# Patient Record
Sex: FEMALE | Race: BLACK OR AFRICAN AMERICAN | NOT HISPANIC OR LATINO | Employment: FULL TIME | URBAN - METROPOLITAN AREA
[De-identification: names, ages, dates, MRNs, and addresses within clinical notes are randomized per-mention and may not be internally consistent; named-entity substitution may affect disease eponyms.]

---

## 2017-04-05 ENCOUNTER — HOSPITAL ENCOUNTER (EMERGENCY)
Facility: HOSPITAL | Age: 32
Discharge: HOME | End: 2017-04-05
Attending: EMERGENCY MEDICINE | Admitting: EMERGENCY MEDICINE

## 2017-04-05 ENCOUNTER — APPOINTMENT (OUTPATIENT)
Dept: GENERAL RADIOLOGY | Facility: HOSPITAL | Age: 32
End: 2017-04-05

## 2017-04-05 VITALS
WEIGHT: 293 LBS | SYSTOLIC BLOOD PRESSURE: 165 MMHG | BODY MASS INDEX: 43.4 KG/M2 | RESPIRATION RATE: 16 BRPM | HEIGHT: 69 IN | HEART RATE: 85 BPM | OXYGEN SATURATION: 95 % | DIASTOLIC BLOOD PRESSURE: 91 MMHG | TEMPERATURE: 96.9 F

## 2017-04-05 DIAGNOSIS — F41.1 ANXIETY REACTION: Primary | ICD-10-CM

## 2017-04-05 DIAGNOSIS — I10 ESSENTIAL HYPERTENSION: ICD-10-CM

## 2017-04-05 LAB
ANION GAP SERPL CALCULATED.3IONS-SCNC: 13 MMOL/L
BASOPHILS # BLD AUTO: 0.01 10*3/MM3 (ref 0–0.2)
BASOPHILS NFR BLD AUTO: 0.1 % (ref 0–1.5)
BUN BLD-MCNC: 9 MG/DL (ref 6–20)
BUN/CREAT SERPL: 10.2 (ref 7–25)
CALCIUM SPEC-SCNC: 9.5 MG/DL (ref 8.6–10.5)
CHLORIDE SERPL-SCNC: 98 MMOL/L (ref 98–107)
CO2 SERPL-SCNC: 26 MMOL/L (ref 22–29)
CREAT BLD-MCNC: 0.88 MG/DL (ref 0.57–1)
DEPRECATED RDW RBC AUTO: 48.6 FL (ref 37–54)
EOSINOPHIL # BLD AUTO: 0.12 10*3/MM3 (ref 0–0.7)
EOSINOPHIL NFR BLD AUTO: 1 % (ref 0.3–6.2)
ERYTHROCYTE [DISTWIDTH] IN BLOOD BY AUTOMATED COUNT: 15.8 % (ref 11.7–13)
GFR SERPL CREATININE-BSD FRML MDRD: 92 ML/MIN/1.73
GLUCOSE BLD-MCNC: 240 MG/DL (ref 65–99)
HCT VFR BLD AUTO: 39.8 % (ref 35.6–45.5)
HGB BLD-MCNC: 12 G/DL (ref 11.9–15.5)
HOLD SPECIMEN: NORMAL
HOLD SPECIMEN: NORMAL
IMM GRANULOCYTES # BLD: 0.02 10*3/MM3 (ref 0–0.03)
IMM GRANULOCYTES NFR BLD: 0.2 % (ref 0–0.5)
LYMPHOCYTES # BLD AUTO: 2.35 10*3/MM3 (ref 0.9–4.8)
LYMPHOCYTES NFR BLD AUTO: 19.7 % (ref 19.6–45.3)
MCH RBC QN AUTO: 25.4 PG (ref 26.9–32)
MCHC RBC AUTO-ENTMCNC: 30.2 G/DL (ref 32.4–36.3)
MCV RBC AUTO: 84.1 FL (ref 80.5–98.2)
MONOCYTES # BLD AUTO: 0.63 10*3/MM3 (ref 0.2–1.2)
MONOCYTES NFR BLD AUTO: 5.3 % (ref 5–12)
NEUTROPHILS # BLD AUTO: 8.79 10*3/MM3 (ref 1.9–8.1)
NEUTROPHILS NFR BLD AUTO: 73.7 % (ref 42.7–76)
PLATELET # BLD AUTO: 328 10*3/MM3 (ref 140–500)
PMV BLD AUTO: 10.5 FL (ref 6–12)
POTASSIUM BLD-SCNC: 4.1 MMOL/L (ref 3.5–5.2)
RBC # BLD AUTO: 4.73 10*6/MM3 (ref 3.9–5.2)
SODIUM BLD-SCNC: 137 MMOL/L (ref 136–145)
WBC NRBC COR # BLD: 11.92 10*3/MM3 (ref 4.5–10.7)
WHOLE BLOOD HOLD SPECIMEN: NORMAL
WHOLE BLOOD HOLD SPECIMEN: NORMAL

## 2017-04-05 PROCEDURE — 99283 EMERGENCY DEPT VISIT LOW MDM: CPT

## 2017-04-05 PROCEDURE — 25010000002 ONDANSETRON PER 1 MG: Performed by: EMERGENCY MEDICINE

## 2017-04-05 PROCEDURE — 80048 BASIC METABOLIC PNL TOTAL CA: CPT | Performed by: EMERGENCY MEDICINE

## 2017-04-05 PROCEDURE — 71020 HC CHEST PA AND LATERAL: CPT

## 2017-04-05 PROCEDURE — 85025 COMPLETE CBC W/AUTO DIFF WBC: CPT | Performed by: EMERGENCY MEDICINE

## 2017-04-05 PROCEDURE — 96374 THER/PROPH/DIAG INJ IV PUSH: CPT

## 2017-04-05 RX ORDER — ONDANSETRON 2 MG/ML
4 INJECTION INTRAMUSCULAR; INTRAVENOUS ONCE
Status: COMPLETED | OUTPATIENT
Start: 2017-04-05 | End: 2017-04-05

## 2017-04-05 RX ORDER — SODIUM CHLORIDE 0.9 % (FLUSH) 0.9 %
10 SYRINGE (ML) INJECTION AS NEEDED
Status: DISCONTINUED | OUTPATIENT
Start: 2017-04-05 | End: 2017-04-05 | Stop reason: HOSPADM

## 2017-04-05 RX ORDER — ONDANSETRON HYDROCHLORIDE 8 MG/1
8 TABLET, FILM COATED ORAL EVERY 8 HOURS PRN
Qty: 10 TABLET | Refills: 0 | Status: SHIPPED | OUTPATIENT
Start: 2017-04-05 | End: 2019-04-22 | Stop reason: ALTCHOICE

## 2017-04-05 RX ADMIN — ONDANSETRON 4 MG: 2 INJECTION INTRAMUSCULAR; INTRAVENOUS at 15:16

## 2017-04-05 NOTE — ED PROVIDER NOTES
" EMERGENCY DEPARTMENT ENCOUNTER    CHIEF COMPLAINT  Chief Complaint: Dizziness, vomiting  History given by: Pt  History limited by: N/A  Room Number: 09/09  PMD: BOB Luis      HPI:  Pt is a 31 y.o. female who presents complaining of dizziness and vomiting x3 onset 10:30 AM today while at work. Pt also c/o facial tingling, nausea, tremors, moderate headache, and occasionally productive cough w/ brown/green sputum for 2 months for which she has been seen by her PCP. She also states \"it felt like I was stuck\" and that she has experienced similar, but less severe, episodes in the last due to panic attacks. Pt denies recent fever, chest pain, SOA, abd pain, diarrhea, black/bloody stool, and recent weight changes. Her LMP was the 11th of March. Pt smokes less than 1/2 pack per day and occasionally drinks alcohol, but none recently. Pt reports THC use several days ago, but denies other illicit drug use. She is on two blood pressure medications, one of which she started yesterday. Pt was also started on Bactrim and Cymbalta yesterday. Pt reports ill contacts at work recently.    Duration: 3 hours  Onset: Gradual  Timing: Constant  Location: Neurological  Radiation: None  Quality: Dizziness  Intensity/Severity: Moderate  Progression: Unchanged  Associated Symptoms: Vomiting, nausea, facial tingling, tremors, moderate headache, and occasionally productive cough for 2 months  Aggravating Factors: Nothing  Alleviating Factors: Nothing  Previous Episodes: None specified  Treatment before arrival: None specified    PAST MEDICAL HISTORY  Active Ambulatory Problems     Diagnosis Date Noted   • No Active Ambulatory Problems     Resolved Ambulatory Problems     Diagnosis Date Noted   • No Resolved Ambulatory Problems     Past Medical History:   Diagnosis Date   • Diabetes mellitus    • Hypertension        PAST SURGICAL HISTORY  History reviewed. No pertinent surgical history.    FAMILY HISTORY  History reviewed. No " pertinent family history.    SOCIAL HISTORY  Social History     Social History   • Marital status:      Spouse name: N/A   • Number of children: N/A   • Years of education: N/A     Occupational History   • Not on file.     Social History Main Topics   • Smoking status: Current Some Day Smoker   • Smokeless tobacco: Not on file   • Alcohol use Yes   • Drug use: No   • Sexual activity: Not on file     Other Topics Concern   • Not on file     Social History Narrative   • No narrative on file       ALLERGIES  Latex    REVIEW OF SYSTEMS  Review of Systems   Constitutional: Negative.  Negative for fever and unexpected weight change.   Respiratory: Positive for cough (occasionally productive for 2 months). Negative for shortness of breath.    Cardiovascular: Negative.  Negative for chest pain.   Gastrointestinal: Positive for nausea and vomiting. Negative for abdominal pain, blood in stool and diarrhea.   Genitourinary: Negative.  Negative for dysuria.   Neurological: Positive for dizziness, tremors, numbness (facial) and headaches (moderate).   Psychiatric/Behavioral: The patient is nervous/anxious.    All other systems reviewed and are negative.      PHYSICAL EXAM  ED Triage Vitals   Temp Heart Rate Resp BP SpO2   04/05/17 1302 04/05/17 1302 04/05/17 1302 04/05/17 1302 04/05/17 1302   99 °F (37.2 °C) 92 20 166/99 100 %      Temp src Heart Rate Source Patient Position BP Location FiO2 (%)   -- -- -- -- --              Physical Exam   Constitutional: She is oriented to person, place, and time and well-developed, well-nourished, and in no distress. No distress.   HENT:   Head: Normocephalic and atraumatic.   Eyes: EOM are normal. Pupils are equal, round, and reactive to light.   Neck: Normal range of motion. Neck supple.   Cardiovascular: Normal rate, regular rhythm and normal heart sounds.    No murmur heard.  Rate 87   Pulmonary/Chest: Effort normal. No respiratory distress. She has decreased breath sounds  (bilaterally).   Sats 98% on room air. Respirations unlabored   Abdominal: Soft. There is no tenderness. There is no rebound and no guarding.   Musculoskeletal: Normal range of motion. She exhibits no edema.   Neurological: She is alert and oriented to person, place, and time. She has normal sensation and normal strength.   Skin: Skin is warm and dry. No rash noted.   Psychiatric: Mood and affect normal.   Nursing note and vitals reviewed.      LAB RESULTS  Lab Results (last 24 hours)     Procedure Component Value Units Date/Time    CBC & Differential [[id number]] Collected:  04/05/17 1323    Specimen:  Blood Updated:  04/05/17 1407    Narrative:       The following orders were created for panel order CBC & Differential.  Procedure                               Abnormality         Status                     ---------                               -----------         ------                     CBC Auto Differential[[id number]]         Abnormal            Final result                 Please view results for these tests on the individual orders.    Basic Metabolic Panel [[id number]]  (Abnormal) Collected:  04/05/17 1323    Specimen:  Blood Updated:  04/05/17 1419     Glucose 240 (H) mg/dL      BUN 9 mg/dL      Creatinine 0.88 mg/dL      Sodium 137 mmol/L      Potassium 4.1 mmol/L      Chloride 98 mmol/L      CO2 26.0 mmol/L      Calcium 9.5 mg/dL      eGFR  African Amer 92 mL/min/1.73      BUN/Creatinine Ratio 10.2     Anion Gap 13.0 mmol/L     Narrative:       GFR Normal >60  Chronic Kidney Disease <60  Kidney Failure <15    CBC Auto Differential [[id number]]  (Abnormal) Collected:  04/05/17 1323    Specimen:  Blood Updated:  04/05/17 1407     WBC 11.92 (H) 10*3/mm3      RBC 4.73 10*6/mm3      Hemoglobin 12.0 g/dL      Hematocrit 39.8 %      MCV 84.1 fL      MCH 25.4 (L) pg      MCHC 30.2 (L) g/dL      RDW 15.8 (H) %      RDW-SD 48.6 fl      MPV 10.5 fL      Platelets 328 10*3/mm3      Neutrophil % 73.7 %      Lymphocyte %  "19.7 %      Monocyte % 5.3 %      Eosinophil % 1.0 %      Basophil % 0.1 %      Immature Grans % 0.2 %      Neutrophils, Absolute 8.79 (H) 10*3/mm3      Lymphocytes, Absolute 2.35 10*3/mm3      Monocytes, Absolute 0.63 10*3/mm3      Eosinophils, Absolute 0.12 10*3/mm3      Basophils, Absolute 0.01 10*3/mm3      Immature Grans, Absolute 0.02 10*3/mm3           I ordered the above labs and reviewed the results    RADIOLOGY  XR Chest 2 View   Final Result      CXR:  Cardiomegaly.  NAD.    I ordered the above noted radiological studies. Interpreted by radiologist. Discussed with radiologist. Reviewed by me in PACS.       PROCEDURES  Procedures      PROGRESS AND CONSULTS  ED Course   Comment By Time   1:45 PM  32 yo with morbid obesity and HTN presents with possible \"panic attack\" at work around 10am.  Felt \"weird\" and dizzy.  Nausea and shaking all over.  Pt started yesterday on new BP med and Cymbalta by PCP.  Exam is unremarkable and neuro exam is normal. Tadeo Jack MD 04/05 1346   1:30 PM:  Vitals: BP: 166/99 HR: 92 Temp: 99 °F (37.2 °C) O2 sat: 100%  D/w pt plan for labs and CXR for further evaluation. Pt understands and agrees with the plan, all questions answered.    2:51 PM:  Vitals: BP: 162/97 HR: 90 Temp: 99 °F (37.2 °C) O2 sat: 96%  Rechecked pt. Pt is resting comfortably. Discussed results of labs, which were unremarkable, and that the pt should be taken for CXR shortly. Advised the pt to monitor her blood pressure over the next week and to f/u with her PCP for further care. Pt understands and agrees with the plan, all questions answered.      MEDICAL DECISION MAKING  Results were reviewed/discussed with the patient and they were also made aware of online access. Pt also made aware that some labs, such as cultures, will not be resulted during ER visit and follow up with PMD is necessary.     MDM  Number of Diagnoses or Management Options     Amount and/or Complexity of Data Reviewed  Clinical lab tests: " reviewed and ordered (Glucose 240, WBC 11.92)  Tests in the radiology section of CPT®: reviewed and ordered  Discussion of test results with the performing providers: yes  Independent visualization of images, tracings, or specimens: yes    Patient Progress  Patient progress: stable         DIAGNOSIS  Final diagnoses:   Anxiety reaction   Essential hypertension       DISPOSITION  DISCHARGE    Patient discharged in stable condition.    Reviewed implications of results, diagnosis, meds, responsibility to follow up, warning signs and symptoms of possible worsening, potential complications and reasons to return to ER, including any new or worsening symptoms.    Patient/Family voiced understanding of above instructions.    Discussed plan for discharge, as there is no emergent indication for admission.  Pt/family is agreeable and understands need for follow up and repeat testing.  Pt is aware that discharge does not mean that nothing is wrong but it indicates no emergency is present that requires admission and they must continue care with follow-up as given below or physician of their choice.     FOLLOW-UP  Aditi Daniels, APRN  [address] [facility] [address]  [place] [id number]  [phone]    In 3 days  If Not Better         Medication List      New Prescriptions          ondansetron 8 MG tablet   Commonly known as:  ZOFRAN   Take 1 tablet by mouth Every 8 (Eight) Hours As Needed for Nausea or   Vomiting.           Latest Documented Vital Signs:  As of 3:47 PM  BP- (!) 173/111 HR- 90 Temp- 99 °F (37.2 °C) O2 sat- 96%    --  Documentation assistance provided by galian Real for Dr. Jack.  Information recorded by the scribe was done at my direction and has been verified and validated by me.     Dipesh Real  04/05/17 [id number]       Tadeo Jack MD  04/05/17 1353

## 2017-04-05 NOTE — ED NOTES
Pt reports nausea, vomiting, weakness, malaise since 10am today.      Annalisa So RN  04/05/17 6334

## 2019-04-16 ENCOUNTER — LAB REQUISITION (OUTPATIENT)
Dept: LAB | Facility: HOSPITAL | Age: 34
End: 2019-04-16

## 2019-04-16 DIAGNOSIS — Z00.00 ROUTINE GENERAL MEDICAL EXAMINATION AT A HEALTH CARE FACILITY: ICD-10-CM

## 2019-04-16 LAB — HCG INTACT+B SERPL-ACNC: 938.4 MIU/ML

## 2019-04-16 PROCEDURE — 84702 CHORIONIC GONADOTROPIN TEST: CPT | Performed by: INTERNAL MEDICINE

## 2019-04-22 ENCOUNTER — APPOINTMENT (OUTPATIENT)
Dept: ULTRASOUND IMAGING | Facility: HOSPITAL | Age: 34
End: 2019-04-22

## 2019-04-22 ENCOUNTER — HOSPITAL ENCOUNTER (EMERGENCY)
Facility: HOSPITAL | Age: 34
Discharge: HOME | End: 2019-04-23
Attending: EMERGENCY MEDICINE | Admitting: EMERGENCY MEDICINE

## 2019-04-22 DIAGNOSIS — Z34.91 FIRST TRIMESTER PREGNANCY: ICD-10-CM

## 2019-04-22 DIAGNOSIS — I10 HYPERTENSION, UNSPECIFIED TYPE: Primary | ICD-10-CM

## 2019-04-22 LAB
BASOPHILS # BLD AUTO: 0.01 10*3/MM3 (ref 0–0.2)
BASOPHILS NFR BLD AUTO: 0.1 % (ref 0–1.5)
DEPRECATED RDW RBC AUTO: 49.4 FL (ref 37–54)
EOSINOPHIL # BLD AUTO: 0.14 10*3/MM3 (ref 0–0.4)
EOSINOPHIL NFR BLD AUTO: 1.2 % (ref 0.3–6.2)
ERYTHROCYTE [DISTWIDTH] IN BLOOD BY AUTOMATED COUNT: 16 % (ref 12.3–15.4)
HCT VFR BLD AUTO: 37.7 % (ref 34–46.6)
HGB BLD-MCNC: 11.3 G/DL (ref 12–15.9)
HOLD SPECIMEN: NORMAL
HOLD SPECIMEN: NORMAL
IMM GRANULOCYTES # BLD AUTO: 0.06 10*3/MM3 (ref 0–0.05)
IMM GRANULOCYTES NFR BLD AUTO: 0.5 % (ref 0–0.5)
LYMPHOCYTES # BLD AUTO: 4.07 10*3/MM3 (ref 0.7–3.1)
LYMPHOCYTES NFR BLD AUTO: 35.1 % (ref 19.6–45.3)
MCH RBC QN AUTO: 25.6 PG (ref 26.6–33)
MCHC RBC AUTO-ENTMCNC: 30 G/DL (ref 31.5–35.7)
MCV RBC AUTO: 85.3 FL (ref 79–97)
MONOCYTES # BLD AUTO: 0.62 10*3/MM3 (ref 0.1–0.9)
MONOCYTES NFR BLD AUTO: 5.4 % (ref 5–12)
NEUTROPHILS # BLD AUTO: 6.74 10*3/MM3 (ref 1.7–7)
NEUTROPHILS NFR BLD AUTO: 58.2 % (ref 42.7–76)
PLATELET # BLD AUTO: 386 10*3/MM3 (ref 140–450)
PMV BLD AUTO: 10.6 FL (ref 6–12)
RBC # BLD AUTO: 4.42 10*6/MM3 (ref 3.77–5.28)
WBC NRBC COR # BLD: 11.58 10*3/MM3 (ref 3.4–10.8)
WHOLE BLOOD HOLD SPECIMEN: NORMAL
WHOLE BLOOD HOLD SPECIMEN: NORMAL

## 2019-04-22 PROCEDURE — 84702 CHORIONIC GONADOTROPIN TEST: CPT | Performed by: EMERGENCY MEDICINE

## 2019-04-22 PROCEDURE — 85025 COMPLETE CBC W/AUTO DIFF WBC: CPT | Performed by: EMERGENCY MEDICINE

## 2019-04-22 PROCEDURE — 80053 COMPREHEN METABOLIC PANEL: CPT | Performed by: EMERGENCY MEDICINE

## 2019-04-22 PROCEDURE — 76817 TRANSVAGINAL US OBSTETRIC: CPT

## 2019-04-22 PROCEDURE — 99283 EMERGENCY DEPT VISIT LOW MDM: CPT

## 2019-04-22 RX ORDER — LABETALOL 100 MG/1
200 TABLET, FILM COATED ORAL 2 TIMES DAILY
COMMUNITY
End: 2022-02-19 | Stop reason: HOSPADM

## 2019-04-22 RX ORDER — HYDROCHLOROTHIAZIDE 25 MG/1
25 TABLET ORAL DAILY
COMMUNITY
End: 2019-05-02

## 2019-04-22 RX ORDER — SODIUM CHLORIDE 0.9 % (FLUSH) 0.9 %
10 SYRINGE (ML) INJECTION AS NEEDED
Status: DISCONTINUED | OUTPATIENT
Start: 2019-04-22 | End: 2019-04-23 | Stop reason: HOSPADM

## 2019-04-22 RX ORDER — GLIPIZIDE 5 MG/1
5 TABLET ORAL
COMMUNITY
End: 2019-05-02

## 2019-04-23 VITALS
OXYGEN SATURATION: 98 % | SYSTOLIC BLOOD PRESSURE: 164 MMHG | RESPIRATION RATE: 18 BRPM | HEART RATE: 90 BPM | DIASTOLIC BLOOD PRESSURE: 91 MMHG | BODY MASS INDEX: 43.4 KG/M2 | HEIGHT: 69 IN | TEMPERATURE: 99.5 F | WEIGHT: 293 LBS

## 2019-04-23 LAB
ALBUMIN SERPL-MCNC: 4.1 G/DL (ref 3.5–5.2)
ALBUMIN/GLOB SERPL: 1.1 G/DL
ALP SERPL-CCNC: 91 U/L (ref 39–117)
ALT SERPL W P-5'-P-CCNC: 18 U/L (ref 1–33)
ANION GAP SERPL CALCULATED.3IONS-SCNC: 16 MMOL/L
AST SERPL-CCNC: 17 U/L (ref 1–32)
BILIRUB SERPL-MCNC: 0.2 MG/DL (ref 0.2–1.2)
BILIRUB UR QL STRIP: NEGATIVE
BUN BLD-MCNC: 10 MG/DL (ref 6–20)
BUN/CREAT SERPL: 12.8 (ref 7–25)
CALCIUM SPEC-SCNC: 9.7 MG/DL (ref 8.6–10.5)
CHLORIDE SERPL-SCNC: 96 MMOL/L (ref 98–107)
CLARITY UR: CLEAR
CO2 SERPL-SCNC: 24 MMOL/L (ref 22–29)
COLOR UR: YELLOW
CREAT BLD-MCNC: 0.78 MG/DL (ref 0.57–1)
GFR SERPL CREATININE-BSD FRML MDRD: 104 ML/MIN/1.73
GLOBULIN UR ELPH-MCNC: 3.8 GM/DL
GLUCOSE BLD-MCNC: 256 MG/DL (ref 65–99)
GLUCOSE UR STRIP-MCNC: ABNORMAL MG/DL
HCG INTACT+B SERPL-ACNC: 6257 MIU/ML
HGB UR QL STRIP.AUTO: NEGATIVE
KETONES UR QL STRIP: ABNORMAL
LEUKOCYTE ESTERASE UR QL STRIP.AUTO: NEGATIVE
NITRITE UR QL STRIP: NEGATIVE
PH UR STRIP.AUTO: 6 [PH] (ref 5–8)
POTASSIUM BLD-SCNC: 3.9 MMOL/L (ref 3.5–5.2)
PROT SERPL-MCNC: 7.9 G/DL (ref 6–8.5)
PROT UR QL STRIP: NEGATIVE
SODIUM BLD-SCNC: 136 MMOL/L (ref 136–145)
SP GR UR STRIP: 1.03 (ref 1–1.03)
UROBILINOGEN UR QL STRIP: ABNORMAL

## 2019-04-23 PROCEDURE — 81003 URINALYSIS AUTO W/O SCOPE: CPT | Performed by: EMERGENCY MEDICINE

## 2019-04-23 PROCEDURE — 93005 ELECTROCARDIOGRAM TRACING: CPT | Performed by: EMERGENCY MEDICINE

## 2019-05-01 ENCOUNTER — INITIAL PRENATAL (OUTPATIENT)
Dept: OBSTETRICS AND GYNECOLOGY | Facility: CLINIC | Age: 34
End: 2019-05-01

## 2019-05-01 ENCOUNTER — LAB REQUISITION (OUTPATIENT)
Dept: LAB | Facility: HOSPITAL | Age: 34
End: 2019-05-01

## 2019-05-01 VITALS
HEIGHT: 67 IN | SYSTOLIC BLOOD PRESSURE: 144 MMHG | WEIGHT: 293 LBS | BODY MASS INDEX: 45.99 KG/M2 | DIASTOLIC BLOOD PRESSURE: 90 MMHG

## 2019-05-01 DIAGNOSIS — O16.9 HYPERTENSION AFFECTING PREGNANCY, ANTEPARTUM: ICD-10-CM

## 2019-05-01 DIAGNOSIS — O99.210 OBESITY AFFECTING PREGNANCY, ANTEPARTUM: ICD-10-CM

## 2019-05-01 DIAGNOSIS — O24.119 PRE-EXISTING TYPE 2 DIABETES MELLITUS DURING PREGNANCY, ANTEPARTUM: ICD-10-CM

## 2019-05-01 DIAGNOSIS — O34.219 PREVIOUS CESAREAN DELIVERY AFFECTING PREGNANCY: Primary | ICD-10-CM

## 2019-05-01 DIAGNOSIS — Z00.00 ROUTINE GENERAL MEDICAL EXAMINATION AT A HEALTH CARE FACILITY: ICD-10-CM

## 2019-05-01 LAB
EXTERNAL ABO GROUPING: (no result)
EXTERNAL ANTIBODY SCREEN: NORMAL
EXTERNAL HEMATOCRIT: 39 %
EXTERNAL HEMOGLOBIN: 12 G/DL
EXTERNAL HEPATITIS B SURFACE ANTIGEN: NEGATIVE
EXTERNAL PLATELET COUNT: 347 K/ΜL
EXTERNAL RH FACTOR: POSITIVE
EXTERNAL SYPHILIS RPR SCREEN: (no result)
HIV 1+2 AB+HIV1 P24 AG SERPL QL IA: NORMAL
RUBV IGG SERPL IA-ACNC: (no result)

## 2019-05-01 PROCEDURE — 36415 COLL VENOUS BLD VENIPUNCTURE: CPT | Performed by: OBSTETRICS & GYNECOLOGY

## 2019-05-01 PROCEDURE — 0501F PRENATAL FLOW SHEET: CPT | Performed by: OBSTETRICS & GYNECOLOGY

## 2019-05-02 ENCOUNTER — ROUTINE PRENATAL (OUTPATIENT)
Dept: OBSTETRICS AND GYNECOLOGY | Facility: CLINIC | Age: 34
End: 2019-05-02

## 2019-05-02 VITALS — WEIGHT: 293 LBS | BODY MASS INDEX: 67.82 KG/M2 | SYSTOLIC BLOOD PRESSURE: 132 MMHG | DIASTOLIC BLOOD PRESSURE: 76 MMHG

## 2019-05-02 DIAGNOSIS — O99.210 OBESITY AFFECTING PREGNANCY, ANTEPARTUM: ICD-10-CM

## 2019-05-02 DIAGNOSIS — O24.119 PRE-EXISTING TYPE 2 DIABETES MELLITUS DURING PREGNANCY, ANTEPARTUM: ICD-10-CM

## 2019-05-02 DIAGNOSIS — O16.9 HYPERTENSION AFFECTING PREGNANCY, ANTEPARTUM: ICD-10-CM

## 2019-05-02 DIAGNOSIS — O34.219 PREVIOUS CESAREAN DELIVERY AFFECTING PREGNANCY: Primary | ICD-10-CM

## 2019-05-02 RX ORDER — BLOOD-GLUCOSE METER
1 KIT MISCELLANEOUS AS NEEDED
Qty: 1 EACH | Refills: 1 | Status: SHIPPED | OUTPATIENT
Start: 2019-05-02 | End: 2020-05-01

## 2019-05-06 ENCOUNTER — RESULTS ENCOUNTER (OUTPATIENT)
Dept: OBSTETRICS AND GYNECOLOGY | Facility: CLINIC | Age: 34
End: 2019-05-06

## 2019-05-06 ENCOUNTER — TELEPHONE (OUTPATIENT)
Dept: OBSTETRICS AND GYNECOLOGY | Facility: CLINIC | Age: 34
End: 2019-05-06

## 2019-05-06 DIAGNOSIS — O99.210 OBESITY AFFECTING PREGNANCY, ANTEPARTUM: ICD-10-CM

## 2019-05-06 DIAGNOSIS — O34.219 PREVIOUS CESAREAN DELIVERY AFFECTING PREGNANCY: ICD-10-CM

## 2019-05-06 DIAGNOSIS — O24.119 PRE-EXISTING TYPE 2 DIABETES MELLITUS DURING PREGNANCY, ANTEPARTUM: ICD-10-CM

## 2019-05-06 DIAGNOSIS — O16.9 HYPERTENSION AFFECTING PREGNANCY, ANTEPARTUM: ICD-10-CM

## 2019-05-06 NOTE — TELEPHONE ENCOUNTER
Daniel from [facility] pharmacy -  They received a script last week for a  Freestyle monitoring kit - he is asking how many times she is testing?

## 2019-05-12 NOTE — PROGRESS NOTES
"Subjective   Edd Leung is a 33 y.o. female being seen today for her first obstetrical visit. She is at 8w3d gestation. Patient reports fatigue and pelvic cramping. Fetal movement: None to date.    Menstrual History:  OB History      Para Term  AB Living    2 1 1     1    SAB TAB Ectopic Molar Multiple Live Births              1         Menarche age: 13  Patient's last menstrual period was 2019 (exact date).       The following portions of the patient's history were reviewed and updated as appropriate: allergies, current medications, past family history, past medical history, past social history, past surgical history and problem list.    Review of Systems  A 14 point review of systems was negative except for: Constitutional: positive for fatigue  Genitourinary: positive for Pelvic cramping     Objective     /90   Ht 170.2 cm (67\")   Wt (!) 198 kg (436 lb)   LMP 2019 (Exact Date)   BMI 68.29 kg/m²   Uterine Size: Appropriate   Pelvic Exam:           Perineum: is normal                 Vulva: normal and Bartholin's, Urethra, Stonewood's normal              Vagina:  normal mucosa and normal discharge               Cervix: no cervical motion tenderness               Uterus: normal size, non-tender and normal shape and consistency              Adnexa: normal adnexa and no mass, fullness, tenderness      Bony Pelvis: gynecoid        Assessment     IUP at 8w3d  Pelvic cramping  Uncertain LMP      Plan     Problem list reviewed and updated.  Labs and ultrasound ordered.  AFP3 discussed: undecided.  Role of ultrasound in pregnancy discussed; fetal survey: ordered.  Amniocentesis discussed: not indicated.  Follow up in 1 weeks.  Time 20 minutes.       Guzman Peñaloza MD RAKESH    "

## 2019-05-19 NOTE — PROGRESS NOTES
Chief Complaint   Patient presents with   • Routine Prenatal Visit     No complaints       HPI: Edd is a  currently at 9w1d who today reports the following:  Nausea - No; Vaginal bleeding -  No; Heartburn - No.    ROS:  GI: Constipation - No; Diarrhea - No    Neuro: Headache - No; Visual change - No      EXAM:  Vitals: See prenatal flowsheet   Abdomen: See prenatal flowsheet   Urine glucose/protein: See prenatal flowsheet   Pelvic: See prenatal flowsheet     Prenatal Labs  Lab Results   Component Value Date    HGB 11.3 (L) 2019           MDM:  Impression: 1. Supervision of high risk pregnancy  2. DM - Pregestational  3. Previous C/S with route of delivery to be determined  4. Morbid obesity   Tests done today: 1. U/S for Dating   Topics discussed: 1. Continue with PNV's  2. Prenatal labs reviewed  3. Discussed plans for monitoring glucose levels.  Glucometer ordered.   Tests next visit: none   Next visit: See prenatal flowsheet

## 2019-05-20 ENCOUNTER — ROUTINE PRENATAL (OUTPATIENT)
Dept: OBSTETRICS AND GYNECOLOGY | Facility: CLINIC | Age: 34
End: 2019-05-20

## 2019-05-20 VITALS — WEIGHT: 293 LBS | DIASTOLIC BLOOD PRESSURE: 90 MMHG | BODY MASS INDEX: 68.48 KG/M2 | SYSTOLIC BLOOD PRESSURE: 130 MMHG

## 2019-05-20 DIAGNOSIS — O24.119 PRE-EXISTING TYPE 2 DIABETES MELLITUS DURING PREGNANCY, ANTEPARTUM: ICD-10-CM

## 2019-05-20 DIAGNOSIS — O34.219 PREVIOUS CESAREAN DELIVERY AFFECTING PREGNANCY: Primary | ICD-10-CM

## 2019-05-20 DIAGNOSIS — O16.9 HYPERTENSION AFFECTING PREGNANCY, ANTEPARTUM: ICD-10-CM

## 2019-05-20 DIAGNOSIS — O99.210 OBESITY AFFECTING PREGNANCY, ANTEPARTUM: ICD-10-CM

## 2019-05-20 LAB
AMPHET+METHAMPHET UR QL: NEGATIVE
AMPHETAMINES UR QL: NEGATIVE
BARBITURATES UR QL SCN: NEGATIVE
BENZODIAZ UR QL SCN: NEGATIVE
BUPRENORPHINE SERPL-MCNC: NEGATIVE NG/ML
CANNABINOIDS SERPL QL: POSITIVE
COCAINE UR QL: NEGATIVE
METHADONE UR QL SCN: NEGATIVE
OPIATES UR QL: NEGATIVE
OXYCODONE UR QL SCN: NEGATIVE
PCP UR QL SCN: NEGATIVE
PROPOXYPH UR QL: NEGATIVE
TRICYCLICS UR QL SCN: NEGATIVE

## 2019-05-20 PROCEDURE — 80306 DRUG TEST PRSMV INSTRMNT: CPT | Performed by: OBSTETRICS & GYNECOLOGY

## 2019-05-20 PROCEDURE — 0502F SUBSEQUENT PRENATAL CARE: CPT | Performed by: OBSTETRICS & GYNECOLOGY

## 2019-05-20 RX ORDER — DOCUSATE SODIUM 100 MG/1
100 CAPSULE, LIQUID FILLED ORAL 2 TIMES DAILY
Qty: 60 CAPSULE | Refills: 12 | Status: SHIPPED | OUTPATIENT
Start: 2019-05-20 | End: 2020-05-19

## 2020-09-02 ENCOUNTER — APPOINTMENT (OUTPATIENT)
Dept: GENERAL RADIOLOGY | Facility: HOSPITAL | Age: 35
End: 2020-09-02

## 2020-09-02 ENCOUNTER — HOSPITAL ENCOUNTER (EMERGENCY)
Facility: HOSPITAL | Age: 35
Discharge: HOME | End: 2020-09-03
Attending: EMERGENCY MEDICINE | Admitting: EMERGENCY MEDICINE

## 2020-09-02 VITALS
HEART RATE: 58 BPM | DIASTOLIC BLOOD PRESSURE: 97 MMHG | TEMPERATURE: 98.3 F | BODY MASS INDEX: 43.4 KG/M2 | RESPIRATION RATE: 16 BRPM | WEIGHT: 293 LBS | SYSTOLIC BLOOD PRESSURE: 125 MMHG | OXYGEN SATURATION: 98 % | HEIGHT: 69 IN

## 2020-09-02 DIAGNOSIS — F41.9 ANXIETY: ICD-10-CM

## 2020-09-02 DIAGNOSIS — R07.9 NONSPECIFIC CHEST PAIN: Primary | ICD-10-CM

## 2020-09-02 LAB
ALBUMIN SERPL-MCNC: 4.5 G/DL (ref 3.5–5.2)
ALBUMIN/GLOB SERPL: 1.2 G/DL
ALP SERPL-CCNC: 103 U/L (ref 39–117)
ALT SERPL W P-5'-P-CCNC: 21 U/L (ref 1–33)
ANION GAP SERPL CALCULATED.3IONS-SCNC: 11 MMOL/L (ref 5–15)
AST SERPL-CCNC: 23 U/L (ref 1–32)
B-HCG UR QL: NEGATIVE
BASOPHILS # BLD AUTO: 0.03 10*3/MM3 (ref 0–0.2)
BASOPHILS NFR BLD AUTO: 0.3 % (ref 0–1.5)
BILIRUB SERPL-MCNC: 0.3 MG/DL (ref 0–1.2)
BUN SERPL-MCNC: 10 MG/DL (ref 6–20)
BUN/CREAT SERPL: 11.9 (ref 7–25)
CALCIUM SPEC-SCNC: 9.6 MG/DL (ref 8.6–10.5)
CHLORIDE SERPL-SCNC: 98 MMOL/L (ref 98–107)
CO2 SERPL-SCNC: 28 MMOL/L (ref 22–29)
CREAT SERPL-MCNC: 0.84 MG/DL (ref 0.57–1)
DEPRECATED RDW RBC AUTO: 49.8 FL (ref 37–54)
EOSINOPHIL # BLD AUTO: 0.18 10*3/MM3 (ref 0–0.4)
EOSINOPHIL NFR BLD AUTO: 1.7 % (ref 0.3–6.2)
ERYTHROCYTE [DISTWIDTH] IN BLOOD BY AUTOMATED COUNT: 16.3 % (ref 12.3–15.4)
GFR SERPL CREATININE-BSD FRML MDRD: 95 ML/MIN/1.73
GLOBULIN UR ELPH-MCNC: 3.8 GM/DL
GLUCOSE SERPL-MCNC: 190 MG/DL (ref 65–99)
HCT VFR BLD AUTO: 43.6 % (ref 34–46.6)
HGB BLD-MCNC: 12.6 G/DL (ref 12–15.9)
HOLD SPECIMEN: NORMAL
HOLD SPECIMEN: NORMAL
IMM GRANULOCYTES # BLD AUTO: 0.02 10*3/MM3 (ref 0–0.05)
IMM GRANULOCYTES NFR BLD AUTO: 0.2 % (ref 0–0.5)
INTERNAL NEGATIVE CONTROL: NEGATIVE
INTERNAL POSITIVE CONTROL: POSITIVE
LIPASE SERPL-CCNC: 46 U/L (ref 13–60)
LYMPHOCYTES # BLD AUTO: 4.59 10*3/MM3 (ref 0.7–3.1)
LYMPHOCYTES NFR BLD AUTO: 42.2 % (ref 19.6–45.3)
Lab: NORMAL
MCH RBC QN AUTO: 24 PG (ref 26.6–33)
MCHC RBC AUTO-ENTMCNC: 28.9 G/DL (ref 31.5–35.7)
MCV RBC AUTO: 83 FL (ref 79–97)
MONOCYTES # BLD AUTO: 0.62 10*3/MM3 (ref 0.1–0.9)
MONOCYTES NFR BLD AUTO: 5.7 % (ref 5–12)
NEUTROPHILS NFR BLD AUTO: 49.9 % (ref 42.7–76)
NEUTROPHILS NFR BLD AUTO: 5.43 10*3/MM3 (ref 1.7–7)
NRBC BLD AUTO-RTO: 0 /100 WBC (ref 0–0.2)
NT-PROBNP SERPL-MCNC: 18.1 PG/ML (ref 0–450)
PLATELET # BLD AUTO: 385 10*3/MM3 (ref 140–450)
PMV BLD AUTO: 9.9 FL (ref 6–12)
POTASSIUM SERPL-SCNC: 3.9 MMOL/L (ref 3.5–5.2)
PROT SERPL-MCNC: 8.3 G/DL (ref 6–8.5)
RBC # BLD AUTO: 5.25 10*6/MM3 (ref 3.77–5.28)
SODIUM SERPL-SCNC: 137 MMOL/L (ref 136–145)
TROPONIN T SERPL-MCNC: <0.01 NG/ML (ref 0–0.03)
TROPONIN T SERPL-MCNC: <0.01 NG/ML (ref 0–0.03)
WBC # BLD AUTO: 10.87 10*3/MM3 (ref 3.4–10.8)
WHOLE BLOOD HOLD SPECIMEN: NORMAL
WHOLE BLOOD HOLD SPECIMEN: NORMAL

## 2020-09-02 PROCEDURE — 81025 URINE PREGNANCY TEST: CPT | Performed by: EMERGENCY MEDICINE

## 2020-09-02 PROCEDURE — 83880 ASSAY OF NATRIURETIC PEPTIDE: CPT | Performed by: EMERGENCY MEDICINE

## 2020-09-02 PROCEDURE — 93005 ELECTROCARDIOGRAM TRACING: CPT | Performed by: EMERGENCY MEDICINE

## 2020-09-02 PROCEDURE — 71045 X-RAY EXAM CHEST 1 VIEW: CPT

## 2020-09-02 PROCEDURE — 84484 ASSAY OF TROPONIN QUANT: CPT | Performed by: PHYSICIAN ASSISTANT

## 2020-09-02 PROCEDURE — 84484 ASSAY OF TROPONIN QUANT: CPT | Performed by: EMERGENCY MEDICINE

## 2020-09-02 PROCEDURE — 99284 EMERGENCY DEPT VISIT MOD MDM: CPT

## 2020-09-02 PROCEDURE — 80053 COMPREHEN METABOLIC PANEL: CPT | Performed by: EMERGENCY MEDICINE

## 2020-09-02 PROCEDURE — 83690 ASSAY OF LIPASE: CPT | Performed by: EMERGENCY MEDICINE

## 2020-09-02 PROCEDURE — 85025 COMPLETE CBC W/AUTO DIFF WBC: CPT | Performed by: EMERGENCY MEDICINE

## 2020-09-02 RX ORDER — GLIPIZIDE 10 MG/1
10 TABLET, FILM COATED, EXTENDED RELEASE ORAL DAILY
COMMUNITY
End: 2021-12-13 | Stop reason: HOSPADM

## 2020-09-02 RX ORDER — SODIUM CHLORIDE 0.9 % (FLUSH) 0.9 %
10 SYRINGE (ML) INJECTION AS NEEDED
Status: DISCONTINUED | OUTPATIENT
Start: 2020-09-02 | End: 2020-09-03 | Stop reason: HOSPADM

## 2020-09-02 RX ORDER — HYDROCHLOROTHIAZIDE 25 MG/1
25 TABLET ORAL DAILY
COMMUNITY
End: 2021-12-13 | Stop reason: HOSPADM

## 2020-09-02 RX ORDER — IBUPROFEN 800 MG/1
800 TABLET ORAL ONCE
Status: COMPLETED | OUTPATIENT
Start: 2020-09-02 | End: 2020-09-02

## 2020-09-02 RX ORDER — ASPIRIN 81 MG/1
324 TABLET, CHEWABLE ORAL ONCE
Status: DISCONTINUED | OUTPATIENT
Start: 2020-09-02 | End: 2020-09-03 | Stop reason: HOSPADM

## 2020-09-02 RX ADMIN — IBUPROFEN 800 MG: 800 TABLET, FILM COATED ORAL at 21:01

## 2020-09-02 NOTE — ED PROVIDER NOTES
"Subjective   Patient is a 35-year-old female who presents the emergency room today with complaints of a intermittent pressure in her right chest that she feels intermittently down into her right arm and fingers.  She describes the pressure as a \"not really pain\", which has been occurring intermittently for the last couple days but caused her to be more scared today when it woke her up this morning from her sleep.  Patient reports that her symptoms are intermittent in nature, come and go on their own and she cannot correlate them to anything specific.  She states that her pain was more frequent throughout the day today and she contacted a friend, took her regular medications of labetalol, hydrochlorothiazide and glipizide and called the ambulance as she lives by herself.  Patient states that she has been complaining for years about heart palpitations.  She says that she has worn a heart monitor for a month and nothing was captured.  Patient also shares that she had a stress test completed where her heart rate elevated quickly and it was stopped.  Patient shares that she knows that she needs to lose weight but reports she has been trying and exercising for approximately half hour several times a week.  She denies any additional associated symptoms at the time of interview and exam.          Review of Systems   Constitutional: Negative.    HENT: Negative.    Eyes: Negative.    Respiratory: Positive for shortness of breath.    Cardiovascular: Positive for chest pain and palpitations.   Gastrointestinal: Negative.    Genitourinary: Negative.    Musculoskeletal: Negative.    All other systems reviewed and are negative.      Past Medical History:   Diagnosis Date   • Anxiety    • Anxiety and depression    • Diabetes mellitus (CMS/[facility])    • Gonorrhea    • History of abnormal cervical Pap smear    • Hypertension        Allergies   Allergen Reactions   • Latex        Past Surgical History:   Procedure Laterality Date   • "  SECTION  2006   • DILATATION AND CURETTAGE         Family History   Problem Relation Age of Onset   • Hypertension Mother    • Hypertension Maternal Grandmother    • Diabetes Maternal Grandmother    • Diabetes Paternal Grandmother        Social History     Socioeconomic History   • Marital status:      Spouse name: Not on file   • Number of children: Not on file   • Years of education: Not on file   • Highest education level: Not on file   Tobacco Use   • Smoking status: Former Smoker     Last attempt to quit: 2019     Years since quittin.4   • Smokeless tobacco: Never Used   Substance and Sexual Activity   • Alcohol use: Yes     Comment: SOCIALLY    • Drug use: Yes     Types: Marijuana     Comment: SOCIALLY    • Sexual activity: Yes     Partners: Male     Birth control/protection: None           Objective   Physical Exam   Constitutional: She is oriented to person, place, and time. She appears well-developed and well-nourished. No distress.   HENT:   Head: Normocephalic and atraumatic.   Eyes: Conjunctivae and EOM are normal.   Neck: Normal range of motion. Neck supple.   Cardiovascular: Normal rate and regular rhythm.   Pulmonary/Chest: Effort normal and breath sounds normal. No respiratory distress.   Abdominal: She exhibits no distension.   Obese   Musculoskeletal: Normal range of motion.   Neurological: She is alert and oriented to person, place, and time.   Skin: Skin is warm and dry.   Psychiatric: She has a normal mood and affect. Her behavior is normal. Judgment and thought content normal.   Nursing note and vitals reviewed.      Procedures           ED Course  ED Course as of Sep 03 0433   Thu Sep 03, 2020   0431 Patient presents to ED with complaints of a tingling sensation in her right chest that worsened today.  No acute abnormalities appreciated on physical exam.  Sinus rhythm without acute ST elevations on serial EKGs.  Negative serial troponin.  Chest x-ray without acute  cardiopulmonary process.  Patient is afebrile, nontoxic appearing, vital signs stable and able to maintain O2 sats of 98% on room air. Patient will be discharged home with outpatient follow up to their primary care provider as recommended. Patient is agreeable to plan of care of outpatient follow up, provided clear return precautions and demonstrated understanding.    [JG]      ED Course User Index  [JG] Perry Alexandra, PA      Recent Results (from the past 24 hour(s))   Troponin    Collection Time: 09/02/20  7:27 PM   Result Value Ref Range    Troponin T <0.010 0.000 - 0.030 ng/mL   Comprehensive Metabolic Panel    Collection Time: 09/02/20  7:27 PM   Result Value Ref Range    Glucose 190 (H) 65 - 99 mg/dL    BUN 10 6 - 20 mg/dL    Creatinine 0.84 0.57 - 1.00 mg/dL    Sodium 137 136 - 145 mmol/L    Potassium 3.9 3.5 - 5.2 mmol/L    Chloride 98 98 - 107 mmol/L    CO2 28.0 22.0 - 29.0 mmol/L    Calcium 9.6 8.6 - 10.5 mg/dL    Total Protein 8.3 6.0 - 8.5 g/dL    Albumin 4.50 3.50 - 5.20 g/dL    ALT (SGPT) 21 1 - 33 U/L    AST (SGOT) 23 1 - 32 U/L    Alkaline Phosphatase 103 39 - 117 U/L    Total Bilirubin 0.3 0.0 - 1.2 mg/dL    eGFR  African Amer 95 >60 mL/min/1.73    Globulin 3.8 gm/dL    A/G Ratio 1.2 g/dL    BUN/Creatinine Ratio 11.9 7.0 - 25.0    Anion Gap 11.0 5.0 - 15.0 mmol/L   Lipase    Collection Time: 09/02/20  7:27 PM   Result Value Ref Range    Lipase 46 13 - 60 U/L   BNP    Collection Time: 09/02/20  7:27 PM   Result Value Ref Range    proBNP 18.1 0.0 - 450.0 pg/mL   Light Blue Top    Collection Time: 09/02/20  7:27 PM   Result Value Ref Range    Extra Tube hold for add-on    Green Top (Gel)    Collection Time: 09/02/20  7:27 PM   Result Value Ref Range    Extra Tube Hold for add-ons.    Lavender Top    Collection Time: 09/02/20  7:27 PM   Result Value Ref Range    Extra Tube hold for add-on    Gold Top - SST    Collection Time: 09/02/20  7:27 PM   Result Value Ref Range    Extra Tube Hold for add-ons.     CBC Auto Differential    Collection Time: 09/02/20  7:27 PM   Result Value Ref Range    WBC 10.87 (H) 3.40 - 10.80 10*3/mm3    RBC 5.25 3.77 - 5.28 10*6/mm3    Hemoglobin 12.6 12.0 - 15.9 g/dL    Hematocrit 43.6 34.0 - 46.6 %    MCV 83.0 79.0 - 97.0 fL    MCH 24.0 (L) 26.6 - 33.0 pg    MCHC 28.9 (L) 31.5 - 35.7 g/dL    RDW 16.3 (H) 12.3 - 15.4 %    RDW-SD 49.8 37.0 - 54.0 fl    MPV 9.9 6.0 - 12.0 fL    Platelets 385 140 - 450 10*3/mm3    Neutrophil % 49.9 42.7 - 76.0 %    Lymphocyte % 42.2 19.6 - 45.3 %    Monocyte % 5.7 5.0 - 12.0 %    Eosinophil % 1.7 0.3 - 6.2 %    Basophil % 0.3 0.0 - 1.5 %    Immature Grans % 0.2 0.0 - 0.5 %    Neutrophils, Absolute 5.43 1.70 - 7.00 10*3/mm3    Lymphocytes, Absolute 4.59 (H) 0.70 - 3.10 10*3/mm3    Monocytes, Absolute 0.62 0.10 - 0.90 10*3/mm3    Eosinophils, Absolute 0.18 0.00 - 0.40 10*3/mm3    Basophils, Absolute 0.03 0.00 - 0.20 10*3/mm3    Immature Grans, Absolute 0.02 0.00 - 0.05 10*3/mm3    nRBC 0.0 0.0 - 0.2 /100 WBC   POCT pregnancy, urine    Collection Time: 09/02/20  9:09 PM   Result Value Ref Range    HCG, Urine, QL Negative Negative    Lot Number [id number]     Internal Positive Control Positive     Internal Negative Control Negative    Troponin    Collection Time: 09/02/20  9:28 PM   Result Value Ref Range    Troponin T <0.010 0.000 - 0.030 ng/mL     Note: In addition to lab results from this visit, the labs listed above may include labs taken at another facility or during a different encounter within the last 24 hours. Please correlate lab times with ED admission and discharge times for further clarification of the services performed during this visit.    XR Chest 1 View   Final Result   No acute cardiopulmonary findings.      Signer Name: Delisa Levy MD    Signed: 9/2/2020 9:41 PM    Workstation Name: [id number]     [facility]        Vitals:    09/02/20 2001 09/02/20 2030 09/02/20 2038 09/02/20 2130   BP:  147/56  125/97   BP  Location:       Patient Position:       Pulse: 61  59 58   Resp:       Temp:       TempSrc:       SpO2: 97%  98%    Weight:       Height:         Medications   ibuprofen (ADVIL,MOTRIN) tablet 800 mg (800 mg Oral Given 9/2/20 2101)     ECG/EMG Results (last 24 hours)     Procedure Component Value Units Date/Time    ECG 12 Lead [[id number]] Collected:  09/02/20 1919     Updated:  09/02/20 1921    ECG 12 Lead [[id number]] Collected:  09/02/20 2133     Updated:  09/03/20 0002    Narrative:       Test Reason : chest pain  Blood Pressure : **/** mmHG  Vent. Rate : 058 BPM     Atrial Rate : 058 BPM     P-R Int : 152 ms          QRS Dur : 088 ms      QT Int : 454 ms       P-R-T Axes : 009 021 011 degrees     QTc Int : 445 ms    Sinus bradycardia  Nonspecific T wave abnormality  Abnormal ECG  When compared with ECG of 02-SEP-2020 19:19, (Unconfirmed)  No significant change was found  Confirmed by ARTHUR COOK MD ([id number]) on 9/3/2020 12:02:42 AM    Referred By:  AMISHA           Confirmed By:ARTHUR COOK MD        ECG 12 Lead   Final Result   Test Reason : chest pain   Blood Pressure : **/** mmHG   Vent. Rate : 058 BPM     Atrial Rate : 058 BPM      P-R Int : 152 ms          QRS Dur : 088 ms       QT Int : 454 ms       P-R-T Axes : 009 021 011 degrees      QTc Int : 445 ms      Sinus bradycardia   Nonspecific T wave abnormality   Abnormal ECG   When compared with ECG of 02-SEP-2020 19:19, (Unconfirmed)   No significant change was found   Confirmed by ARTHUR COOK MD ([id number]) on 9/3/2020 12:02:42 AM      Referred By:  AMISHA           Confirmed By:ARTHUR COOK MD      ECG 12 Lead                   DISCHARGE    Patient discharged in stable condition.    Reviewed implications of results, diagnosis, meds, responsibility to follow up, warning signs and symptoms of possible worsening, potential complications and reasons to return to ER.    Patient/Family voiced understanding of above instructions.    Discussed plan for discharge, as  there is no emergent indication for admission.  Pt/family is agreeable and understands need for follow up and possible repeat testing.  Pt/family is aware that discharge does not mean that nothing is wrong but that it indicates no emergency is currently present that requires admission and they must continue care with follow-up as given below or with a physician of their choice.     FOLLOW-UP  Fauzia Gerard MD  [address]  [address]  [place] [address]  [phone]    Schedule an appointment as soon as possible for a visit       [facility]  [address] [facility] [address] [phone]  Go to   If symptoms worsen         Medication List      No changes were made to your prescriptions during this visit.                                MDM  Number of Diagnoses or Management Options  Anxiety: new and requires workup  Nonspecific chest pain: new and requires workup     Amount and/or Complexity of Data Reviewed  Clinical lab tests: reviewed  Tests in the radiology section of CPT®: reviewed  Tests in the medicine section of CPT®: reviewed    Risk of Complications, Morbidity, and/or Mortality  Presenting problems: moderate  Diagnostic procedures: moderate  Management options: moderate    Patient Progress  Patient progress: improved      Final diagnoses:   Nonspecific chest pain   Anxiety            Perry Alexandra PA  09/03/20 0433

## 2020-09-03 NOTE — DISCHARGE INSTRUCTIONS
Symptomatic care is recommended.  Take all medications as prescribed and instructed.  Follow-up with your primary care physician or return to ED with worsening of symptoms.

## 2021-12-03 ENCOUNTER — PRE-ADMISSION TESTING (OUTPATIENT)
Dept: PREADMISSION TESTING | Facility: HOSPITAL | Age: 36
End: 2021-12-03

## 2021-12-03 VITALS — WEIGHT: 293 LBS | HEIGHT: 69 IN | BODY MASS INDEX: 43.4 KG/M2

## 2021-12-03 LAB
ABO GROUP BLD: NORMAL
BLD GP AB SCN SERPL QL: NEGATIVE
DEPRECATED RDW RBC AUTO: 48.9 FL (ref 37–54)
ERYTHROCYTE [DISTWIDTH] IN BLOOD BY AUTOMATED COUNT: 16.9 % (ref 12.3–15.4)
HCT VFR BLD AUTO: 34.1 % (ref 34–46.6)
HGB BLD-MCNC: 10.4 G/DL (ref 12–15.9)
MCH RBC QN AUTO: 24.5 PG (ref 26.6–33)
MCHC RBC AUTO-ENTMCNC: 30.5 G/DL (ref 31.5–35.7)
MCV RBC AUTO: 80.4 FL (ref 79–97)
PLATELET # BLD AUTO: 308 10*3/MM3 (ref 140–450)
PMV BLD AUTO: 10.7 FL (ref 6–12)
RBC # BLD AUTO: 4.24 10*6/MM3 (ref 3.77–5.28)
RH BLD: POSITIVE
SARS-COV-2 RNA PNL SPEC NAA+PROBE: NOT DETECTED
T&S EXPIRATION DATE: NORMAL
WBC NRBC COR # BLD: 8.35 10*3/MM3 (ref 3.4–10.8)

## 2021-12-03 PROCEDURE — 86901 BLOOD TYPING SEROLOGIC RH(D): CPT

## 2021-12-03 PROCEDURE — C9803 HOPD COVID-19 SPEC COLLECT: HCPCS

## 2021-12-03 PROCEDURE — 36415 COLL VENOUS BLD VENIPUNCTURE: CPT

## 2021-12-03 PROCEDURE — 86850 RBC ANTIBODY SCREEN: CPT

## 2021-12-03 PROCEDURE — 86900 BLOOD TYPING SEROLOGIC ABO: CPT

## 2021-12-03 PROCEDURE — U0004 COV-19 TEST NON-CDC HGH THRU: HCPCS

## 2021-12-03 PROCEDURE — 85027 COMPLETE CBC AUTOMATED: CPT

## 2021-12-03 RX ORDER — ASPIRIN 81 MG/1
81 TABLET, CHEWABLE ORAL DAILY
COMMUNITY
End: 2021-12-13 | Stop reason: HOSPADM

## 2021-12-03 RX ORDER — BUPROPION HYDROCHLORIDE 150 MG/1
150 TABLET, EXTENDED RELEASE ORAL DAILY
COMMUNITY
End: 2022-10-05 | Stop reason: ALTCHOICE

## 2021-12-03 RX ORDER — PRENATAL WITH FERROUS FUM AND FOLIC ACID 3080; 920; 120; 400; 22; 1.84; 3; 20; 10; 1; 12; 200; 27; 25; 2 [IU]/1; [IU]/1; MG/1; [IU]/1; MG/1; MG/1; MG/1; MG/1; MG/1; MG/1; UG/1; MG/1; MG/1; MG/1; MG/1
TABLET ORAL DAILY
Status: ON HOLD | COMMUNITY
End: 2022-02-18

## 2021-12-03 NOTE — PAT
Patient viewed general [facility] education video as instructed in their preoperative information received from their surgeon.  Patient stated the general [facility] education video was viewed in its entirety and survey completed.  Copies of [facility] general education handouts (Incentive Spirometry, Meds to Beds Program, Patient Belongings, Pre-op skin preparation instructions, Blood Glucose testing, Visitor policy, Surgery FAQ, Code H) distributed to patient if not printed. Education related to the PAT pass and skin preparation for surgery (if applicable) completed in [facility] as a reinforcement to PAT education video. Patient instructed to return PAT pass provided today as well as completed skin preparation sheet (if applicable) on the day of procedure.     Additionally if patient had not viewed video yet but intended to view it at home or in our waiting area, then referred them to the handout with QR code/link provided during PAT visit.  Instructed patient to complete survey after viewing the video in its entirety.  Encouraged patient/family to read [facility] general education handouts thoroughly and notify PAT staff with any questions or concerns. Patient verbalized understanding of all information and priority content.    Patient to apply Chlorhexadine wipes  to surgical area (as instructed) the night before procedure and the AM of procedure. Wipes provided.    Patient instructed to drink 20 ounces (or until full) of Gatorade and it needs to be completed 1 hour (for Main OR patients) or 2 hours (scheduled  section patients) before given arrival time for procedure (NO RED Gatorade)    Patient verbalized understanding.    Instructed patient to take two Tylenol extra strength (total of 1000 mg) the night before surgery.    It was noted during Pre Admission Testing that patient was wearing some form of fingernail polish (gel/regular) and/or acrylic/artificial nails.  Patient was told that polish and/or artificial nails must be removed  for surgery.  If a patient had recent manicure, and would rather not remove polish or artificial nails. Then the minimum requirement is that the polish/artificial nails must be removed from the middle finger on each hand.  Patient verbalized understanding.    If patient was having surgery on an upper extremity, then the patient was instructed that fingernail polish/artificial fingernails must be removed for surgery.  NO EXCEPTIONS.  Patient verbalized understanding.    If patient was having surgery on a lower extremity, then the patient was instructed that toenail polish on both extremities must be removed for surgery.  NO EXCEPTIONS. Patient verbalized understanding.    Covid test done in [facility].     Instructed pt to call OB to see if daily ASA needs to be stopped prior to surgery.     Blood bank bracelet applied to patient during Pre Admission Testing visit.  Patient instructed not to remove from arm until after procedure and they are discharged from the hospital.  Explained to patient that they may shower and get the bracelet wet, but not to immerse under water for longer periods (bathing, swimming, hand dishwashing, etc).  Patient verbalized understanding.

## 2021-12-06 ENCOUNTER — ANESTHESIA EVENT (OUTPATIENT)
Dept: LABOR AND DELIVERY | Facility: HOSPITAL | Age: 36
End: 2021-12-06

## 2021-12-06 ENCOUNTER — HOSPITAL ENCOUNTER (INPATIENT)
Facility: HOSPITAL | Age: 36
LOS: 7 days | Discharge: HOME | End: 2021-12-13
Attending: OBSTETRICS & GYNECOLOGY | Admitting: OBSTETRICS & GYNECOLOGY

## 2021-12-06 ENCOUNTER — ANESTHESIA (OUTPATIENT)
Dept: LABOR AND DELIVERY | Facility: HOSPITAL | Age: 36
End: 2021-12-06

## 2021-12-06 DIAGNOSIS — E66.01 CLASS 3 SEVERE OBESITY WITHOUT SERIOUS COMORBIDITY WITH BODY MASS INDEX (BMI) OF 60.0 TO 69.9 IN ADULT, UNSPECIFIED OBESITY TYPE (HCC): ICD-10-CM

## 2021-12-06 DIAGNOSIS — O24.113 TYPE 2 DIABETES MELLITUS AFFECTING PREGNANCY IN THIRD TRIMESTER, ANTEPARTUM: ICD-10-CM

## 2021-12-06 DIAGNOSIS — N71.0 ACUTE ENDOMETRITIS: ICD-10-CM

## 2021-12-06 LAB
AMPHET+METHAMPHET UR QL: NEGATIVE
AMPHETAMINES UR QL: NEGATIVE
ATMOSPHERIC PRESS: ABNORMAL MM[HG]
ATMOSPHERIC PRESS: ABNORMAL MM[HG]
BARBITURATES UR QL SCN: NEGATIVE
BASE EXCESS BLDCOA CALC-SCNC: -3.8 MMOL/L (ref 0–2)
BASE EXCESS BLDCOV CALC-SCNC: -1.1 MMOL/L (ref 0–2)
BDY SITE: ABNORMAL
BDY SITE: ABNORMAL
BENZODIAZ UR QL SCN: NEGATIVE
BODY TEMPERATURE: 37 C
BODY TEMPERATURE: 37 C
BUPRENORPHINE SERPL-MCNC: NEGATIVE NG/ML
CANNABINOIDS SERPL QL: NEGATIVE
CO2 BLDA-SCNC: 30.5 MMOL/L (ref 22–33)
CO2 BLDA-SCNC: 30.8 MMOL/L (ref 22–33)
COCAINE UR QL: NEGATIVE
COLLECT TME SMN: ABNORMAL
EPAP: 0
EPAP: 0
GLUCOSE BLDC GLUCOMTR-MCNC: 133 MG/DL (ref 70–130)
GLUCOSE BLDC GLUCOMTR-MCNC: 152 MG/DL (ref 70–130)
GLUCOSE BLDC GLUCOMTR-MCNC: 172 MG/DL (ref 70–130)
HCO3 BLDCOA-SCNC: 27.9 MMOL/L (ref 16.9–20.5)
HCO3 BLDCOV-SCNC: 28.7 MMOL/L (ref 18.6–21.4)
HGB BLDA-MCNC: 14.5 G/DL (ref 14–18)
HGB BLDA-MCNC: 15 G/DL (ref 14–18)
INHALED O2 CONCENTRATION: 21 %
INHALED O2 CONCENTRATION: 21 %
IPAP: 0
IPAP: 0
Lab: ABNORMAL
Lab: ABNORMAL
METHADONE UR QL SCN: NEGATIVE
MODALITY: ABNORMAL
MODALITY: ABNORMAL
NOTE: ABNORMAL
NOTE: ABNORMAL
NOTIFIED BY: ABNORMAL
NOTIFIED BY: ABNORMAL
NOTIFIED WHO: ABNORMAL
NOTIFIED WHO: ABNORMAL
OPIATES UR QL: NEGATIVE
OXYCODONE UR QL SCN: NEGATIVE
PAW @ PEAK INSP FLOW SETTING VENT: 0 CMH2O
PAW @ PEAK INSP FLOW SETTING VENT: 0 CMH2O
PCO2 BLDCOA: 84 MMHG (ref 43.3–54.9)
PCO2 BLDCOV: 70.6 MM HG (ref 28–40)
PCP UR QL SCN: NEGATIVE
PH BLDCOA: 7.13 PH UNITS (ref 7.22–7.3)
PH BLDCOV: 7.22 PH UNITS (ref 7.31–7.37)
PO2 BLDCOA: 13.4 MMHG (ref 11.5–43.3)
PO2 BLDCOV: 16.5 MM HG (ref 21–31)
PROPOXYPH UR QL: NEGATIVE
SAO2 % BLDCOA: 24.1 %
SAO2 % BLDCOA: ABNORMAL %
SAO2 % BLDCOV: 31 %
TOTAL RATE: 0 BREATHS/MINUTE
TOTAL RATE: 0 BREATHS/MINUTE
TRICYCLICS UR QL SCN: NEGATIVE

## 2021-12-06 PROCEDURE — 25010000002 CEFAZOLIN PER 500 MG: Performed by: OBSTETRICS & GYNECOLOGY

## 2021-12-06 PROCEDURE — 0 MORPHINE PER 10 MG: Performed by: ANESTHESIOLOGY

## 2021-12-06 PROCEDURE — 59514 CESAREAN DELIVERY ONLY: CPT | Performed by: OBSTETRICS & GYNECOLOGY

## 2021-12-06 PROCEDURE — C1889 IMPLANT/INSERT DEVICE, NOC: HCPCS | Performed by: OBSTETRICS & GYNECOLOGY

## 2021-12-06 PROCEDURE — S0260 H&P FOR SURGERY: HCPCS | Performed by: OBSTETRICS & GYNECOLOGY

## 2021-12-06 PROCEDURE — 25010000002 ONDANSETRON PER 1 MG: Performed by: STUDENT IN AN ORGANIZED HEALTH CARE EDUCATION/TRAINING PROGRAM

## 2021-12-06 PROCEDURE — 25010000002 METOCLOPRAMIDE PER 10 MG: Performed by: ANESTHESIOLOGY

## 2021-12-06 PROCEDURE — 25010000002 AZITHROMYCIN PER 500 MG: Performed by: STUDENT IN AN ORGANIZED HEALTH CARE EDUCATION/TRAINING PROGRAM

## 2021-12-06 PROCEDURE — 80306 DRUG TEST PRSMV INSTRMNT: CPT | Performed by: OBSTETRICS & GYNECOLOGY

## 2021-12-06 PROCEDURE — 82805 BLOOD GASES W/O2 SATURATION: CPT

## 2021-12-06 PROCEDURE — 25010000002 MORPHINE PER 10 MG: Performed by: NURSE ANESTHETIST, CERTIFIED REGISTERED

## 2021-12-06 PROCEDURE — 63710000001 INSULIN DETEMIR PER 5 UNITS: Performed by: STUDENT IN AN ORGANIZED HEALTH CARE EDUCATION/TRAINING PROGRAM

## 2021-12-06 PROCEDURE — 25010000002 KETOROLAC TROMETHAMINE PER 15 MG: Performed by: STUDENT IN AN ORGANIZED HEALTH CARE EDUCATION/TRAINING PROGRAM

## 2021-12-06 PROCEDURE — 25010000002 MIDAZOLAM PER 1 MG: Performed by: ANESTHESIOLOGY

## 2021-12-06 PROCEDURE — 25010000002 FENTANYL CITRATE (PF) 50 MCG/ML SOLUTION: Performed by: ANESTHESIOLOGY

## 2021-12-06 PROCEDURE — 82962 GLUCOSE BLOOD TEST: CPT

## 2021-12-06 PROCEDURE — 25010000002 ONDANSETRON PER 1 MG: Performed by: ANESTHESIOLOGY

## 2021-12-06 PROCEDURE — 88307 TISSUE EXAM BY PATHOLOGIST: CPT | Performed by: OBSTETRICS & GYNECOLOGY

## 2021-12-06 PROCEDURE — 63710000001 INSULIN LISPRO (HUMAN) PER 5 UNITS: Performed by: STUDENT IN AN ORGANIZED HEALTH CARE EDUCATION/TRAINING PROGRAM

## 2021-12-06 DEVICE — HEMOST ABS SURGICEL PWDR 3GM: Type: IMPLANTABLE DEVICE | Status: FUNCTIONAL

## 2021-12-06 RX ORDER — LANOLIN
CREAM (ML) TOPICAL
Status: DISCONTINUED | OUTPATIENT
Start: 2021-12-06 | End: 2021-12-13 | Stop reason: HOSPADM

## 2021-12-06 RX ORDER — SODIUM CHLORIDE 0.9 % (FLUSH) 0.9 %
10 SYRINGE (ML) INJECTION EVERY 12 HOURS SCHEDULED
Status: DISCONTINUED | OUTPATIENT
Start: 2021-12-06 | End: 2021-12-06

## 2021-12-06 RX ORDER — LABETALOL 200 MG/1
200 TABLET, FILM COATED ORAL 2 TIMES DAILY
Status: DISCONTINUED | OUTPATIENT
Start: 2021-12-06 | End: 2021-12-13 | Stop reason: HOSPADM

## 2021-12-06 RX ORDER — FAMOTIDINE 10 MG/ML
INJECTION, SOLUTION INTRAVENOUS AS NEEDED
Status: DISCONTINUED | OUTPATIENT
Start: 2021-12-06 | End: 2021-12-06 | Stop reason: SURG

## 2021-12-06 RX ORDER — MIDAZOLAM HYDROCHLORIDE 1 MG/ML
INJECTION INTRAMUSCULAR; INTRAVENOUS AS NEEDED
Status: DISCONTINUED | OUTPATIENT
Start: 2021-12-06 | End: 2021-12-06 | Stop reason: SURG

## 2021-12-06 RX ORDER — BUPROPION HYDROCHLORIDE 150 MG/1
150 TABLET, EXTENDED RELEASE ORAL DAILY
Status: DISCONTINUED | OUTPATIENT
Start: 2021-12-06 | End: 2021-12-13 | Stop reason: HOSPADM

## 2021-12-06 RX ORDER — BUPIVACAINE HYDROCHLORIDE 7.5 MG/ML
INJECTION, SOLUTION INTRASPINAL AS NEEDED
Status: DISCONTINUED | OUTPATIENT
Start: 2021-12-06 | End: 2021-12-06 | Stop reason: SURG

## 2021-12-06 RX ORDER — OXYCODONE HYDROCHLORIDE 5 MG/1
5 TABLET ORAL EVERY 4 HOURS PRN
Status: DISCONTINUED | OUTPATIENT
Start: 2021-12-06 | End: 2021-12-13 | Stop reason: HOSPADM

## 2021-12-06 RX ORDER — OXYTOCIN/0.9 % SODIUM CHLORIDE 30/500 ML
650 PLASTIC BAG, INJECTION (ML) INTRAVENOUS ONCE
Status: DISCONTINUED | OUTPATIENT
Start: 2021-12-06 | End: 2021-12-06

## 2021-12-06 RX ORDER — KETOROLAC TROMETHAMINE 30 MG/ML
30 INJECTION, SOLUTION INTRAMUSCULAR; INTRAVENOUS ONCE
Status: COMPLETED | OUTPATIENT
Start: 2021-12-06 | End: 2021-12-06

## 2021-12-06 RX ORDER — DOCUSATE SODIUM 100 MG/1
100 CAPSULE, LIQUID FILLED ORAL 2 TIMES DAILY PRN
Status: DISCONTINUED | OUTPATIENT
Start: 2021-12-06 | End: 2021-12-13 | Stop reason: HOSPADM

## 2021-12-06 RX ORDER — DIPHENHYDRAMINE HCL 25 MG
25 CAPSULE ORAL EVERY 4 HOURS PRN
Status: DISCONTINUED | OUTPATIENT
Start: 2021-12-06 | End: 2021-12-13 | Stop reason: HOSPADM

## 2021-12-06 RX ORDER — PROMETHAZINE HYDROCHLORIDE 12.5 MG/1
12.5 SUPPOSITORY RECTAL ONCE AS NEEDED
Status: DISCONTINUED | OUTPATIENT
Start: 2021-12-06 | End: 2021-12-13 | Stop reason: HOSPADM

## 2021-12-06 RX ORDER — MORPHINE SULFATE 1 MG/ML
INJECTION, SOLUTION EPIDURAL; INTRATHECAL; INTRAVENOUS AS NEEDED
Status: DISCONTINUED | OUTPATIENT
Start: 2021-12-06 | End: 2021-12-06 | Stop reason: SURG

## 2021-12-06 RX ORDER — IBUPROFEN 600 MG/1
600 TABLET ORAL EVERY 6 HOURS
Status: DISCONTINUED | OUTPATIENT
Start: 2021-12-07 | End: 2021-12-13 | Stop reason: HOSPADM

## 2021-12-06 RX ORDER — NIFEDIPINE 10 MG/1
30 CAPSULE ORAL 2 TIMES DAILY
Status: DISCONTINUED | OUTPATIENT
Start: 2021-12-06 | End: 2021-12-08

## 2021-12-06 RX ORDER — METHYLERGONOVINE MALEATE 0.2 MG/ML
200 INJECTION INTRAVENOUS ONCE AS NEEDED
Status: DISCONTINUED | OUTPATIENT
Start: 2021-12-06 | End: 2021-12-06

## 2021-12-06 RX ORDER — PRENATAL VIT/IRON FUM/FOLIC AC 27MG-0.8MG
1 TABLET ORAL DAILY
Status: DISCONTINUED | OUTPATIENT
Start: 2021-12-06 | End: 2021-12-06 | Stop reason: SDUPTHER

## 2021-12-06 RX ORDER — KETAMINE HYDROCHLORIDE 100 MG/ML
INJECTION INTRAMUSCULAR; INTRAVENOUS AS NEEDED
Status: DISCONTINUED | OUTPATIENT
Start: 2021-12-06 | End: 2021-12-06 | Stop reason: SURG

## 2021-12-06 RX ORDER — METOCLOPRAMIDE HYDROCHLORIDE 5 MG/ML
INJECTION INTRAMUSCULAR; INTRAVENOUS AS NEEDED
Status: DISCONTINUED | OUTPATIENT
Start: 2021-12-06 | End: 2021-12-06 | Stop reason: SURG

## 2021-12-06 RX ORDER — CEFAZOLIN SODIUM IN 0.9 % NACL 3 G/100 ML
3 INTRAVENOUS SOLUTION, PIGGYBACK (ML) INTRAVENOUS EVERY 8 HOURS
Status: COMPLETED | OUTPATIENT
Start: 2021-12-06 | End: 2021-12-07

## 2021-12-06 RX ORDER — KETOROLAC TROMETHAMINE 15 MG/ML
15 INJECTION, SOLUTION INTRAMUSCULAR; INTRAVENOUS EVERY 6 HOURS
Status: COMPLETED | OUTPATIENT
Start: 2021-12-06 | End: 2021-12-07

## 2021-12-06 RX ORDER — ONDANSETRON 2 MG/ML
INJECTION INTRAMUSCULAR; INTRAVENOUS AS NEEDED
Status: DISCONTINUED | OUTPATIENT
Start: 2021-12-06 | End: 2021-12-06 | Stop reason: SURG

## 2021-12-06 RX ORDER — EPHEDRINE SULFATE 50 MG/ML
INJECTION, SOLUTION INTRAVENOUS AS NEEDED
Status: DISCONTINUED | OUTPATIENT
Start: 2021-12-06 | End: 2021-12-06 | Stop reason: SURG

## 2021-12-06 RX ORDER — SODIUM CHLORIDE 0.9 % (FLUSH) 0.9 %
1-10 SYRINGE (ML) INJECTION AS NEEDED
Status: DISCONTINUED | OUTPATIENT
Start: 2021-12-06 | End: 2021-12-06

## 2021-12-06 RX ORDER — ONDANSETRON 2 MG/ML
4 INJECTION INTRAMUSCULAR; INTRAVENOUS EVERY 6 HOURS PRN
Status: DISCONTINUED | OUTPATIENT
Start: 2021-12-06 | End: 2021-12-06

## 2021-12-06 RX ORDER — MISOPROSTOL 200 UG/1
800 TABLET ORAL AS NEEDED
Status: DISCONTINUED | OUTPATIENT
Start: 2021-12-06 | End: 2021-12-06

## 2021-12-06 RX ORDER — PRENATAL VIT/IRON FUM/FOLIC AC 27MG-0.8MG
1 TABLET ORAL DAILY
Status: DISCONTINUED | OUTPATIENT
Start: 2021-12-06 | End: 2021-12-13 | Stop reason: HOSPADM

## 2021-12-06 RX ORDER — CEFAZOLIN SODIUM IN 0.9 % NACL 3 G/100 ML
3 INTRAVENOUS SOLUTION, PIGGYBACK (ML) INTRAVENOUS ONCE
Status: COMPLETED | OUTPATIENT
Start: 2021-12-06 | End: 2021-12-06

## 2021-12-06 RX ORDER — OXYCODONE HYDROCHLORIDE 5 MG/1
10 TABLET ORAL EVERY 4 HOURS PRN
Status: DISCONTINUED | OUTPATIENT
Start: 2021-12-06 | End: 2021-12-13 | Stop reason: HOSPADM

## 2021-12-06 RX ORDER — HYDROCORTISONE 25 MG/G
1 CREAM TOPICAL AS NEEDED
Status: DISCONTINUED | OUTPATIENT
Start: 2021-12-06 | End: 2021-12-13 | Stop reason: HOSPADM

## 2021-12-06 RX ORDER — LIDOCAINE HYDROCHLORIDE 10 MG/ML
5 INJECTION, SOLUTION EPIDURAL; INFILTRATION; INTRACAUDAL; PERINEURAL AS NEEDED
Status: DISCONTINUED | OUTPATIENT
Start: 2021-12-06 | End: 2021-12-06

## 2021-12-06 RX ORDER — TRISODIUM CITRATE DIHYDRATE AND CITRIC ACID MONOHYDRATE 500; 334 MG/5ML; MG/5ML
30 SOLUTION ORAL ONCE
Status: COMPLETED | OUTPATIENT
Start: 2021-12-06 | End: 2021-12-06

## 2021-12-06 RX ORDER — CARBOPROST TROMETHAMINE 250 UG/ML
250 INJECTION, SOLUTION INTRAMUSCULAR AS NEEDED
Status: DISCONTINUED | OUTPATIENT
Start: 2021-12-06 | End: 2021-12-06

## 2021-12-06 RX ORDER — ONDANSETRON 2 MG/ML
4 INJECTION INTRAMUSCULAR; INTRAVENOUS ONCE AS NEEDED
Status: ACTIVE | OUTPATIENT
Start: 2021-12-06 | End: 2021-12-07

## 2021-12-06 RX ORDER — MORPHINE SULFATE 0.5 MG/ML
INJECTION, SOLUTION EPIDURAL; INTRATHECAL; INTRAVENOUS AS NEEDED
Status: DISCONTINUED | OUTPATIENT
Start: 2021-12-06 | End: 2021-12-06 | Stop reason: SURG

## 2021-12-06 RX ORDER — OXYTOCIN-SODIUM CHLORIDE 0.9% IV SOLN 30 UNIT/500ML 30-0.9/5 UT/ML-%
SOLUTION INTRAVENOUS AS NEEDED
Status: DISCONTINUED | OUTPATIENT
Start: 2021-12-06 | End: 2021-12-06 | Stop reason: SURG

## 2021-12-06 RX ORDER — FENTANYL CITRATE 50 UG/ML
INJECTION, SOLUTION INTRAMUSCULAR; INTRAVENOUS AS NEEDED
Status: DISCONTINUED | OUTPATIENT
Start: 2021-12-06 | End: 2021-12-06 | Stop reason: SURG

## 2021-12-06 RX ORDER — ONDANSETRON 4 MG/1
4 TABLET, FILM COATED ORAL EVERY 6 HOURS PRN
Status: DISCONTINUED | OUTPATIENT
Start: 2021-12-06 | End: 2021-12-06

## 2021-12-06 RX ORDER — SIMETHICONE 80 MG
80 TABLET,CHEWABLE ORAL 4 TIMES DAILY PRN
Status: DISCONTINUED | OUTPATIENT
Start: 2021-12-06 | End: 2021-12-13 | Stop reason: HOSPADM

## 2021-12-06 RX ORDER — ACETAMINOPHEN 500 MG
1000 TABLET ORAL ONCE
Status: COMPLETED | OUTPATIENT
Start: 2021-12-06 | End: 2021-12-06

## 2021-12-06 RX ORDER — FAMOTIDINE 10 MG/ML
20 INJECTION, SOLUTION INTRAVENOUS ONCE AS NEEDED
Status: DISCONTINUED | OUTPATIENT
Start: 2021-12-06 | End: 2021-12-06

## 2021-12-06 RX ORDER — ACETAMINOPHEN 500 MG
1000 TABLET ORAL EVERY 6 HOURS
Status: COMPLETED | OUTPATIENT
Start: 2021-12-06 | End: 2021-12-07

## 2021-12-06 RX ORDER — DIPHENHYDRAMINE HYDROCHLORIDE 50 MG/ML
25 INJECTION INTRAMUSCULAR; INTRAVENOUS EVERY 4 HOURS PRN
Status: DISCONTINUED | OUTPATIENT
Start: 2021-12-06 | End: 2021-12-13 | Stop reason: HOSPADM

## 2021-12-06 RX ORDER — DIPHENHYDRAMINE HYDROCHLORIDE 50 MG/ML
25 INJECTION INTRAMUSCULAR; INTRAVENOUS ONCE AS NEEDED
Status: DISCONTINUED | OUTPATIENT
Start: 2021-12-06 | End: 2021-12-13 | Stop reason: HOSPADM

## 2021-12-06 RX ORDER — OXYTOCIN/0.9 % SODIUM CHLORIDE 30/500 ML
85 PLASTIC BAG, INJECTION (ML) INTRAVENOUS ONCE
Status: DISCONTINUED | OUTPATIENT
Start: 2021-12-06 | End: 2021-12-06

## 2021-12-06 RX ORDER — BUPIVACAINE HCL/0.9 % NACL/PF 0.125 %
PLASTIC BAG, INJECTION (ML) EPIDURAL AS NEEDED
Status: DISCONTINUED | OUTPATIENT
Start: 2021-12-06 | End: 2021-12-06 | Stop reason: SURG

## 2021-12-06 RX ORDER — HYDROCHLOROTHIAZIDE 25 MG/1
25 TABLET ORAL DAILY
Status: DISCONTINUED | OUTPATIENT
Start: 2021-12-06 | End: 2021-12-06

## 2021-12-06 RX ORDER — METOCLOPRAMIDE 10 MG/1
10 TABLET ORAL ONCE AS NEEDED
Status: DISCONTINUED | OUTPATIENT
Start: 2021-12-06 | End: 2021-12-13 | Stop reason: HOSPADM

## 2021-12-06 RX ORDER — ACETAMINOPHEN 325 MG/1
650 TABLET ORAL EVERY 6 HOURS
Status: DISCONTINUED | OUTPATIENT
Start: 2021-12-07 | End: 2021-12-13 | Stop reason: HOSPADM

## 2021-12-06 RX ORDER — PROMETHAZINE HYDROCHLORIDE 25 MG/1
25 TABLET ORAL ONCE AS NEEDED
Status: DISCONTINUED | OUTPATIENT
Start: 2021-12-06 | End: 2021-12-13 | Stop reason: HOSPADM

## 2021-12-06 RX ORDER — CALCIUM CARBONATE 200(500)MG
1 TABLET,CHEWABLE ORAL EVERY 4 HOURS PRN
Status: DISCONTINUED | OUTPATIENT
Start: 2021-12-06 | End: 2021-12-13 | Stop reason: HOSPADM

## 2021-12-06 RX ORDER — SODIUM CHLORIDE, SODIUM LACTATE, POTASSIUM CHLORIDE, CALCIUM CHLORIDE 600; 310; 30; 20 MG/100ML; MG/100ML; MG/100ML; MG/100ML
125 INJECTION, SOLUTION INTRAVENOUS CONTINUOUS
Status: DISCONTINUED | OUTPATIENT
Start: 2021-12-06 | End: 2021-12-06

## 2021-12-06 RX ORDER — NALOXONE HCL 0.4 MG/ML
0.4 VIAL (ML) INJECTION
Status: ACTIVE | OUTPATIENT
Start: 2021-12-06 | End: 2021-12-07

## 2021-12-06 RX ORDER — ALUMINA, MAGNESIA, AND SIMETHICONE 2400; 2400; 240 MG/30ML; MG/30ML; MG/30ML
15 SUSPENSION ORAL EVERY 4 HOURS PRN
Status: DISCONTINUED | OUTPATIENT
Start: 2021-12-06 | End: 2021-12-13 | Stop reason: HOSPADM

## 2021-12-06 RX ORDER — FAMOTIDINE 20 MG/1
20 TABLET, FILM COATED ORAL ONCE AS NEEDED
Status: DISCONTINUED | OUTPATIENT
Start: 2021-12-06 | End: 2021-12-06

## 2021-12-06 RX ADMIN — MIDAZOLAM 1 MG: 1 INJECTION INTRAMUSCULAR; INTRAVENOUS at 09:39

## 2021-12-06 RX ADMIN — MORPHINE SULFATE 2 MG: 1 INJECTION, SOLUTION EPIDURAL; INTRATHECAL; INTRAVENOUS at 09:30

## 2021-12-06 RX ADMIN — EPHEDRINE SULFATE 10 MG: 50 INJECTION, SOLUTION INTRAVENOUS at 08:29

## 2021-12-06 RX ADMIN — FENTANYL CITRATE 20 MCG: 50 INJECTION, SOLUTION INTRAMUSCULAR; INTRAVENOUS at 09:13

## 2021-12-06 RX ADMIN — MORPHINE SULFATE 2 MG: 1 INJECTION, SOLUTION EPIDURAL; INTRATHECAL; INTRAVENOUS at 09:39

## 2021-12-06 RX ADMIN — INSULIN DETEMIR 20 UNITS: 100 INJECTION, SOLUTION SUBCUTANEOUS at 20:59

## 2021-12-06 RX ADMIN — KETAMINE HYDROCHLORIDE 30 MG: 100 INJECTION INTRAMUSCULAR; INTRAVENOUS at 09:31

## 2021-12-06 RX ADMIN — Medication 100 MCG: at 08:43

## 2021-12-06 RX ADMIN — Medication 100 MCG: at 08:33

## 2021-12-06 RX ADMIN — INSULIN LISPRO 15 UNITS: 100 INJECTION, SOLUTION INTRAVENOUS; SUBCUTANEOUS at 17:30

## 2021-12-06 RX ADMIN — KETAMINE HYDROCHLORIDE 30 MG: 100 INJECTION INTRAMUSCULAR; INTRAVENOUS at 09:51

## 2021-12-06 RX ADMIN — METOCLOPRAMIDE 10 MG: 5 INJECTION, SOLUTION INTRAMUSCULAR; INTRAVENOUS at 07:46

## 2021-12-06 RX ADMIN — EPHEDRINE SULFATE 10 MG: 50 INJECTION, SOLUTION INTRAVENOUS at 08:22

## 2021-12-06 RX ADMIN — MORPHINE SULFATE 2 MG: 1 INJECTION, SOLUTION EPIDURAL; INTRATHECAL; INTRAVENOUS at 09:35

## 2021-12-06 RX ADMIN — Medication 100 MCG: at 08:47

## 2021-12-06 RX ADMIN — MORPHINE SULFATE 0.2 MCG: 0.5 INJECTION, SOLUTION EPIDURAL; INTRATHECAL; INTRAVENOUS at 07:55

## 2021-12-06 RX ADMIN — KETAMINE HYDROCHLORIDE 25 MG: 100 INJECTION INTRAMUSCULAR; INTRAVENOUS at 10:13

## 2021-12-06 RX ADMIN — KETOROLAC TROMETHAMINE 15 MG: 15 INJECTION, SOLUTION INTRAMUSCULAR; INTRAVENOUS at 16:03

## 2021-12-06 RX ADMIN — SIMETHICONE 80 MG: 80 TABLET, CHEWABLE ORAL at 16:06

## 2021-12-06 RX ADMIN — SODIUM CHLORIDE, POTASSIUM CHLORIDE, SODIUM LACTATE AND CALCIUM CHLORIDE 1000 ML: 600; 310; 30; 20 INJECTION, SOLUTION INTRAVENOUS at 07:29

## 2021-12-06 RX ADMIN — SIMETHICONE 80 MG: 80 TABLET, CHEWABLE ORAL at 22:08

## 2021-12-06 RX ADMIN — SODIUM CITRATE AND CITRIC ACID MONOHYDRATE 30 ML: 500; 334 SOLUTION ORAL at 07:29

## 2021-12-06 RX ADMIN — FENTANYL CITRATE 50 MCG: 50 INJECTION, SOLUTION INTRAMUSCULAR; INTRAVENOUS at 10:25

## 2021-12-06 RX ADMIN — ACETAMINOPHEN 1000 MG: 500 TABLET, FILM COATED ORAL at 07:29

## 2021-12-06 RX ADMIN — BUPROPION HYDROCHLORIDE 150 MG: 150 TABLET, EXTENDED RELEASE ORAL at 16:01

## 2021-12-06 RX ADMIN — SODIUM CHLORIDE, POTASSIUM CHLORIDE, SODIUM LACTATE AND CALCIUM CHLORIDE 1000 ML: 600; 310; 30; 20 INJECTION, SOLUTION INTRAVENOUS at 08:29

## 2021-12-06 RX ADMIN — MIDAZOLAM 1 MG: 1 INJECTION INTRAMUSCULAR; INTRAVENOUS at 09:45

## 2021-12-06 RX ADMIN — FENTANYL CITRATE 50 MCG: 50 INJECTION, SOLUTION INTRAMUSCULAR; INTRAVENOUS at 09:08

## 2021-12-06 RX ADMIN — KETAMINE HYDROCHLORIDE 25 MG: 100 INJECTION INTRAMUSCULAR; INTRAVENOUS at 10:00

## 2021-12-06 RX ADMIN — BUPIVACAINE HYDROCHLORIDE IN DEXTROSE 1.8 ML: 7.5 INJECTION, SOLUTION SUBARACHNOID at 07:55

## 2021-12-06 RX ADMIN — KETAMINE HYDROCHLORIDE 20 MG: 100 INJECTION INTRAMUSCULAR; INTRAVENOUS at 09:46

## 2021-12-06 RX ADMIN — MIDAZOLAM 1 MG: 1 INJECTION INTRAMUSCULAR; INTRAVENOUS at 09:10

## 2021-12-06 RX ADMIN — MIDAZOLAM 1 MG: 1 INJECTION INTRAMUSCULAR; INTRAVENOUS at 10:01

## 2021-12-06 RX ADMIN — MIDAZOLAM 1 MG: 1 INJECTION INTRAMUSCULAR; INTRAVENOUS at 10:00

## 2021-12-06 RX ADMIN — ONDANSETRON 4 MG: 2 INJECTION INTRAMUSCULAR; INTRAVENOUS at 17:27

## 2021-12-06 RX ADMIN — KETAMINE HYDROCHLORIDE 20 MG: 100 INJECTION INTRAMUSCULAR; INTRAVENOUS at 09:34

## 2021-12-06 RX ADMIN — EPHEDRINE SULFATE 10 MG: 50 INJECTION, SOLUTION INTRAVENOUS at 08:25

## 2021-12-06 RX ADMIN — KETOROLAC TROMETHAMINE 30 MG: 30 INJECTION, SOLUTION INTRAMUSCULAR at 11:11

## 2021-12-06 RX ADMIN — MIDAZOLAM 1 MG: 1 INJECTION INTRAMUSCULAR; INTRAVENOUS at 09:05

## 2021-12-06 RX ADMIN — EPHEDRINE SULFATE 15 MG: 50 INJECTION, SOLUTION INTRAVENOUS at 08:16

## 2021-12-06 RX ADMIN — FENTANYL CITRATE 20 MCG: 50 INJECTION, SOLUTION INTRAMUSCULAR; INTRAVENOUS at 07:55

## 2021-12-06 RX ADMIN — AZITHROMYCIN DIHYDRATE 500 MG: 500 INJECTION, POWDER, LYOPHILIZED, FOR SOLUTION INTRAVENOUS at 16:01

## 2021-12-06 RX ADMIN — SODIUM CHLORIDE, POTASSIUM CHLORIDE, SODIUM LACTATE AND CALCIUM CHLORIDE 125 ML/HR: 600; 310; 30; 20 INJECTION, SOLUTION INTRAVENOUS at 11:34

## 2021-12-06 RX ADMIN — OXYTOCIN-SODIUM CHLORIDE 0.9% IV SOLN 30 UNIT/500ML 500 ML: 30-0.9/5 SOLUTION at 08:38

## 2021-12-06 RX ADMIN — NIFEDIPINE 30 MG: 10 CAPSULE ORAL at 20:59

## 2021-12-06 RX ADMIN — Medication 3 G: at 23:26

## 2021-12-06 RX ADMIN — FENTANYL CITRATE 30 MCG: 50 INJECTION, SOLUTION INTRAMUSCULAR; INTRAVENOUS at 09:20

## 2021-12-06 RX ADMIN — Medication 3 G: at 07:30

## 2021-12-06 RX ADMIN — KETAMINE HYDROCHLORIDE 30 MG: 100 INJECTION INTRAMUSCULAR; INTRAVENOUS at 09:39

## 2021-12-06 RX ADMIN — MIDAZOLAM 2 MG: 1 INJECTION INTRAMUSCULAR; INTRAVENOUS at 07:46

## 2021-12-06 RX ADMIN — ACETAMINOPHEN 1000 MG: 500 TABLET, FILM COATED ORAL at 18:43

## 2021-12-06 RX ADMIN — MIDAZOLAM 1 MG: 1 INJECTION INTRAMUSCULAR; INTRAVENOUS at 09:17

## 2021-12-06 RX ADMIN — MORPHINE SULFATE 2 MG: 1 INJECTION, SOLUTION EPIDURAL; INTRATHECAL; INTRAVENOUS at 09:25

## 2021-12-06 RX ADMIN — ONDANSETRON 4 MG: 2 INJECTION INTRAMUSCULAR; INTRAVENOUS at 07:46

## 2021-12-06 RX ADMIN — Medication 3 G: at 15:16

## 2021-12-06 RX ADMIN — FAMOTIDINE 20 MG: 10 INJECTION, SOLUTION INTRAVENOUS at 07:46

## 2021-12-06 RX ADMIN — DOCUSATE SODIUM 100 MG: 100 CAPSULE, LIQUID FILLED ORAL at 22:08

## 2021-12-06 RX ADMIN — SODIUM CHLORIDE, POTASSIUM CHLORIDE, SODIUM LACTATE AND CALCIUM CHLORIDE 500 ML: 600; 310; 30; 20 INJECTION, SOLUTION INTRAVENOUS at 07:46

## 2021-12-06 RX ADMIN — FENTANYL CITRATE 50 MCG: 50 INJECTION, SOLUTION INTRAMUSCULAR; INTRAVENOUS at 10:27

## 2021-12-06 RX ADMIN — EPHEDRINE SULFATE 15 MG: 50 INJECTION, SOLUTION INTRAVENOUS at 08:12

## 2021-12-06 RX ADMIN — Medication 100 MCG: at 08:06

## 2021-12-06 RX ADMIN — MORPHINE SULFATE 1.8 MG: 1 INJECTION, SOLUTION EPIDURAL; INTRATHECAL; INTRAVENOUS at 09:46

## 2021-12-06 RX ADMIN — SODIUM CHLORIDE, POTASSIUM CHLORIDE, SODIUM LACTATE AND CALCIUM CHLORIDE: 600; 310; 30; 20 INJECTION, SOLUTION INTRAVENOUS at 08:30

## 2021-12-06 RX ADMIN — KETOROLAC TROMETHAMINE 15 MG: 15 INJECTION, SOLUTION INTRAMUSCULAR; INTRAVENOUS at 22:15

## 2021-12-06 RX ADMIN — LABETALOL HYDROCHLORIDE 200 MG: 200 TABLET, FILM COATED ORAL at 20:59

## 2021-12-06 NOTE — ANESTHESIA PROCEDURE NOTES
Spinal Block      Patient reassessed immediately prior to procedure    Patient location during procedure: OB  Indication:procedure for pain  Performed By  Anesthesiologist: Mylene Gray DO  Preanesthetic Checklist  Completed: patient identified, IV checked, risks and benefits discussed, surgical consent, monitors and equipment checked, pre-op evaluation and timeout performed  Spinal Block Prep:  Patient Position:sitting  Sterile Tech:cap, gloves, mask and sterile barriers  Prep:Chloraprep  Patient Monitoring:blood pressure monitoring and EKG  Spinal Block Procedure  Approach:midline  Guidance:palpation technique  Location:L3-L4  Needle Type:Lin  Needle Gauge:22 G  Placement of Spinal needle event:cerebrospinal fluid aspirated  Paresthesia: no  Fluid Appearance:clear     Post Assessment  Patient Tolerance:patient tolerated the procedure well with no apparent complications  Complications no

## 2021-12-06 NOTE — H&P
"[facility]  Edd Leung  : 1985  MRN: [id number]  CSN: [id number]    History and Physical    Subjective   Edd Leung is a 36 y.o. year old  with an Estimated Date of Delivery: None noted. scheduled for  delivery due to previous C/S - declines .  Her placental location is anterior.  She is not planning for sterilization at the time of the .    Prenatal care has been with Dr. Rita Busby.  It has been complicated by pre-gestational diabetes and chronic hypertension without superimposed pre-eclampsia.    OB History    Para Term  AB Living   2 1 1 0 0 1   SAB IAB Ectopic Molar Multiple Live Births   0 0 0 0 0 1      # Outcome Date GA Lbr Carter/2nd Weight Sex Delivery Anes PTL Lv   2 Current            1 Term 06   3090 g (6 lb 13 oz) M CS-LTranv   RICHI      Birth Comments: [place]      Name: [facility]     Past Medical History:   Diagnosis Date   • Anxiety    • Anxiety and depression    • Diabetes mellitus (HCC)    • Gonorrhea    • History of abnormal cervical Pap smear    • Hypertension      Past Surgical History:   Procedure Laterality Date   •  SECTION  2006   • DILATATION AND CURETTAGE       No current facility-administered medications for this encounter.    Allergies   Allergen Reactions   • Latex Rash     \"makes my skin raw\"        Social History    Tobacco Use      Smoking status: Former Smoker        Packs/day: 0.25        Years: 17.00        Pack years: 4.25        Types: Cigarettes        Quit date: 2019        Years since quittin.6      Smokeless tobacco: Never Used    Review of Systems   All other systems reviewed and are negative.        Objective   There were no vitals taken for this visit.  General: well developed; well nourished  no acute distress  obese   Heart: Not performed.   Lungs: breathing is unlabored   Abdomen: soft, non-tender; no masses     Prenatal Labs  Lab Results   Component " Value Date    HGB 10.4 (L) 2021    RUBELLAABIGG Immune 2019    HEPBSAG Negative 2019    ABSCRN Negative 2021    [id number] Non-Reactive 2019       Recent Labs  Lab Results   Component Value Date    HGB 10.4 (L) 2021    HCT 34.1 2021    WBC 8.35 2021     2021           Assessment   1. IUP with an Estimated Date of Delivery: None noted.  2. Planned  section for previous C/S - declines    3. Chronic Hypertension  4. T2DM  5. Anxiety     Plan   1. Repeat   2. ABx and DVT prophylaxis  3. CHTN: continue labetalol 200 mg BID and Nifedipine 30 mg BID  4. T2DM: continue Lantus 60u/60u, Humalog 56u/56u/56u  5. Anxiety: continue Wellbutrin 150 mg QD    Terra Green MD  2021

## 2021-12-06 NOTE — PAYOR COMM NOTE
"Yamel Leung (36 y.o. Female)     Delivery information:  YUSEF 21  GA 37 weeks and 4 days    C Section 21 @ 08:38  Male  Wt 3089 gms  Apgars 7/9    From: Amalia Laguerre  #[phone]  Fax#[phone]              Date of Birth Social Security Number Address Home Phone MRN    1985   [place] [address] [phone] [id number]    Taoism Marital Status             None        Admission Date Admission Type Admitting Provider Attending Provider Department, Room/Bed    21 Elective Rita Busby MD Playforth, Karen S, MD [facility] ANTEPARTUM, N324    Discharge Date Discharge Disposition Discharge Destination                         Attending Provider: Rita Busby MD    Allergies: Latex    Isolation: None   Infection: None   Code Status: CPR   Advance Care Planning Activity    Ht: 175.3 cm (69\")   Wt: 204 kg (450 lb)    Admission Cmt: None   Principal Problem: None                Active Insurance as of 2021     Primary Coverage     Payor Plan Insurance Group Employer/Plan Group    [facility] MEDICAID      Payor Plan Address Payor Plan Phone Number Payor Plan Fax Number Effective Dates    [address] [address] [phone]  2021 - None Entered    [facility] [id number]       Subscriber Name Subscriber Birth Date Member ID       YAMEL LEUNG 1985 [id number]                 Emergency Contacts      (Rel.) Home Phone Work Phone Mobile Phone    RAJAN ROWELL (Relative) -- -- [phone]    LeslieJoanne (Mother) -- -- [phone]            Insurance Information                [facility] MEDICAID Phone: [phone]    Subscriber: Yamel Leungoinette Subscriber#: [id number]    Group#: -- Precert#: --               History & Physical      Terra Green MD at 21 0719     Attestation signed by Rita Busby MD at 21 1128    I have " "reviewed this documentation and agree.                    [facility]  Edd Leung  : 1985  MRN: [id number]  CSN: [id number]    History and Physical    Subjective   Edd Leung is a 36 y.o. year old  with an Estimated Date of Delivery: None noted. scheduled for  delivery due to previous C/S - declines .  Her placental location is anterior.  She is not planning for sterilization at the time of the .    Prenatal care has been with Dr. Rita Busby.  It has been complicated by pre-gestational diabetes and chronic hypertension without superimposed pre-eclampsia.    OB History    Para Term  AB Living   2 1 1 0 0 1   SAB IAB Ectopic Molar Multiple Live Births   0 0 0 0 0 1      # Outcome Date GA Lbr Carter/2nd Weight Sex Delivery Anes PTL Lv   2 Current            1 Term 06   3090 g (6 lb 13 oz) M CS-LTranv   RICHI      Birth Comments: Arrest of descent      Name: [facility]     Past Medical History:   Diagnosis Date   • Anxiety    • Anxiety and depression    • Diabetes mellitus (HCC)    • Gonorrhea    • History of abnormal cervical Pap smear    • Hypertension      Past Surgical History:   Procedure Laterality Date   •  SECTION  2006   • DILATATION AND CURETTAGE       No current facility-administered medications for this encounter.    Allergies   Allergen Reactions   • Latex Rash     \"makes my skin raw\"        Social History    Tobacco Use      Smoking status: Former Smoker        Packs/day: 0.25        Years: 17.00        Pack years: 4.25        Types: Cigarettes        Quit date: 2019        Years since quittin.6      Smokeless tobacco: Never Used    Review of Systems   All other systems reviewed and are negative.        Objective   There were no vitals taken for this visit.  General: well developed; well nourished  no acute distress  obese   Heart: Not performed.   Lungs: breathing is unlabored   Abdomen: soft, " non-tender; no masses     Prenatal Labs  Lab Results   Component Value Date    HGB 10.4 (L) 2021    RUBELLAABIGG Immune 2019    HEPBSAG Negative 2019    ABSCRN Negative 2021    [id number] Non-Reactive 2019       Recent Labs  Lab Results   Component Value Date    HGB 10.4 (L) 2021    HCT 34.1 2021    WBC 8.35 2021     2021           Assessment   1. IUP with an Estimated Date of Delivery: None noted.  2. Planned  section for previous C/S - declines    3. Chronic Hypertension  4. T2DM  5. Anxiety     Plan   1. Repeat   2. ABx and DVT prophylaxis  3. CHTN: continue labetalol 200 mg BID and Nifedipine 30 mg BID  4. T2DM: continue Lantus 60u/60u, Humalog 56u/56u/56u  5. Anxiety: continue Wellbutrin 150 mg QD    Terra Green MD  2021          Electronically signed by Rita Busby MD at 21 1128         Facility-Administered Medications as of 2021   Medication Dose Route Frequency Provider Last Rate Last Admin   • [COMPLETED] acetaminophen (TYLENOL) tablet 1,000 mg  1,000 mg Oral Once Terra Green MD   1,000 mg at 21 0729   • carboprost (HEMABATE) injection 250 mcg  250 mcg Intramuscular PRN Terra Green MD       • [COMPLETED] ceFAZolin in Sodium Chloride (ANCEF) IVPB solution 3 g  3 g Intravenous Once Terra Green  mL/hr at 21 0730 3 g at 21 0730   • ceFAZolin in Sodium Chloride (ANCEF) IVPB solution 3 g  3 g Intravenous Q8H Terra Green MD       • famotidine (PEPCID) injection 20 mg  20 mg Intravenous Once PRN Terra Green MD        Or   • famotidine (PEPCID) tablet 20 mg  20 mg Oral Once PRN Terra Green MD       • [COMPLETED] ketorolac (TORADOL) injection 30 mg  30 mg Intravenous Once Terra Green MD   30 mg at 21 1111   • [COMPLETED] lactated ringers bolus 1,000 mL  1,000 mL Intravenous Once Terra Green MD 1,000 mL/hr at 21 0729 1,000 mL  at 12/06/21 0829   • lactated ringers infusion  125 mL/hr Intravenous Continuous Terra Green  mL/hr at 12/06/21 1134 125 mL/hr at 12/06/21 1134   • lidocaine PF 1% (XYLOCAINE) injection 5 mL  5 mL Intradermal PRN Terra Green MD       • methylergonovine (METHERGINE) injection 200 mcg  200 mcg Intramuscular Once PRN Terra Green MD       • miSOPROStol (CYTOTEC) tablet 800 mcg  800 mcg Rectal PRN Terra Green MD       • ondansetron (ZOFRAN) tablet 4 mg  4 mg Oral Q6H PRN Terra Green MD        Or   • ondansetron (ZOFRAN) injection 4 mg  4 mg Intravenous Q6H PRN Terra Green MD       • oxytocin (PITOCIN) 30 units in 0.9% sodium chloride 500 mL (premix)  650 mL/hr Intravenous Once Terra Green MD        Followed by   • oxytocin (PITOCIN) 30 units in 0.9% sodium chloride 500 mL (premix)  85 mL/hr Intravenous Once Terra Green MD       • [COMPLETED] Sod Citrate-Citric Acid (BICITRA) solution 30 mL  30 mL Oral Once Terra Green MD   30 mL at 12/06/21 0729   • sodium chloride 0.9 % flush 1-10 mL  1-10 mL Intravenous PRN Terra Green MD       • sodium chloride 0.9 % flush 10 mL  10 mL Intravenous Q12H Terra Green MD           Lab Results (last 24 hours)     Procedure Component Value Units Date/Time    POC Glucose Once [[id number]]  (Abnormal) Collected: 12/06/21 0935    Specimen: Blood Updated: 12/06/21 0937     Glucose 133 mg/dL      Comment: Meter: [id number] : [id number] Trey Lindquist       Blood Gas, Arterial, Cord [[id number]]  (Abnormal) Collected: 12/06/21 0851    Specimen: Cord Blood Arterial from Umbilical Cord Updated: 12/06/21 0852     Site Umbilical     pH, Cord Arterial 7.13 pH Units      Comment: 85 Value below critical limit        pCO2, Cord Arterial 84.0 mmHg      pO2, Cord Arterial 13.4 mmHg      HCO3, Cord Arterial 27.9 mmol/L      Base Exc, Cord Arterial -3.8 mmol/L      O2 Sat, Cord Arterial 24.1 %      Hemoglobin, Blood Gas 15.0 g/dL       CO2 Content 30.5 mmol/L      Temperature 37.0 C      Barometric Pressure for Blood Gas --     Comment: N/A        Modality Room Air     FIO2 21 %      Rate 0 Breaths/minute      PIP 0 cmH2O      Comment: Meter: [id number]     :  [id number]        IPAP 0     EPAP 0     Note --     Notified Corazon GUPTA MD     Notified By [id number]     Notified Time 12/06/2021 08:58    Blood Gas, Venous, Cord [[id number]]  (Abnormal) Collected: 12/06/21 0851    Specimen: Cord Blood Venous from Umbilical Cord Updated: 12/06/21 0852     Site Umbilical     pH, Cord Venous 7.217 pH Units      pCO2, Cord Venous 70.6 mm Hg      Comment: 86 Value above critical limit        pO2, Cord Venous 16.5 mm Hg      HCO3, Cord Venous 28.7 mmol/L      Base Excess, Cord Venous -1.1 mmol/L      O2 Sat, Cord Venous 31.0 %      Hemoglobin, Blood Gas 14.5 g/dL      CO2 Content 30.8 mmol/L      Temperature 37.0 C      Barometric Pressure for Blood Gas --     Comment: N/A        Modality Room Air     FIO2 21 %      Rate 0 Breaths/minute      PIP 0 cmH2O      Comment: Meter: [id number]     :  [id number]        IPAP 0     EPAP 0     O2 Saturation Calculated --     Comment: Calculated O2 saturation result not reported at this site.        Note --     Notified Corazon GUPTA MD     Notified By [id number]     Notified Time 12/06/2021 08:56     Collection Time --    Urine Drug Screen - Urine, Clean Catch [[id number]]  (Normal) Collected: 12/06/21 0748    Specimen: Urine, Clean Catch Updated: 12/06/21 0811     THC, Screen, Urine Negative     Phencyclidine (PCP), Urine Negative     Cocaine Screen, Urine Negative     Methamphetamine, Ur Negative     Opiate Screen Negative     Amphetamine Screen, Urine Negative     Benzodiazepine Screen, Urine Negative     Tricyclic Antidepressants Screen Negative     Methadone Screen, Urine Negative     Barbiturates Screen, Urine Negative     Oxycodone Screen, Urine Negative     Propoxyphene Screen Negative      "Buprenorphine, Screen, Urine Negative    Narrative:      Cutoff For Drugs Screened:    Amphetamines               500 ng/ml  Barbiturates               200 ng/ml  Benzodiazepines            150 ng/ml  Cocaine                    150 ng/ml  Methadone                  200 ng/ml  Opiates                    100 ng/ml  Phencyclidine               25 ng/ml  THC                            50 ng/ml  Methamphetamine            500 ng/ml  Tricyclic Antidepressants  300 ng/ml  Oxycodone                  100 ng/ml  Propoxyphene               300 ng/ml  Buprenorphine               10 ng/ml    The normal value for all drugs tested is negative. This report includes unconfirmed screening results, with the cutoff values listed, to be used for medical treatment purposes only.  Unconfirmed results must not be used for non-medical purposes such as employment or legal testing.  Clinical consideration should be applied to any drug of abuse test, particularly when unconfirmed results are used.          Orders (last 24 hrs)      Start     Ordered    12/06/21 1530  ceFAZolin in Sodium Chloride (ANCEF) IVPB solution 3 g  Every 8 Hours         12/06/21 1055    12/06/21 1344  Diet Regular  Diet Effective Now,   Status:  Canceled         12/06/21 1343    12/06/21 1344  Diet Regular  Diet Effective Now        Comments: Send chicken tenders and fries, sprite, dessert, honey mustard.    12/06/21 1344    12/06/21 1130  oxytocin (PITOCIN) 30 units in 0.9% sodium chloride 500 mL (premix)  Once        \"Followed by\" Linked Group Details    12/06/21 1040    12/06/21 1130  ketorolac (TORADOL) injection 30 mg  Once         12/06/21 1040    12/06/21 1100  Respirations  Every Hour      Comments: If respiratory rate is less than 10/min, notify the Anesthesiologist    12/06/21 1040    12/06/21 1041  Notify Physician (specified)  Until Discontinued         12/06/21 1040    12/06/21 1041  Vital Signs Per Hospital Policy  Per Hospital Policy         12/06/21 1040 " "   12/06/21 1041  Strict Bed Rest  Until Discontinued         12/06/21 1040    12/06/21 1041  Fundal & Lochia Check  Per Order Details        Comments: Every 15 Minutes x4, Then Every 30 Minutes x2, Then Every Shift    12/06/21 1040    12/06/21 1041  Fundal & Lochia Check  Every Shift       12/06/21 1040    12/06/21 1041  Diet Regular  Diet Effective Now,   Status:  Canceled         12/06/21 1040    12/06/21 1041  Advance Diet as Tolerated  Until Discontinued         12/06/21 1040    12/06/21 1041  Vital Signs  Per Hospital Policy         12/06/21 1040    12/06/21 1041  If Respiratory Rate is Less Than 8/Min, See Narcan Order. Notify Anesthesiologist STAT.  Continuous         12/06/21 1040    12/06/21 1041  Blood Pressure and Pulse Every 4 Hours  Continuous         12/06/21 1040    12/06/21 1041  Activity & Removal of Stratton Catheter, Per Obstetrician  Continuous         12/06/21 1040    12/06/21 1041  Notify Anesthesiologist for Any Questions / Problems  Continuous         12/06/21 1040    12/06/21 1041  Notify Anesthesia for Temp Over 101.4F  Continuous        Comments: May discharge from PACU with temperature at or above 95 degrees.    12/06/21 1040    12/06/21 1040  oxytocin (PITOCIN) 30 units in 0.9% sodium chloride 500 mL (premix)  Once        \"Followed by\" Linked Group Details    12/06/21 1040    12/06/21 1040  methylergonovine (METHERGINE) injection 200 mcg  Once As Needed         12/06/21 1040    12/06/21 1040  miSOPROStol (CYTOTEC) tablet 800 mcg  As Needed         12/06/21 1040    12/06/21 1040  ondansetron (ZOFRAN) tablet 4 mg  Every 6 Hours PRN        \"Or\" Linked Group Details    12/06/21 1040    12/06/21 1040  ondansetron (ZOFRAN) injection 4 mg  Every 6 Hours PRN        \"Or\" Linked Group Details    12/06/21 1040    12/06/21 1040  famotidine (PEPCID) injection 20 mg  Once As Needed        \"Or\" Linked Group Details    12/06/21 1040    12/06/21 1040  famotidine (PEPCID) tablet 20 mg  Once As Needed      " "  \"Or\" Linked Group Details    12/06/21 1040    12/06/21 1040  carboprost (HEMABATE) injection 250 mcg  As Needed         12/06/21 1040    12/06/21 0938  POC Glucose Once  PROCEDURE ONCE         12/06/21 0935    12/06/21 0901  Tissue Pathology Exam  RELEASE UPON ORDERING         12/06/21 0901    12/06/21 0900  sodium chloride 0.9 % flush 10 mL  Every 12 Hours Scheduled         12/06/21 0727    12/06/21 0853  Blood Gas, Arterial, Cord  PROCEDURE ONCE         12/06/21 0851    12/06/21 0852  Blood Gas, Venous, Cord  PROCEDURE ONCE         12/06/21 0851    12/06/21 0830  ceFAZolin in Sodium Chloride (ANCEF) IVPB solution 3 g  Once         12/06/21 0732    12/06/21 0815  lactated ringers bolus 1,000 mL  Once         12/06/21 0727    12/06/21 0815  lactated ringers infusion  Continuous         12/06/21 0727    12/06/21 0815  Sod Citrate-Citric Acid (BICITRA) solution 30 mL  Once         12/06/21 0727    12/06/21 0815  acetaminophen (TYLENOL) tablet 1,000 mg  Once         12/06/21 0727    12/06/21 0743  Urine Drug Screen - Urine, Clean Catch  STAT         12/06/21 0743    12/06/21 0723  Admit To Obstetrics Inpatient  Once         12/06/21 0727    12/06/21 0723  Code Status and Medical Interventions:  Continuous         12/06/21 0727    12/06/21 0723  Obtain informed consent  Once         12/06/21 0727    12/06/21 0723  Vital Signs Per Hospital Policy  Per Hospital Policy         12/06/21 0727 12/06/21 0723  Continuous Fetal Monitoring With NST on Admission and Prior to Initiation of Oxytocin.  Per Order Details        Comments: Continuous Fetal Monitoring With NST on Admission & Prior to Initiation of Oxytocin.    12/06/21 0727 12/06/21 0723  External Uterine Contraction Monitoring  Per Hospital Policy         12/06/21 0727 12/06/21 0723  Notify Physician (specified)  Until Discontinued         12/06/21 0727 12/06/21 0723  Notify physician for tachysystole (per hospital algorithm)  Until Discontinued         " "21  Notify physician if membranes ruptured, bleeding greater than 1 pad an hour, fetal heart tone abnormality, and severe pain  Until Discontinued         21  Initiate Group Beta Strep (GBS) Prophylaxis Protocol, If Criteria Met  Continuous        Comments: NO TREATMENT RECOMMENDED IF: 1)  Maternal GBS status known negative 2)  Scheduled  birth with intact membranes, not in labor.  3 ) Maternal GBS unknown, no risk factors.   TREAT WITH ANTIBIOTICS IF:  1)  Maternal GBS status is known postive.  2)  Maternal GBS status unknown with these risk factors:  a)  Previous infant affected by GBS infection.  b)  GBS urinary tract infection (UTI) or bacteruria during pregnancy  c)  Unexplained maternal fever in labor (greater than or equal to 100.4F or 38.0C)  d)  Prolonged rupture of the membranes greater than or equal to 18 hours.  e)  Gestational age less than 37 weeks.    21  Insert Indwelling Urinary Catheter  Once        \"And\" Linked Group Details    21  Assess Need for Indwelling Urinary Catheter - Follow Removal Protocol  Continuous        Comments: Indwelling Urinary Catheter Removal Criteria  Discontinue Indwelling Urinary Catheter Unless One of the Following is Present  Urinary Retention or Obstruction  Chronic Stratton Catheter Use  End of Life  Critical Illness with Strict I/O   Tract or Abdominal Surgery  Stage 3/4 Sacral / Perineal Wound  Required Activity Restriction: Trauma  Required Activity Restriction: Spine Surgery  If Patient is Being Followed by Urology Contact Them PRIOR to Removal  Do Not Remove Indwelling Urinary Catheter Order is Present with a CLINICAL REASON to Maintain the Catheter. Provider is Required to Include a Clinical Reason to Maintain a Urinary Catheter    Chronic Stratton Catheter Use (Present on Admission)  Assess for Continued Need & Document Medical Necessity  If " "Infection is Suspected, Contact the Provider       \"And\" Linked Group Details    12/06/21 0727 12/06/21 0723  Urinary Catheter Care  Every Shift      \"And\" Linked Group Details    12/06/21 0727 12/06/21 0723  Abdominal Prep with Clippers  Once         12/06/21 0727 12/06/21 0723  Chlorhexadine Skin Prep Unless Otherwise Indicated  Once         12/06/21 0727 12/06/21 0723  SCD (sequential compression devices)  Once         12/06/21 0727 12/06/21 0723  POC Glucose Once  Once         12/06/21 0727 12/06/21 0723  Document Gatorade Consumption Prior to Admission (Yes or No)  Once         12/06/21 0727 12/06/21 0723  NPO Diet  Diet Effective Now,   Status:  Canceled         12/06/21 0727 12/06/21 0723  NPO Diet NPO Except: Ice chips  Diet Effective Now,   Status:  Canceled         12/06/21 0727 12/06/21 0723  Inpatient Consult to Anesthesiology  Once        Specialty:  Anesthesiology  Provider:  (Not yet assigned)    12/06/21 0727 12/06/21 0723  Type & Screen  Once,   Status:  Canceled         12/06/21 0727 12/06/21 0723  CBC (No Diff)  STAT         12/06/21 0727 12/06/21 0723  Preeclampsia Panel  Once,   Status:  Canceled         12/06/21 0727 12/06/21 0723  Insert Peripheral IV  Once         12/06/21 0727 12/06/21 0723  Saline Lock & Maintain IV Access  Continuous         12/06/21 0727 12/06/21 0722  lidocaine PF 1% (XYLOCAINE) injection 5 mL  As Needed         12/06/21 0727 12/06/21 0722  sodium chloride 0.9 % flush 1-10 mL  As Needed         12/06/21 0727 12/06/21 0722  ABO/Rh Specimen Verification  Once         12/06/21 0721 12/06/21 0722  Follow Anesthesia Guidelines / Protocol  Once         12/06/21 0721 12/06/21 0722  Verify / Perform Chlorhexidine Skin Prep  Once        Comments: Chlorhexidine Skin Wipes and Instructions For All Patients Having A Procedure Requiring an Outward Incision if Not Allergic.  If Allergic, Give Antibacterial Skin Wipes and " "Instructions.  Do Not Use For Facial Cases or on Any Mucus Membranes.    12/06/21 0721 12/06/21 0722  Verify / Perform Chlorhexidine Skin Prep if Indicated (If Not Already Completed)  Once         12/06/21 0721    Unscheduled  IV Site Care  As Needed       12/06/21 1040    Unscheduled  Blood Gas, Arterial -With Co-Ox Panel: Yes  As Needed       12/06/21 1040    Signed and Held  ondansetron (ZOFRAN) injection 4 mg  Once As Needed         Signed and Held    Signed and Held  diphenhydrAMINE (BENADRYL) capsule 25 mg  Every 4 Hours PRN        \"Or\" Linked Group Details    Signed and Held    Signed and Held  diphenhydrAMINE (BENADRYL) injection 25 mg  Once As Needed        \"Or\" Linked Group Details    Signed and Held    Signed and Held  diphenhydrAMINE (BENADRYL) injection 25 mg  Every 4 Hours PRN        \"Or\" Linked Group Details    Signed and Held    Signed and Held  naloxone (NARCAN) injection 0.4 mg  Every 1 Hour PRN         Signed and Held    Signed and Held  buPROPion SR (WELLBUTRIN SR) 12 hr tablet 150 mg  Daily         Signed and Held    Signed and Held  hydroCHLOROthiazide (HYDRODIURIL) tablet 25 mg  Daily         Signed and Held    Signed and Held  labetalol (NORMODYNE) tablet 200 mg  2 Times Daily         Signed and Held    Signed and Held  NIFEdipine (PROCARDIA) capsule 30 mg  2 Times Daily         Signed and Held    Signed and Held  prenatal vitamin tablet 1 tablet  Daily         Signed and Held    Signed and Held  Code Status and Medical Interventions:  Continuous         Signed and Held    Signed and Held  Vital Signs Per Hospital Policy  Per Hospital Policy         Signed and Held    Signed and Held  Notify Physician  Until Discontinued         Signed and Held    Signed and Held  Up with Assistance  As Needed         Signed and Held    Signed and Held  Ambulate Patient  2 Times Daily      Comments: After anesthesia wears off.    Signed and Held    Signed and Held  Patient May Shower  Per Order Details     "    Comments: After Anesthesia Wears Off & With Assistance    Signed and Held    Signed and Held  Advance Diet as Tolerated  Until Discontinued         Signed and Held    Signed and Held  I/O  Every Shift       Signed and Held    Signed and Held  Fundal and Lochia Check  Per Order Details        Comments: Every 30 Minutes x2, Every 1 Hour x4, Every 4 Hours x24 Hours, Then Every Shift.    Signed and Held    Signed and Held  Weigh Patient  Once         Signed and Held    Signed and Held  Continue Indwelling Urinary Catheter Already in Place  Once         Signed and Held    Signed and Held  Discontinue Indwelling Urinary Catheter in AM  Once         Signed and Held    Signed and Held  Bladder Scan if Patient Unable to Void 4-6 Hours After Catheter Removal  As Needed         Signed and Held    Signed and Held  Straight Cath Every 4-6 Hours As Needed If Patient is Unable to Void After 4-6 Hours, Bladder Scan Volume is Greater Than 500mL & Patient Has Symptoms of Bladder Discomfort / Distention  As Needed         Signed and Held    Signed and Held  Notify Provider if Bladder Distention Continues  Until Discontinued         Signed and Held    Signed and Held  Consult Pharmacist For Review of Medications That May Cause Urinary Retention - RN To Place Order for Consult it Needed  As Needed         Signed and Held    Signed and Held  Schedule / Prompt Voiding For Patients With Urinary Incontinence  As Needed         Signed and Held    Signed and Held  Urinary Catheter Care  Every Shift       Signed and Held    Signed and Held  Abdominal Wound Care  Per Order Details        Comments: Postop Day 1. Remove Dressing & Leave Incision Open to Air.    Signed and Held    Signed and Held  Turn Cough Deep Breathe  Once         Signed and Held    Signed and Held  Incentive spirometry RT  Every Hour       Signed and Held    Signed and Held  Encourage early intake of PO fluids  Continuous         Signed and Held    Signed and Held   "Ambulate Patient 3-5 times per day (with or without Stratton)  Every Shift       Signed and Held    Signed and Held  Chewing Gum  As Needed         Signed and Held    Signed and Held  Apply Abdominal Binding Until Discontinued  Until Discontinued         Signed and Held    Signed and Held  \"If patient tolerates food and liquids after completion of second bag of Pitocin, saline lock IV and discontinue IV fluid infusions.  Once         Signed and Held    Signed and Held  Remove Abdominal Dressing  Once         Signed and Held    Signed and Held  Warm compress  As Needed         Signed and Held    Signed and Held  Breast pump to bed  Once         Signed and Held    Signed and Held  Apply ace wrap, tight bra, or binder  As Needed         Signed and Held    Signed and Held  Apply ice packs  As Needed         Signed and Held    Signed and Held  If indicated -- Please administer RH Immunoglobulin based on results of cord blood evaluation and fetal screen lab tests, pharmacy to dispense  Per Order Details        Comments: See Process Instructions For Reference Range Details.    Signed and Held    Signed and Held  CBC & Differential  Timed         Signed and Held    Signed and Held  docusate sodium (COLACE) capsule 100 mg  2 Times Daily PRN         Signed and Held    Signed and Held  simethicone (MYLICON) chewable tablet 80 mg  4 Times Daily PRN         Signed and Held    Signed and Held  promethazine (PHENERGAN) tablet 25 mg  Once As Needed        \"Or\" Linked Group Details    Signed and Held    Signed and Held  promethazine (PHENERGAN) suppository 12.5 mg  Once As Needed        \"Or\" Linked Group Details    Signed and Held    Signed and Held  metoclopramide (REGLAN) tablet 10 mg  Once As Needed         Signed and Held    Signed and Held  lanolin cream  Every 1 Hour PRN         Signed and Held    Signed and Held  Hydrocortisone (Perianal) (ANUSOL-HC) 2.5 % rectal cream 1 application  As Needed         Signed and Held    Signed " "and Held  prenatal vitamin tablet 1 tablet  Daily         Signed and Held    Signed and Held  aluminum-magnesium hydroxide-simethicone (MAALOX MAX) 400-400-40 MG/5ML suspension 15 mL  Every 4 Hours PRN        \"Or\" Linked Group Details    Signed and Held    Signed and Held  calcium carbonate (TUMS) chewable tablet 500 mg (200 mg elemental)  Every 4 Hours PRN        \"Or\" Linked Group Details    Signed and Held    Signed and Held  Place Sequential Compression Device  Once         Signed and Held    Signed and Held  Maintain Sequential Compression Device  Continuous         Signed and Held    Signed and Held  Diet Regular; Consistent Carbohydrate  Diet Effective Now         Signed and Held    Signed and Held  POC Glucose 4x Daily Fasting & PC  4 Times Daily Fasting & After Meals       Signed and Held    Signed and Held  acetaminophen (TYLENOL) tablet 1,000 mg  Every 6 Hours        \"Followed by\" Linked Group Details    Signed and Held    Signed and Held  acetaminophen (TYLENOL) tablet 650 mg  Every 6 Hours        \"Followed by\" Linked Group Details    Signed and Held    Signed and Held  ketorolac (TORADOL) injection 15 mg  Every 6 Hours        \"Followed by\" Linked Group Details    Signed and Held    Signed and Held  ibuprofen (ADVIL,MOTRIN) tablet 600 mg  Every 6 Hours        \"Followed by\" Linked Group Details    Signed and Held    Signed and Held  oxyCODONE (ROXICODONE) immediate release tablet 5 mg  Every 4 Hours PRN        \"Or\" Linked Group Details    Signed and Held    Signed and Held  oxyCODONE (ROXICODONE) immediate release tablet 10 mg  Every 4 Hours PRN        \"Or\" Linked Group Details    Signed and Held    Signed and Held  diphenhydrAMINE (BENADRYL) capsule 25 mg  Every 4 Hours PRN         Signed and Held    Signed and Held  insulin lispro (humaLOG) injection 15 Units  3 Times Daily With Meals         Signed and Held    Signed and Held  insulin detemir (LEVEMIR) injection 20 Units  Every 12 Hours Scheduled      "    Signed and Held    Signed and Held  AZITHROMYCIN 500 MG/250 ML 0.9% NS IVPB (vial-mate)  Once         Signed and Held                   Operative/Procedure Notes (last 24 hours)      Rita Busby MD at 21 0813           Rhea  Edd Dede Leung  : 1985  MRN: [id number]  CSN: [id number]     Section Operative Note    Pre-Operative Dx:   1. Intrauterine pregnancy at 37w4d weeks   2. Previous  section - declines       Postoperative dx:    1. Intrauterine pregnancy at 37w4d weeks 2.   Previous  section - declines         Procedure: vacuum delivery Repeat  (LTCS)  - 2 layer closure       Surgeon: MD Cesar Georges MD   Assistant:   Terra Green MD ( OB/GYN PGY1 resident)       Anesthesia: Spinal        EBL: 750 mls.   IV Fluids: 1500 mls.   UOP: 200 mls.     Antibiotics: cefazolin 3 gms and azithromycin 500mgs     Infant      Name:  Uriel      Gender: male  infant    Weight: 3089 g (6 lb 13 oz)     Apgars: 7  @ 1 minute / 9  @ 5 minutes     Procedure Details:   After the patient was adequately anesthetized, she was sterilely prepped and draped in the dorsal supine left lateral tilt position. Her pannus was taped up to allow for better visualization of the lower abdomen. A Pfannenstiel incision was created sharply with the knife. It was carried down to the fascia with the scalpel.  The fascia was cut transversely with the knife and extended in a curvilinear fashion with scissors. The fascia was freed from its midline insertion superiorly and inferiorly. Rectus muscles were  in the midline. The peritoneum was sharply entered and a bladder flap was sharply created. The lower uterine segment was scored transversely with the knife. Placenta was visualized at superior border of hysterotomy. Clear amniotic fluid was seen. The infant was in vertex presentation.  The head was delivered with use of Kiwi vacuum, no pop  off. The mouth and nose were bulb suctioned.  The cord was milked 4 times, clamped and the infant was handed to the delivery team which was in attendance. The placenta was spontaneously extracted. The uterus was exteriorized and wiped free of debris and clot. Given angle of uterus, decision was made to close hysterotomy intra-abdominally. The uterine incision was closed with 1-0 vicryl in a continuous running locking fashion. A second 1-0 vicryl was used to imbricate across the first. Multiple figure 8 stitches and box stitches were used to control bleeding from the uterine incision. The bladder flap was not reapproximated. Surgifoam was used x 2 across the incision. Hemostatis was confirmed visually.     The fascia was closed with 0 vicryl sewing from either corner, tying in the midline. The subcutaneous tissue was copiously irrigated. New's fascia was closed with 3-0 Vicryl in one layer, and a subsequent layer was run approximating subcutaneous tissue. and the skin was closed with 4-0 Vicryl subcuticularly. Skin glue was applied.  All counts were correct.         Complications:   None      Disposition:   Mother to Mother Baby/Postpartum  in stable condition currently.   Baby to NBN  in stable condition currently.       Terra Green MD  12/6/2021  10:51 EST    I was present and scrubbed for the entire procedure.   Rita Busby MD    Electronically signed by Rita Busby MD at 12/06/21 1140       Physician Progress Notes (last 24 hours)  Notes from 12/05/21 1413 through 12/06/21 1413   No notes of this type exist for this encounter.

## 2021-12-06 NOTE — ANESTHESIA PREPROCEDURE EVALUATION
Anesthesia Evaluation     Patient summary reviewed and Nursing notes reviewed   NPO Solid Status: > 8 hours  NPO Liquid Status: > 2 hours           Airway   Mallampati: I  TM distance: >3 FB  Neck ROM: full  Dental      Pulmonary - negative pulmonary ROS   Cardiovascular     (+) hypertension,       Neuro/Psych  (+) psychiatric history Anxiety and Depression,     GI/Hepatic/Renal/Endo    (+) morbid obesity (Super),  diabetes mellitus type 2,     Musculoskeletal (-) negative ROS    Abdominal    Substance History - negative use     OB/GYN negative ob/gyn ROS         Other - negative ROS                     Anesthesia Plan    ASA 3     spinal and ITN       Anesthetic plan, all risks, benefits, and alternatives have been provided, discussed and informed consent has been obtained with: patient.  Use of blood products discussed with patient .   Plan discussed with attending.

## 2021-12-06 NOTE — ANESTHESIA POSTPROCEDURE EVALUATION
Patient: Edd Aguayo Madhu    Procedure Summary     Date: 21 Room / Location: [facility] LABOR DELIVERY   [facility] LABOR DELIVERY    Anesthesia Start: 746 Anesthesia Stop:     Procedure:  SECTION REPEAT (N/A Abdomen) Diagnosis:     Surgeons: Rita Busby MD Provider: Mylene Gray DO    Anesthesia Type: spinal, ITN ASA Status: 3          Anesthesia Type: spinal, ITN    Vitals  Vitals Value Taken Time   /65 21 1036   Temp     Pulse 65 21 1038   Resp     SpO2 93 % 21 1038   Vitals shown include unvalidated device data.  T 97.4  RR 18      Post Anesthesia Care and Evaluation    Patient location during evaluation: bedside  Patient participation: complete - patient participated  Level of consciousness: awake and awake and alert  Pain score: 0  Pain management: satisfactory to patient  Airway patency: patent  Anesthetic complications: No anesthetic complications  PONV Status: none  Cardiovascular status: acceptable, hemodynamically stable and stable  Respiratory status: acceptable  Hydration status: stable  Post Neuraxial Block status: Motor and sensory function returned to baseline and No signs or symptoms of PDPH

## 2021-12-06 NOTE — OP NOTE
Rhea Leung  : 1985  MRN: [id number]  CSN: [id number]     Section Operative Note    Pre-Operative Dx:   1. Intrauterine pregnancy at 37w4d weeks   2. Previous  section - declines       Postoperative dx:    1. Intrauterine pregnancy at 37w4d weeks 2.   Previous  section - declines         Procedure: vacuum delivery Repeat  (LTCS)  - 2 layer closure       Surgeon: MD Cesar Georges MD   Assistant:   Terra Green MD ( OB/GYN PGY1 resident)       Anesthesia: Spinal        EBL: 750 mls.   IV Fluids: 1500 mls.   UOP: 200 mls.     Antibiotics: cefazolin 3 gms and azithromycin 500mgs     Infant      Name:  Uriel      Gender: male  infant    Weight: 3089 g (6 lb 13 oz)     Apgars: 7  @ 1 minute / 9  @ 5 minutes     Procedure Details:   After the patient was adequately anesthetized, she was sterilely prepped and draped in the dorsal supine left lateral tilt position. Her pannus was taped up to allow for better visualization of the lower abdomen. A Pfannenstiel incision was created sharply with the knife. It was carried down to the fascia with the scalpel.  The fascia was cut transversely with the knife and extended in a curvilinear fashion with scissors. The fascia was freed from its midline insertion superiorly and inferiorly. Rectus muscles were  in the midline. The peritoneum was sharply entered and a bladder flap was sharply created. The lower uterine segment was scored transversely with the knife. Placenta was visualized at superior border of hysterotomy. Clear amniotic fluid was seen. The infant was in vertex presentation.  The head was delivered with use of Kiwi vacuum, no pop off. The mouth and nose were bulb suctioned.  The cord was milked 4 times, clamped and the infant was handed to the delivery team which was in attendance. The placenta was spontaneously extracted. The uterus was exteriorized and wiped  free of debris and clot. Given angle of uterus, decision was made to close hysterotomy intra-abdominally. The uterine incision was closed with 1-0 vicryl in a continuous running locking fashion. A second 1-0 vicryl was used to imbricate across the first. Multiple figure 8 stitches and box stitches were used to control bleeding from the uterine incision. The bladder flap was not reapproximated. Surgifoam was used x 2 across the incision. Hemostatis was confirmed visually.     The fascia was closed with 0 vicryl sewing from either corner, tying in the midline. The subcutaneous tissue was copiously irrigated. New's fascia was closed with 3-0 Vicryl in one layer, and a subsequent layer was run approximating subcutaneous tissue. and the skin was closed with 4-0 Vicryl subcuticularly. Skin glue was applied.  All counts were correct.         Complications:   None      Disposition:   Mother to Mother Baby/Postpartum  in stable condition currently.   Baby to NBN  in stable condition currently.       Terra Green MD  12/6/2021  10:51 EST    I was present and scrubbed for the entire procedure.   Rita Busby MD

## 2021-12-07 LAB
ABO GROUP BLD: NORMAL
BASOPHILS # BLD AUTO: 0.02 10*3/MM3 (ref 0–0.2)
BASOPHILS NFR BLD AUTO: 0.2 % (ref 0–1.5)
DEPRECATED RDW RBC AUTO: 49 FL (ref 37–54)
DEPRECATED RDW RBC AUTO: 49.9 FL (ref 37–54)
EOSINOPHIL # BLD AUTO: 0.07 10*3/MM3 (ref 0–0.4)
EOSINOPHIL NFR BLD AUTO: 0.6 % (ref 0.3–6.2)
ERYTHROCYTE [DISTWIDTH] IN BLOOD BY AUTOMATED COUNT: 16.3 % (ref 12.3–15.4)
ERYTHROCYTE [DISTWIDTH] IN BLOOD BY AUTOMATED COUNT: 16.5 % (ref 12.3–15.4)
GLUCOSE BLDC GLUCOMTR-MCNC: 120 MG/DL (ref 70–130)
GLUCOSE BLDC GLUCOMTR-MCNC: 127 MG/DL (ref 70–130)
GLUCOSE BLDC GLUCOMTR-MCNC: 133 MG/DL (ref 70–130)
GLUCOSE BLDC GLUCOMTR-MCNC: 136 MG/DL (ref 70–130)
HCT VFR BLD AUTO: 25.8 % (ref 34–46.6)
HCT VFR BLD AUTO: 26.6 % (ref 34–46.6)
HGB BLD-MCNC: 7.8 G/DL (ref 12–15.9)
HGB BLD-MCNC: 7.9 G/DL (ref 12–15.9)
IMM GRANULOCYTES # BLD AUTO: 0.05 10*3/MM3 (ref 0–0.05)
IMM GRANULOCYTES NFR BLD AUTO: 0.5 % (ref 0–0.5)
LYMPHOCYTES # BLD AUTO: 2.11 10*3/MM3 (ref 0.7–3.1)
LYMPHOCYTES NFR BLD AUTO: 19.5 % (ref 19.6–45.3)
MCH RBC QN AUTO: 24.5 PG (ref 26.6–33)
MCH RBC QN AUTO: 24.7 PG (ref 26.6–33)
MCHC RBC AUTO-ENTMCNC: 29.7 G/DL (ref 31.5–35.7)
MCHC RBC AUTO-ENTMCNC: 30.2 G/DL (ref 31.5–35.7)
MCV RBC AUTO: 81.6 FL (ref 79–97)
MCV RBC AUTO: 82.4 FL (ref 79–97)
MONOCYTES # BLD AUTO: 0.73 10*3/MM3 (ref 0.1–0.9)
MONOCYTES NFR BLD AUTO: 6.8 % (ref 5–12)
NEUTROPHILS NFR BLD AUTO: 7.83 10*3/MM3 (ref 1.7–7)
NEUTROPHILS NFR BLD AUTO: 72.4 % (ref 42.7–76)
NRBC BLD AUTO-RTO: 0 /100 WBC (ref 0–0.2)
PLATELET # BLD AUTO: 257 10*3/MM3 (ref 140–450)
PLATELET # BLD AUTO: 272 10*3/MM3 (ref 140–450)
PMV BLD AUTO: 10.9 FL (ref 6–12)
PMV BLD AUTO: 11.1 FL (ref 6–12)
RBC # BLD AUTO: 3.16 10*6/MM3 (ref 3.77–5.28)
RBC # BLD AUTO: 3.23 10*6/MM3 (ref 3.77–5.28)
RH BLD: POSITIVE
WBC NRBC COR # BLD: 10.81 10*3/MM3 (ref 3.4–10.8)
WBC NRBC COR # BLD: 9.04 10*3/MM3 (ref 3.4–10.8)

## 2021-12-07 PROCEDURE — 25010000002 KETOROLAC TROMETHAMINE PER 15 MG: Performed by: STUDENT IN AN ORGANIZED HEALTH CARE EDUCATION/TRAINING PROGRAM

## 2021-12-07 PROCEDURE — 25010000002 CEFAZOLIN PER 500 MG: Performed by: OBSTETRICS & GYNECOLOGY

## 2021-12-07 PROCEDURE — 82962 GLUCOSE BLOOD TEST: CPT

## 2021-12-07 PROCEDURE — 63710000001 INSULIN LISPRO (HUMAN) PER 5 UNITS: Performed by: STUDENT IN AN ORGANIZED HEALTH CARE EDUCATION/TRAINING PROGRAM

## 2021-12-07 PROCEDURE — 85025 COMPLETE CBC W/AUTO DIFF WBC: CPT | Performed by: STUDENT IN AN ORGANIZED HEALTH CARE EDUCATION/TRAINING PROGRAM

## 2021-12-07 PROCEDURE — 99231 SBSQ HOSP IP/OBS SF/LOW 25: CPT | Performed by: OBSTETRICS & GYNECOLOGY

## 2021-12-07 PROCEDURE — 85027 COMPLETE CBC AUTOMATED: CPT | Performed by: OBSTETRICS & GYNECOLOGY

## 2021-12-07 PROCEDURE — 86900 BLOOD TYPING SEROLOGIC ABO: CPT

## 2021-12-07 PROCEDURE — 63710000001 INSULIN DETEMIR PER 5 UNITS: Performed by: STUDENT IN AN ORGANIZED HEALTH CARE EDUCATION/TRAINING PROGRAM

## 2021-12-07 PROCEDURE — 86901 BLOOD TYPING SEROLOGIC RH(D): CPT

## 2021-12-07 RX ORDER — BUTALBITAL, ACETAMINOPHEN AND CAFFEINE 50; 325; 40 MG/1; MG/1; MG/1
2 TABLET ORAL EVERY 4 HOURS PRN
Status: DISCONTINUED | OUTPATIENT
Start: 2021-12-07 | End: 2021-12-13 | Stop reason: HOSPADM

## 2021-12-07 RX ADMIN — OXYCODONE 5 MG: 5 TABLET ORAL at 16:03

## 2021-12-07 RX ADMIN — LABETALOL HYDROCHLORIDE 200 MG: 200 TABLET, FILM COATED ORAL at 09:22

## 2021-12-07 RX ADMIN — SIMETHICONE 80 MG: 80 TABLET, CHEWABLE ORAL at 16:03

## 2021-12-07 RX ADMIN — INSULIN DETEMIR 20 UNITS: 100 INJECTION, SOLUTION SUBCUTANEOUS at 09:26

## 2021-12-07 RX ADMIN — ACETAMINOPHEN 1000 MG: 500 TABLET, FILM COATED ORAL at 01:05

## 2021-12-07 RX ADMIN — PRENATAL VITAMINS-IRON FUMARATE 27 MG IRON-FOLIC ACID 0.8 MG TABLET 1 TABLET: at 09:21

## 2021-12-07 RX ADMIN — INSULIN LISPRO 15 UNITS: 100 INJECTION, SOLUTION INTRAVENOUS; SUBCUTANEOUS at 12:26

## 2021-12-07 RX ADMIN — INSULIN LISPRO 15 UNITS: 100 INJECTION, SOLUTION INTRAVENOUS; SUBCUTANEOUS at 07:40

## 2021-12-07 RX ADMIN — OXYCODONE 10 MG: 5 TABLET ORAL at 20:58

## 2021-12-07 RX ADMIN — ACETAMINOPHEN 1000 MG: 500 TABLET, FILM COATED ORAL at 12:25

## 2021-12-07 RX ADMIN — KETOROLAC TROMETHAMINE 15 MG: 15 INJECTION, SOLUTION INTRAMUSCULAR; INTRAVENOUS at 09:51

## 2021-12-07 RX ADMIN — DOCUSATE SODIUM 100 MG: 100 CAPSULE, LIQUID FILLED ORAL at 20:58

## 2021-12-07 RX ADMIN — ACETAMINOPHEN 650 MG: 325 TABLET, FILM COATED ORAL at 18:28

## 2021-12-07 RX ADMIN — Medication 3 G: at 08:12

## 2021-12-07 RX ADMIN — IBUPROFEN 600 MG: 600 TABLET ORAL at 22:24

## 2021-12-07 RX ADMIN — LABETALOL HYDROCHLORIDE 200 MG: 200 TABLET, FILM COATED ORAL at 20:59

## 2021-12-07 RX ADMIN — BUTALBITAL, ACETAMINOPHEN, AND CAFFEINE 2 TABLET: 50; 325; 40 TABLET ORAL at 22:46

## 2021-12-07 RX ADMIN — INSULIN LISPRO 15 UNITS: 100 INJECTION, SOLUTION INTRAVENOUS; SUBCUTANEOUS at 19:27

## 2021-12-07 RX ADMIN — OXYCODONE 10 MG: 5 TABLET ORAL at 23:35

## 2021-12-07 RX ADMIN — BUPROPION HYDROCHLORIDE 150 MG: 150 TABLET, EXTENDED RELEASE ORAL at 09:20

## 2021-12-07 RX ADMIN — IBUPROFEN 600 MG: 600 TABLET ORAL at 16:03

## 2021-12-07 RX ADMIN — KETOROLAC TROMETHAMINE 15 MG: 15 INJECTION, SOLUTION INTRAMUSCULAR; INTRAVENOUS at 04:01

## 2021-12-07 RX ADMIN — ACETAMINOPHEN 1000 MG: 500 TABLET, FILM COATED ORAL at 06:50

## 2021-12-07 RX ADMIN — INSULIN DETEMIR 20 UNITS: 100 INJECTION, SOLUTION SUBCUTANEOUS at 22:24

## 2021-12-07 RX ADMIN — NIFEDIPINE 30 MG: 10 CAPSULE ORAL at 09:19

## 2021-12-07 NOTE — LACTATION NOTE
12/06/21 1510   Maternal Information   Person Making Referral   (courtesy consult)   Maternal Reason for Referral   (attempted to breastfeed 1st baby x2 weeks)   Infant Reason for Referral   (reports baby  ok x1 and followed up with formula per moms request)   Maternal Assessment   Breast Size Issue none   Breast Shape Bilateral:; pendulous   Breast Density Bilateral:; soft   Nipples Bilateral:; short   Left Nipple Symptoms intact   Right Nipple Symptoms intact   Maternal Infant Feeding   Maternal Emotional State receptive; tense   Infant Positioning clutch/football   Signs of Milk Transfer   (not able to latch--used nipple shield and baby latched well)   Pain with Feeding no   Comfort Measures Before/During Feeding infant position adjusted; latch adjusted; maternal position adjusted  (small nipple shield)   Latch Assistance minimal assistance   Milk Expression/Equipment   Breast Pump Type double electric, personal  (has pump at home)   Helped mom with latch and position. Mom will continue to work on these things. Plans to give formula after breastfeedings, if baby seems hungry. Teaching done as documented under Education. To call lactation services, if there are questions or concerns or if mom wants help with another feeding.

## 2021-12-07 NOTE — PLAN OF CARE
Problem: Breastfeeding  Goal: Effective Breastfeeding  Outcome: Ongoing, Progressing  Intervention: Promote Effective Breastfeeding  Flowsheets (Taken 12/6/2021 1510)  Breastfeeding Assistance:   feeding cue recognition promoted   feeding on demand promoted   assisted with positioning   support offered  Parent/Child Attachment Promotion:   caring behavior modeled   cue recognition promoted   face-to-face positioning promoted   participation in care promoted   skin-to-skin contact encouraged   strengths emphasized   positive reinforcement provided  Intervention: Support Exclusive Breastfeeding Success  Flowsheets (Taken 12/6/2021 1510)  Supportive Measures: active listening utilized  Breastfeeding Support:   diary/feeding log utilized   encouragement provided   lactation counseling provided   Goal Outcome Evaluation:

## 2021-12-07 NOTE — LACTATION NOTE
"   12/07/21 1230   Maternal Information   Person Making Referral lactation consultant  (Follow-up visit)   Maternal Reason for Referral breastfeeding unsuccessful in past; other (see comments)  (Reports she has \"spinal headache\" at this time.)   Infant Reason for Referral other (see comments)  (Baby's grandmother is formula feeding baby at this time)   Milk Expression/Equipment   Breast Pump Type double electric, hospital grade  (Encouraged to use breast pump when she feels well enough.)   Breast Pump Flange Type hard   Breast Pump Flange Size 24 mm   Breast Pumping   Breast Pumping Interventions other (see comments)  (Enouraged to call out for LC with help with pumping)     "

## 2021-12-07 NOTE — ANESTHESIA POSTPROCEDURE EVALUATION
Patient: Edd Aguayo Madhu    Procedure Summary     Date: 21 Room / Location: [facility] LABOR DELIVERY   [facility] LABOR DELIVERY    Anesthesia Start: 746 Anesthesia Stop:     Procedure:  SECTION REPEAT (N/A Abdomen) Diagnosis:     Surgeons: Rita Busby MD Provider: Mylene Gray DO    Anesthesia Type: spinal, ITN ASA Status: 3          Anesthesia Type: spinal, ITN    Vitals  Vitals Value Taken Time   /58 21 1200   Temp 98.1 °F (36.7 °C) 21 1200   Pulse 80 21 1200   Resp 16 21 1200   SpO2 97 % 21 0650   Vitals shown include unvalidated device data.        Post Anesthesia Care and Evaluation    Patient location during evaluation: bedside  Patient participation: complete - patient participated  Level of consciousness: awake and alert  Pain management: adequate  Airway patency: patent  Anesthetic complications: No anesthetic complications    Cardiovascular status: acceptable  Respiratory status: acceptable  Hydration status: acceptable  Post Neuraxial Block status: Motor and sensory function returned to baseline and No signs or symptoms of PDPH

## 2021-12-07 NOTE — PROGRESS NOTES
"  Laborist Post Partum Note: Postpartum Day: #1     Patient Name:  Edd Leung  :  1985  MRN:  [id number]  Referring Physician: Bettina Schwartz     Chief complaint: Insulin-dependent diabetes in pregnancy    S. No complaints today. Resting comfortably.    O.     Vitals:    21 1600   BP: 133/62   Pulse: 85   Resp: 18   Temp: 98.7 °F (37.1 °C)   SpO2:        Examination              Chest: Clear to auscultation.  Normal air movement.              Heart: Regular rate and rhythm without murmurs, gallops or rubs.              Abdomen: Dressing dry and intact.   Extremities: No calf tenderness.    Blood sugar stable       A.   Doing well postop day 1 status post  section      P. Continue current care.    Cesar Sea \"Silvia\" LINDA Leung MD  17:55 EST  21  "

## 2021-12-08 LAB
ALP SERPL-CCNC: 101 U/L (ref 39–117)
ALT SERPL W P-5'-P-CCNC: 15 U/L (ref 1–33)
AST SERPL-CCNC: 33 U/L (ref 1–32)
BASOPHILS # BLD AUTO: 0.01 10*3/MM3 (ref 0–0.2)
BASOPHILS NFR BLD AUTO: 0.1 % (ref 0–1.5)
BILIRUB SERPL-MCNC: 0.2 MG/DL (ref 0–1.2)
CREAT SERPL-MCNC: 0.96 MG/DL (ref 0.57–1)
CYTO UR: NORMAL
DEPRECATED RDW RBC AUTO: 48 FL (ref 37–54)
EOSINOPHIL # BLD AUTO: 0.06 10*3/MM3 (ref 0–0.4)
EOSINOPHIL NFR BLD AUTO: 0.8 % (ref 0.3–6.2)
ERYTHROCYTE [DISTWIDTH] IN BLOOD BY AUTOMATED COUNT: 16.7 % (ref 12.3–15.4)
GLUCOSE BLDC GLUCOMTR-MCNC: 126 MG/DL (ref 70–130)
GLUCOSE BLDC GLUCOMTR-MCNC: 149 MG/DL (ref 70–130)
GLUCOSE BLDC GLUCOMTR-MCNC: 199 MG/DL (ref 70–130)
GLUCOSE BLDC GLUCOMTR-MCNC: 216 MG/DL (ref 70–130)
HCT VFR BLD AUTO: 26 % (ref 34–46.6)
HGB BLD-MCNC: 8.1 G/DL (ref 12–15.9)
IMM GRANULOCYTES # BLD AUTO: 0.06 10*3/MM3 (ref 0–0.05)
IMM GRANULOCYTES NFR BLD AUTO: 0.8 % (ref 0–0.5)
LAB AP CASE REPORT: NORMAL
LAB AP CLINICAL INFORMATION: NORMAL
LDH SERPL-CCNC: 323 U/L (ref 135–214)
LYMPHOCYTES # BLD AUTO: 1.16 10*3/MM3 (ref 0.7–3.1)
LYMPHOCYTES NFR BLD AUTO: 16 % (ref 19.6–45.3)
MCH RBC QN AUTO: 24.8 PG (ref 26.6–33)
MCHC RBC AUTO-ENTMCNC: 31.2 G/DL (ref 31.5–35.7)
MCV RBC AUTO: 79.5 FL (ref 79–97)
MONOCYTES # BLD AUTO: 0.48 10*3/MM3 (ref 0.1–0.9)
MONOCYTES NFR BLD AUTO: 6.6 % (ref 5–12)
NEUTROPHILS NFR BLD AUTO: 5.47 10*3/MM3 (ref 1.7–7)
NEUTROPHILS NFR BLD AUTO: 75.7 % (ref 42.7–76)
NRBC BLD AUTO-RTO: 0 /100 WBC (ref 0–0.2)
PATH REPORT.FINAL DX SPEC: NORMAL
PATH REPORT.GROSS SPEC: NORMAL
PLATELET # BLD AUTO: 268 10*3/MM3 (ref 140–450)
PMV BLD AUTO: 10.6 FL (ref 6–12)
RBC # BLD AUTO: 3.27 10*6/MM3 (ref 3.77–5.28)
URATE SERPL-MCNC: 5.6 MG/DL (ref 2.4–5.7)
WBC NRBC COR # BLD: 7.24 10*3/MM3 (ref 3.4–10.8)

## 2021-12-08 PROCEDURE — 63710000001 INSULIN DETEMIR PER 5 UNITS: Performed by: STUDENT IN AN ORGANIZED HEALTH CARE EDUCATION/TRAINING PROGRAM

## 2021-12-08 PROCEDURE — 99231 SBSQ HOSP IP/OBS SF/LOW 25: CPT | Performed by: OBSTETRICS & GYNECOLOGY

## 2021-12-08 PROCEDURE — 82962 GLUCOSE BLOOD TEST: CPT

## 2021-12-08 PROCEDURE — 84550 ASSAY OF BLOOD/URIC ACID: CPT | Performed by: OBSTETRICS & GYNECOLOGY

## 2021-12-08 PROCEDURE — 25010000002 HYDROMORPHONE 1 MG/ML SOLUTION: Performed by: OBSTETRICS & GYNECOLOGY

## 2021-12-08 PROCEDURE — 83615 LACTATE (LD) (LDH) ENZYME: CPT | Performed by: OBSTETRICS & GYNECOLOGY

## 2021-12-08 PROCEDURE — 85025 COMPLETE CBC W/AUTO DIFF WBC: CPT | Performed by: OBSTETRICS & GYNECOLOGY

## 2021-12-08 PROCEDURE — 84075 ASSAY ALKALINE PHOSPHATASE: CPT | Performed by: OBSTETRICS & GYNECOLOGY

## 2021-12-08 PROCEDURE — 82247 BILIRUBIN TOTAL: CPT | Performed by: OBSTETRICS & GYNECOLOGY

## 2021-12-08 PROCEDURE — 84460 ALANINE AMINO (ALT) (SGPT): CPT | Performed by: OBSTETRICS & GYNECOLOGY

## 2021-12-08 PROCEDURE — 82565 ASSAY OF CREATININE: CPT | Performed by: OBSTETRICS & GYNECOLOGY

## 2021-12-08 PROCEDURE — 63710000001 INSULIN LISPRO (HUMAN) PER 5 UNITS: Performed by: STUDENT IN AN ORGANIZED HEALTH CARE EDUCATION/TRAINING PROGRAM

## 2021-12-08 PROCEDURE — 84450 TRANSFERASE (AST) (SGOT): CPT | Performed by: OBSTETRICS & GYNECOLOGY

## 2021-12-08 RX ORDER — NIFEDIPINE 30 MG/1
30 TABLET, EXTENDED RELEASE ORAL EVERY 12 HOURS
Status: DISCONTINUED | OUTPATIENT
Start: 2021-12-08 | End: 2021-12-13 | Stop reason: HOSPADM

## 2021-12-08 RX ADMIN — INSULIN LISPRO 15 UNITS: 100 INJECTION, SOLUTION INTRAVENOUS; SUBCUTANEOUS at 12:39

## 2021-12-08 RX ADMIN — DOCUSATE SODIUM 100 MG: 100 CAPSULE, LIQUID FILLED ORAL at 20:07

## 2021-12-08 RX ADMIN — PRENATAL VITAMINS-IRON FUMARATE 27 MG IRON-FOLIC ACID 0.8 MG TABLET 1 TABLET: at 08:52

## 2021-12-08 RX ADMIN — INSULIN LISPRO 15 UNITS: 100 INJECTION, SOLUTION INTRAVENOUS; SUBCUTANEOUS at 17:25

## 2021-12-08 RX ADMIN — ACETAMINOPHEN 650 MG: 325 TABLET, FILM COATED ORAL at 05:46

## 2021-12-08 RX ADMIN — ACETAMINOPHEN 650 MG: 325 TABLET, FILM COATED ORAL at 12:39

## 2021-12-08 RX ADMIN — OXYCODONE 10 MG: 5 TABLET ORAL at 18:16

## 2021-12-08 RX ADMIN — INSULIN LISPRO 15 UNITS: 100 INJECTION, SOLUTION INTRAVENOUS; SUBCUTANEOUS at 09:06

## 2021-12-08 RX ADMIN — BUPROPION HYDROCHLORIDE 150 MG: 150 TABLET, EXTENDED RELEASE ORAL at 08:52

## 2021-12-08 RX ADMIN — HYDROMORPHONE HYDROCHLORIDE 1 MG: 1 INJECTION, SOLUTION INTRAMUSCULAR; INTRAVENOUS; SUBCUTANEOUS at 02:50

## 2021-12-08 RX ADMIN — IBUPROFEN 600 MG: 600 TABLET ORAL at 05:46

## 2021-12-08 RX ADMIN — IBUPROFEN 600 MG: 600 TABLET ORAL at 21:53

## 2021-12-08 RX ADMIN — LABETALOL HYDROCHLORIDE 200 MG: 200 TABLET, FILM COATED ORAL at 20:07

## 2021-12-08 RX ADMIN — HYDROMORPHONE HYDROCHLORIDE 1 MG: 1 INJECTION, SOLUTION INTRAMUSCULAR; INTRAVENOUS; SUBCUTANEOUS at 01:38

## 2021-12-08 RX ADMIN — OXYCODONE 10 MG: 5 TABLET ORAL at 07:41

## 2021-12-08 RX ADMIN — IBUPROFEN 600 MG: 600 TABLET ORAL at 16:11

## 2021-12-08 RX ADMIN — IBUPROFEN 600 MG: 600 TABLET ORAL at 11:35

## 2021-12-08 RX ADMIN — ACETAMINOPHEN 650 MG: 325 TABLET, FILM COATED ORAL at 18:16

## 2021-12-08 RX ADMIN — INSULIN DETEMIR 20 UNITS: 100 INJECTION, SOLUTION SUBCUTANEOUS at 09:07

## 2021-12-08 RX ADMIN — INSULIN DETEMIR 20 UNITS: 100 INJECTION, SOLUTION SUBCUTANEOUS at 21:53

## 2021-12-08 RX ADMIN — LABETALOL HYDROCHLORIDE 200 MG: 200 TABLET, FILM COATED ORAL at 08:53

## 2021-12-08 RX ADMIN — NIFEDIPINE 30 MG: 30 TABLET, FILM COATED, EXTENDED RELEASE ORAL at 11:35

## 2021-12-08 RX ADMIN — OXYCODONE 10 MG: 5 TABLET ORAL at 21:53

## 2021-12-08 RX ADMIN — OXYCODONE 10 MG: 5 TABLET ORAL at 12:45

## 2021-12-08 RX ADMIN — ACETAMINOPHEN 650 MG: 325 TABLET, FILM COATED ORAL at 01:39

## 2021-12-08 NOTE — PROGRESS NOTES
Rhea  Edd Leung  : 1985  MRN: [id number]  CSN: [id number]    Hospital Day: 3    Post-operative Day #2  Subjective     CC: Previous caesarean section     Yesterday and into last night Ms. Leung reported having an ongoing headache.  She says her lower abdomen is hurting more as well which does not allow her to sleep, and she has not slept much for the last several days, and the lack of sleep is causing her headache.   She tried Tylenol and Roxicodone with little to no relief.   She denies RUQ pian, and vision changes.  Denies chest pain and SOA.  She says it does not change with position of lying down or sitting up.  Given her risk for pre-eclampsia, labs ordered and an IV started.  She received 1 mg of Dilaudid while awaiting lab results.   Checking on her after a bit  - she said the headache had completely resolved and her abdominal pain had minimized to what she expected after a Caesarean section.   She was happy that she felt well and felt like she could sleep.      Objective     Min/max vitals past 24 hours:   Temp  Min: 98.1 °F (36.7 °C)  Max: 98.7 °F (37.1 °C)  BP  Min: 118/56  Max: 176/77  Pulse  Min: 80  Max: 92  Resp  Min: 16  Max: 18        General:    Neuro: well developed;   no acute distress  No obvious abnormalities - CN II - XII grossly intact.    Abdomen: soft, obese; some mild tenderness in the lower abdomen area bilaterally, but not an acute abdomen.   Pressure dressing intact.     Exam very limited by body habitus   Pelvic: Not performed   Ext: Calves NT - +2 edema     Results from last 7 days   Lab Units 21  0143 21  1612 21  1056   WBC 10*3/mm3 7.24 9.04 10.81*   HEMOGLOBIN g/dL 8.1* 7.9* 7.8*   HEMATOCRIT % 26.0* 26.6* 25.8*   PLATELETS 10*3/mm3 268 257 272     Lab Results   Component Value Date    RH Positive 2021    HEPBSAG Negative 2019     Lab Results   Component Value Date     2021    HGB 8.1 (L) 2021    HCT  26.0 (L) 2021    WBC 7.24 2021     Lab Results   Component Value Date    HGB 8.1 (L) 2021     2021    AST 33 (H) 2021    ALT 15 2021     (H) 2021    URICACID 5.6 2021    BUN 10 2020    CREATININE 0.96 2021    GLUCOSE 190 (H) 2020        Assessment /Plan  1. POD #2 S/P Repeat  (LTCS)  2. Headache resolved with treatment  3. PEP/CBC WNL  4. Will remove pressure bandage today.  5. Continue to watch BPs and repeat labs prn   6. Continue routine post operative care.   7. Discuss with MFM team adding anticoagulation while recovering due to high risk for coagulation          Héctor Hoffman MD  2021  02:54 EST

## 2021-12-08 NOTE — LACTATION NOTE
12/08/21 1200   Maternal Information   Date of Referral 12/08/21   Person Making Referral nurse; patient   Maternal Reason for Referral breastfeeding currently  (desires help with nursing)   Maternal Assessment   Breast Size Issue none   Breast Shape Bilateral:; round   Breast Density Bilateral:; soft   Nipples Bilateral:; short; everted   Left Nipple Symptoms intact   Right Nipple Symptoms intact   Maternal Infant Feeding   Maternal Emotional State receptive; relaxed   Infant Positioning clutch/football  (bilaterally)   Signs of Milk Transfer audible swallow; deep jaw excursions noted   Pain with Feeding no   Comfort Measures Before/During Feeding infant position adjusted; latch adjusted; maternal position adjusted  (using medium shield to nurse)   Nipple Shape After Feeding, Left Breast round; symmetrical; appropriately projected   Nipple Shape After Feeding, Right round; symmetrical; appropriately projected   Latch Assistance full assistance needed   Support Person Involvement verbally supports mother; actively supporting mother   Milk Expression/Equipment   Breast Pump Type double electric, hospital grade   Breast Pump Flange Type hard   Breast Pump Flange Size 24 mm   Breast Pumping   Breast Pumping Interventions   (pump when baby takes bottle and does not nurse well.)

## 2021-12-09 LAB
ALBUMIN SERPL-MCNC: 2.9 G/DL (ref 3.5–5.2)
ALBUMIN/GLOB SERPL: 0.9 G/DL
ALP SERPL-CCNC: 97 U/L (ref 39–117)
ALT SERPL W P-5'-P-CCNC: 37 U/L (ref 1–33)
ANION GAP SERPL CALCULATED.3IONS-SCNC: 10 MMOL/L (ref 5–15)
AST SERPL-CCNC: 66 U/L (ref 1–32)
BACTERIA UR QL AUTO: ABNORMAL /HPF
BASOPHILS # BLD MANUAL: 0 10*3/MM3 (ref 0–0.2)
BASOPHILS NFR BLD MANUAL: 0 % (ref 0–1.5)
BILIRUB SERPL-MCNC: 0.4 MG/DL (ref 0–1.2)
BILIRUB UR QL STRIP: NEGATIVE
BUN SERPL-MCNC: 7 MG/DL (ref 6–20)
BUN/CREAT SERPL: 9.7 (ref 7–25)
CALCIUM SPEC-SCNC: 8.5 MG/DL (ref 8.6–10.5)
CHLORIDE SERPL-SCNC: 104 MMOL/L (ref 98–107)
CLARITY UR: CLEAR
CO2 SERPL-SCNC: 23 MMOL/L (ref 22–29)
COLOR UR: YELLOW
CREAT SERPL-MCNC: 0.72 MG/DL (ref 0.57–1)
D-LACTATE SERPL-SCNC: 1.2 MMOL/L (ref 0.5–2)
D-LACTATE SERPL-SCNC: 2.1 MMOL/L (ref 0.5–2)
DEPRECATED RDW RBC AUTO: 50.4 FL (ref 37–54)
EOSINOPHIL # BLD MANUAL: 0 10*3/MM3 (ref 0–0.4)
EOSINOPHIL NFR BLD MANUAL: 0 % (ref 0.3–6.2)
ERYTHROCYTE [DISTWIDTH] IN BLOOD BY AUTOMATED COUNT: 17 % (ref 12.3–15.4)
GFR SERPL CREATININE-BSD FRML MDRD: 112 ML/MIN/1.73
GLOBULIN UR ELPH-MCNC: 3.2 GM/DL
GLUCOSE BLDC GLUCOMTR-MCNC: 153 MG/DL (ref 70–130)
GLUCOSE BLDC GLUCOMTR-MCNC: 160 MG/DL (ref 70–130)
GLUCOSE BLDC GLUCOMTR-MCNC: 246 MG/DL (ref 70–130)
GLUCOSE SERPL-MCNC: 129 MG/DL (ref 65–99)
GLUCOSE UR STRIP-MCNC: NEGATIVE MG/DL
HCT VFR BLD AUTO: 26.5 % (ref 34–46.6)
HGB BLD-MCNC: 8 G/DL (ref 12–15.9)
HGB UR QL STRIP.AUTO: ABNORMAL
HYALINE CASTS UR QL AUTO: ABNORMAL /LPF
HYPOCHROMIA BLD QL: ABNORMAL
KETONES UR QL STRIP: NEGATIVE
LEUKOCYTE ESTERASE UR QL STRIP.AUTO: NEGATIVE
LYMPHOCYTES # BLD MANUAL: 0.18 10*3/MM3 (ref 0.7–3.1)
LYMPHOCYTES NFR BLD MANUAL: 2 % (ref 5–12)
MCH RBC QN AUTO: 24.5 PG (ref 26.6–33)
MCHC RBC AUTO-ENTMCNC: 30.2 G/DL (ref 31.5–35.7)
MCV RBC AUTO: 81.3 FL (ref 79–97)
METAMYELOCYTES NFR BLD MANUAL: 4 % (ref 0–0)
MICROCYTES BLD QL: ABNORMAL
MONOCYTES # BLD: 0.09 10*3/MM3 (ref 0.1–0.9)
NEUTROPHILS # BLD AUTO: 4.12 10*3/MM3 (ref 1.7–7)
NEUTROPHILS NFR BLD MANUAL: 77 % (ref 42.7–76)
NEUTS BAND NFR BLD MANUAL: 13 % (ref 0–5)
NITRITE UR QL STRIP: NEGATIVE
NT-PROBNP SERPL-MCNC: 154.9 PG/ML (ref 0–450)
PH UR STRIP.AUTO: 6.5 [PH] (ref 5–8)
PLAT MORPH BLD: NORMAL
PLATELET # BLD AUTO: 266 10*3/MM3 (ref 140–450)
PMV BLD AUTO: 10.4 FL (ref 6–12)
POTASSIUM SERPL-SCNC: 3.9 MMOL/L (ref 3.5–5.2)
PROCALCITONIN SERPL-MCNC: 0.17 NG/ML (ref 0–0.25)
PROT SERPL-MCNC: 6.1 G/DL (ref 6–8.5)
PROT UR QL STRIP: NEGATIVE
RBC # BLD AUTO: 3.26 10*6/MM3 (ref 3.77–5.28)
RBC # UR STRIP: ABNORMAL /HPF
REF LAB TEST METHOD: ABNORMAL
SODIUM SERPL-SCNC: 137 MMOL/L (ref 136–145)
SP GR UR STRIP: 1.01 (ref 1–1.03)
SQUAMOUS #/AREA URNS HPF: ABNORMAL /HPF
UROBILINOGEN UR QL STRIP: ABNORMAL
VARIANT LYMPHS NFR BLD MANUAL: 4 % (ref 19.6–45.3)
WBC # UR STRIP: ABNORMAL /HPF
WBC MORPH BLD: NORMAL
WBC NRBC COR # BLD: 4.58 10*3/MM3 (ref 3.4–10.8)

## 2021-12-09 PROCEDURE — 84145 PROCALCITONIN (PCT): CPT | Performed by: OBSTETRICS & GYNECOLOGY

## 2021-12-09 PROCEDURE — 63710000001 INSULIN LISPRO (HUMAN) PER 5 UNITS: Performed by: STUDENT IN AN ORGANIZED HEALTH CARE EDUCATION/TRAINING PROGRAM

## 2021-12-09 PROCEDURE — 25010000002 AMPICILLIN PER 500 MG: Performed by: OBSTETRICS & GYNECOLOGY

## 2021-12-09 PROCEDURE — 99233 SBSQ HOSP IP/OBS HIGH 50: CPT | Performed by: OBSTETRICS & GYNECOLOGY

## 2021-12-09 PROCEDURE — C1751 CATH, INF, PER/CENT/MIDLINE: HCPCS

## 2021-12-09 PROCEDURE — 85007 BL SMEAR W/DIFF WBC COUNT: CPT | Performed by: OBSTETRICS & GYNECOLOGY

## 2021-12-09 PROCEDURE — 85025 COMPLETE CBC W/AUTO DIFF WBC: CPT | Performed by: OBSTETRICS & GYNECOLOGY

## 2021-12-09 PROCEDURE — 63710000001 INSULIN DETEMIR PER 5 UNITS: Performed by: STUDENT IN AN ORGANIZED HEALTH CARE EDUCATION/TRAINING PROGRAM

## 2021-12-09 PROCEDURE — 81001 URINALYSIS AUTO W/SCOPE: CPT | Performed by: OBSTETRICS & GYNECOLOGY

## 2021-12-09 PROCEDURE — 25010000002 GENTAMICIN PER 80 MG

## 2021-12-09 PROCEDURE — 05HY33Z INSERTION OF INFUSION DEVICE INTO UPPER VEIN, PERCUTANEOUS APPROACH: ICD-10-PCS | Performed by: OBSTETRICS & GYNECOLOGY

## 2021-12-09 PROCEDURE — 82962 GLUCOSE BLOOD TEST: CPT

## 2021-12-09 PROCEDURE — 83880 ASSAY OF NATRIURETIC PEPTIDE: CPT | Performed by: OBSTETRICS & GYNECOLOGY

## 2021-12-09 PROCEDURE — 83605 ASSAY OF LACTIC ACID: CPT | Performed by: OBSTETRICS & GYNECOLOGY

## 2021-12-09 PROCEDURE — 80053 COMPREHEN METABOLIC PANEL: CPT | Performed by: OBSTETRICS & GYNECOLOGY

## 2021-12-09 PROCEDURE — 87040 BLOOD CULTURE FOR BACTERIA: CPT | Performed by: OBSTETRICS & GYNECOLOGY

## 2021-12-09 PROCEDURE — 25010000002 ENOXAPARIN PER 10 MG: Performed by: OBSTETRICS & GYNECOLOGY

## 2021-12-09 PROCEDURE — C1894 INTRO/SHEATH, NON-LASER: HCPCS

## 2021-12-09 RX ORDER — CLINDAMYCIN PHOSPHATE 900 MG/50ML
900 INJECTION INTRAVENOUS EVERY 8 HOURS
Status: DISCONTINUED | OUTPATIENT
Start: 2021-12-09 | End: 2021-12-09

## 2021-12-09 RX ORDER — SODIUM CHLORIDE 9 MG/ML
9 INJECTION, SOLUTION INTRAVENOUS CONTINUOUS
Status: DISCONTINUED | OUTPATIENT
Start: 2021-12-09 | End: 2021-12-13 | Stop reason: HOSPADM

## 2021-12-09 RX ORDER — SODIUM CHLORIDE 9 MG/ML
75 INJECTION, SOLUTION INTRAVENOUS CONTINUOUS
Status: DISCONTINUED | OUTPATIENT
Start: 2021-12-09 | End: 2021-12-13 | Stop reason: HOSPADM

## 2021-12-09 RX ORDER — SODIUM CHLORIDE 0.9 % (FLUSH) 0.9 %
10 SYRINGE (ML) INJECTION AS NEEDED
Status: DISCONTINUED | OUTPATIENT
Start: 2021-12-09 | End: 2021-12-13 | Stop reason: HOSPADM

## 2021-12-09 RX ORDER — CLINDAMYCIN PHOSPHATE 900 MG/50ML
900 INJECTION INTRAVENOUS EVERY 8 HOURS
Status: COMPLETED | OUTPATIENT
Start: 2021-12-09 | End: 2021-12-10

## 2021-12-09 RX ORDER — SODIUM CHLORIDE 0.9 % (FLUSH) 0.9 %
10 SYRINGE (ML) INJECTION EVERY 12 HOURS SCHEDULED
Status: DISCONTINUED | OUTPATIENT
Start: 2021-12-09 | End: 2021-12-13 | Stop reason: HOSPADM

## 2021-12-09 RX ADMIN — NIFEDIPINE 30 MG: 30 TABLET, FILM COATED, EXTENDED RELEASE ORAL at 12:59

## 2021-12-09 RX ADMIN — GENTAMICIN SULFATE 500 MG: 40 INJECTION, SOLUTION INTRAMUSCULAR; INTRAVENOUS at 15:45

## 2021-12-09 RX ADMIN — LABETALOL HYDROCHLORIDE 200 MG: 200 TABLET, FILM COATED ORAL at 20:02

## 2021-12-09 RX ADMIN — INSULIN LISPRO 15 UNITS: 100 INJECTION, SOLUTION INTRAVENOUS; SUBCUTANEOUS at 08:30

## 2021-12-09 RX ADMIN — ACETAMINOPHEN 650 MG: 325 TABLET, FILM COATED ORAL at 06:23

## 2021-12-09 RX ADMIN — SODIUM CHLORIDE, PRESERVATIVE FREE 10 ML: 5 INJECTION INTRAVENOUS at 20:05

## 2021-12-09 RX ADMIN — INSULIN LISPRO 15 UNITS: 100 INJECTION, SOLUTION INTRAVENOUS; SUBCUTANEOUS at 20:02

## 2021-12-09 RX ADMIN — OXYCODONE 10 MG: 5 TABLET ORAL at 03:00

## 2021-12-09 RX ADMIN — IBUPROFEN 600 MG: 600 TABLET ORAL at 11:07

## 2021-12-09 RX ADMIN — ENOXAPARIN SODIUM 40 MG: 40 INJECTION SUBCUTANEOUS at 11:08

## 2021-12-09 RX ADMIN — NIFEDIPINE 30 MG: 30 TABLET, FILM COATED, EXTENDED RELEASE ORAL at 00:42

## 2021-12-09 RX ADMIN — AMPICILLIN SODIUM 2 G: 2 INJECTION, POWDER, FOR SOLUTION INTRAMUSCULAR; INTRAVENOUS at 20:03

## 2021-12-09 RX ADMIN — ACETAMINOPHEN 650 MG: 325 TABLET, FILM COATED ORAL at 18:27

## 2021-12-09 RX ADMIN — BUPROPION HYDROCHLORIDE 150 MG: 150 TABLET, EXTENDED RELEASE ORAL at 08:30

## 2021-12-09 RX ADMIN — INSULIN DETEMIR 20 UNITS: 100 INJECTION, SOLUTION SUBCUTANEOUS at 08:29

## 2021-12-09 RX ADMIN — DOCUSATE SODIUM 100 MG: 100 CAPSULE, LIQUID FILLED ORAL at 20:02

## 2021-12-09 RX ADMIN — INSULIN DETEMIR 20 UNITS: 100 INJECTION, SOLUTION SUBCUTANEOUS at 20:04

## 2021-12-09 RX ADMIN — OXYCODONE 10 MG: 5 TABLET ORAL at 20:02

## 2021-12-09 RX ADMIN — ACETAMINOPHEN 650 MG: 325 TABLET, FILM COATED ORAL at 12:46

## 2021-12-09 RX ADMIN — AMPICILLIN SODIUM 2 G: 2 INJECTION, POWDER, FOR SOLUTION INTRAMUSCULAR; INTRAVENOUS at 14:43

## 2021-12-09 RX ADMIN — PRENATAL VITAMINS-IRON FUMARATE 27 MG IRON-FOLIC ACID 0.8 MG TABLET 1 TABLET: at 08:30

## 2021-12-09 RX ADMIN — SODIUM CHLORIDE 1000 ML: 9 INJECTION, SOLUTION INTRAVENOUS at 12:18

## 2021-12-09 RX ADMIN — IBUPROFEN 600 MG: 600 TABLET ORAL at 04:36

## 2021-12-09 RX ADMIN — CLINDAMYCIN PHOSPHATE 900 MG: 900 INJECTION, SOLUTION INTRAVENOUS at 16:30

## 2021-12-09 RX ADMIN — IBUPROFEN 600 MG: 600 TABLET ORAL at 16:30

## 2021-12-09 RX ADMIN — IBUPROFEN 600 MG: 600 TABLET ORAL at 22:26

## 2021-12-09 RX ADMIN — INSULIN LISPRO 15 UNITS: 100 INJECTION, SOLUTION INTRAVENOUS; SUBCUTANEOUS at 12:46

## 2021-12-09 RX ADMIN — ACETAMINOPHEN 650 MG: 325 TABLET, FILM COATED ORAL at 00:42

## 2021-12-09 NOTE — PROGRESS NOTES
Pharmacokinetic Consult - Gentamicin Dosing  Edd Leung is a 36 y.o. female who has been consulted for gentamicin dosing for sepsis.    Relevant clinical data and objective history reviewed:  Creatinine   Date Value Ref Range Status   12/08/2021 0.96 0.57 - 1.00 mg/dL Final     Estimated Creatinine Clearance: 157.7 mL/min (by C-G formula based on SCr of 0.96 mg/dL).  No intake/output data recorded.  Patient weight: (!) 204 kg (450 lb)   Adjusted body weight: 121 kg    Asessment/Plan  Gentamicin 500 mg (5 mg/kg adjusted ideal body weight of 121 kg, max dose 500mg) IV q 24 hours x 2 doses    Pharmacy will order gentamicin level in 3 days if patient remains on gentamicin therapy after that time frame.    Evita Roldan, PharmD  12/9/2021  09:58 EST

## 2021-12-09 NOTE — LACTATION NOTE
Mom currently pumping and baby received a bottle for this feeding.  States nursing has been going well since yesterday with medium shield.  No needs at this time.

## 2021-12-09 NOTE — PROGRESS NOTES
12/9/2021  PPD/POD #3     Subjective   Marlissa reports feeling somewhat unwell. Generalized abdominal pain. Mild headache. No SOA, CP, cough, sore throat. Min VB. No incision concerns. Pumping well.   Baby doing well     Objective   Temp: Temp:  [98.8 °F (37.1 °C)-101.3 °F (38.5 °C)] 99.2 °F (37.3 °C) Temp src: Oral   BP: BP: (104-168)/(59-85) 104/59        Pulse: Heart Rate:  [] 114  RR: Resp:  [16-18] 18      Intake/Output last 24 hours:    No intake or output data in the 24 hours ending 12/09/21 0953    Intake/Output this shift:    No intake/output data recorded.    NAD. Stilling upright. Speaking in full sentences without difficulty   CTA-B. Normal pulmonary effort   RRR   Breasts without erythema   Abdomen: ++ fundal TTP. No rebound. Incision C/D/I   Ext: 2+ bilateral lower extremity edema, no pain                     Lab Results   Component Value Date    WBC 7.24 12/08/2021    HGB 8.1 (L) 12/08/2021    HCT 26.0 (L) 12/08/2021    MCV 79.5 12/08/2021     12/08/2021    HEPBSAG Negative 05/01/2019         Medications:  acetaminophen, 650 mg, Oral, Q6H  ampicillin, 2 g, Intravenous, Q6H  buPROPion SR, 150 mg, Oral, Daily  clindamycin, 900 mg, Intravenous, Q8H  enoxaparin, 40 mg, Subcutaneous, Q24H  gentamicin, 1 mg/kg (Ideal), Intravenous, Q8H   And  Pharmacy to Dose gentamicin (GARAMYCIN), 1 Mutually Defined, Does not apply, Q8H  ibuprofen, 600 mg, Oral, Q6H  insulin detemir, 20 Units, Subcutaneous, Q12H  insulin lispro, 15 Units, Subcutaneous, TID With Meals  labetalol, 200 mg, Oral, BID  NIFEdipine XL, 30 mg, Oral, Q12H  prenatal vitamin, 1 tablet, Oral, Daily       aluminum-magnesium hydroxide-simethicone **OR** calcium carbonate  •  butalbital-acetaminophen-caffeine  •  diphenhydrAMINE **OR** diphenhydrAMINE **OR** diphenhydrAMINE  •  diphenhydrAMINE  •  docusate sodium  •  Hydrocortisone (Perianal)  •  lanolin  •  metoclopramide  •  oxyCODONE **OR** oxyCODONE  •  promethazine **OR**  promethazine  •  simethicone    Labs:  Lab Results   Component Value Date    HGB 8.1 (L) 12/08/2021     Lab Results   Component Value Date    GLUCOSE 190 (H) 09/02/2020         Assessment  1. PPD/POD# 3 after RCS   2. Poorly controlled DM   3. CHTN   4. Post-op fever - suspect endometritis      Plan  1. Will send labs now: CBC with diff, CMP, lactate, BNP   2. Start broad spectrum Abx: Amp/Gent/Clinda   3. Will need to reestablish IV access   4. If continues to have fever, consider CT A/P   5. Start ppx Lovenox   6. Continue current insulin regimen   7. Last BP check slightly low. Hold BP meds this AM and reevaluate   8. Continue close monitoring     Bettina Schwartz MD  December 9, 2021 12/9/2021

## 2021-12-09 NOTE — CONSULTS
JED MARTIN PICC placed in R cephalic vein by RAFAL Lopez-BC  50cm total length, 3cm exposed.  Verified via 3cg.

## 2021-12-10 LAB
BASOPHILS # BLD MANUAL: 0 10*3/MM3 (ref 0–0.2)
BASOPHILS NFR BLD MANUAL: 0 % (ref 0–1.5)
D-LACTATE SERPL-SCNC: 1 MMOL/L (ref 0.5–2)
DEPRECATED RDW RBC AUTO: 50.1 FL (ref 37–54)
EOSINOPHIL # BLD MANUAL: 0 10*3/MM3 (ref 0–0.4)
EOSINOPHIL NFR BLD MANUAL: 0 % (ref 0.3–6.2)
ERYTHROCYTE [DISTWIDTH] IN BLOOD BY AUTOMATED COUNT: 16.8 % (ref 12.3–15.4)
GLUCOSE BLDC GLUCOMTR-MCNC: 117 MG/DL (ref 70–130)
GLUCOSE BLDC GLUCOMTR-MCNC: 142 MG/DL (ref 70–130)
GLUCOSE BLDC GLUCOMTR-MCNC: 150 MG/DL (ref 70–130)
GLUCOSE BLDC GLUCOMTR-MCNC: 98 MG/DL (ref 70–130)
HCT VFR BLD AUTO: 26.6 % (ref 34–46.6)
HGB BLD-MCNC: 8 G/DL (ref 12–15.9)
HYPOCHROMIA BLD QL: ABNORMAL
LYMPHOCYTES # BLD MANUAL: 0.85 10*3/MM3 (ref 0.7–3.1)
LYMPHOCYTES NFR BLD MANUAL: 3 % (ref 5–12)
MCH RBC QN AUTO: 24.6 PG (ref 26.6–33)
MCHC RBC AUTO-ENTMCNC: 30.1 G/DL (ref 31.5–35.7)
MCV RBC AUTO: 81.8 FL (ref 79–97)
METAMYELOCYTES NFR BLD MANUAL: 3 % (ref 0–0)
MONOCYTES # BLD: 0.09 10*3/MM3 (ref 0.1–0.9)
NEUTROPHILS # BLD AUTO: 2.11 10*3/MM3 (ref 1.7–7)
NEUTROPHILS NFR BLD MANUAL: 20 % (ref 42.7–76)
NEUTS BAND NFR BLD MANUAL: 47 % (ref 0–5)
NRBC SPEC MANUAL: 0 /100 WBC (ref 0–0.2)
PLAT MORPH BLD: NORMAL
PLATELET # BLD AUTO: 269 10*3/MM3 (ref 140–450)
PMV BLD AUTO: 10.3 FL (ref 6–12)
RBC # BLD AUTO: 3.25 10*6/MM3 (ref 3.77–5.28)
SMUDGE CELLS BLD QL SMEAR: ABNORMAL
VARIANT LYMPHS NFR BLD MANUAL: 2 % (ref 0–5)
VARIANT LYMPHS NFR BLD MANUAL: 25 % (ref 19.6–45.3)
WBC NRBC COR # BLD: 3.15 10*3/MM3 (ref 3.4–10.8)

## 2021-12-10 PROCEDURE — 63710000001 INSULIN DETEMIR PER 5 UNITS: Performed by: STUDENT IN AN ORGANIZED HEALTH CARE EDUCATION/TRAINING PROGRAM

## 2021-12-10 PROCEDURE — 63710000001 INSULIN LISPRO (HUMAN) PER 5 UNITS: Performed by: STUDENT IN AN ORGANIZED HEALTH CARE EDUCATION/TRAINING PROGRAM

## 2021-12-10 PROCEDURE — 83605 ASSAY OF LACTIC ACID: CPT | Performed by: OBSTETRICS & GYNECOLOGY

## 2021-12-10 PROCEDURE — 82962 GLUCOSE BLOOD TEST: CPT

## 2021-12-10 PROCEDURE — 25010000002 GENTAMICIN PER 80 MG

## 2021-12-10 PROCEDURE — 25010000002 FUROSEMIDE PER 20 MG: Performed by: OBSTETRICS & GYNECOLOGY

## 2021-12-10 PROCEDURE — 25010000002 AMPICILLIN PER 500 MG: Performed by: OBSTETRICS & GYNECOLOGY

## 2021-12-10 PROCEDURE — 85007 BL SMEAR W/DIFF WBC COUNT: CPT | Performed by: OBSTETRICS & GYNECOLOGY

## 2021-12-10 PROCEDURE — 25010000002 ENOXAPARIN PER 10 MG: Performed by: OBSTETRICS & GYNECOLOGY

## 2021-12-10 PROCEDURE — 99233 SBSQ HOSP IP/OBS HIGH 50: CPT | Performed by: OBSTETRICS & GYNECOLOGY

## 2021-12-10 PROCEDURE — 85025 COMPLETE CBC W/AUTO DIFF WBC: CPT | Performed by: OBSTETRICS & GYNECOLOGY

## 2021-12-10 RX ORDER — FUROSEMIDE 10 MG/ML
20 INJECTION INTRAMUSCULAR; INTRAVENOUS DAILY
Status: COMPLETED | OUTPATIENT
Start: 2021-12-10 | End: 2021-12-12

## 2021-12-10 RX ADMIN — DOCUSATE SODIUM 100 MG: 100 CAPSULE, LIQUID FILLED ORAL at 07:51

## 2021-12-10 RX ADMIN — ENOXAPARIN SODIUM 40 MG: 40 INJECTION SUBCUTANEOUS at 10:54

## 2021-12-10 RX ADMIN — SIMETHICONE 80 MG: 80 TABLET, CHEWABLE ORAL at 16:03

## 2021-12-10 RX ADMIN — ACETAMINOPHEN 650 MG: 325 TABLET, FILM COATED ORAL at 12:46

## 2021-12-10 RX ADMIN — IBUPROFEN 600 MG: 600 TABLET ORAL at 22:49

## 2021-12-10 RX ADMIN — ACETAMINOPHEN 650 MG: 325 TABLET, FILM COATED ORAL at 18:30

## 2021-12-10 RX ADMIN — ACETAMINOPHEN 650 MG: 325 TABLET, FILM COATED ORAL at 00:38

## 2021-12-10 RX ADMIN — NIFEDIPINE 30 MG: 30 TABLET, FILM COATED, EXTENDED RELEASE ORAL at 00:38

## 2021-12-10 RX ADMIN — AMPICILLIN SODIUM 2 G: 2 INJECTION, POWDER, FOR SOLUTION INTRAMUSCULAR; INTRAVENOUS at 07:51

## 2021-12-10 RX ADMIN — BUPROPION HYDROCHLORIDE 150 MG: 150 TABLET, EXTENDED RELEASE ORAL at 08:00

## 2021-12-10 RX ADMIN — INSULIN DETEMIR 20 UNITS: 100 INJECTION, SOLUTION SUBCUTANEOUS at 20:24

## 2021-12-10 RX ADMIN — CLINDAMYCIN PHOSPHATE 900 MG: 900 INJECTION, SOLUTION INTRAVENOUS at 08:41

## 2021-12-10 RX ADMIN — OXYCODONE 10 MG: 5 TABLET ORAL at 00:38

## 2021-12-10 RX ADMIN — OXYCODONE 10 MG: 5 TABLET ORAL at 10:54

## 2021-12-10 RX ADMIN — LABETALOL HYDROCHLORIDE 200 MG: 200 TABLET, FILM COATED ORAL at 08:41

## 2021-12-10 RX ADMIN — IBUPROFEN 600 MG: 600 TABLET ORAL at 02:57

## 2021-12-10 RX ADMIN — INSULIN LISPRO 15 UNITS: 100 INJECTION, SOLUTION INTRAVENOUS; SUBCUTANEOUS at 18:37

## 2021-12-10 RX ADMIN — PRENATAL VITAMINS-IRON FUMARATE 27 MG IRON-FOLIC ACID 0.8 MG TABLET 1 TABLET: at 08:00

## 2021-12-10 RX ADMIN — IBUPROFEN 600 MG: 600 TABLET ORAL at 16:04

## 2021-12-10 RX ADMIN — NIFEDIPINE 30 MG: 30 TABLET, FILM COATED, EXTENDED RELEASE ORAL at 11:57

## 2021-12-10 RX ADMIN — GENTAMICIN SULFATE 500 MG: 40 INJECTION, SOLUTION INTRAMUSCULAR; INTRAVENOUS at 16:04

## 2021-12-10 RX ADMIN — CLINDAMYCIN PHOSPHATE 900 MG: 900 INJECTION, SOLUTION INTRAVENOUS at 17:05

## 2021-12-10 RX ADMIN — IBUPROFEN 600 MG: 600 TABLET ORAL at 10:54

## 2021-12-10 RX ADMIN — ACETAMINOPHEN 650 MG: 325 TABLET, FILM COATED ORAL at 06:00

## 2021-12-10 RX ADMIN — SIMETHICONE 80 MG: 80 TABLET, CHEWABLE ORAL at 07:51

## 2021-12-10 RX ADMIN — FUROSEMIDE 20 MG: 10 INJECTION, SOLUTION INTRAVENOUS at 11:57

## 2021-12-10 RX ADMIN — AMPICILLIN SODIUM 2 G: 2 INJECTION, POWDER, FOR SOLUTION INTRAMUSCULAR; INTRAVENOUS at 02:57

## 2021-12-10 RX ADMIN — INSULIN LISPRO 15 UNITS: 100 INJECTION, SOLUTION INTRAVENOUS; SUBCUTANEOUS at 11:56

## 2021-12-10 RX ADMIN — INSULIN DETEMIR 20 UNITS: 100 INJECTION, SOLUTION SUBCUTANEOUS at 08:00

## 2021-12-10 RX ADMIN — SODIUM CHLORIDE 75 ML/HR: 9 INJECTION, SOLUTION INTRAVENOUS at 12:46

## 2021-12-10 RX ADMIN — INSULIN LISPRO 15 UNITS: 100 INJECTION, SOLUTION INTRAVENOUS; SUBCUTANEOUS at 08:41

## 2021-12-10 RX ADMIN — DOCUSATE SODIUM 100 MG: 100 CAPSULE, LIQUID FILLED ORAL at 20:26

## 2021-12-10 RX ADMIN — CLINDAMYCIN PHOSPHATE 900 MG: 900 INJECTION, SOLUTION INTRAVENOUS at 00:38

## 2021-12-10 RX ADMIN — AMPICILLIN SODIUM 2 G: 2 INJECTION, POWDER, FOR SOLUTION INTRAMUSCULAR; INTRAVENOUS at 20:27

## 2021-12-10 RX ADMIN — LABETALOL HYDROCHLORIDE 200 MG: 200 TABLET, FILM COATED ORAL at 20:26

## 2021-12-10 RX ADMIN — AMPICILLIN SODIUM 2 G: 2 INJECTION, POWDER, FOR SOLUTION INTRAMUSCULAR; INTRAVENOUS at 14:39

## 2021-12-10 NOTE — PROGRESS NOTES
Antepartum Progress Note    Patient name: Edd Leung  YOB: 1985   MRN: [id number]  Admission Date: 2021  Date of Service: 12/10/2021    Edd Leung is a 36 y.o.   s/p repeat  delivery at [id number] secondary to Type 2 DM/CHTN/obesity.     Hospital day 4      Subjective:      Edd reports increased swelling of lower extremities and feet with numbness of feet bilaterally.  Feels better than yesterday. Still with abdominal discomfort above the incision. No SOA/CP/palpitations. Breast feeding/pumping going very well.     Objective:     Vital signs:  Temp:  [98.1 °F (36.7 °C)-98.8 °F (37.1 °C)] 98.1 °F (36.7 °C)  Heart Rate:  [81-95] 81  Resp:  [16-18] 18  BP: (115-150)/(56-69) 126/65    Abdomen: soft, nontender, woody edema of panus  Uterus: mild TTP  Extremities: nontender; woody edema to bilateral lower shins, extreme edema to tops of feet     Medications:  acetaminophen, 650 mg, Oral, Q6H  ampicillin, 2 g, Intravenous, Q6H  buPROPion SR, 150 mg, Oral, Daily  clindamycin, 900 mg, Intravenous, Q8H  enoxaparin, 40 mg, Subcutaneous, Q24H  furosemide, 20 mg, Intravenous, Daily  gentamicin, 500 mg, Intravenous, Q24H  ibuprofen, 600 mg, Oral, Q6H  insulin detemir, 20 Units, Subcutaneous, Q12H  insulin lispro, 15 Units, Subcutaneous, TID With Meals  labetalol, 200 mg, Oral, BID  NIFEdipine XL, 30 mg, Oral, Q12H  prenatal vitamin, 1 tablet, Oral, Daily  sodium chloride, 10 mL, Intravenous, Q12H       aluminum-magnesium hydroxide-simethicone **OR** calcium carbonate  •  butalbital-acetaminophen-caffeine  •  diphenhydrAMINE **OR** diphenhydrAMINE **OR** diphenhydrAMINE  •  diphenhydrAMINE  •  docusate sodium  •  Hydrocortisone (Perianal)  •  lanolin  •  metoclopramide  •  oxyCODONE **OR** oxyCODONE  •  gentamicin **AND** Pharmacy to Dose gentamicin (GARAMYCIN)  •  promethazine **OR** promethazine  •  simethicone  •  sodium chloride    Labs:  Lab Results (last 24  hours)     Procedure Component Value Units Date/Time    Lactic Acid, Plasma [[id number]]  (Normal) Collected: 12/10/21 0806    Specimen: Blood Updated: 12/10/21 0924     Lactate 1.0 mmol/L      Comment: Falsely depressed results may occur on samples drawn from patients receiving N-Acetylcysteine (NAC) or Metamizole.       CBC Auto Differential [[id number]]  (Abnormal) Collected: 12/10/21 0806    Specimen: Blood Updated: 12/10/21 0907     WBC 3.15 10*3/mm3      RBC 3.25 10*6/mm3      Hemoglobin 8.0 g/dL      Hematocrit 26.6 %      MCV 81.8 fL      MCH 24.6 pg      MCHC 30.1 g/dL      RDW 16.8 %      RDW-SD 50.1 fl      MPV 10.3 fL      Platelets 269 10*3/mm3      nRBC 0.0 /100 WBC     CBC & Differential [[id number]]  (Abnormal) Collected: 12/10/21 0806    Specimen: Blood Updated: 12/10/21 0902    Narrative:      The following orders were created for panel order CBC & Differential.  Procedure                               Abnormality         Status                     ---------                               -----------         ------                     CBC Auto Differential[[id number]]        Abnormal            Preliminary result         Scan Slide[[id number]]                                       In process                   Please view results for these tests on the individual orders.    Scan Slide [[id number]] Collected: 12/10/21 0806    Specimen: Blood Updated: 12/10/21 0902    POC Glucose Once [[id number]]  (Abnormal) Collected: 12/10/21 0739    Specimen: Blood Updated: 12/10/21 0740     Glucose 150 mg/dL      Comment: Meter: [id number] : [id number] Aaron Walden       POC Glucose Once [[id number]]  (Abnormal) Collected: 12/09/21 2236    Specimen: Blood Updated: 12/09/21 2237     Glucose 246 mg/dL      Comment: Meter: [id number] : [id number] [employer]       STAT Lactic Acid, Reflex [[id number]]  (Normal) Collected: 12/09/21 1524    Specimen: Blood Updated: 12/09/21 1555     Lactate 1.2 mmol/L       Comment: Falsely depressed results may occur on samples drawn from patients receiving N-Acetylcysteine (NAC) or Metamizole.       Urinalysis, Microscopic Only - Urine, Clean Catch [[id number]]  (Abnormal) Collected: 12/09/21 1339    Specimen: Urine, Clean Catch Updated: 12/09/21 1404     RBC, UA 7-12 /HPF      WBC, UA 0-2 /HPF      Bacteria, UA None Seen /HPF      Squamous Epithelial Cells, UA 0-2 /HPF      Hyaline Casts, UA None Seen /LPF      Methodology Automated Microscopy    Urinalysis With Culture If Indicated - Urine, Clean Catch [[id number]]  (Abnormal) Collected: 12/09/21 1339    Specimen: Urine, Clean Catch Updated: 12/09/21 1404     Color, UA Yellow     Appearance, UA Clear     pH, UA 6.5     Specific Gravity, UA 1.009     Glucose, UA Negative     Ketones, UA Negative     Bilirubin, UA Negative     Blood, UA Small (1+)     Protein, UA Negative     Leuk Esterase, UA Negative     Nitrite, UA Negative     Urobilinogen, UA 0.2 E.U./dL    POC Glucose Once [[id number]]  (Abnormal) Collected: 12/09/21 1355    Specimen: Blood Updated: 12/09/21 1358     Glucose 160 mg/dL      Comment: Meter: [id number] : [id number] Kiersten López       Manual Differential [[id number]]  (Abnormal) Collected: 12/09/21 1035    Specimen: Blood Updated: 12/09/21 1249     Neutrophil % 77.0 %      Lymphocyte % 4.0 %      Monocyte % 2.0 %      Eosinophil % 0.0 %      Basophil % 0.0 %      Bands %  13.0 %      Metamyelocyte % 4.0 %      Neutrophils Absolute 4.12 10*3/mm3      Lymphocytes Absolute 0.18 10*3/mm3      Monocytes Absolute 0.09 10*3/mm3      Eosinophils Absolute 0.00 10*3/mm3      Basophils Absolute 0.00 10*3/mm3      Hypochromia Slight/1+     Microcytes Slight/1+     WBC Morphology Normal     Platelet Morphology Normal    CBC & Differential [[id number]]  (Abnormal) Collected: 12/09/21 1035    Specimen: Blood Updated: 12/09/21 1249    Narrative:      The following orders were created for panel order CBC &  Differential.  Procedure                               Abnormality         Status                     ---------                               -----------         ------                     CBC Auto Differential[[id number]]        Abnormal            Final result               Scan Slide[[id number]]                                                                    Please view results for these tests on the individual orders.    CBC Auto Differential [[id number]]  (Abnormal) Collected: 12/09/21 1035    Specimen: Blood Updated: 12/09/21 1249     WBC 4.58 10*3/mm3      RBC 3.26 10*6/mm3      Hemoglobin 8.0 g/dL      Hematocrit 26.5 %      MCV 81.3 fL      MCH 24.5 pg      MCHC 30.2 g/dL      RDW 17.0 %      RDW-SD 50.4 fl      MPV 10.4 fL      Platelets 266 10*3/mm3     Narrative:      The previously reported component NRBC is no longer being reported. Previous result was 0.0 /100 WBC (Reference Range: 0.0-0.2 /100 WBC) on 12/9/2021 at 1206 EST.    Lactic Acid, Plasma [[id number]]  (Abnormal) Collected: 12/09/21 1035    Specimen: Blood Updated: 12/09/21 1245     Lactate 2.1 mmol/L      Comment: Falsely depressed results may occur on samples drawn from patients receiving N-Acetylcysteine (NAC) or Metamizole.       Comprehensive Metabolic Panel [[id number]]  (Abnormal) Collected: 12/09/21 1035    Specimen: Blood Updated: 12/09/21 1241     Glucose 129 mg/dL      BUN 7 mg/dL      Creatinine 0.72 mg/dL      Sodium 137 mmol/L      Potassium 3.9 mmol/L      Chloride 104 mmol/L      CO2 23.0 mmol/L      Calcium 8.5 mg/dL      Total Protein 6.1 g/dL      Albumin 2.90 g/dL      ALT (SGPT) 37 U/L      AST (SGOT) 66 U/L      Alkaline Phosphatase 97 U/L      Total Bilirubin 0.4 mg/dL      eGFR  African Amer 112 mL/min/1.73      Globulin 3.2 gm/dL      Comment: Calculated Result        A/G Ratio 0.9 g/dL      BUN/Creatinine Ratio 9.7     Anion Gap 10.0 mmol/L     Narrative:      GFR Normal >60  Chronic Kidney Disease <60  Kidney Failure  "<15      Procalcitonin [[id number]]  (Normal) Collected: 12/09/21 1035    Specimen: Blood Updated: 12/09/21 1229     Procalcitonin 0.17 ng/mL     Narrative:      As a Marker for Sepsis (Non-Neonates):     1. <0.5 ng/mL represents a low risk of severe sepsis and/or septic shock.  2. >2 ng/mL represents a high risk of severe sepsis and/or septic shock.    As a Marker for Lower Respiratory Tract Infections that require antibiotic therapy:  PCT on Admission     Antibiotic Therapy             6-12 Hrs later  >0.5                          Strongly Recommended            >0.25 - <0.5             Recommended  0.1 - 0.25                  Discouraged                       Remeasure/reassess PCT  <0.1                         Strongly Discouraged         Remeasure/reassess PCT      As 28 day mortality risk marker: \"Change in Procalcitonin Result\" (>80% or <=80%) if Day 0 (or Day 1) and Day 4 values are available. Refer to http://www.[employer] [facility]-pct-calculator.com/    Change in PCT <=80 %   A decrease of PCT levels below or equal to 80% defines a positive change in PCT test result representing a higher risk for 28-day all-cause mortality of patients diagnosed with severe sepsis or septic shock.    Change in PCT >80 %   A decrease of PCT levels of more than 80% defines a negative change in PCT result representing a lower risk for 28-day all-cause mortality of patients diagnosed with severe sepsis or septic shock.                BNP [[id number]]  (Normal) Collected: 12/09/21 1035    Specimen: Blood Updated: 12/09/21 1220     proBNP 154.9 pg/mL     Narrative:      Among patients with dyspnea, NT-proBNP is highly sensitive for the detection of acute congestive heart failure. In addition NT-proBNP of <300 pg/ml effectively rules out acute congestive heart failure with 99% negative predictive value.    Results may be falsely decreased if patient taking Biotin.      Blood Culture - Blood, Arm, Right [[id number]] Collected: 12/09/21 1048    " Specimen: Blood from Arm, Right Updated: 21 1154    Blood Culture - Blood, Arm, Left [[id number]] Collected: 21 1035    Specimen: Blood from Arm, Left Updated: 21 1154    POC Glucose Once [[id number]]  (Abnormal) Collected: 21 103    Specimen: Blood Updated: 21 1034     Glucose 153 mg/dL      Comment: Meter: [id number] : [id number] Kiersten López           Lab Results   Component Value Date    HGB 8.0 (L) 12/10/2021       Assessment/Plan:      Edd is a 36 y.o.    POD#4 s/p repeat  at 37w4d with post-op course complicated by sepsis most likely secondary to endometritis, now improving with Amp/Gent/Clinda IV. Repeat lactic acid is normal. WBC's are still supressed. Vital signs are now normal. Clinically she appears well today without fever. Blood cultures are still pending.   1. Will continue antibiotics for 48hrs then d/c and observe x 24 hours   2. Likely discharge home on Monday  3. Continue supportive care, lovenox, antibiotics, abdominal binder  4. Will give Lasix IV for 3 days, encouraged to ambulate and to elevate feet when not ambulating     I spent 25 minutes with patient, in counseling and coordination of care.  All questions were answered to the best of my ablity.    Rita Busby MD  12/10/2021  09:49 EST

## 2021-12-10 NOTE — LACTATION NOTE
Mom said she is going to pump and supplement baby with formula until her milk volume picks up.  She was encouraged to pump every 3 hours.

## 2021-12-11 LAB
GLUCOSE BLDC GLUCOMTR-MCNC: 129 MG/DL (ref 70–130)
GLUCOSE BLDC GLUCOMTR-MCNC: 130 MG/DL (ref 70–130)
GLUCOSE BLDC GLUCOMTR-MCNC: 140 MG/DL (ref 70–130)
GLUCOSE BLDC GLUCOMTR-MCNC: 143 MG/DL (ref 70–130)

## 2021-12-11 PROCEDURE — 63710000001 INSULIN LISPRO (HUMAN) PER 5 UNITS: Performed by: STUDENT IN AN ORGANIZED HEALTH CARE EDUCATION/TRAINING PROGRAM

## 2021-12-11 PROCEDURE — 25010000002 FUROSEMIDE PER 20 MG: Performed by: OBSTETRICS & GYNECOLOGY

## 2021-12-11 PROCEDURE — 82962 GLUCOSE BLOOD TEST: CPT

## 2021-12-11 PROCEDURE — 99231 SBSQ HOSP IP/OBS SF/LOW 25: CPT | Performed by: OBSTETRICS & GYNECOLOGY

## 2021-12-11 PROCEDURE — 63710000001 INSULIN DETEMIR PER 5 UNITS: Performed by: STUDENT IN AN ORGANIZED HEALTH CARE EDUCATION/TRAINING PROGRAM

## 2021-12-11 PROCEDURE — 25010000002 ENOXAPARIN PER 10 MG: Performed by: OBSTETRICS & GYNECOLOGY

## 2021-12-11 PROCEDURE — 25010000002 AMPICILLIN PER 500 MG: Performed by: OBSTETRICS & GYNECOLOGY

## 2021-12-11 RX ADMIN — INSULIN LISPRO 15 UNITS: 100 INJECTION, SOLUTION INTRAVENOUS; SUBCUTANEOUS at 08:24

## 2021-12-11 RX ADMIN — FUROSEMIDE 20 MG: 10 INJECTION, SOLUTION INTRAVENOUS at 08:24

## 2021-12-11 RX ADMIN — ACETAMINOPHEN 650 MG: 325 TABLET, FILM COATED ORAL at 12:43

## 2021-12-11 RX ADMIN — LABETALOL HYDROCHLORIDE 200 MG: 200 TABLET, FILM COATED ORAL at 20:44

## 2021-12-11 RX ADMIN — SODIUM CHLORIDE, PRESERVATIVE FREE 10 ML: 5 INJECTION INTRAVENOUS at 08:24

## 2021-12-11 RX ADMIN — PRENATAL VITAMINS-IRON FUMARATE 27 MG IRON-FOLIC ACID 0.8 MG TABLET 1 TABLET: at 08:24

## 2021-12-11 RX ADMIN — INSULIN DETEMIR 20 UNITS: 100 INJECTION, SOLUTION SUBCUTANEOUS at 20:43

## 2021-12-11 RX ADMIN — AMPICILLIN SODIUM 2 G: 2 INJECTION, POWDER, FOR SOLUTION INTRAMUSCULAR; INTRAVENOUS at 02:45

## 2021-12-11 RX ADMIN — ACETAMINOPHEN 650 MG: 325 TABLET, FILM COATED ORAL at 00:21

## 2021-12-11 RX ADMIN — INSULIN LISPRO 15 UNITS: 100 INJECTION, SOLUTION INTRAVENOUS; SUBCUTANEOUS at 12:42

## 2021-12-11 RX ADMIN — AMPICILLIN SODIUM 2 G: 2 INJECTION, POWDER, FOR SOLUTION INTRAMUSCULAR; INTRAVENOUS at 08:23

## 2021-12-11 RX ADMIN — ACETAMINOPHEN 650 MG: 325 TABLET, FILM COATED ORAL at 18:59

## 2021-12-11 RX ADMIN — DOCUSATE SODIUM 100 MG: 100 CAPSULE, LIQUID FILLED ORAL at 08:24

## 2021-12-11 RX ADMIN — SIMETHICONE 80 MG: 80 TABLET, CHEWABLE ORAL at 08:24

## 2021-12-11 RX ADMIN — ENOXAPARIN SODIUM 40 MG: 40 INJECTION SUBCUTANEOUS at 10:28

## 2021-12-11 RX ADMIN — IBUPROFEN 600 MG: 600 TABLET ORAL at 16:51

## 2021-12-11 RX ADMIN — BUPROPION HYDROCHLORIDE 150 MG: 150 TABLET, EXTENDED RELEASE ORAL at 08:23

## 2021-12-11 RX ADMIN — IBUPROFEN 600 MG: 600 TABLET ORAL at 10:28

## 2021-12-11 RX ADMIN — NIFEDIPINE 30 MG: 30 TABLET, FILM COATED, EXTENDED RELEASE ORAL at 00:21

## 2021-12-11 RX ADMIN — OXYCODONE 5 MG: 5 TABLET ORAL at 16:51

## 2021-12-11 RX ADMIN — OXYCODONE 10 MG: 5 TABLET ORAL at 00:21

## 2021-12-11 RX ADMIN — IBUPROFEN 600 MG: 600 TABLET ORAL at 22:08

## 2021-12-11 RX ADMIN — IBUPROFEN 600 MG: 600 TABLET ORAL at 04:20

## 2021-12-11 RX ADMIN — INSULIN LISPRO 15 UNITS: 100 INJECTION, SOLUTION INTRAVENOUS; SUBCUTANEOUS at 17:28

## 2021-12-11 RX ADMIN — NIFEDIPINE 30 MG: 30 TABLET, FILM COATED, EXTENDED RELEASE ORAL at 12:42

## 2021-12-11 RX ADMIN — INSULIN DETEMIR 20 UNITS: 100 INJECTION, SOLUTION SUBCUTANEOUS at 08:23

## 2021-12-11 RX ADMIN — ACETAMINOPHEN 650 MG: 325 TABLET, FILM COATED ORAL at 06:38

## 2021-12-11 RX ADMIN — LABETALOL HYDROCHLORIDE 200 MG: 200 TABLET, FILM COATED ORAL at 08:24

## 2021-12-11 RX ADMIN — OXYCODONE 10 MG: 5 TABLET ORAL at 22:08

## 2021-12-11 NOTE — PROGRESS NOTES
"  Laborist Post Partum Note: Postpartum Day: #5     Patient Name:  Edd Leung  :  1985  MRN:  [id number]  Referring Physician: Bettina Schwartz     Chief complaint: No chief complaint on file.       S.  No complaints today.  Denies chest pain or shortness of breath.    O.     Vitals:    21 0700   BP: 135/69   Pulse: 85   Resp: 16   Temp: 98.3 °F (36.8 °C)   SpO2:        Examination              Chest: Clear to auscultation.  Normal air movement.              Heart: Regular rate and rhythm without murmurs, gallops or rubs.              Abdomen: Incision clean, dry, and intact with subcuticular suture in place   Extremities: No calf tenderness.  Blood sugar stable.       A.    Status post  section.  Finishing antibiotic course today for treatment of chorioamnionitis.  We will follow tomorrow off of antibiotics to make sure she remains afebrile.      P.  Plan discharge on Monday.    Cesar Hurtado \"Silvia\" LINDA Leung MD  12:06 EST  21  "

## 2021-12-12 LAB
ANION GAP SERPL CALCULATED.3IONS-SCNC: 6 MMOL/L (ref 5–15)
ANISOCYTOSIS BLD QL: ABNORMAL
BASOPHILS # BLD MANUAL: 0 10*3/MM3 (ref 0–0.2)
BASOPHILS NFR BLD MANUAL: 0 % (ref 0–1.5)
BUN SERPL-MCNC: 11 MG/DL (ref 6–20)
BUN/CREAT SERPL: 11.7 (ref 7–25)
CALCIUM SPEC-SCNC: 8.1 MG/DL (ref 8.6–10.5)
CHLORIDE SERPL-SCNC: 105 MMOL/L (ref 98–107)
CO2 SERPL-SCNC: 27 MMOL/L (ref 22–29)
CREAT SERPL-MCNC: 0.94 MG/DL (ref 0.57–1)
DEPRECATED RDW RBC AUTO: 51.3 FL (ref 37–54)
EOSINOPHIL # BLD MANUAL: 0 10*3/MM3 (ref 0–0.4)
EOSINOPHIL NFR BLD MANUAL: 0 % (ref 0.3–6.2)
ERYTHROCYTE [DISTWIDTH] IN BLOOD BY AUTOMATED COUNT: 17.1 % (ref 12.3–15.4)
GFR SERPL CREATININE-BSD FRML MDRD: 83 ML/MIN/1.73
GLUCOSE BLDC GLUCOMTR-MCNC: 105 MG/DL (ref 70–130)
GLUCOSE BLDC GLUCOMTR-MCNC: 120 MG/DL (ref 70–130)
GLUCOSE BLDC GLUCOMTR-MCNC: 139 MG/DL (ref 70–130)
GLUCOSE BLDC GLUCOMTR-MCNC: 149 MG/DL (ref 70–130)
GLUCOSE SERPL-MCNC: 146 MG/DL (ref 65–99)
HCT VFR BLD AUTO: 24.5 % (ref 34–46.6)
HGB BLD-MCNC: 7.2 G/DL (ref 12–15.9)
HYPOCHROMIA BLD QL: ABNORMAL
LYMPHOCYTES # BLD MANUAL: 2.71 10*3/MM3 (ref 0.7–3.1)
LYMPHOCYTES NFR BLD MANUAL: 8 % (ref 5–12)
MCH RBC QN AUTO: 24.2 PG (ref 26.6–33)
MCHC RBC AUTO-ENTMCNC: 29.4 G/DL (ref 31.5–35.7)
MCV RBC AUTO: 82.2 FL (ref 79–97)
METAMYELOCYTES NFR BLD MANUAL: 5 % (ref 0–0)
MONOCYTES # BLD: 0.49 10*3/MM3 (ref 0.1–0.9)
MYELOCYTES NFR BLD MANUAL: 5 % (ref 0–0)
NEUTROPHILS # BLD AUTO: 2.34 10*3/MM3 (ref 1.7–7)
NEUTROPHILS NFR BLD MANUAL: 27 % (ref 42.7–76)
NEUTS BAND NFR BLD MANUAL: 11 % (ref 0–5)
NRBC SPEC MANUAL: 0 /100 WBC (ref 0–0.2)
PLAT MORPH BLD: NORMAL
PLATELET # BLD AUTO: 333 10*3/MM3 (ref 140–450)
PMV BLD AUTO: 10 FL (ref 6–12)
POTASSIUM SERPL-SCNC: 3.7 MMOL/L (ref 3.5–5.2)
RBC # BLD AUTO: 2.98 10*6/MM3 (ref 3.77–5.28)
SMUDGE CELLS BLD QL SMEAR: ABNORMAL
SODIUM SERPL-SCNC: 138 MMOL/L (ref 136–145)
VARIANT LYMPHS NFR BLD MANUAL: 44 % (ref 19.6–45.3)
WBC NRBC COR # BLD: 6.15 10*3/MM3 (ref 3.4–10.8)

## 2021-12-12 PROCEDURE — 25010000002 ENOXAPARIN PER 10 MG: Performed by: OBSTETRICS & GYNECOLOGY

## 2021-12-12 PROCEDURE — 82962 GLUCOSE BLOOD TEST: CPT

## 2021-12-12 PROCEDURE — 63710000001 INSULIN LISPRO (HUMAN) PER 5 UNITS: Performed by: STUDENT IN AN ORGANIZED HEALTH CARE EDUCATION/TRAINING PROGRAM

## 2021-12-12 PROCEDURE — 99232 SBSQ HOSP IP/OBS MODERATE 35: CPT | Performed by: OBSTETRICS & GYNECOLOGY

## 2021-12-12 PROCEDURE — 25010000002 FUROSEMIDE PER 20 MG: Performed by: OBSTETRICS & GYNECOLOGY

## 2021-12-12 PROCEDURE — 85007 BL SMEAR W/DIFF WBC COUNT: CPT | Performed by: OBSTETRICS & GYNECOLOGY

## 2021-12-12 PROCEDURE — 63710000001 INSULIN DETEMIR PER 5 UNITS: Performed by: STUDENT IN AN ORGANIZED HEALTH CARE EDUCATION/TRAINING PROGRAM

## 2021-12-12 PROCEDURE — 80048 BASIC METABOLIC PNL TOTAL CA: CPT | Performed by: OBSTETRICS & GYNECOLOGY

## 2021-12-12 PROCEDURE — 85025 COMPLETE CBC W/AUTO DIFF WBC: CPT | Performed by: OBSTETRICS & GYNECOLOGY

## 2021-12-12 RX ADMIN — ACETAMINOPHEN 650 MG: 325 TABLET, FILM COATED ORAL at 00:55

## 2021-12-12 RX ADMIN — FUROSEMIDE 20 MG: 10 INJECTION, SOLUTION INTRAVENOUS at 08:19

## 2021-12-12 RX ADMIN — LABETALOL HYDROCHLORIDE 200 MG: 200 TABLET, FILM COATED ORAL at 21:26

## 2021-12-12 RX ADMIN — OXYCODONE 10 MG: 5 TABLET ORAL at 23:34

## 2021-12-12 RX ADMIN — NIFEDIPINE 30 MG: 30 TABLET, FILM COATED, EXTENDED RELEASE ORAL at 00:55

## 2021-12-12 RX ADMIN — PRENATAL VITAMINS-IRON FUMARATE 27 MG IRON-FOLIC ACID 0.8 MG TABLET 1 TABLET: at 08:19

## 2021-12-12 RX ADMIN — INSULIN DETEMIR 20 UNITS: 100 INJECTION, SOLUTION SUBCUTANEOUS at 08:16

## 2021-12-12 RX ADMIN — SODIUM CHLORIDE, PRESERVATIVE FREE 10 ML: 5 INJECTION INTRAVENOUS at 23:00

## 2021-12-12 RX ADMIN — IBUPROFEN 600 MG: 600 TABLET ORAL at 16:41

## 2021-12-12 RX ADMIN — IBUPROFEN 600 MG: 600 TABLET ORAL at 10:55

## 2021-12-12 RX ADMIN — OXYCODONE 10 MG: 5 TABLET ORAL at 04:17

## 2021-12-12 RX ADMIN — SODIUM CHLORIDE, PRESERVATIVE FREE 10 ML: 5 INJECTION INTRAVENOUS at 08:19

## 2021-12-12 RX ADMIN — IBUPROFEN 600 MG: 600 TABLET ORAL at 04:17

## 2021-12-12 RX ADMIN — LABETALOL HYDROCHLORIDE 200 MG: 200 TABLET, FILM COATED ORAL at 08:19

## 2021-12-12 RX ADMIN — BUPROPION HYDROCHLORIDE 150 MG: 150 TABLET, EXTENDED RELEASE ORAL at 08:19

## 2021-12-12 RX ADMIN — ENOXAPARIN SODIUM 40 MG: 40 INJECTION SUBCUTANEOUS at 10:55

## 2021-12-12 RX ADMIN — INSULIN LISPRO 15 UNITS: 100 INJECTION, SOLUTION INTRAVENOUS; SUBCUTANEOUS at 08:17

## 2021-12-12 RX ADMIN — INSULIN LISPRO 15 UNITS: 100 INJECTION, SOLUTION INTRAVENOUS; SUBCUTANEOUS at 17:15

## 2021-12-12 RX ADMIN — IBUPROFEN 600 MG: 600 TABLET ORAL at 23:00

## 2021-12-12 RX ADMIN — ACETAMINOPHEN 650 MG: 325 TABLET, FILM COATED ORAL at 18:57

## 2021-12-12 RX ADMIN — ACETAMINOPHEN 650 MG: 325 TABLET, FILM COATED ORAL at 06:42

## 2021-12-12 RX ADMIN — INSULIN LISPRO 15 UNITS: 100 INJECTION, SOLUTION INTRAVENOUS; SUBCUTANEOUS at 12:42

## 2021-12-12 RX ADMIN — NIFEDIPINE 30 MG: 30 TABLET, FILM COATED, EXTENDED RELEASE ORAL at 12:42

## 2021-12-12 RX ADMIN — INSULIN DETEMIR 20 UNITS: 100 INJECTION, SOLUTION SUBCUTANEOUS at 21:26

## 2021-12-12 RX ADMIN — ACETAMINOPHEN 650 MG: 325 TABLET, FILM COATED ORAL at 12:42

## 2021-12-12 NOTE — PROGRESS NOTES
"  Laborist Post Partum Note: Postpartum Day: #6     Patient Name:  Edd Leung  :  1985  MRN:  [id number]  Referring Physician: Bettina Schwartz  Patient voices some concern regarding her edema.  Continues to receive intermittent Lasix.  She denies headache, scotomata or epigastric pain.    O.     Vitals:    21 0400   BP: 119/56   Pulse: 83   Resp: 18   Temp: 97.9 °F (36.6 °C)   SpO2:      Good blood sugar control without evidence of hypoglycemia.    Examination              Chest: Clear to auscultation.  Normal air movement.              Heart: Regular rate and rhythm without murmurs, gallops or rubs.              Abdomen: Incision clean, dry, and intact with subcuticular suture in place.  Patient does have significant mid abdominal edema.  No evidence of cellulitis.   Extremities: No calf tenderness.       A.  1.  Postop day #6 status post  section.  24 hours off of antibiotic therapy.  Remains afebrile status post treatment for chorioamnionitis.        2.  Insulin-dependent diabetes mellitus.  Reasonable blood sugar control noted postop.      P.  1.  Repeat electrolytes and CBC.        2.  If remains afebrile today, most likely discharge tomorrow.    Cesar Hurtado \"Slivia\" LINDA Leung MD  09:48 EST  21  "

## 2021-12-13 VITALS
DIASTOLIC BLOOD PRESSURE: 72 MMHG | BODY MASS INDEX: 43.4 KG/M2 | RESPIRATION RATE: 20 BRPM | HEIGHT: 69 IN | SYSTOLIC BLOOD PRESSURE: 155 MMHG | WEIGHT: 293 LBS | TEMPERATURE: 98.6 F | HEART RATE: 83 BPM | OXYGEN SATURATION: 100 %

## 2021-12-13 LAB
GLUCOSE BLDC GLUCOMTR-MCNC: 119 MG/DL (ref 70–130)
GLUCOSE BLDC GLUCOMTR-MCNC: 349 MG/DL (ref 70–130)

## 2021-12-13 PROCEDURE — 82962 GLUCOSE BLOOD TEST: CPT

## 2021-12-13 PROCEDURE — 25010000002 ENOXAPARIN PER 10 MG: Performed by: OBSTETRICS & GYNECOLOGY

## 2021-12-13 PROCEDURE — 99238 HOSP IP/OBS DSCHRG MGMT 30/<: CPT | Performed by: OBSTETRICS & GYNECOLOGY

## 2021-12-13 PROCEDURE — 63710000001 INSULIN DETEMIR PER 5 UNITS: Performed by: STUDENT IN AN ORGANIZED HEALTH CARE EDUCATION/TRAINING PROGRAM

## 2021-12-13 PROCEDURE — 63710000001 INSULIN LISPRO (HUMAN) PER 5 UNITS: Performed by: STUDENT IN AN ORGANIZED HEALTH CARE EDUCATION/TRAINING PROGRAM

## 2021-12-13 RX ORDER — ACETAMINOPHEN 325 MG/1
650 TABLET ORAL EVERY 6 HOURS
Qty: 100 TABLET | Refills: 0 | Status: SHIPPED | OUTPATIENT
Start: 2021-12-13 | End: 2021-12-27

## 2021-12-13 RX ORDER — PSEUDOEPHEDRINE HCL 30 MG
100 TABLET ORAL 2 TIMES DAILY PRN
Qty: 120 CAPSULE | Refills: 0 | Status: SHIPPED | OUTPATIENT
Start: 2021-12-13 | End: 2022-02-11

## 2021-12-13 RX ORDER — INSULIN LISPRO 100 [IU]/ML
15 INJECTION, SOLUTION INTRAVENOUS; SUBCUTANEOUS
Qty: 13.5 ML | Refills: 11 | Status: SHIPPED | OUTPATIENT
Start: 2021-12-13 | End: 2021-12-20 | Stop reason: HOSPADM

## 2021-12-13 RX ORDER — FUROSEMIDE 40 MG/1
40 TABLET ORAL DAILY
Qty: 3 TABLET | Refills: 0 | Status: ON HOLD | OUTPATIENT
Start: 2021-12-13 | End: 2021-12-20 | Stop reason: SDUPTHER

## 2021-12-13 RX ORDER — SIMETHICONE 80 MG
80 TABLET,CHEWABLE ORAL 4 TIMES DAILY PRN
Qty: 30 TABLET | Refills: 0 | Status: ON HOLD | OUTPATIENT
Start: 2021-12-13 | End: 2022-02-18

## 2021-12-13 RX ORDER — LANOLIN
CREAM (ML) TOPICAL
Qty: 9 ML | Refills: 0 | Status: SHIPPED | OUTPATIENT
Start: 2021-12-13 | End: 2021-12-20 | Stop reason: HOSPADM

## 2021-12-13 RX ORDER — INSULIN GLARGINE 100 [IU]/ML
20 INJECTION, SOLUTION SUBCUTANEOUS 2 TIMES DAILY
Qty: 15 ML | Refills: 11 | Status: SHIPPED | OUTPATIENT
Start: 2021-12-13 | End: 2021-12-20 | Stop reason: HOSPADM

## 2021-12-13 RX ORDER — OXYCODONE HYDROCHLORIDE 5 MG/1
5 TABLET ORAL EVERY 4 HOURS PRN
Qty: 20 TABLET | Refills: 0 | Status: ON HOLD | OUTPATIENT
Start: 2021-12-13 | End: 2022-02-18

## 2021-12-13 RX ORDER — IBUPROFEN 600 MG/1
600 TABLET ORAL EVERY 6 HOURS
Qty: 80 TABLET | Refills: 0 | Status: SHIPPED | OUTPATIENT
Start: 2021-12-13 | End: 2022-01-02

## 2021-12-13 RX ADMIN — IBUPROFEN 600 MG: 600 TABLET ORAL at 11:01

## 2021-12-13 RX ADMIN — IBUPROFEN 600 MG: 600 TABLET ORAL at 04:07

## 2021-12-13 RX ADMIN — BUPROPION HYDROCHLORIDE 150 MG: 150 TABLET, EXTENDED RELEASE ORAL at 08:09

## 2021-12-13 RX ADMIN — PRENATAL VITAMINS-IRON FUMARATE 27 MG IRON-FOLIC ACID 0.8 MG TABLET 1 TABLET: at 08:10

## 2021-12-13 RX ADMIN — ENOXAPARIN SODIUM 40 MG: 40 INJECTION SUBCUTANEOUS at 11:01

## 2021-12-13 RX ADMIN — NIFEDIPINE 30 MG: 30 TABLET, FILM COATED, EXTENDED RELEASE ORAL at 13:04

## 2021-12-13 RX ADMIN — NIFEDIPINE 30 MG: 30 TABLET, FILM COATED, EXTENDED RELEASE ORAL at 00:38

## 2021-12-13 RX ADMIN — INSULIN DETEMIR 20 UNITS: 100 INJECTION, SOLUTION SUBCUTANEOUS at 08:09

## 2021-12-13 RX ADMIN — LABETALOL HYDROCHLORIDE 200 MG: 200 TABLET, FILM COATED ORAL at 08:10

## 2021-12-13 RX ADMIN — SODIUM CHLORIDE, PRESERVATIVE FREE 10 ML: 5 INJECTION INTRAVENOUS at 11:02

## 2021-12-13 RX ADMIN — ACETAMINOPHEN 650 MG: 325 TABLET, FILM COATED ORAL at 00:38

## 2021-12-13 RX ADMIN — ACETAMINOPHEN 650 MG: 325 TABLET, FILM COATED ORAL at 06:29

## 2021-12-13 RX ADMIN — INSULIN LISPRO 15 UNITS: 100 INJECTION, SOLUTION INTRAVENOUS; SUBCUTANEOUS at 08:10

## 2021-12-13 NOTE — PROGRESS NOTES
2021  PPD/POD#7     Chief Complaint: Ready to go home.     Subjective   Edd feels well today. Reports continued swelling of bilateral LE and abdominal skin.   Patient describes her lochia less than menses.  Pain is well controlled  Breastfeeding/Pumping going well  Contraception --> partner lives in [place]. Plan to discuss BC at next visit.      Objective   Temp: Temp:  [97.9 °F (36.6 °C)-98.4 °F (36.9 °C)] 98.1 °F (36.7 °C) Temp src: Oral   BP: BP: (137-142)/(65-99) 139/99        Pulse: Heart Rate:  [81-89] 89  RR: Resp:  [16-20] 20      General:  No acute distress   Abdomen: Fundus firm and beneath umbilicus   Extremities: Edema bilaterally, tense not compressible   Pelvis: deferred       Medications:  acetaminophen, 650 mg, Oral, Q6H  buPROPion SR, 150 mg, Oral, Daily  enoxaparin, 40 mg, Subcutaneous, Q24H  ibuprofen, 600 mg, Oral, Q6H  insulin detemir, 20 Units, Subcutaneous, Q12H  insulin lispro, 15 Units, Subcutaneous, TID With Meals  labetalol, 200 mg, Oral, BID  NIFEdipine XL, 30 mg, Oral, Q12H  prenatal vitamin, 1 tablet, Oral, Daily  sodium chloride, 10 mL, Intravenous, Q12H       aluminum-magnesium hydroxide-simethicone **OR** calcium carbonate  •  butalbital-acetaminophen-caffeine  •  diphenhydrAMINE **OR** diphenhydrAMINE **OR** diphenhydrAMINE  •  diphenhydrAMINE  •  docusate sodium  •  Hydrocortisone (Perianal)  •  lanolin  •  metoclopramide  •  oxyCODONE **OR** oxyCODONE  •  promethazine **OR** promethazine  •  simethicone  •  sodium chloride    Labs:  Lab Results   Component Value Date    HGB 7.2 (L) 2021     Lab Results   Component Value Date    GLUCOSE 146 (H) 2021     Assessment  1. PPD/POD# 7 after  (LTCS)    Plan  1. Discharge to home  2. Follow up with Dr. Busby  in 2 weeks  3. Prescriptions for Tylenol, Motrin and Percocet prescribed and advised on weaning. Adding oral lasix for significant edema.   4. Advised no tampons, intercourse, or tub baths for 6  weeks.     This note has been electronically signed.    Rita Busby MD  December 13, 2021

## 2021-12-13 NOTE — DISCHARGE SUMMARY
Discharge Summary    Patient Name: Edd Leung  : 1985  MRN: [id number]  Date of Service: 2021  Referring Provider: Bettina Schwartz  Discharge Provider: Rita Busby MD    Date of Admission: 2021    Date of Discharge:  2021         Admission Diagnosis:  delivery due to maternal disorder [O99.892]     Discharge Diagnosis: repeat  section, low vertical incision at 37w4d    Procedures:  , Low Transverse     2021    8:38 AM      Hospital Course:  Patient is a 36 y.o. female  status post repeat  section, low transverse incision without complication at 37w4d. Postpartum course was complicated by endometritis and SIRS/sepsis. She was placed on Amp/Gent/Clinda for 48hrs. She was then observed for an additional 24hrs and she remained afebrile, with vital signs stable. She was also given Lovenox post-partum and for 7 days after discharge. She was ready for discharge on postpartum day 7.     Infant:   male  fetus 3089 g (6 lb 13 oz)  with Apgar scores of 7 , 9  at five minutes.    Discharge Condition: Stable    Discharge to: Home    Discharge Medications:      Discharge Medications      New Medications      Instructions Start Date   acetaminophen 325 MG tablet  Commonly known as: TYLENOL   650 mg, Oral, Every 6 Hours      enoxaparin 40 MG/0.4ML solution syringe  Commonly known as: LOVENOX   40 mg, Subcutaneous, Every 24 Hours      furosemide 40 MG tablet  Commonly known as: Lasix   40 mg, Oral, Daily      ibuprofen 600 MG tablet  Commonly known as: ADVIL,MOTRIN   600 mg, Oral, Every 6 Hours      lanolin cream   Topical, Every 1 Hour PRN      Lantus SoloStar 100 UNIT/ML injection pen  Generic drug: Insulin Glargine  Replaces: LANTUS SC   20 Units, Subcutaneous, 2 Times Daily      oxyCODONE 5 MG immediate release tablet  Commonly known as: ROXICODONE   5 mg, Oral, Every 4 Hours PRN      simethicone 80 MG chewable tablet  Commonly known  as: MYLICON   80 mg, Oral, 4 Times Daily PRN      Stool Softener 100 MG capsule  Generic drug: docusate sodium   100 mg, Oral, 2 Times Daily PRN         Changes to Medications      Instructions Start Date   HumaLOG KwikPen 100 UNIT/ML solution pen-injector  Generic drug: Insulin Lispro (1 Unit Dial)  What changed:   · medication strength  · how much to take  · when to take this  · additional instructions   15 Units, Subcutaneous, 3 Times Daily With Meals         Continue These Medications      Instructions Start Date   buPROPion  MG 12 hr tablet  Commonly known as: WELLBUTRIN SR   150 mg, Oral, Daily      labetalol 100 MG tablet  Commonly known as: NORMODYNE   200 mg, Oral, 2 Times Daily      NIFEDIPINE PO   30 mg, Oral, 2 Times Daily      Prenatal 27-1 27-1 MG tablet tablet   Oral, Daily         Stop These Medications    aspirin 81 MG chewable tablet     glipizide 10 MG 24 hr tablet  Commonly known as: GLUCOTROL XL     hydroCHLOROthiazide 25 MG tablet  Commonly known as: HYDRODIURIL     LANTUS SC  Replaced by: Lantus SoloStar 100 UNIT/ML injection pen            Discharge Diet: Diabetic diet    Discharge Activity: No driving until no longer taking narcotics and Pelvic rest x 6 weeks (nothing in the vagina, no intercourse, tampons, douches)      Follow up appointments: To see Dr. Busby in 2 weeks    Contraception: Will follow up with her primary OB in 1-2 weeks to further discuss short and long term options.    Rita Busby MD  12/13/2021 17:16 EST

## 2021-12-14 LAB
BACTERIA SPEC AEROBE CULT: NORMAL
BACTERIA SPEC AEROBE CULT: NORMAL

## 2021-12-14 NOTE — PAYOR COMM NOTE
"[facility]  phone [phone]  fax [phone]    Yamel Leung (36 y.o. Female) [id number]  NOTIFICATION OF DISCHARGE               Date of Birth Social Security Number Address Home Phone MRN    1985  [address] [address] [phone] [id number]    [employer] Marital Status             None        Admission Date Admission Type Admitting Provider Attending Provider Department, Room/Bed    21 Elective Cesar Leung III, MD  [facility] MOTHER BABY 4A, N401/1    Discharge Date Discharge Disposition Discharge Destination          2021 Home or Self Care              Attending Provider: (none)   Allergies: Latex    Isolation: None   Infection: None   Code Status: Prior   Advance Care Planning Activity    Ht: 175.3 cm (69\")   Wt: 204 kg (450 lb)    Admission Cmt: None   Principal Problem: None                Active Insurance as of 2021     Primary Coverage     Payor Plan Insurance Group Employer/Plan Group    [employer] [place] [employer] [place]      Payor Plan Address Payor Plan Phone Number Payor Plan Fax Number Effective Dates    [address] [address] [phone]  2021 - None Entered    [facility] [id number]       Subscriber Name Subscriber Birth Date Member ID       YAMEL LEUNGOINETTE 1985 [id number]                 Emergency Contacts      (Rel.) Home Phone Work Phone Mobile Phone    LELIASTEFANIE DOWLINGRAJAN (Relative) -- -- [phone]    LeslieJoanne (Mother) -- -- [phone]            Insurance Information                [facility]/[employer] MEDICAID Phone: [phone]    Subscriber: Johnna Leungbetty Aguayo Subscriber#: [id number]    Group#: -- Precert#: --             Discharge Summary      Rita Busby MD at 21 1344          Discharge Summary    Patient Name: Yamel Leung  : 1985  MRN: [id number]  Date of Service: 2021  Referring Provider: " Bettina Schwartz  Discharge Provider: Rita Busby MD    Date of Admission: 2021    Date of Discharge:  2021         Admission Diagnosis:  delivery due to maternal disorder [O99.892]     Discharge Diagnosis: repeat  section, low vertical incision at 37w4d    Procedures:  , Low Transverse     2021    8:38 AM      Hospital Course:  Patient is a 36 y.o. female  status post repeat  section, low transverse incision without complication at 37w4d. Postpartum course was complicated by endometritis and SIRS/sepsis. She was placed on Amp/Gent/Clinda for 48hrs. She was then observed for an additional 24hrs and she remained afebrile, with vital signs stable. She was also given Lovenox post-partum and for 7 days after discharge. She was ready for discharge on postpartum day 7.     Infant:   male  fetus 3089 g (6 lb 13 oz)  with Apgar scores of 7 , 9  at five minutes.    Discharge Condition: Stable    Discharge to: Home    Discharge Medications:      Discharge Medications      New Medications      Instructions Start Date   acetaminophen 325 MG tablet  Commonly known as: TYLENOL   650 mg, Oral, Every 6 Hours      enoxaparin 40 MG/0.4ML solution syringe  Commonly known as: LOVENOX   40 mg, Subcutaneous, Every 24 Hours      furosemide 40 MG tablet  Commonly known as: Lasix   40 mg, Oral, Daily      ibuprofen 600 MG tablet  Commonly known as: ADVIL,MOTRIN   600 mg, Oral, Every 6 Hours      lanolin cream   Topical, Every 1 Hour PRN      Lantus SoloStar 100 UNIT/ML injection pen  Generic drug: Insulin Glargine  Replaces: LANTUS SC   20 Units, Subcutaneous, 2 Times Daily      oxyCODONE 5 MG immediate release tablet  Commonly known as: ROXICODONE   5 mg, Oral, Every 4 Hours PRN      simethicone 80 MG chewable tablet  Commonly known as: MYLICON   80 mg, Oral, 4 Times Daily PRN      Stool Softener 100 MG capsule  Generic drug: docusate sodium   100 mg, Oral, 2 Times Daily  PRN         Changes to Medications      Instructions Start Date   HumaLOG KwikPen 100 UNIT/ML solution pen-injector  Generic drug: Insulin Lispro (1 Unit Dial)  What changed:   · medication strength  · how much to take  · when to take this  · additional instructions   15 Units, Subcutaneous, 3 Times Daily With Meals         Continue These Medications      Instructions Start Date   buPROPion  MG 12 hr tablet  Commonly known as: WELLBUTRIN SR   150 mg, Oral, Daily      labetalol 100 MG tablet  Commonly known as: NORMODYNE   200 mg, Oral, 2 Times Daily      NIFEDIPINE PO   30 mg, Oral, 2 Times Daily      Prenatal 27-1 27-1 MG tablet tablet   Oral, Daily         Stop These Medications    aspirin 81 MG chewable tablet     glipizide 10 MG 24 hr tablet  Commonly known as: GLUCOTROL XL     hydroCHLOROthiazide 25 MG tablet  Commonly known as: HYDRODIURIL     LANTUS SC  Replaced by: Lantus SoloStar 100 UNIT/ML injection pen            Discharge Diet: Diabetic diet    Discharge Activity: No driving until no longer taking narcotics and Pelvic rest x 6 weeks (nothing in the vagina, no intercourse, tampons, douches)      Follow up appointments: To see Dr. Busby in 2 weeks    Contraception: Will follow up with her primary OB in 1-2 weeks to further discuss short and long term options.    Rita Busby MD  12/13/2021 17:16 EST    Electronically signed by Rita Busby MD at 12/13/21 4941

## 2021-12-15 ENCOUNTER — HOSPITAL ENCOUNTER (INPATIENT)
Facility: HOSPITAL | Age: 36
LOS: 5 days | Discharge: HOME | End: 2021-12-20
Attending: EMERGENCY MEDICINE | Admitting: OBSTETRICS & GYNECOLOGY

## 2021-12-15 ENCOUNTER — APPOINTMENT (OUTPATIENT)
Dept: GENERAL RADIOLOGY | Facility: HOSPITAL | Age: 36
End: 2021-12-15

## 2021-12-15 ENCOUNTER — APPOINTMENT (OUTPATIENT)
Dept: CT IMAGING | Facility: HOSPITAL | Age: 36
End: 2021-12-15

## 2021-12-15 DIAGNOSIS — J96.01 ACUTE RESPIRATORY FAILURE WITH HYPOXIA (HCC): Primary | ICD-10-CM

## 2021-12-15 DIAGNOSIS — D64.9 ANEMIA, UNSPECIFIED TYPE: ICD-10-CM

## 2021-12-15 DIAGNOSIS — E11.69 TYPE 2 DIABETES MELLITUS WITH OTHER SPECIFIED COMPLICATION, WITH LONG-TERM CURRENT USE OF INSULIN (HCC): ICD-10-CM

## 2021-12-15 DIAGNOSIS — Z98.891 STATUS POST CESAREAN SECTION: ICD-10-CM

## 2021-12-15 DIAGNOSIS — I51.7 CARDIOMEGALY: ICD-10-CM

## 2021-12-15 DIAGNOSIS — Z79.4 TYPE 2 DIABETES MELLITUS WITH OTHER SPECIFIED COMPLICATION, WITH LONG-TERM CURRENT USE OF INSULIN (HCC): ICD-10-CM

## 2021-12-15 DIAGNOSIS — I10 HYPERTENSION, UNSPECIFIED TYPE: ICD-10-CM

## 2021-12-15 DIAGNOSIS — R60.0 BILATERAL EDEMA OF LOWER EXTREMITY: ICD-10-CM

## 2021-12-15 DIAGNOSIS — J18.9 PNEUMONIA OF BOTH LOWER LOBES DUE TO INFECTIOUS ORGANISM: ICD-10-CM

## 2021-12-15 LAB
ALBUMIN SERPL-MCNC: 3.5 G/DL (ref 3.5–5.2)
ALBUMIN/GLOB SERPL: 1.1 G/DL
ALP SERPL-CCNC: 160 U/L (ref 39–117)
ALT SERPL W P-5'-P-CCNC: 34 U/L (ref 1–33)
ANION GAP SERPL CALCULATED.3IONS-SCNC: 12 MMOL/L (ref 5–15)
ANISOCYTOSIS BLD QL: ABNORMAL
AST SERPL-CCNC: 30 U/L (ref 1–32)
BASOPHILS # BLD MANUAL: 0 10*3/MM3 (ref 0–0.2)
BASOPHILS NFR BLD MANUAL: 0 % (ref 0–1.5)
BILIRUB SERPL-MCNC: 0.3 MG/DL (ref 0–1.2)
BILIRUB UR QL STRIP: NEGATIVE
BUN SERPL-MCNC: 7 MG/DL (ref 6–20)
BUN/CREAT SERPL: 9.6 (ref 7–25)
CALCIUM SPEC-SCNC: 8.8 MG/DL (ref 8.6–10.5)
CHLORIDE SERPL-SCNC: 103 MMOL/L (ref 98–107)
CLARITY UR: CLEAR
CO2 SERPL-SCNC: 24 MMOL/L (ref 22–29)
COLOR UR: YELLOW
CREAT SERPL-MCNC: 0.73 MG/DL (ref 0.57–1)
D-LACTATE SERPL-SCNC: 0.9 MMOL/L (ref 0.5–2)
DEPRECATED RDW RBC AUTO: 51 FL (ref 37–54)
EOSINOPHIL # BLD MANUAL: 0.14 10*3/MM3 (ref 0–0.4)
EOSINOPHIL NFR BLD MANUAL: 1 % (ref 0.3–6.2)
ERYTHROCYTE [DISTWIDTH] IN BLOOD BY AUTOMATED COUNT: 17.4 % (ref 12.3–15.4)
FLUAV SUBTYP SPEC NAA+PROBE: NOT DETECTED
FLUBV RNA ISLT QL NAA+PROBE: NOT DETECTED
GFR SERPL CREATININE-BSD FRML MDRD: 111 ML/MIN/1.73
GLOBULIN UR ELPH-MCNC: 3.3 GM/DL
GLUCOSE BLDC GLUCOMTR-MCNC: 59 MG/DL (ref 70–130)
GLUCOSE BLDC GLUCOMTR-MCNC: 87 MG/DL (ref 70–130)
GLUCOSE SERPL-MCNC: 153 MG/DL (ref 65–99)
GLUCOSE UR STRIP-MCNC: NEGATIVE MG/DL
HCT VFR BLD AUTO: 28.3 % (ref 34–46.6)
HGB BLD-MCNC: 8.4 G/DL (ref 12–15.9)
HGB UR QL STRIP.AUTO: NEGATIVE
HOLD SPECIMEN: NORMAL
HYPOCHROMIA BLD QL: ABNORMAL
KETONES UR QL STRIP: NEGATIVE
LARGE PLATELETS: ABNORMAL
LEUKOCYTE ESTERASE UR QL STRIP.AUTO: NEGATIVE
LYMPHOCYTES # BLD MANUAL: 2.68 10*3/MM3 (ref 0.7–3.1)
LYMPHOCYTES NFR BLD MANUAL: 5 % (ref 5–12)
MAGNESIUM SERPL-MCNC: 2 MG/DL (ref 1.6–2.6)
MCH RBC QN AUTO: 24.1 PG (ref 26.6–33)
MCHC RBC AUTO-ENTMCNC: 29.7 G/DL (ref 31.5–35.7)
MCV RBC AUTO: 81.3 FL (ref 79–97)
METAMYELOCYTES NFR BLD MANUAL: 3 % (ref 0–0)
MONOCYTES # BLD: 0.71 10*3/MM3 (ref 0.1–0.9)
MYELOCYTES NFR BLD MANUAL: 4 % (ref 0–0)
NEUTROPHILS # BLD AUTO: 9.61 10*3/MM3 (ref 1.7–7)
NEUTROPHILS NFR BLD MANUAL: 66 % (ref 42.7–76)
NEUTS BAND NFR BLD MANUAL: 2 % (ref 0–5)
NITRITE UR QL STRIP: NEGATIVE
NT-PROBNP SERPL-MCNC: 93.1 PG/ML (ref 0–450)
PH UR STRIP.AUTO: 8.5 [PH] (ref 5–8)
PLATELET # BLD AUTO: 478 10*3/MM3 (ref 140–450)
PMV BLD AUTO: 9.6 FL (ref 6–12)
POLYCHROMASIA BLD QL SMEAR: ABNORMAL
POTASSIUM SERPL-SCNC: 4 MMOL/L (ref 3.5–5.2)
PROCALCITONIN SERPL-MCNC: 0.04 NG/ML (ref 0–0.25)
PROT SERPL-MCNC: 6.8 G/DL (ref 6–8.5)
PROT UR QL STRIP: NEGATIVE
RBC # BLD AUTO: 3.48 10*6/MM3 (ref 3.77–5.28)
SARS-COV-2 RNA PNL SPEC NAA+PROBE: NOT DETECTED
SMALL PLATELETS BLD QL SMEAR: ABNORMAL
SODIUM SERPL-SCNC: 139 MMOL/L (ref 136–145)
SP GR UR STRIP: 1.01 (ref 1–1.03)
TROPONIN T SERPL-MCNC: <0.01 NG/ML (ref 0–0.03)
UROBILINOGEN UR QL STRIP: ABNORMAL
VARIANT LYMPHS NFR BLD MANUAL: 19 % (ref 19.6–45.3)
WBC MORPH BLD: NORMAL
WBC NRBC COR # BLD: 14.13 10*3/MM3 (ref 3.4–10.8)
WHOLE BLOOD HOLD SPECIMEN: NORMAL
WHOLE BLOOD HOLD SPECIMEN: NORMAL

## 2021-12-15 PROCEDURE — 87641 MR-STAPH DNA AMP PROBE: CPT

## 2021-12-15 PROCEDURE — 83605 ASSAY OF LACTIC ACID: CPT | Performed by: PHYSICIAN ASSISTANT

## 2021-12-15 PROCEDURE — 87147 CULTURE TYPE IMMUNOLOGIC: CPT | Performed by: PHYSICIAN ASSISTANT

## 2021-12-15 PROCEDURE — 84484 ASSAY OF TROPONIN QUANT: CPT | Performed by: EMERGENCY MEDICINE

## 2021-12-15 PROCEDURE — 93005 ELECTROCARDIOGRAM TRACING: CPT

## 2021-12-15 PROCEDURE — 87040 BLOOD CULTURE FOR BACTERIA: CPT | Performed by: PHYSICIAN ASSISTANT

## 2021-12-15 PROCEDURE — 87150 DNA/RNA AMPLIFIED PROBE: CPT | Performed by: PHYSICIAN ASSISTANT

## 2021-12-15 PROCEDURE — 85025 COMPLETE CBC W/AUTO DIFF WBC: CPT | Performed by: EMERGENCY MEDICINE

## 2021-12-15 PROCEDURE — 80053 COMPREHEN METABOLIC PANEL: CPT | Performed by: EMERGENCY MEDICINE

## 2021-12-15 PROCEDURE — 71045 X-RAY EXAM CHEST 1 VIEW: CPT

## 2021-12-15 PROCEDURE — 74177 CT ABD & PELVIS W/CONTRAST: CPT

## 2021-12-15 PROCEDURE — 82962 GLUCOSE BLOOD TEST: CPT

## 2021-12-15 PROCEDURE — 25010000002 ENOXAPARIN PER 10 MG: Performed by: OBSTETRICS & GYNECOLOGY

## 2021-12-15 PROCEDURE — 84145 PROCALCITONIN (PCT): CPT | Performed by: PHYSICIAN ASSISTANT

## 2021-12-15 PROCEDURE — 99222 1ST HOSP IP/OBS MODERATE 55: CPT | Performed by: OBSTETRICS & GYNECOLOGY

## 2021-12-15 PROCEDURE — 81003 URINALYSIS AUTO W/O SCOPE: CPT | Performed by: PHYSICIAN ASSISTANT

## 2021-12-15 PROCEDURE — 71275 CT ANGIOGRAPHY CHEST: CPT

## 2021-12-15 PROCEDURE — 25010000002 FUROSEMIDE PER 20 MG: Performed by: OBSTETRICS & GYNECOLOGY

## 2021-12-15 PROCEDURE — 25010000002 PIPERACILLIN SOD-TAZOBACTAM PER 1 G: Performed by: PHYSICIAN ASSISTANT

## 2021-12-15 PROCEDURE — 83880 ASSAY OF NATRIURETIC PEPTIDE: CPT | Performed by: EMERGENCY MEDICINE

## 2021-12-15 PROCEDURE — 87636 SARSCOV2 & INF A&B AMP PRB: CPT | Performed by: EMERGENCY MEDICINE

## 2021-12-15 PROCEDURE — 0 IOPAMIDOL PER 1 ML: Performed by: EMERGENCY MEDICINE

## 2021-12-15 PROCEDURE — 85007 BL SMEAR W/DIFF WBC COUNT: CPT | Performed by: EMERGENCY MEDICINE

## 2021-12-15 PROCEDURE — 87076 CULTURE ANAEROBE IDENT EACH: CPT | Performed by: PHYSICIAN ASSISTANT

## 2021-12-15 PROCEDURE — 99285 EMERGENCY DEPT VISIT HI MDM: CPT

## 2021-12-15 PROCEDURE — 83735 ASSAY OF MAGNESIUM: CPT | Performed by: PHYSICIAN ASSISTANT

## 2021-12-15 PROCEDURE — 25010000002 VANCOMYCIN 10 G RECONSTITUTED SOLUTION: Performed by: PHYSICIAN ASSISTANT

## 2021-12-15 PROCEDURE — 93005 ELECTROCARDIOGRAM TRACING: CPT | Performed by: EMERGENCY MEDICINE

## 2021-12-15 RX ORDER — DEXTROSE MONOHYDRATE 25 G/50ML
25 INJECTION, SOLUTION INTRAVENOUS
Status: DISCONTINUED | OUTPATIENT
Start: 2021-12-15 | End: 2021-12-20 | Stop reason: HOSPADM

## 2021-12-15 RX ORDER — LABETALOL 200 MG/1
200 TABLET, FILM COATED ORAL EVERY 8 HOURS SCHEDULED
Status: DISCONTINUED | OUTPATIENT
Start: 2021-12-15 | End: 2021-12-20 | Stop reason: HOSPADM

## 2021-12-15 RX ORDER — NICOTINE POLACRILEX 4 MG
15 LOZENGE BUCCAL
Status: DISCONTINUED | OUTPATIENT
Start: 2021-12-15 | End: 2021-12-20 | Stop reason: HOSPADM

## 2021-12-15 RX ORDER — MAGNESIUM SULFATE HEPTAHYDRATE 40 MG/ML
1 INJECTION, SOLUTION INTRAVENOUS CONTINUOUS
Status: DISCONTINUED | OUTPATIENT
Start: 2021-12-15 | End: 2021-12-17

## 2021-12-15 RX ORDER — SODIUM CHLORIDE 0.9 % (FLUSH) 0.9 %
10 SYRINGE (ML) INJECTION AS NEEDED
Status: DISCONTINUED | OUTPATIENT
Start: 2021-12-15 | End: 2021-12-20 | Stop reason: HOSPADM

## 2021-12-15 RX ORDER — ACETAMINOPHEN 325 MG/1
650 TABLET ORAL EVERY 4 HOURS PRN
Status: DISCONTINUED | OUTPATIENT
Start: 2021-12-15 | End: 2021-12-20 | Stop reason: HOSPADM

## 2021-12-15 RX ORDER — HYDROXYZINE HYDROCHLORIDE 25 MG/1
50 TABLET, FILM COATED ORAL NIGHTLY PRN
Status: DISCONTINUED | OUTPATIENT
Start: 2021-12-15 | End: 2021-12-20 | Stop reason: HOSPADM

## 2021-12-15 RX ORDER — LIDOCAINE HYDROCHLORIDE 10 MG/ML
5 INJECTION, SOLUTION EPIDURAL; INFILTRATION; INTRACAUDAL; PERINEURAL AS NEEDED
Status: DISCONTINUED | OUTPATIENT
Start: 2021-12-15 | End: 2021-12-17

## 2021-12-15 RX ORDER — BUPROPION HYDROCHLORIDE 150 MG/1
150 TABLET, EXTENDED RELEASE ORAL DAILY
Status: DISCONTINUED | OUTPATIENT
Start: 2021-12-15 | End: 2021-12-20 | Stop reason: HOSPADM

## 2021-12-15 RX ORDER — FUROSEMIDE 40 MG/1
40 TABLET ORAL
Status: DISCONTINUED | OUTPATIENT
Start: 2021-12-15 | End: 2021-12-20 | Stop reason: HOSPADM

## 2021-12-15 RX ORDER — LABETALOL HYDROCHLORIDE 5 MG/ML
20-80 INJECTION, SOLUTION INTRAVENOUS
Status: ACTIVE | OUTPATIENT
Start: 2021-12-15 | End: 2021-12-16

## 2021-12-15 RX ORDER — NITROGLYCERIN 20 MG/100ML
5-200 INJECTION INTRAVENOUS
Status: DISCONTINUED | OUTPATIENT
Start: 2021-12-15 | End: 2021-12-17

## 2021-12-15 RX ORDER — SODIUM CHLORIDE 450 MG/100ML
50 INJECTION, SOLUTION INTRAVENOUS CONTINUOUS
Status: DISCONTINUED | OUTPATIENT
Start: 2021-12-15 | End: 2021-12-20 | Stop reason: HOSPADM

## 2021-12-15 RX ORDER — IBUPROFEN 600 MG/1
600 TABLET ORAL EVERY 6 HOURS PRN
Status: DISCONTINUED | OUTPATIENT
Start: 2021-12-15 | End: 2021-12-17

## 2021-12-15 RX ORDER — SODIUM CHLORIDE 0.9 % (FLUSH) 0.9 %
10 SYRINGE (ML) INJECTION EVERY 12 HOURS SCHEDULED
Status: DISCONTINUED | OUTPATIENT
Start: 2021-12-15 | End: 2021-12-20 | Stop reason: HOSPADM

## 2021-12-15 RX ORDER — FUROSEMIDE 10 MG/ML
20 INJECTION INTRAMUSCULAR; INTRAVENOUS ONCE
Status: COMPLETED | OUTPATIENT
Start: 2021-12-15 | End: 2021-12-15

## 2021-12-15 RX ORDER — NIFEDIPINE 30 MG/1
30 TABLET, EXTENDED RELEASE ORAL EVERY 12 HOURS SCHEDULED
Status: DISCONTINUED | OUTPATIENT
Start: 2021-12-15 | End: 2021-12-20 | Stop reason: HOSPADM

## 2021-12-15 RX ORDER — SODIUM CHLORIDE 0.9 % (FLUSH) 0.9 %
1-10 SYRINGE (ML) INJECTION AS NEEDED
Status: DISCONTINUED | OUTPATIENT
Start: 2021-12-15 | End: 2021-12-17

## 2021-12-15 RX ADMIN — BUPROPION HYDROCHLORIDE 150 MG: 150 TABLET, EXTENDED RELEASE ORAL at 21:48

## 2021-12-15 RX ADMIN — VANCOMYCIN HYDROCHLORIDE 3000 MG: 10 INJECTION, POWDER, LYOPHILIZED, FOR SOLUTION INTRAVENOUS at 12:53

## 2021-12-15 RX ADMIN — TAZOBACTAM SODIUM AND PIPERACILLIN SODIUM 4.5 G: 500; 4 INJECTION, SOLUTION INTRAVENOUS at 11:49

## 2021-12-15 RX ADMIN — SODIUM CHLORIDE 50 ML/HR: 4.5 INJECTION, SOLUTION INTRAVENOUS at 20:01

## 2021-12-15 RX ADMIN — FUROSEMIDE 20 MG: 10 INJECTION, SOLUTION INTRAMUSCULAR; INTRAVENOUS at 20:20

## 2021-12-15 RX ADMIN — FUROSEMIDE 40 MG: 40 TABLET ORAL at 21:48

## 2021-12-15 RX ADMIN — ENOXAPARIN SODIUM 40 MG: 40 INJECTION SUBCUTANEOUS at 20:20

## 2021-12-15 RX ADMIN — NIFEDIPINE 30 MG: 30 TABLET, FILM COATED, EXTENDED RELEASE ORAL at 20:09

## 2021-12-15 RX ADMIN — IOPAMIDOL 90 ML: 755 INJECTION, SOLUTION INTRAVENOUS at 14:41

## 2021-12-15 RX ADMIN — LABETALOL HYDROCHLORIDE 200 MG: 200 TABLET, FILM COATED ORAL at 21:48

## 2021-12-15 RX ADMIN — NITROGLYCERIN 5 MCG/MIN: 20 INJECTION INTRAVENOUS at 11:36

## 2021-12-15 NOTE — CASE MANAGEMENT/SOCIAL WORK
Discharge Planning Assessment  [facility]     Patient Name: Edd Leung  MRN: [id number]  Today's Date: 12/15/2021    Admit Date: 12/15/2021     Discharge Needs Assessment     Row Name 12/15/21 7014       Living Environment    Lives With parent(s); child(bob), dependent    Current Living Arrangements home/apartment/condo    Primary Care Provided by self    Provides Primary Care For no one    Family Caregiver if Needed parent(s)    Quality of Family Relationships unable to assess    Able to Return to Prior Arrangements yes       Resource/Environmental Concerns    Resource/Environmental Concerns none    Transportation Concerns car, none       Transition Planning    Patient/Family Anticipates Transition to home    Patient/Family Anticipated Services at Transition     Transportation Anticipated family or friend will provide       Discharge Needs Assessment    Readmission Within the Last 30 Days no previous admission in last 30 days    Equipment Currently Used at Home none    Concerns to be Addressed denies needs/concerns at this time    Anticipated Changes Related to Illness none    Equipment Needed After Discharge none               Discharge Plan     Row Name 12/15/21 6752       Plan    Plan Initial    Plan Comments CM spoke with patient at bedside. Patient resides in [facility] with her father, son and infant. Patient is independent with ADL’s, denies any DME. Patient denies any current home health or outpatient services. Patient has medical insurance, prescription coverage and is able to afford/obtain medications without difficulty. Patient denies any discharge planning needs. Goal is home. CM will continue to follow.              Continued Care and Services - Admitted Since 12/15/2021    Coordination has not been started for this encounter.          Demographic Summary     Row Name 12/15/21 5727       General Information    Arrived From home    Referral Source emergency department     Reason for Consult discharge planning    Preferred Language English     Used During This Interaction no       Contact Information    Contact Information Comments RAJAN ROWELL Relative   [phone]               Functional Status     Row Name 12/15/21 1656       Functional Status    Usual Activity Tolerance moderate    Current Activity Tolerance moderate       Functional Status, IADL    Medications independent    Meal Preparation independent    Housekeeping independent    Laundry independent    Shopping independent       Mental Status    General Appearance WDL WDL       Mental Status Summary    Recent Changes in Mental Status/Cognitive Functioning no changes       Employment/    Employment Status unemployed               Psychosocial    No documentation.                Abuse/Neglect    No documentation.                Legal    No documentation.                Substance Abuse    No documentation.                Patient Forms    No documentation.                   Betty Covarrubias RN

## 2021-12-15 NOTE — ED PROVIDER NOTES
Subjective   Pt is a 37 yo female presenting to ED with complaints SOB and leg swelling. PMHx significant for DM (insulin), HTN, Anxiety, Depression and Obesity. Pt post partum from C section 12-6-21 with Dr. Rita Busby. She was 37w4 day at time of Csection. Pt had complicated course with possible Endometritis / SIRS / Sepsis and treated with Ampicillin / Gentamycin / Clindamycin for 48 hours per records. She was not discharged on antibiotics. She developed a dry cough with SOB yesterday. SOB worse laying flat and with exertion. She reports gradually worsening bilateral lower extremity edema and lower abdominal swelling over the last few days. On arrival to ED /100. Pt has been on Labetalol 200mg BID and Nifedipine 30mg BID since discharge as well as Lasix 40mg daily. She is also taking Lovenox injections. Pt denies headache, confusion, weakness, numbness, dizziness, syncope, fever, chest pain, N/V/D or urinary sx. She is still having some vaginal bleeding but improving. Pt does not wear O2 at home. She denies prior hx of CHF / PE. She denies tobacco, drug or ETOH use. She is breastfeeding.       History provided by:  Medical records and patient      Review of Systems   Constitutional: Negative for chills and fever.   HENT: Negative for congestion, sore throat and trouble swallowing.    Eyes: Negative for visual disturbance.   Respiratory: Positive for cough. Negative for shortness of breath.    Cardiovascular: Positive for leg swelling. Negative for chest pain.   Gastrointestinal: Positive for abdominal pain. Negative for diarrhea, nausea and vomiting.   Genitourinary: Positive for vaginal bleeding. Negative for difficulty urinating, dysuria, flank pain and hematuria.   Musculoskeletal: Negative for arthralgias and back pain.   Skin: Negative for rash and wound.   Neurological: Negative for dizziness, syncope, speech difficulty, weakness, numbness and headaches.   Psychiatric/Behavioral: Negative for  "confusion.       Past Medical History:   Diagnosis Date   • Anxiety    • Anxiety and depression    • Diabetes mellitus (HCC)    • Gonorrhea    • History of abnormal cervical Pap smear    • Hypertension        Allergies   Allergen Reactions   • Latex Rash     \"makes my skin raw\"        Past Surgical History:   Procedure Laterality Date   •  SECTION  2006   • DILATATION AND CURETTAGE         Family History   Problem Relation Age of Onset   • Hypertension Mother    • Hypertension Maternal Grandmother    • Diabetes Maternal Grandmother    • Diabetes Paternal Grandmother        Social History     Socioeconomic History   • Marital status:    • Number of children: 1   • Years of education: 14   • Highest education level: Associate degree: occupational, technical, or vocational program   Tobacco Use   • Smoking status: Former Smoker     Packs/day: 0.25     Years: 17.00     Pack years: 4.25     Types: Cigarettes     Quit date: 2019     Years since quittin.6   • Smokeless tobacco: Never Used   Vaping Use   • Vaping Use: Never used   Substance and Sexual Activity   • Alcohol use: Not Currently     Comment: SOCIALLY    • Drug use: Not Currently     Types: Marijuana     Comment: SOCIALLY    • Sexual activity: Yes     Partners: Male     Birth control/protection: None           Objective   Physical Exam  Vitals and nursing note reviewed.   Constitutional:       General: She is not in acute distress.     Appearance: She is well-developed. She is obese.   HENT:      Head: Atraumatic.      Nose: Nose normal.   Eyes:      General: Lids are normal.      Conjunctiva/sclera: Conjunctivae normal.      Pupils: Pupils are equal, round, and reactive to light.   Cardiovascular:      Rate and Rhythm: Normal rate and regular rhythm.      Heart sounds: Heart sounds are distant.   Pulmonary:      Effort: Pulmonary effort is normal.      Breath sounds: Normal breath sounds. No wheezing.   Abdominal:      General: " There is distension (edema).      Palpations: Abdomen is soft.      Tenderness: There is abdominal tenderness (mild diffuse). There is no guarding or rebound.   Musculoskeletal:         General: No tenderness. Normal range of motion.      Cervical back: Normal range of motion and neck supple.      Right lower le+ Edema present.      Left lower le+ Edema present.   Skin:     General: Skin is warm and dry.      Findings: No erythema or rash.   Neurological:      Mental Status: She is alert and oriented to person, place, and time.      Sensory: No sensory deficit.   Psychiatric:         Speech: Speech normal.         Behavior: Behavior normal.         Procedures           ED Course  ED Course as of 12/15/21 1816   Wed Dec 15, 2021   1038 SpO2(!): 85 % [RT]   1038 193/100 [RT]   1128 Discussed patient with Dr. Jones. Will initiate nitro drip due to hypertension, hypoxia and edema. Will initiate broad spectrum antibiotics due to hypoxia, recent sepsis / SIRS / endometritis. Will obtain CTA chest to r/o PE / pneumonia / CHF and CT abd / pelvis with recent hx. Discussed plan with patient and she is agreeable.  [RT]   1233 BP: 179/96 [RT]   1546 Discussed patient with hospitalist Dr. Eller. Requests consult OBGYN.  [RT]   1550 Discussed patient with OBGYN Dr. Busby. She will contact laborist and requests admission to L and D instead of hospitalist.  [RT]   1703 No bed request placed so contacted laborist Dr. Cesar Leung and discussed. He is aware of patient. He requested bed request for L and D and send patient up to floor.  [RT]   1748 Discussed patient with Laborist Dr. Leung. Reviewed ED workup. He instructed to discontinue Nitro drip and send patient up to antepartum.  [RT]      ED Course User Index  [RT] Daria Carreon PA      Reviewed old records.     Re-examined patient several times in ED. Pt resting and feeling better after meds / O2. Discussed results and plan for admission.     Recent Results (from  the past 24 hour(s))   Comprehensive Metabolic Panel    Collection Time: 12/15/21 11:00 AM    Specimen: Blood   Result Value Ref Range    Glucose 153 (H) 65 - 99 mg/dL    BUN 7 6 - 20 mg/dL    Creatinine 0.73 0.57 - 1.00 mg/dL    Sodium 139 136 - 145 mmol/L    Potassium 4.0 3.5 - 5.2 mmol/L    Chloride 103 98 - 107 mmol/L    CO2 24.0 22.0 - 29.0 mmol/L    Calcium 8.8 8.6 - 10.5 mg/dL    Total Protein 6.8 6.0 - 8.5 g/dL    Albumin 3.50 3.50 - 5.20 g/dL    ALT (SGPT) 34 (H) 1 - 33 U/L    AST (SGOT) 30 1 - 32 U/L    Alkaline Phosphatase 160 (H) 39 - 117 U/L    Total Bilirubin 0.3 0.0 - 1.2 mg/dL    eGFR  African Amer 111 >60 mL/min/1.73    Globulin 3.3 gm/dL    A/G Ratio 1.1 g/dL    BUN/Creatinine Ratio 9.6 7.0 - 25.0    Anion Gap 12.0 5.0 - 15.0 mmol/L   BNP    Collection Time: 12/15/21 11:00 AM    Specimen: Blood   Result Value Ref Range    proBNP 93.1 0.0 - 450.0 pg/mL   Troponin    Collection Time: 12/15/21 11:00 AM    Specimen: Blood   Result Value Ref Range    Troponin T <0.010 0.000 - 0.030 ng/mL   Green Top (Gel)    Collection Time: 12/15/21 11:00 AM   Result Value Ref Range    Extra Tube Hold for add-ons.    Lavender Top    Collection Time: 12/15/21 11:00 AM   Result Value Ref Range    Extra Tube hold for add-on    Gold Top - SST    Collection Time: 12/15/21 11:00 AM   Result Value Ref Range    Extra Tube Hold for add-ons.    Gray Top    Collection Time: 12/15/21 11:00 AM   Result Value Ref Range    Extra Tube Hold for add-ons.    Light Blue Top    Collection Time: 12/15/21 11:00 AM   Result Value Ref Range    Extra Tube hold for add-on    CBC Auto Differential    Collection Time: 12/15/21 11:00 AM    Specimen: Blood   Result Value Ref Range    WBC 14.13 (H) 3.40 - 10.80 10*3/mm3    RBC 3.48 (L) 3.77 - 5.28 10*6/mm3    Hemoglobin 8.4 (L) 12.0 - 15.9 g/dL    Hematocrit 28.3 (L) 34.0 - 46.6 %    MCV 81.3 79.0 - 97.0 fL    MCH 24.1 (L) 26.6 - 33.0 pg    MCHC 29.7 (L) 31.5 - 35.7 g/dL    RDW 17.4 (H) 12.3 - 15.4 %     RDW-SD 51.0 37.0 - 54.0 fl    MPV 9.6 6.0 - 12.0 fL    Platelets 478 (H) 140 - 450 10*3/mm3   COVID-19 and FLU A/B PCR - Swab, Nasopharynx    Collection Time: 12/15/21 11:00 AM    Specimen: Nasopharynx; Swab   Result Value Ref Range    COVID19 Not Detected Not Detected - Ref. Range    Influenza A PCR Not Detected Not Detected    Influenza B PCR Not Detected Not Detected   Magnesium    Collection Time: 12/15/21 11:00 AM    Specimen: Blood   Result Value Ref Range    Magnesium 2.0 1.6 - 2.6 mg/dL   Lactic Acid, Plasma    Collection Time: 12/15/21 11:00 AM    Specimen: Blood   Result Value Ref Range    Lactate 0.9 0.5 - 2.0 mmol/L   Procalcitonin    Collection Time: 12/15/21 11:00 AM    Specimen: Blood   Result Value Ref Range    Procalcitonin 0.04 0.00 - 0.25 ng/mL   Manual Differential    Collection Time: 12/15/21 11:00 AM    Specimen: Blood   Result Value Ref Range    Neutrophil % 66.0 42.7 - 76.0 %    Lymphocyte % 19.0 (L) 19.6 - 45.3 %    Monocyte % 5.0 5.0 - 12.0 %    Eosinophil % 1.0 0.3 - 6.2 %    Basophil % 0.0 0.0 - 1.5 %    Bands %  2.0 0.0 - 5.0 %    Metamyelocyte % 3.0 (H) 0.0 - 0.0 %    Myelocyte % 4.0 (H) 0.0 - 0.0 %    Neutrophils Absolute 9.61 (H) 1.70 - 7.00 10*3/mm3    Lymphocytes Absolute 2.68 0.70 - 3.10 10*3/mm3    Monocytes Absolute 0.71 0.10 - 0.90 10*3/mm3    Eosinophils Absolute 0.14 0.00 - 0.40 10*3/mm3    Basophils Absolute 0.00 0.00 - 0.20 10*3/mm3    Anisocytosis Slight/1+ None Seen    Hypochromia Slight/1+ None Seen    Polychromasia Slight/1+ None Seen    WBC Morphology Normal Normal    Platelet Estimate Increased Normal    Large Platelets Slight/1+ None Seen   Urinalysis With Culture If Indicated - Urine, Clean Catch    Collection Time: 12/15/21  2:27 PM    Specimen: Urine, Clean Catch   Result Value Ref Range    Color, UA Yellow Yellow, Straw    Appearance, UA Clear Clear    pH, UA 8.5 (H) 5.0 - 8.0    Specific Gravity, UA 1.010 1.001 - 1.030    Glucose, UA Negative Negative     Ketones, UA Negative Negative    Bilirubin, UA Negative Negative    Blood, UA Negative Negative    Protein, UA Negative Negative    Leuk Esterase, UA Negative Negative    Nitrite, UA Negative Negative    Urobilinogen, UA 1.0 E.U./dL 0.2 - 1.0 E.U./dL     Note: In addition to lab results from this visit, the labs listed above may include labs taken at another facility or during a different encounter within the last 24 hours. Please correlate lab times with ED admission and discharge times for further clarification of the services performed during this visit.    CT Angiogram Chest   Final Result   Patchy groundglass opacity identified diffusely throughout   the mid and lower lung fields bilaterally with consolidative infiltrate   identified at the right lung base concerning for infiltrate. There is a   tiny right pleural effusion. The cardiac chambers are enlarged.   Postpartum uterus identified with no CT evidence of acute   intra-abdominal or pelvic abnormality. The uterus itself is not well   evaluated due to patient's large body habitus. There is extensive edema   identified throughout the subcutaneous tissues within the pannus.       D:  12/15/2021   E:  12/15/2021       This report was finalized on 12/15/2021 5:10 PM by Dr. Chelsie Mejia MD.          CT Abdomen Pelvis With Contrast   Final Result   Patchy groundglass opacity identified diffusely throughout   the mid and lower lung fields bilaterally with consolidative infiltrate   identified at the right lung base concerning for infiltrate. There is a   tiny right pleural effusion. The cardiac chambers are enlarged.   Postpartum uterus identified with no CT evidence of acute   intra-abdominal or pelvic abnormality. The uterus itself is not well   evaluated due to patient's large body habitus. There is extensive edema   identified throughout the subcutaneous tissues within the pannus.       D:  12/15/2021   E:  12/15/2021       This report was finalized on  12/15/2021 5:10 PM by Dr. Chelsie Mejia MD.          XR Chest 1 View   Final Result   Increased markings identified of the mid and lower lung   fields bilaterally concerning for infiltrate.       D:  12/15/2021   E:  12/15/2021       This report was finalized on 12/15/2021 4:54 PM by Dr. Chelsie Mejia MD.            Vitals:    12/15/21 1630 12/15/21 1640 12/15/21 1645 12/15/21 1702   BP:  165/93     BP Location:       Patient Position:       Pulse: 86 79 80 81   Resp:       Temp:       TempSrc:       SpO2: 100% 100% 100% 100%   Weight:       Height:         Medications   sodium chloride 0.9 % flush 10 mL (has no administration in time range)   nitroglycerin (TRIDIL) 200 mcg/ml infusion (0 mcg/min Intravenous Stopped 12/15/21 175)   vancomycin 3000 mg/500 mL 0.9% NS IVPB ([facility]) (0 mg Intravenous Stopped 12/15/21 175)   piperacillin-tazobactam (ZOSYN) 4.5 g in iso-osmotic dextrose 100 mL IVPB (premix) (0 g Intravenous Stopped 12/15/21 1232)   iopamidol (ISOVUE-370) 76 % injection 100 mL (90 mL Intravenous Given 12/15/21 1441)     ECG/EMG Results (last 24 hours)     Procedure Component Value Units Date/Time    ECG 12 Lead [[id number]] Collected: 12/15/21 1048     Updated: 12/15/21 1127        ECG 12 Lead                                                        MDM    Final diagnoses:   Acute respiratory failure with hypoxia (HCC)   Pneumonia of both lower lobes due to infectious organism   Cardiomegaly   Bilateral edema of lower extremity   Status post  section   Hypertension, unspecified type   Type 2 diabetes mellitus with other specified complication, with long-term current use of insulin (HCC)   Anemia, unspecified type       ED Disposition  ED Disposition     ED Disposition Condition Comment    Send to L&D  Admitting Physician: ROLAND ALEXANDER III [[id number]   Unit: [facility] [[id number]]            No follow-up provider specified.       Medication List      No changes were made to  your prescriptions during this visit.          Daria Carreon PA  12/15/21 4103

## 2021-12-16 ENCOUNTER — APPOINTMENT (OUTPATIENT)
Dept: CARDIOLOGY | Facility: HOSPITAL | Age: 36
End: 2021-12-16

## 2021-12-16 LAB
ALBUMIN SERPL-MCNC: 3.3 G/DL (ref 3.5–5.2)
ALBUMIN/GLOB SERPL: 1.1 G/DL
ALP SERPL-CCNC: 144 U/L (ref 39–117)
ALT SERPL W P-5'-P-CCNC: 29 U/L (ref 1–33)
ANION GAP SERPL CALCULATED.3IONS-SCNC: 11 MMOL/L (ref 5–15)
AST SERPL-CCNC: 20 U/L (ref 1–32)
BACTERIA BLD CULT: ABNORMAL
BH CV ECHO MEAS - AO MAX PG (FULL): 9.2 MMHG
BH CV ECHO MEAS - AO MAX PG: 15.7 MMHG
BH CV ECHO MEAS - AO MEAN PG (FULL): 3.7 MMHG
BH CV ECHO MEAS - AO MEAN PG: 7.9 MMHG
BH CV ECHO MEAS - AO ROOT AREA (BSA CORRECTED): 0.96
BH CV ECHO MEAS - AO ROOT AREA: 6 CM^2
BH CV ECHO MEAS - AO ROOT DIAM: 2.8 CM
BH CV ECHO MEAS - AO V2 MAX: 198.2 CM/SEC
BH CV ECHO MEAS - AO V2 MEAN: 129 CM/SEC
BH CV ECHO MEAS - AO V2 VTI: 39.5 CM
BH CV ECHO MEAS - ASC AORTA: 2.6 CM
BH CV ECHO MEAS - AVA(I,A): 2 CM^2
BH CV ECHO MEAS - AVA(I,D): 2 CM^2
BH CV ECHO MEAS - AVA(V,A): 2.1 CM^2
BH CV ECHO MEAS - AVA(V,D): 2.1 CM^2
BH CV ECHO MEAS - BSA(HAYCOCK): 3.3 M^2
BH CV ECHO MEAS - BSA: 2.9 M^2
BH CV ECHO MEAS - BZI_BMI: 65.6 KILOGRAMS/M^2
BH CV ECHO MEAS - BZI_METRIC_HEIGHT: 175.3 CM
BH CV ECHO MEAS - BZI_METRIC_WEIGHT: 201.4 KG
BH CV ECHO MEAS - EDV(CUBED): 150.7 ML
BH CV ECHO MEAS - EDV(MOD-SP2): 135 ML
BH CV ECHO MEAS - EDV(MOD-SP4): 204 ML
BH CV ECHO MEAS - EDV(TEICH): 136.6 ML
BH CV ECHO MEAS - EF(CUBED): 74 %
BH CV ECHO MEAS - EF(MOD-BP): 56 %
BH CV ECHO MEAS - EF(MOD-SP2): 54.1 %
BH CV ECHO MEAS - EF(MOD-SP4): 58.3 %
BH CV ECHO MEAS - EF(TEICH): 65.3 %
BH CV ECHO MEAS - ESV(CUBED): 39.3 ML
BH CV ECHO MEAS - ESV(MOD-SP2): 62 ML
BH CV ECHO MEAS - ESV(MOD-SP4): 85 ML
BH CV ECHO MEAS - ESV(TEICH): 47.4 ML
BH CV ECHO MEAS - FS: 36.1 %
BH CV ECHO MEAS - IVS/LVPW: 1.2
BH CV ECHO MEAS - IVSD: 0.99 CM
BH CV ECHO MEAS - LA DIMENSION: 3.9 CM
BH CV ECHO MEAS - LA/AO: 1.4
BH CV ECHO MEAS - LAD MAJOR: 6.1 CM
BH CV ECHO MEAS - LAT PEAK E' VEL: 10.3 CM/SEC
BH CV ECHO MEAS - LATERAL E/E' RATIO: 11.3
BH CV ECHO MEAS - LV DIASTOLIC VOL/BSA (35-75): 70.4 ML/M^2
BH CV ECHO MEAS - LV IVRT: 0.05 SEC
BH CV ECHO MEAS - LV MASS(C)D: 177.8 GRAMS
BH CV ECHO MEAS - LV MASS(C)DI: 61.3 GRAMS/M^2
BH CV ECHO MEAS - LV MAX PG: 6.5 MMHG
BH CV ECHO MEAS - LV MEAN PG: 4.2 MMHG
BH CV ECHO MEAS - LV SYSTOLIC VOL/BSA (12-30): 29.3 ML/M^2
BH CV ECHO MEAS - LV V1 MAX: 127.8 CM/SEC
BH CV ECHO MEAS - LV V1 MEAN: 97.3 CM/SEC
BH CV ECHO MEAS - LV V1 VTI: 25.3 CM
BH CV ECHO MEAS - LVIDD: 5.3 CM
BH CV ECHO MEAS - LVIDS: 3.4 CM
BH CV ECHO MEAS - LVLD AP2: 8.9 CM
BH CV ECHO MEAS - LVLD AP4: 9.8 CM
BH CV ECHO MEAS - LVLS AP2: 7.2 CM
BH CV ECHO MEAS - LVLS AP4: 8 CM
BH CV ECHO MEAS - LVOT AREA (M): 3.1 CM^2
BH CV ECHO MEAS - LVOT AREA: 3.2 CM^2
BH CV ECHO MEAS - LVOT DIAM: 2 CM
BH CV ECHO MEAS - LVPWD: 0.83 CM
BH CV ECHO MEAS - MED PEAK E' VEL: 8.2 CM/SEC
BH CV ECHO MEAS - MEDIAL E/E' RATIO: 14.2
BH CV ECHO MEAS - MV A MAX VEL: 77.9 CM/SEC
BH CV ECHO MEAS - MV DEC TIME: 0.2 SEC
BH CV ECHO MEAS - MV E MAX VEL: 118.4 CM/SEC
BH CV ECHO MEAS - MV E/A: 1.5
BH CV ECHO MEAS - PA ACC SLOPE: 884.2 CM/SEC^2
BH CV ECHO MEAS - PA ACC TIME: 0.09 SEC
BH CV ECHO MEAS - PA PR(ACCEL): 37.8 MMHG
BH CV ECHO MEAS - RAP SYSTOLE: 3 MMHG
BH CV ECHO MEAS - RVSP: 26 MMHG
BH CV ECHO MEAS - SI(AO): 82.1 ML/M^2
BH CV ECHO MEAS - SI(CUBED): 38.5 ML/M^2
BH CV ECHO MEAS - SI(LVOT): 27.9 ML/M^2
BH CV ECHO MEAS - SI(MOD-SP2): 25.2 ML/M^2
BH CV ECHO MEAS - SI(MOD-SP4): 41 ML/M^2
BH CV ECHO MEAS - SI(TEICH): 30.8 ML/M^2
BH CV ECHO MEAS - SV(AO): 238 ML
BH CV ECHO MEAS - SV(CUBED): 111.5 ML
BH CV ECHO MEAS - SV(LVOT): 80.8 ML
BH CV ECHO MEAS - SV(MOD-SP2): 73 ML
BH CV ECHO MEAS - SV(MOD-SP4): 119 ML
BH CV ECHO MEAS - SV(TEICH): 89.3 ML
BH CV ECHO MEAS - TAPSE (>1.6): 2.7 CM
BH CV ECHO MEAS - TR MAX PG: 23 MMHG
BH CV ECHO MEAS - TR MAX VEL: 237.5 CM/SEC
BH CV ECHO MEASUREMENTS AVERAGE E/E' RATIO: 12.8
BH CV XLRA - RV BASE: 3.9 CM
BH CV XLRA - RV LENGTH: 8.1 CM
BH CV XLRA - RV MID: 3.2 CM
BH CV XLRA - TDI S': 17.8 CM/SEC
BILIRUB SERPL-MCNC: 0.2 MG/DL (ref 0–1.2)
BUN SERPL-MCNC: 7 MG/DL (ref 6–20)
BUN/CREAT SERPL: 7.2 (ref 7–25)
CALCIUM SPEC-SCNC: 8.1 MG/DL (ref 8.6–10.5)
CHLORIDE SERPL-SCNC: 102 MMOL/L (ref 98–107)
CO2 SERPL-SCNC: 25 MMOL/L (ref 22–29)
CREAT SERPL-MCNC: 0.97 MG/DL (ref 0.57–1)
DEPRECATED RDW RBC AUTO: 52.9 FL (ref 37–54)
ERYTHROCYTE [DISTWIDTH] IN BLOOD BY AUTOMATED COUNT: 17.8 % (ref 12.3–15.4)
GFR SERPL CREATININE-BSD FRML MDRD: 80 ML/MIN/1.73
GLOBULIN UR ELPH-MCNC: 2.9 GM/DL
GLUCOSE BLDC GLUCOMTR-MCNC: 108 MG/DL (ref 70–130)
GLUCOSE BLDC GLUCOMTR-MCNC: 117 MG/DL (ref 70–130)
GLUCOSE BLDC GLUCOMTR-MCNC: 152 MG/DL (ref 70–130)
GLUCOSE BLDC GLUCOMTR-MCNC: 185 MG/DL (ref 70–130)
GLUCOSE BLDC GLUCOMTR-MCNC: 70 MG/DL (ref 70–130)
GLUCOSE SERPL-MCNC: 177 MG/DL (ref 65–99)
HCT VFR BLD AUTO: 25.3 % (ref 34–46.6)
HGB BLD-MCNC: 7.5 G/DL (ref 12–15.9)
LDH SERPL-CCNC: 339 U/L (ref 135–214)
LEFT ATRIUM VOLUME INDEX: 23.5 ML/M^2
LEFT ATRIUM VOLUME: 68 ML
MAXIMAL PREDICTED HEART RATE: 186 BPM
MCH RBC QN AUTO: 24.4 PG (ref 26.6–33)
MCHC RBC AUTO-ENTMCNC: 29.6 G/DL (ref 31.5–35.7)
MCV RBC AUTO: 82.4 FL (ref 79–97)
MRSA DNA SPEC QL NAA+PROBE: NEGATIVE
PLATELET # BLD AUTO: 468 10*3/MM3 (ref 140–450)
PMV BLD AUTO: 9.9 FL (ref 6–12)
POTASSIUM SERPL-SCNC: 4.4 MMOL/L (ref 3.5–5.2)
PROT SERPL-MCNC: 6.2 G/DL (ref 6–8.5)
RBC # BLD AUTO: 3.07 10*6/MM3 (ref 3.77–5.28)
SODIUM SERPL-SCNC: 138 MMOL/L (ref 136–145)
STRESS TARGET HR: 158 BPM
URATE SERPL-MCNC: 6.2 MG/DL (ref 2.4–5.7)
WBC NRBC COR # BLD: 11.38 10*3/MM3 (ref 3.4–10.8)

## 2021-12-16 PROCEDURE — 80053 COMPREHEN METABOLIC PANEL: CPT | Performed by: OBSTETRICS & GYNECOLOGY

## 2021-12-16 PROCEDURE — 84550 ASSAY OF BLOOD/URIC ACID: CPT | Performed by: OBSTETRICS & GYNECOLOGY

## 2021-12-16 PROCEDURE — 25010000002 ENOXAPARIN PER 10 MG: Performed by: OBSTETRICS & GYNECOLOGY

## 2021-12-16 PROCEDURE — 0 MAGNESIUM SULFATE 20 GM/500ML SOLUTION: Performed by: OBSTETRICS & GYNECOLOGY

## 2021-12-16 PROCEDURE — 93306 TTE W/DOPPLER COMPLETE: CPT

## 2021-12-16 PROCEDURE — 63710000001 INSULIN LISPRO (HUMAN) PER 5 UNITS: Performed by: OBSTETRICS & GYNECOLOGY

## 2021-12-16 PROCEDURE — 82962 GLUCOSE BLOOD TEST: CPT

## 2021-12-16 PROCEDURE — 83615 LACTATE (LD) (LDH) ENZYME: CPT | Performed by: OBSTETRICS & GYNECOLOGY

## 2021-12-16 PROCEDURE — 25010000002 VANCOMYCIN 10 G RECONSTITUTED SOLUTION: Performed by: OBSTETRICS & GYNECOLOGY

## 2021-12-16 PROCEDURE — 25010000002 PIPERACILLIN SOD-TAZOBACTAM PER 1 G: Performed by: OBSTETRICS & GYNECOLOGY

## 2021-12-16 PROCEDURE — 99233 SBSQ HOSP IP/OBS HIGH 50: CPT | Performed by: OBSTETRICS & GYNECOLOGY

## 2021-12-16 PROCEDURE — 93306 TTE W/DOPPLER COMPLETE: CPT | Performed by: INTERNAL MEDICINE

## 2021-12-16 PROCEDURE — 85027 COMPLETE CBC AUTOMATED: CPT | Performed by: OBSTETRICS & GYNECOLOGY

## 2021-12-16 PROCEDURE — 63710000001 INSULIN DETEMIR PER 5 UNITS: Performed by: OBSTETRICS & GYNECOLOGY

## 2021-12-16 RX ADMIN — INSULIN DETEMIR 60 UNITS: 100 INJECTION, SOLUTION SUBCUTANEOUS at 08:15

## 2021-12-16 RX ADMIN — IBUPROFEN 600 MG: 600 TABLET ORAL at 22:12

## 2021-12-16 RX ADMIN — MAGNESIUM SULFATE IN WATER 1 G/HR: 40 INJECTION, SOLUTION INTRAVENOUS at 13:40

## 2021-12-16 RX ADMIN — ACETAMINOPHEN 650 MG: 325 TABLET, FILM COATED ORAL at 16:47

## 2021-12-16 RX ADMIN — SODIUM CHLORIDE 50 ML/HR: 4.5 INJECTION, SOLUTION INTRAVENOUS at 16:47

## 2021-12-16 RX ADMIN — LABETALOL HYDROCHLORIDE 200 MG: 200 TABLET, FILM COATED ORAL at 13:41

## 2021-12-16 RX ADMIN — NIFEDIPINE 30 MG: 30 TABLET, FILM COATED, EXTENDED RELEASE ORAL at 08:15

## 2021-12-16 RX ADMIN — LABETALOL HYDROCHLORIDE 200 MG: 200 TABLET, FILM COATED ORAL at 05:45

## 2021-12-16 RX ADMIN — NIFEDIPINE 30 MG: 30 TABLET, FILM COATED, EXTENDED RELEASE ORAL at 20:00

## 2021-12-16 RX ADMIN — BUPROPION HYDROCHLORIDE 150 MG: 150 TABLET, EXTENDED RELEASE ORAL at 08:15

## 2021-12-16 RX ADMIN — TAZOBACTAM SODIUM AND PIPERACILLIN SODIUM 4.5 G: 500; 4 INJECTION, SOLUTION INTRAVENOUS at 21:59

## 2021-12-16 RX ADMIN — INSULIN LISPRO 50 UNITS: 100 INJECTION, SOLUTION INTRAVENOUS; SUBCUTANEOUS at 11:26

## 2021-12-16 RX ADMIN — ACETAMINOPHEN 650 MG: 325 TABLET, FILM COATED ORAL at 00:37

## 2021-12-16 RX ADMIN — VANCOMYCIN HYDROCHLORIDE 1500 MG: 10 INJECTION, POWDER, LYOPHILIZED, FOR SOLUTION INTRAVENOUS at 07:04

## 2021-12-16 RX ADMIN — ENOXAPARIN SODIUM 40 MG: 40 INJECTION SUBCUTANEOUS at 20:00

## 2021-12-16 RX ADMIN — FUROSEMIDE 40 MG: 40 TABLET ORAL at 08:15

## 2021-12-16 RX ADMIN — VANCOMYCIN HYDROCHLORIDE 1500 MG: 10 INJECTION, POWDER, LYOPHILIZED, FOR SOLUTION INTRAVENOUS at 18:45

## 2021-12-16 RX ADMIN — IBUPROFEN 600 MG: 600 TABLET ORAL at 02:33

## 2021-12-16 RX ADMIN — INSULIN DETEMIR 60 UNITS: 100 INJECTION, SOLUTION SUBCUTANEOUS at 20:01

## 2021-12-16 RX ADMIN — LABETALOL HYDROCHLORIDE 200 MG: 200 TABLET, FILM COATED ORAL at 21:59

## 2021-12-16 RX ADMIN — ENOXAPARIN SODIUM 40 MG: 40 INJECTION SUBCUTANEOUS at 08:15

## 2021-12-16 RX ADMIN — IBUPROFEN 600 MG: 600 TABLET ORAL at 12:47

## 2021-12-16 RX ADMIN — FUROSEMIDE 40 MG: 40 TABLET ORAL at 17:52

## 2021-12-16 NOTE — PAYOR COMM NOTE
"Yamel Alexander (36 y.o. Female) [id number] initial clinicals   wb            Date of Birth Social Security Number Address Home Phone MRN    [id number]  [id number] [address] [phone] [id number]    Tenriism Marital Status             None        Admission Date Admission Type Admitting Provider Attending Provider Department, Room/Bed    12/15/21 Emergency Cesar Alexander III, MD Allen, George Sea III, MD [facility] ANTEPARTUM, N331/1    Discharge Date Discharge Disposition Discharge Destination                         Attending Provider: Cesar Alexander III, MD    Allergies: Latex, Banana, Cucumber Extract, Melon    Isolation: None   Infection: None   Code Status: CPR   Advance Care Planning Activity    Ht: 175.3 cm (69\")   Wt: 202 kg (444 lb 8 oz)    Admission Cmt: None   Principal Problem: None                Active Insurance as of 12/15/2021     Primary Coverage     Payor Plan Insurance Group Employer/Plan Group    [employer] [place] [employer]      Payor Plan Address Payor Plan Phone Number Payor Plan Fax Number Effective Dates    [address] [address] [phone]  11/23/2021 - None Entered    [facility] [address]       Subscriber Name Subscriber Birth Date Member ID       YAMEL ALEXANDER 1985 [id number]                 Emergency Contacts      (Rel.) Home Phone Work Phone Mobile Phone    LELIASTEFANIE DOWLINGRAJAN (Relative) -- -- [phone]    KishanJoanne Nguyen (Mother) -- -- [phone]            Insurance Information                [facility]/WELLCARE MEDICAID Phone: [phone]    Subscriber: Yamel Alexanderoinette Subscriber#: [id number]    Group#: -- Precert#: --          Vital Signs (last day)     Date/Time Temp Temp src Pulse Resp BP Patient Position SpO2    12/16/21 0921 -- -- 84 -- -- -- 100    12/16/21 0920 -- -- 77 16 134/77 Lying --    12/16/21 0820 97.8 (36.6) Oral 77 14 122/57 Lying 97    12/16/21 0748 -- -- 74 " 14 130/62 Lying 92    12/16/21 0636 -- -- 73 14 156/86 -- 98    12/16/21 0533 -- -- 81 -- 166/76 Lying --    12/16/21 0532 -- -- 86 14 153/68 Lying 97    12/16/21 0440 98 (36.7) Oral 83 14 140/66 -- 98    12/16/21 0404 -- -- 67 -- -- -- 97    12/16/21 0402 -- -- 69 -- -- -- 94    12/16/21 0358 -- -- 76 -- -- -- 87    12/16/21 0348 -- -- 77 -- -- -- 98    12/16/21 0344 -- -- 83 -- -- -- 92    12/16/21 0343 -- -- 78 -- -- -- 83    12/16/21 0339 -- -- 81 -- -- -- 93    12/16/21 0333 -- -- 86 14 139/64 -- 97    12/16/21 0234 -- -- 80 14 137/85 -- 99    12/16/21 0208 -- -- 84 -- -- -- 98    12/16/21 0142 98.4 (36.9) Axillary -- -- -- -- 100    12/16/21 0140 -- -- 85 -- 149/72 -- --    12/16/21 0139 -- -- 85 -- 138/106 -- --    12/16/21 0037 -- -- 81 -- -- -- 100    12/16/21 0035 98.5 (36.9) Oral 82 14 132/60 Lying --    12/15/21 2339 -- -- 85 14 126/73 -- 94    12/15/21 2234 -- -- 82 -- -- -- 100    12/15/21 2233 -- -- 95 16 133/61 -- 94    12/15/21 2138 -- -- 87 16 161/68 -- 99    12/15/21 2136 -- -- 85 -- 183/86 -- --    12/15/21 2037 -- -- 83 -- 161/71 -- 99    12/15/21 2035 -- -- 85 -- 180/77 -- 99    12/15/21 2032 -- -- 78 -- 180/107 -- 100    12/15/21 2027 -- -- 79 18 176/88 -- 100    12/15/21 2022 -- -- 75 -- 164/65 -- --    12/15/21 2020 -- -- 78 -- 168/75 -- --    12/15/21 2018 -- -- 80 -- 208/77 -- 100    12/15/21 2013 -- -- 77 -- 180/89 -- 100    12/15/21 2007 -- -- 81 18 184/85 -- 99    12/15/21 2001 98.4 (36.9) Oral 81 18 174/79 Sitting --    12/15/21 1950 -- -- 78 -- 188/88 -- --    12/15/21 1935 -- -- 75 -- 188/88 -- 100    12/15/21 1902 98.4 (36.9) Oral 74 18 187/96 Sitting 100    12/15/21 1900 -- -- 83 -- -- -- 94    12/15/21 1832 -- -- -- -- -- -- 100    12/15/21 1702 -- -- 81 -- -- -- 100    12/15/21 1645 -- -- 80 -- -- -- 100    12/15/21 1640 -- -- 79 -- 165/93 -- 100    12/15/21 1630 -- -- 86 -- -- -- 100    12/15/21 1615 -- -- 69 -- -- -- 100    12/15/21 1600 -- -- 73 -- 159/91 -- 100    12/15/21  1545 -- -- 74 -- -- -- 99    12/15/21 15:03:40 -- -- -- -- 164/93 -- --    12/15/21 1500 -- -- 74 -- -- -- 100    12/15/21 1423 -- -- 75 -- -- -- 97    12/15/21 1415 -- -- 83 -- 190/99 -- 96    12/15/21 1400 -- -- 73 -- 174/95 -- 95    12/15/21 1346 -- -- 75 -- -- -- --    12/15/21 1344 -- -- -- -- -- -- 97    12/15/21 1330 -- -- 72 -- 189/94 -- 98    12/15/21 1252 -- -- 71 -- -- -- 94    12/15/21 1230 -- -- 80 20 179/96 -- 98    12/15/21 1215 -- -- 73 -- -- -- 100    12/15/21 1200 -- -- 74 -- 175/75 -- 95    12/15/21 1145 -- -- 86 -- -- -- 99    12/15/21 1130 -- -- 78 -- -- -- 100    12/15/21 1129 -- -- 78 -- -- -- 100    12/15/21 1128 -- -- 81 -- -- -- 99    12/15/21 1115 -- -- 82 -- -- -- 96    12/15/21 1113 -- -- -- -- 171/88 -- --    12/15/21 1103 -- -- -- -- 193/100 Standing 96    12/15/21 1038 98.2 (36.8) Oral 83 18 -- -- 85            Facility-Administered Medications as of 12/16/2021   Medication Dose Route Frequency Provider Last Rate Last Admin   • [START ON 12/17/2021] !Vancomycin level 12/17 @ 0530. Please hold 0600 dose until pharmacy evaluates    Does not apply Once Alisia Granda, [facility]       • acetaminophen (TYLENOL) tablet 650 mg  650 mg Oral Q4H PRN Cesar Leung III, MD   650 mg at 12/16/21 0037   • buPROPion SR (WELLBUTRIN SR) 12 hr tablet 150 mg  150 mg Oral Daily Cesar Leung III, MD   150 mg at 12/16/21 0815   • dextrose (D50W) (25 g/50 mL) IV injection 25 g  25 g Intravenous Q15 Min PRN Cesar Leung III, MD       • dextrose (GLUTOSE) oral gel 15 g  15 g Oral Q15 Min PRN Cesar Leung III, MD       • enoxaparin (LOVENOX) syringe 40 mg  40 mg Subcutaneous Q12H Cesar Leung III, MD   40 mg at 12/16/21 0815   • [COMPLETED] furosemide (LASIX) injection 20 mg  20 mg Intravenous Once Cesar Leung III, MD   20 mg at 12/15/21 2020   • furosemide (LASIX) tablet 40 mg  40 mg Oral BID Cesar Leung III, MD   40 mg at 12/16/21 0815   • glucagon (human recombinant)  (GLUCAGEN DIAGNOSTIC) injection 1 mg  1 mg Subcutaneous Q15 Min PRN Cesar Leung III, MD       • hydrOXYzine (ATARAX) tablet 50 mg  50 mg Oral Nightly PRN Cesar Leung III, MD       • ibuprofen (ADVIL,MOTRIN) tablet 600 mg  600 mg Oral Q6H PRN Cesar Leung III, MD   600 mg at 12/16/21 0233   • insulin detemir (LEVEMIR) injection 60 Units  60 Units Subcutaneous BID AC Cesar Leung III, MD   60 Units at 12/16/21 0815   • insulin lispro (humaLOG) injection 50 Units  50 Units Subcutaneous Daily With Breakfast Cesar Leung III, MD       • insulin lispro (humaLOG) injection 50 Units  50 Units Subcutaneous Daily With Lunch Cesar Leung III, MD       • insulin lispro (humaLOG) injection 60 Units  60 Units Subcutaneous Daily With Dinner Cesar Leung III, MD       • [COMPLETED] iopamidol (ISOVUE-370) 76 % injection 100 mL  100 mL Intravenous Once in imaging Cesar Jones DO   90 mL at 12/15/21 1441   • labetalol (NORMODYNE) tablet 200 mg  200 mg Oral Q8H Cesar Leung III, MD   200 mg at 12/16/21 0545   • labetalol (NORMODYNE,TRANDATE) injection 20-80 mg  20-80 mg Intravenous Q10 Min PRN Cesar Leung III, MD       • lidocaine PF 1% (XYLOCAINE) injection 5 mL  5 mL Intradermal PRN Cesar Leung III, MD       • magnesium sulfate 20 GM/500ML infusion  1 g/hr Intravenous Continuous Cesar Leung III, MD 25 mL/hr at 12/15/21 2105 1 g/hr at 12/15/21 2105   • [COMPLETED] magnesium sulfate bolus from bag 0.04 g/mL 4 g  4 g Intravenous Once Cesar Leung III,  mL/hr at 12/15/21 2001 4 g at 12/15/21 2001   • NIFEdipine XL (PROCARDIA XL) 24 hr tablet 30 mg  30 mg Oral Q12H Cesar Leung III, MD   30 mg at 12/16/21 0815   • nitroglycerin (TRIDIL) 200 mcg/ml infusion  5-200 mcg/min Intravenous Titrated Cesar Jones DO   Stopped at 12/15/21 1758   • Pharmacy to dose vancomycin   Does not apply Continuous PRN Cesar Leung III, MD       • [COMPLETED]  piperacillin-tazobactam (ZOSYN) 4.5 g in iso-osmotic dextrose 100 mL IVPB (premix)  4.5 g Intravenous Once Daria Carreon PA   Stopped at 12/15/21 1232   • piperacillin-tazobactam (ZOSYN) 4.5 g in iso-osmotic dextrose 100 mL IVPB (premix)  4.5 g Intravenous Q8H Cesar Leung III, MD       • sodium chloride 0.45 % infusion  50 mL/hr Intravenous Continuous Cesar Leung III, MD 50 mL/hr at 12/15/21 2001 50 mL/hr at 12/15/21 2001   • sodium chloride 0.9 % flush 1-10 mL  1-10 mL Intravenous PRN Cesar Leung III, MD       • sodium chloride 0.9 % flush 10 mL  10 mL Intravenous PRN Cesar Jones DO       • sodium chloride 0.9 % flush 10 mL  10 mL Intravenous Q12H Cesar Leung III, MD       • vancomycin 1500 mg/500 mL 0.9% NS IVPB ([facility])  1,500 mg Intravenous Q12H Cesar Leung III, MD   1,500 mg at 12/16/21 0704   • [COMPLETED] vancomycin 3000 mg/500 mL 0.9% NS IVPB ([facility])  20 mg/kg Intravenous Once Daria Carreon PA   Stopped at 12/15/21 1759     Respiratory Therapy (last 24 hours)     Respiratory Therapy Flowsheet     Row Name 12/16/21 0921 12/16/21 0920 12/16/21 0820 12/16/21 0748 12/16/21 0636       Oxygen Therapy    SpO2 100 % -- 97 % 92 % 98 %    Device (Oxygen Therapy) nasal cannula nasal cannula nasal cannula room air nasal cannula    Flow (L/min) 2 2 2 -- 2       Vital Signs    Temp -- -- 97.8 °F (36.6 °C) -- --    Temp src -- -- Oral -- --    Pulse 84 77 77 74 73    Heart Rate Source -- Monitor Monitor Monitor --    Resp -- 16 14 14 14    Resp Rate Source -- Visual Visual Visual --    BP -- 134/77 122/57 130/62 156/86    BP Location -- Left arm Left arm Left arm --    BP Method -- Automatic Automatic Automatic --    Patient Position -- Lying Lying Lying --       Assessment    Rhythm/Pattern, Respiratory -- no shortness of breath reported -- no shortness of breath reported --       Breath Sounds    Breath Sounds -- All Fields -- All Fields --    All Lung Fields Breath Sounds -- clear; equal  bilaterally -- clear; equal bilaterally --    LLL Breath Sounds -- Anterior:; diminished -- Anterior:; diminished --    RUL Breath Sounds -- Anterior:; diminished -- Anterior:; diminished --    RLL Breath Sounds -- diminished -- diminished --    Row Name 12/16/21 0604 12/16/21 0533 12/16/21 0532 12/16/21 0440 12/16/21 0404       Oxygen Therapy    SpO2 -- -- 97 % 98 % 97 %    Device (Oxygen Therapy) -- -- nasal cannula nasal cannula nasal cannula    Flow (L/min) -- -- 2 2 2    SF Ratio 393 -- -- -- --       Vital Signs    Temp -- -- -- 98 °F (36.7 °C) --    Temp src -- -- -- Oral --    Pulse -- 81 86 83 67    Heart Rate Source -- -- -- Monitor --    Resp -- -- 14 14 --    BP -- 166/76  Patient moved arm while taking. 153/68 140/66 --    BP Location -- Right arm Left arm -- --    BP Method -- Automatic Automatic -- --    Patient Position -- Lying Lying -- --       Assessment    Respiratory WDL -- -- WDL except; breath sounds -- --       Breath Sounds    Breath Sounds -- -- All Fields -- --    All Lung Fields Breath Sounds -- -- clear; equal bilaterally -- --    RLL Breath Sounds -- -- diminished -- --    Row Name 12/16/21 0402 12/16/21 0358 12/16/21 0348 12/16/21 0344 12/16/21 0343       Oxygen Therapy    SpO2 94 % 87 %  Patient had oxygen long term out of nose. 98 % 92 %  Up to 98% before leaving room. 83 %  Falling off patient finger, repositioned.    Device (Oxygen Therapy) nasal cannula -- nasal cannula nasal cannula --    Flow (L/min) 2 -- 2 2 --       Vital Signs    Pulse 69 76 77 83 78    Row Name 12/16/21 0339 12/16/21 0333 12/16/21 0234 12/16/21 0208 12/16/21 0142       Oxygen Therapy    SpO2 93 % 97 % 99 % 98 % 100 %    Device (Oxygen Therapy) nasal cannula nasal cannula nasal cannula nasal cannula nasal cannula    Flow (L/min) 1 2 2 2 2       Vital Signs    Temp -- -- -- -- 98.4 °F (36.9 °C)    Temp src -- -- -- -- Axillary    Pulse 81 86 80 84 --    Resp -- 14 14 -- --    BP -- 139/64 137/85 -- --        Assessment    Respiratory WDL -- WDL except; breath sounds -- -- WDL except; breath sounds       Breath Sounds    Breath Sounds -- All Fields -- -- All Fields    All Lung Fields Breath Sounds -- clear; equal bilaterally -- -- clear; equal bilaterally    Row Name 12/16/21 0140 12/16/21 0139 12/16/21 0037 12/16/21 0035 12/15/21 2339       Oxygen Therapy    SpO2 -- -- 100 % -- 94 %    Device (Oxygen Therapy) -- -- nasal cannula -- nasal cannula    Flow (L/min) -- -- 2 -- 2       Vital Signs    Temp -- -- -- 98.5 °F (36.9 °C) --    Temp src -- -- -- Oral --    Pulse 85 85 81 82 85    Heart Rate Source Monitor -- -- Monitor --    Resp -- -- -- 14 14    /72 138/106  Readjusted. -- 132/60 126/73    BP Location -- -- -- Left arm --    BP Method -- -- -- Automatic --    Patient Position -- -- -- Lying --       Assessment    Respiratory WDL -- -- -- -- WDL except; breath sounds       Breath Sounds    Breath Sounds -- -- -- -- RLL    All Lung Fields Breath Sounds -- -- -- -- clear; equal bilaterally    RLL Breath Sounds -- -- -- -- diminished    Row Name 12/15/21 2234 12/15/21 2233 12/15/21 2138 12/15/21 2136 12/15/21 2037       Oxygen Therapy    SpO2 100 % 94 % 99 % -- 99 %    Device (Oxygen Therapy) nasal cannula nasal cannula nasal cannula -- --    Flow (L/min) 2 2 2 -- --       Vital Signs    Pulse 82 95 87 85 83    Resp -- 16 16 -- --    BP -- 133/61 161/68 183/86  Readjusted. 161/71       Assessment    Respiratory WDL -- -- WDL except; breath sounds -- --       Breath Sounds    Breath Sounds -- -- RLL -- --    All Lung Fields Breath Sounds -- -- clear; equal bilaterally -- --    RLL Breath Sounds -- -- diminished -- --    Row Name 12/15/21 2035 12/15/21 2032 12/15/21 2027 12/15/21 2022 12/15/21 2020       Oxygen Therapy    SpO2 99 % 100 % 100 % -- --    Device (Oxygen Therapy) -- nasal cannula nasal cannula -- --    Flow (L/min) -- 2 2 -- --       Vital Signs    Pulse 85 78 79 75 78    Resp -- -- 18 -- --    BP  180/77 180/107  Readjusted. 176/88 164/65 168/75    Row Name 12/15/21 2018 12/15/21 2013 12/15/21 2007 12/15/21 2001 12/15/21 [facility]       Oxygen Therapy    SpO2 100 % 100 % 99 % -- --    Device (Oxygen Therapy) -- nasal cannula nasal cannula nasal cannula --    Flow (L/min) -- 2 2 2 --       Vital Signs    Temp -- -- -- 98.4 °F (36.9 °C) --    Temp src -- -- -- Oral --    Pulse 80 77 81 81 78    Heart Rate Source -- -- -- Monitor --    Resp -- -- 18 18 --    /77  Slipped down patient arm, readjusted. 180/89 184/85 174/79 188/88    BP Location -- -- -- Left arm --    BP Method -- -- -- Automatic --    Patient Position -- -- -- Sitting --       Assessment    Respiratory WDL -- -- -- WDL except; breath sounds --    Rhythm/Pattern, Respiratory -- -- -- no shortness of breath reported --       Breath Sounds    Breath Sounds -- -- -- RLL --    All Lung Fields Breath Sounds -- -- -- clear; equal bilaterally --    RLL Breath Sounds -- -- -- diminished --    Row Name 12/15/21 1935 12/15/21 1902 12/15/21 1900 12/15/21 1854 12/15/21 1832       Oxygen Therapy    SpO2 100 % 100 % 94 % -- 100 %    Pulse Oximetry Type -- Continuous -- -- Continuous    Device (Oxygen Therapy) -- nasal cannula -- nasal cannula nasal cannula    Flow (L/min) -- 2 -- 2 2       Vital Signs    Temp -- 98.4 °F (36.9 °C) -- -- --    Temp src -- Oral -- -- --    Pulse 75 74 83 -- --    Heart Rate Source -- Monitor -- -- --    Resp -- 18 -- -- --    Resp Rate Source -- Visual -- -- --    /88 187/96 -- -- --    BP Location -- Right arm -- -- --    BP Method -- Automatic -- -- --    Patient Position -- Sitting -- -- --       Assessment    Respiratory WDL -- -- -- WDL except; breath sounds; effort/expansion --    Rhythm/Pattern, Respiratory -- -- -- shortness of breath; unlabored --       Breath Sounds    Breath Sounds -- -- -- LORNE; LLL; RUL --    All Lung Fields Breath Sounds -- -- -- Anterior:; clear; equal bilaterally --    LLL Breath Sounds -- --  -- Anterior:; diminished --    RUL Breath Sounds -- -- -- Anterior:; diminished --       Treatment/Therapy    Cough And Deep Breathing -- -- -- done independently per patient --    [facility] Name 12/15/21 1702 12/15/21 1645 12/15/21 1640 12/15/21 1630 12/15/21 1615       Oxygen Therapy    SpO2 100 % 100 % 100 % 100 % 100 %       Vital Signs    Pulse 81 80 79 86 69    BP -- -- 165/93 -- --    Noninvasive MAP (mmHg) -- -- 120 -- --    Row Name 12/15/21 1600 12/15/21 1545 12/15/21 15:03:40 12/15/21 1500 12/15/21 1423       Oxygen Therapy    SpO2 100 % 99 % -- 100 % 97 %       Vital Signs    Pulse 73 74 -- 74 75    /91 -- 164/93 -- --    Noninvasive MAP (mmHg) 98 -- -- -- --    Row Name 12/15/21 1415 12/15/21 1400 12/15/21 1346 12/15/21 1344 12/15/21 1330       Oxygen Therapy    SpO2 96 % 95 % -- 97 % 98 %       Vital Signs    Pulse 83 73 75 -- 72    /99 174/95 -- -- 189/94    Noninvasive MAP (mmHg) 126 109 -- -- 139    Row Name 12/15/21 1252 12/15/21 1230 12/15/21 1215 12/15/21 1200 12/15/21 1145       Oxygen Therapy    SpO2 94 % 98 % 100 % 95 % 99 %       Vital Signs    Pulse 71 80 73 74 86    Resp -- 20 -- -- --    BP -- 179/96 -- 175/75 --    Noninvasive MAP (mmHg) -- 127 -- 118 --    Row Name 12/15/21 11:42:46 12/15/21 1130 12/15/21 1129 12/15/21 1128 12/15/21 1115       Oxygen Therapy    SpO2 -- 100 % 100 % 99 % 96 %       Vital Signs    Pulse -- 78 78 81 82       Assessment    Rhythm/Pattern, Respiratory tachypneic; shortness of breath -- -- -- --       Breath Sounds    Breath Sounds All Fields -- -- -- --    All Lung Fields Breath Sounds clear; equal bilaterally; Posterior: -- -- -- --    Row Name 12/15/21 1113 12/15/21 1103 12/15/21 1047 12/15/21 1038          Oxygen Therapy    SpO2 -- 96 % -- 85 %  informed nurse     Pulse Oximetry Type -- -- -- Intermittent     Device (Oxygen Therapy) -- nasal cannula -- room air     Flow (L/min) -- 2 -- --            Vital Signs    Temp -- -- -- 98.2 °F (36.8 °C)      "Temp src -- -- -- Oral     Pulse -- -- -- 83     Heart Rate Source -- -- -- Monitor     Resp -- -- -- 18     Resp Rate Source -- -- -- Visual     /88 193/100 -- --     Noninvasive MAP (mmHg) 115 -- -- --     BP Location -- Right arm -- --     BP Method -- Automatic -- --     Patient Position -- Standing -- --            Assessment    Rhythm/Pattern, Respiratory -- -- shortness of breath; tachypneic --                  Physician Progress Notes (last 24 hours)      Bettina Schwartz MD at 21 0948          [facility]  Maternal-Fetal Medicine  Progress Note    Chief Complaint:  Chief Complaint   Patient presents with   • Shortness of Breath            Patient is a 36 y.o. female  status post  section on 2021.  Her pregnancy was complicated by insulin-dependent diabetes mellitus, chronic hypertension and materna super obesity.  Post op course complicated by endometritis. She was discharged home 72 hours ago and initially felt well though had some increasing air hunger. She was evaluated in the ED and noted to have hypertension with low O2 saturation. CT-C/A/P with PE protocol noted possible bibasliar PNA and a mildly enlarged heart with trace pleural effusion. She was started on Mag Sulfate, Vancomycin, Zosyn and given IV lasix.   Currently she feels better with less SOA. No pain.      Objective     Vital Signs Range for the last 24 hours  Temp:  [97.8 °F (36.6 °C)-98.5 °F (36.9 °C)] 97.8 °F (36.6 °C)   Temp src: Oral   BP: (122-208)/() 134/77   Heart Rate:  [67-95] 84   Resp:  [14-20] 16   SpO2:  [83 %-100 %] 100 %   Flow (L/min):  [1-2] 2   Device (Oxygen Therapy): nasal cannula   Weight:  [193 kg (426 lb)-202 kg (444 lb 8 oz)] 202 kg (444 lb 8 oz)       Flowsheet Rows      First Filed Value   Admission Height 175.3 cm (69\") Documented at 12/15/2021 1038   Admission Weight 193 kg (426 lb) Documented at 12/15/2021 1038          Intake/Output last 24 hours:      Intake/Output " Summary (Last 24 hours) at 12/16/2021 0948  Last data filed at 12/16/2021 0748  Gross per 24 hour   Intake 1240 ml   Output 4400 ml   Net -3160 ml       Intake/Output this shift:    I/O this shift:  In: 240 [P.O.:240]  Out: 1100 [Urine:1100]    Physical Exam     NAD   Resting comfortably with nasal canula   Able to speak in full sentences   Normal pulmonary effort   Reduced breath sounds (per Dr Leung, who performed pulm exam)   Abdomen obese, nontender   Incision C/D/I   Ext bilateral edema, nontender     Medications:  [START ON 12/17/2021] Pharmacy Consult, , Does not apply, Once  buPROPion SR, 150 mg, Oral, Daily  enoxaparin, 40 mg, Subcutaneous, Q12H  furosemide, 40 mg, Oral, BID  insulin detemir, 60 Units, Subcutaneous, BID AC  insulin lispro, 50 Units, Subcutaneous, Daily With Breakfast  insulin lispro, 50 Units, Subcutaneous, Daily With Lunch  insulin lispro, 60 Units, Subcutaneous, Daily With Dinner  labetalol, 200 mg, Oral, Q8H  NIFEdipine XL, 30 mg, Oral, Q12H  piperacillin-tazobactam, 4.5 g, Intravenous, Q8H  sodium chloride, 10 mL, Intravenous, Q12H  vancomycin, 1,500 mg, Intravenous, Q12H       acetaminophen  •  dextrose  •  dextrose  •  glucagon (human recombinant)  •  hydrOXYzine  •  ibuprofen  •  labetalol  •  lidocaine PF 1%  •  Pharmacy to dose vancomycin  •  sodium chloride  •  sodium chloride    Labs:  Lab Results   Component Value Date    HGB 8.4 (L) 12/15/2021     Lab Results   Component Value Date    GLUCOSE 153 (H) 12/15/2021     CBC w/ diff:   Lab Results   Component Value Date     (H) 12/15/2021    HGB 8.4 (L) 12/15/2021    HCT 28.3 (L) 12/15/2021    MCV 81.3 12/15/2021    RDW 17.4 (H) 12/15/2021    WBC 14.13 (H) 12/15/2021    NEUTRORELPCT 75.7 12/08/2021    AUTOIGPER 0.8 (H) 12/08/2021    LYMPHORELPCT 16.0 (L) 12/08/2021    MONORELPCT 6.6 12/08/2021    EOSRELPCT 0.8 12/08/2021    BASORELPCT 0.1 12/08/2021     CMP:   Lab Results   Component Value Date     12/15/2021    K 4.0  12/15/2021     12/15/2021    CO2 24.0 12/15/2021    BUN 7 12/15/2021    CREATININE 0.73 12/15/2021    GLUCOSE 153 (H) 12/15/2021    ALBUMIN 3.50 12/15/2021    CALCIUM 8.8 12/15/2021    AST 30 12/15/2021    ALT 34 (H) 12/15/2021    BILITOT 0.3 12/15/2021     FSBS's:   Glucose   Date/Time Value Ref Range Status   2021 0710 152 (H) 70 - 130 mg/dL Final     Comment:     Meter: [id number] : [id number] Melecio Campuzano   12/15/2021 2037 87 70 - 130 mg/dL Final     Comment:     Meter: [id number] : [id number] Melecio Campuzano   12/15/2021 1958 59 (L) 70 - 130 mg/dL Final     Comment:     Meter: [id number] : [id number] Melecio Campuzano   2021 1044 349 (H) 70 - 130 mg/dL Final     Comment:     Meter: [id number] : [id number] To CORONADO   2021 0713 119 70 - 130 mg/dL Final     Comment:     Meter: [id number] : [id number] To CORONADO   2021 1938 139 (H) 70 - 130 mg/dL Final     Comment:     Meter: [id number] : [id number] Win LAW   2021 1641 120 70 - 130 mg/dL Final     Comment:     Meter: [id number] : [id number] Saul Bernice M   2021 1245 105 70 - 130 mg/dL Final     Comment:     Meter: [id number] : [id number] Liam Carter     Lactate:   Lab Results   Component Value Date    LACTATE 0.9 12/15/2021     Pro-calcitonin: 0.04   BNP: 93   BCx: prelim gram positive cocci in clusters        Assessment/Plan     36 year old  POD8 s/p RCS with T2DM, CHTN, obesity and post op course complicated by endometritis now readmitted with acute hypertension and hypoxia - work up to date most consistent with bilateral PNA and superimposed PEC vs exacerbation of CHTN   Blood cultures preliminarily positive for gram positive cocci. Will await final culture and maintain Vanc/Zosyn until that time. Will likely need prolonged antibiotic course due to bacteremia   Continue Mag x 24 hours   Order maternal echocardiogram. Patient high risk for CHF -  particularly diastolic dysfunction. BNP normal.   Continue PO lasix with monitoring of electrolytes   Continue lovonox ppx   Continue insulin at current doses, ADA diet and monitoring of glucoses   Continue home BP meds at current doses as BP well controlled at this time. Will likely need BP meds titrated over next couple days once Mag d/c'd   Encourage ambulation   Pumping       Bettina Schwartz MD  12/16/2021  09:48 EST      Electronically signed by Bettina Schwartz MD at 12/16/21 1008       Consult Notes (last 24 hours)  Notes from 12/15/21 1031 through 12/16/21 1031   No notes of this type exist for this encounter.         Nutrition Notes (last 24 hours)  Notes from 12/15/21 1031 through 12/16/21 1031   No notes exist for this encounter.         Physical Therapy Notes (last 24 hours)  Notes from 12/15/21 1031 through 12/16/21 1031   No notes exist for this encounter.         Occupational Therapy Notes (last 24 hours)  Notes from 12/15/21 1031 through 12/16/21 1031   No notes exist for this encounter.         Speech Language Pathology Notes (last 24 hours)  Notes from 12/15/21 1031 through 12/16/21 1031   No notes exist for this encounter.         Respiratory Therapy Notes (last 24 hours)  Notes from 12/15/21 1031 through 12/16/21 1031   No notes exist for this encounter.

## 2021-12-16 NOTE — H&P
[facility]  Obstetric History and Physical    Chief Complaint   Patient presents with   • Shortness of Breath       Subjective     Patient is a 36 y.o. female  status post  section on 2021.  Her pregnancy was complicated by insulin-dependent diabetes mellitus, chronic hypertension and maternal super super obesity.  Her postpartum course was complicated by fluid overload with massive peripheral edema and endometritis.  She underwent a course of antibiotic therapy and was subsequently discharged to home 2 days ago.  She presented back to the emergency room today with complaints of headache, marked edema and shortness of breath.  She has had a CTA negative for pulmonary embolism, normal BNP and negative EKG.  On presentation, she was noted to be markedly hypertensive.  Additionally, she was noted to have significant hypoxia.  Chest CT did suggest bibasilar pneumonia.        The following portions of the patients history were reviewed and updated as appropriate: current medications, allergies, past medical history, past surgical history, past family history, past social history and problem list .        Prenatal Information:   Maternal Prenatal Labs  Blood Type No results found for: ABO   Rh Status No results found for: RH   Antibody Screen No results found for: ABSCRN   Gonnorhea No results found for: GCCX   Chlamydia No results found for: CLAMYDCU   RPR No results found for: RPR   Syphilis Antibody No results found for: SYPHILIS   Rubella No results found for: RUBELLAIGGIN   Hepatitis B Surface Antigen No results found for: HEPBSAG   HIV-1 Antibody No results found for: LABHIV1   Hepatitis C Antibody No results found for: HEPCAB   Rapid Urin Drug Screen No results found for: AMPMETHU, BARBITSCNUR, LABBENZSCN, LABMETHSCN, LABOPIASCN, THCURSCR, COCAINEUR, AMPHETSCREEN, PROPOXSCN, BUPRENORSCNU, METAMPSCNUR, OXYCODONESCN, TRICYCLICSCN   Group B Strep Culture No results found for: GBSANTIGEN            External Prenatal Results     Pregnancy Outside Results - Transcribed From Office Records - See Scanned Records For Details     Test Value Date Time    ABO  A  12/07/21 1056    Rh  Positive  12/07/21 1056    Antibody Screen  Negative  12/03/21 1522    Varicella IgG       Rubella ^ Immune  05/01/19     Hgb  8.4 g/dL 12/15/21 1100       7.2 g/dL 12/12/21 0927       8.0 g/dL 12/10/21 0806       8.0 g/dL 12/09/21 1035       8.1 g/dL 12/08/21 0143       7.9 g/dL 12/07/21 1612       7.8 g/dL 12/07/21 1056       10.4 g/dL 12/03/21 1522    Hct  28.3 % 12/15/21 1100       24.5 % 12/12/21 0927       26.6 % 12/10/21 0806       26.5 % 12/09/21 1035       26.0 % 12/08/21 0143       26.6 % 12/07/21 1612       25.8 % 12/07/21 1056       34.1 % 12/03/21 1522    Glucose Fasting GTT       Glucose Tolerance Test 1 hour       Glucose Tolerance Test 3 hour       Gonorrhea (discrete)       Chlamydia (discrete)       RPR ^ Non-Reactive  05/01/19     VDRL       Syphilis Antibody       HBsAg ^ Negative  05/01/19     Herpes Simplex Virus PCR       Herpes Simplex VIrus Culture       HIV ^ Non-Reactive  05/01/19     Hep C RNA Quant PCR       Hep C Antibody       AFP       Group B Strep       GBS Susceptibility to Clindamycin       GBS Susceptibility to Erythromycin       Fetal Fibronectin       Genetic Testing, Maternal Blood             Drug Screening     Test Value Date Time    Urine Drug Screen       Amphetamine Screen  Negative  12/06/21 0748    Barbiturate Screen  Negative  12/06/21 0748    Benzodiazepine Screen  Negative  12/06/21 0748    Methadone Screen  Negative  12/06/21 0748    Phencyclidine Screen  Negative  12/06/21 0748    Opiates Screen  Negative  12/06/21 0748    THC Screen  Negative  12/06/21 0748    Cocaine Screen       Propoxyphene Screen  Negative  12/06/21 0748    Buprenorphine Screen  Negative  12/06/21 0748    Methamphetamine Screen       Oxycodone Screen  Negative  12/06/21 0748    Tricyclic Antidepressants Screen   Negative  21 0748          Legend    ^: Historical                          Past OB History:       OB History    Para Term  AB Living   2 2 2 0 0 2   SAB IAB Ectopic Molar Multiple Live Births   0 0 0 0 0 2      # Outcome Date GA Lbr Carter/2nd Weight Sex Delivery Anes PTL Lv   2 Term 21 37w4d  3089 g (6 lb 13 oz) M CS-LTranv Spinal N RICHI      Name: JUAN MIGUEL ALEXANDER      Apgar1: 7  Apgar5: 9   1 Term 06   3090 g (6 lb 13 oz) M CS-LTranv   RICHI      Birth Comments: Arrest of descent      Name: [place]       Past Medical History:  Past Medical History:   Diagnosis Date   • Anxiety    • Anxiety and depression    • Diabetes mellitus (HCC)    • Gonorrhea    • History of abnormal cervical Pap smear    • Hypertension       Past Surgical History Past Surgical History:   Procedure Laterality Date   •  SECTION  2006   • DILATATION AND CURETTAGE        Family History: Family History   Problem Relation Age of Onset   • Hypertension Mother    • Hypertension Maternal Grandmother    • Diabetes Maternal Grandmother    • Diabetes Paternal Grandmother       Social History:  reports that she quit smoking about 2 years ago. Her smoking use included cigarettes. She has a 4.25 pack-year smoking history. She has never used smokeless tobacco.   reports previous alcohol use.   reports previous drug use. Drug: Marijuana.   Allergies: Latex  Current Medications:          No current facility-administered medications on file prior to encounter.     Current Outpatient Medications on File Prior to Encounter   Medication Sig Dispense Refill   • acetaminophen (TYLENOL) 325 MG tablet Take 2 tablets by mouth Every 6 (Six) Hours for 14 days. 100 tablet 0   • buPROPion SR (WELLBUTRIN SR) 150 MG 12 hr tablet Take 150 mg by mouth Daily.     • docusate sodium 100 MG capsule Take 1 capsule by mouth 2 (Two) Times a Day As Needed for Constipation for up to 60 days. 120 capsule 0   • enoxaparin (LOVENOX) 40 MG/0.4ML  solution syringe Inject 0.4 mL under the skin into the appropriate area as directed Daily for 7 days. Indications: Post Surgical - Other, Prevention of Unwanted Clot in Veins 2.8 mL 0   • furosemide (Lasix) 40 MG tablet Take 1 tablet by mouth Daily. 3 tablet 0   • ibuprofen (ADVIL,MOTRIN) 600 MG tablet Take 1 tablet by mouth Every 6 (Six) Hours for 20 days. 80 tablet 0   • Insulin Glargine (Lantus SoloStar) 100 UNIT/ML injection pen Inject 20 Units under the skin into the appropriate area as directed 2 (Two) Times a Day. 15 mL 11   • Insulin Lispro, 1 Unit Dial, (HumaLOG KwikPen) 100 UNIT/ML solution pen-injector Inject 15 Units under the skin into the appropriate area as directed 3 (Three) Times a Day With Meals for 30 days. 13.5 mL 11   • labetalol (NORMODYNE) 100 MG tablet Take 200 mg by mouth 2 (Two) Times a Day.     • lanolin cream Apply  topically to the appropriate area as directed Every 1 (One) Hour As Needed for Dry Skin (nipple pain). 9 mL 0   • NIFEDIPINE PO Take 30 mg by mouth 2 (Two) Times a Day.     • oxyCODONE (ROXICODONE) 5 MG immediate release tablet Take 1 tablet by mouth Every 4 (Four) Hours As Needed for Moderate Pain . 20 tablet 0   • Prenatal Vit-Fe Fumarate-FA (Prenatal 27-1) 27-1 MG tablet tablet Take  by mouth Daily.     • simethicone (MYLICON) 80 MG chewable tablet Chew 1 tablet 4 (Four) Times a Day As Needed for Flatulence. 30 tablet 0       General ROS: Pertinent items are noted in HPI, all other systems reviewed and negative    Objective       Vital Signs Range for the last 24 hours  Temperature: Temp:  [98.2 °F (36.8 °C)-98.4 °F (36.9 °C)] 98.4 °F (36.9 °C)   Temp Source: Temp src: Oral   BP: BP: (159-193)/() 187/96   Pulse: Heart Rate:  [69-86] 74   Respirations: Resp:  [18-20] 18   SPO2: SpO2:  [85 %-100 %] 100 %   O2 Amount (l/min): Flow (L/min):  [2] 2   O2 Devices Device (Oxygen Therapy): nasal cannula   Weight: Weight:  [193 kg (426 lb)] 193 kg (426 lb)     Physical  Examination: General appearance - alert, well appearing, and in no distress, oriented to person, place, and time and massive obesity  Mental status - alert, oriented to person, place, and time, normal mood, behavior, speech, dress, motor activity, and thought processes  Eyes - pupils equal and reactive, extraocular eye movements intact, sclera anicteric  Neck - supple, no significant adenopathy, thyroid exam: thyroid is normal in size without nodules or tenderness  Chest - clear to auscultation, no wheezes, rales or rhonchi, symmetric air entry  Heart - normal rate, regular rhythm, normal S1, S2, no murmurs, rubs, clicks or gallops  Abdomen-soft, nontender, nondistended, no masses or organomegaly  scars from previous incisions clean, dry and intact  Neurological - alert, oriented, normal speech, no focal findings or movement disorder noted, DTR's normal and symmetric  Extremities - pedal edema 3-4+    Laboratory Results:   Lab Results   Component Value Date    ALKPHOS 160 (H) 12/15/2021    ALT 34 (H) 12/15/2021    AST 30 12/15/2021    CREATININE 0.73 12/15/2021    BILITOT 0.3 12/15/2021     (H) 12/08/2021    URICACID 5.6 12/08/2021       WBC   Date Value Ref Range Status   12/15/2021 14.13 (H) 3.40 - 10.80 10*3/mm3 Final     RBC   Date Value Ref Range Status   12/15/2021 3.48 (L) 3.77 - 5.28 10*6/mm3 Final     Hemoglobin   Date Value Ref Range Status   12/15/2021 8.4 (L) 12.0 - 15.9 g/dL Final     Hematocrit   Date Value Ref Range Status   12/15/2021 28.3 (L) 34.0 - 46.6 % Final     MCV   Date Value Ref Range Status   12/15/2021 81.3 79.0 - 97.0 fL Final     MCH   Date Value Ref Range Status   12/15/2021 24.1 (L) 26.6 - 33.0 pg Final     MCHC   Date Value Ref Range Status   12/15/2021 29.7 (L) 31.5 - 35.7 g/dL Final     RDW   Date Value Ref Range Status   12/15/2021 17.4 (H) 12.3 - 15.4 % Final     RDW-SD   Date Value Ref Range Status   12/15/2021 51.0 37.0 - 54.0 fl Final     MPV   Date Value Ref Range  "Status   12/15/2021 9.6 6.0 - 12.0 fL Final     Platelets   Date Value Ref Range Status   12/15/2021 478 (H) 140 - 450 10*3/mm3 Final     Neutrophils Absolute   Date Value Ref Range Status   12/15/2021 9.61 (H) 1.70 - 7.00 10*3/mm3 Final     Eosinophils Absolute   Date Value Ref Range Status   12/15/2021 0.14 0.00 - 0.40 10*3/mm3 Final     Basophils Absolute   Date Value Ref Range Status   12/15/2021 0.00 0.00 - 0.20 10*3/mm3 Final             Brief Urine Lab Results  (Last result in the past 365 days)      Color   Clarity   Blood   Leuk Est   Nitrite   Protein   CREAT   Urine HCG        12/15/21 1427 Yellow   Clear   Negative   Negative   Negative   Negative                   Radiology Review: CTA shows no evidence of pulmonary embolism.  Bibasilar pneumonia is suggested.  Other Studies: LFTs show minimal elevation.  Normal platelet count.    Assessment/Plan       Preeclampsia in postpartum period        Assessment:  1. Fluid overload with hypoxia.  2. Postpartum preeclampsia with a history of chronic hypertension complicating this pregnancy.  Severe range hypertension noted on admission.  3. Type 1 insulin-dependent diabetes mellitus.  4. Maternal super super obesity.  5. Bibasilar pneumonia.    Plan:  1. Admit.  2. Magnesium sulfate for seizure prophylaxis.  3. Aggressive diuresis with Lasix.  4. O2 therapy.  5. Labetalol and nifedipine for hypertension control.  6. Continue Zosyn and vancomycin.  Request pharmacy assistance with vancomycin dosing.  7. As BNP is normal, will defer echocardiogram for time being.  8. Patient with normal troponin.     Total time spent today with Edd  was 40-54 minutes (level 5).  Of this time, > 50% was spent face-to-face time coordinating care, answering her questions and counseling regarding pathophysiology of her presenting problem along with plans for any diagnostic work-up and treatment.        Cesar Hurtado \"[place]\" LINDA Leung MD  12/15/2021  19:46 EST    "

## 2021-12-16 NOTE — PROGRESS NOTES
Pharmacy Consult-Vancomycin Dosing  Edd Leung is a  36 y.o. female receiving vancomycin therapy.     Indication: Pneumonia   Consulting Provider: Dr. Leung   ID Consult: No    Goal AUC: 400 - 600 mg/L*hr    Current Antimicrobial Therapy  Anti-Infectives (From admission, onward)      Ordered     Dose/Rate Route Frequency Start Stop    12/15/21 2117  piperacillin-tazobactam (ZOSYN) 4.5 g in iso-osmotic dextrose 100 mL IVPB (premix)        Ordering Provider: Cesar Leung III, MD    4.5 g  over 4 Hours Intravenous Every 8 Hours 12/16/21 2200 12/19/21 2159    12/15/21 2117  vancomycin 1500 mg/500 mL 0.9% NS IVPB ([facility])        Note to Pharmacy: Pharmacy to assist with daily dosing please   Ordering Provider: Cesar Leung III, MD    1,500 mg Intravenous Every 12 Hours 12/16/21 0600 12/19/21 0559    12/15/21 2125  Pharmacy to dose vancomycin        Ordering Provider: Cesar Leung III, MD     Does not apply Continuous PRN 12/15/21 2125 12/22/21 2124    12/15/21 1133  vancomycin 3000 mg/500 mL 0.9% NS IVPB ([facility])        Ordering Provider: Daria Carreon PA    20 mg/kg × 193 kg Intravenous Once 12/15/21 1135 12/15/21 1759    12/15/21 1133  piperacillin-tazobactam (ZOSYN) 4.5 g in iso-osmotic dextrose 100 mL IVPB (premix)        Ordering Provider: Daria Carreon PA    4.5 g  over 30 Minutes Intravenous Once 12/15/21 1135 12/15/21 1232            Allergies  Allergies as of 12/15/2021 - Reviewed 12/15/2021   Allergen Reaction Noted    Latex Rash 04/05/2017       Labs    Results from last 7 days   Lab Units 12/15/21  1100 12/12/21  0927 12/09/21  1035   BUN mg/dL 7 11 7   CREATININE mg/dL 0.73 0.94 0.72       Results from last 7 days   Lab Units 12/15/21  1100 12/12/21  0927 12/10/21  0806   WBC 10*3/mm3 14.13* 6.15 3.15*       Evaluation of Dosing     Last Dose Received in the ED/Outside Facility: Vancomycin 3,000mg IV given 12/15 @ 1253 in ED  Is Patient on Dialysis or Renal Replacement: No    Ht  "- 175.3 cm (69\")  Wt - (!) 193 kg (426 lb)    Estimated Creatinine Clearance: 200.6 mL/min (by C-G formula based on SCr of 0.73 mg/dL).    Intake & Output (last 3 days)         12/13 0701  12/14 0700 12/14 0701  12/15 0700 12/15 0701 12/16 0700    IV Piggyback   600    Total Intake(mL/kg)   600 (3.1)    Net   +600                   Microbiology and Radiology  Microbiology Results (last 10 days)       Procedure Component Value - Date/Time    COVID PRE-OP / PRE-PROCEDURE SCREENING ORDER (NO ISOLATION) - Swab, Nasopharynx [[id number]]  (Normal) Collected: 12/15/21 1100    Lab Status: Final result Specimen: Swab from Nasopharynx Updated: 12/15/21 1134    Narrative:      The following orders were created for panel order COVID PRE-OP / PRE-PROCEDURE SCREENING ORDER (NO ISOLATION) - Swab, Nasopharynx.  Procedure                               Abnormality         Status                     ---------                               -----------         ------                     COVID-19 and FLU A/B PCR...[[id number]]  Normal              Final result                 Please view results for these tests on the individual orders.    COVID-19 and FLU A/B PCR - Swab, Nasopharynx [[id number]]  (Normal) Collected: 12/15/21 1100    Lab Status: Final result Specimen: Swab from Nasopharynx Updated: 12/15/21 1134     COVID19 Not Detected     Influenza A PCR Not Detected     Influenza B PCR Not Detected    Narrative:      Fact sheet for providers: https://www.fda.gov/media/672300/download    Fact sheet for patients: https://www.fda.gov/media/911037/download    Test performed by PCR.    Blood Culture - Blood, Arm, Right [[id number]]  (Normal) Collected: 12/09/21 1048    Lab Status: Final result Specimen: Blood from Arm, Right Updated: 12/14/21 1200     Blood Culture No growth at 5 days    Blood Culture - Blood, Arm, Left [[id number]]  (Normal) Collected: 12/09/21 1035    Lab Status: Final result Specimen: Blood from Arm, Left Updated: 12/14/21 " 1200     Blood Culture No growth at 5 days            Reported Vancomycin Levels                         [employer] AUC Calculation:    Current AUC: -- mg/L*hr    Predicted Steady State AUC on Current Dose: -- mg/L*hr  _________________________________    Predicted Steady State AUC on New Dose: 538 mg/L*hr    Assessment/Plan:    1. Pharmacy to dose vancomycin for pneumonia. Goal -600 mg/L*hr.  2. Patient received loading dose of vancomycin 3,000mg (~15.5mg/kg) IV on 12/15 @ 1300. Will initiate a maintenance dose of vancomycin 1500mg IV Q12hr to begin on 12/16 @ 0600.  3. Assess clearance by trough level on 12/17 @ 0530, prior to 4th overall dose.  4. Pharmacy will continue to monitor renal function, cultures and sensitivities, and clinical status to adjust regimen as necessary.    Alisia Granda, PharmD  12/15/2021   21:38 EST

## 2021-12-16 NOTE — PROGRESS NOTES
"[facility]  Maternal-Fetal Medicine  Progress Note    Chief Complaint:  Chief Complaint   Patient presents with   • Shortness of Breath            Patient is a 36 y.o. female  status post  section on 2021.  Her pregnancy was complicated by insulin-dependent diabetes mellitus, chronic hypertension and maternal obesity.  Post op course complicated by endometritis. She was discharged home 72 hours ago and initially felt well though had some increasing air hunger. No fevers. No chest pain. She was evaluated in the ED and noted to have hypertension with low O2 saturation. CT-C/A/P with PE protocol noted possible bibasliar PNA and a mildly enlarged heart with trace pleural effusion. She was started on Mag Sulfate, Vancomycin, Zosyn and given IV lasix.   Currently she feels better with less SOA. No pain.      Objective     Vital Signs Range for the last 24 hours  Temp:  [97.8 °F (36.6 °C)-98.5 °F (36.9 °C)] 97.8 °F (36.6 °C)   Temp src: Oral   BP: (122-208)/() 134/77   Heart Rate:  [67-95] 84   Resp:  [14-20] 16   SpO2:  [83 %-100 %] 100 %   Flow (L/min):  [1-2] 2   Device (Oxygen Therapy): nasal cannula   Weight:  [193 kg (426 lb)-202 kg (444 lb 8 oz)] 202 kg (444 lb 8 oz)       Flowsheet Rows      First Filed Value   Admission Height 175.3 cm (69\") Documented at 12/15/2021 1038   Admission Weight 193 kg (426 lb) Documented at 12/15/2021 1038          Intake/Output last 24 hours:      Intake/Output Summary (Last 24 hours) at 2021 0948  Last data filed at 2021 0748  Gross per 24 hour   Intake 1240 ml   Output 4400 ml   Net -3160 ml       Intake/Output this shift:    I/O this shift:  In: 240 [P.O.:240]  Out: 1100 [Urine:1100]    Physical Exam     NAD   Resting comfortably with nasal canula   Able to speak in full sentences   Normal pulmonary effort   Reduced breath sounds (per Dr Leung, who performed pulm exam)   Abdomen obese, nontender   Incision C/D/I   Ext bilateral edema, " nontender     Medications:  [START ON 12/17/2021] Pharmacy Consult, , Does not apply, Once  buPROPion SR, 150 mg, Oral, Daily  enoxaparin, 40 mg, Subcutaneous, Q12H  furosemide, 40 mg, Oral, BID  insulin detemir, 60 Units, Subcutaneous, BID AC  insulin lispro, 50 Units, Subcutaneous, Daily With Breakfast  insulin lispro, 50 Units, Subcutaneous, Daily With Lunch  insulin lispro, 60 Units, Subcutaneous, Daily With Dinner  labetalol, 200 mg, Oral, Q8H  NIFEdipine XL, 30 mg, Oral, Q12H  piperacillin-tazobactam, 4.5 g, Intravenous, Q8H  sodium chloride, 10 mL, Intravenous, Q12H  vancomycin, 1,500 mg, Intravenous, Q12H       acetaminophen  •  dextrose  •  dextrose  •  glucagon (human recombinant)  •  hydrOXYzine  •  ibuprofen  •  labetalol  •  lidocaine PF 1%  •  Pharmacy to dose vancomycin  •  sodium chloride  •  sodium chloride    Labs:  Lab Results   Component Value Date    HGB 8.4 (L) 12/15/2021     Lab Results   Component Value Date    GLUCOSE 153 (H) 12/15/2021     CBC w/ diff:   Lab Results   Component Value Date     (H) 12/15/2021    HGB 8.4 (L) 12/15/2021    HCT 28.3 (L) 12/15/2021    MCV 81.3 12/15/2021    RDW 17.4 (H) 12/15/2021    WBC 14.13 (H) 12/15/2021    NEUTRORELPCT 75.7 12/08/2021    AUTOIGPER 0.8 (H) 12/08/2021    LYMPHORELPCT 16.0 (L) 12/08/2021    MONORELPCT 6.6 12/08/2021    EOSRELPCT 0.8 12/08/2021    BASORELPCT 0.1 12/08/2021     CMP:   Lab Results   Component Value Date     12/15/2021    K 4.0 12/15/2021     12/15/2021    CO2 24.0 12/15/2021    BUN 7 12/15/2021    CREATININE 0.73 12/15/2021    GLUCOSE 153 (H) 12/15/2021    ALBUMIN 3.50 12/15/2021    CALCIUM 8.8 12/15/2021    AST 30 12/15/2021    ALT 34 (H) 12/15/2021    BILITOT 0.3 12/15/2021     FSBS's:   Glucose   Date/Time Value Ref Range Status   12/16/2021 0710 152 (H) 70 - 130 mg/dL Final     Comment:     Meter: [id number] : [id number] Melecio Campuzano   12/15/2021 2037 87 70 - 130 mg/dL Final     Comment:     Meter:  [id number] : [id number] Melecio Campuzano   12/15/2021 1958 59 (L) 70 - 130 mg/dL Final     Comment:     Meter: [id number] : [id number] Melecio Campuzano   2021 1044 349 (H) 70 - 130 mg/dL Final     Comment:     Meter: [id number] : [id number] To CORONADO   2021 0713 119 70 - 130 mg/dL Final     Comment:     Meter: [id number] : [id number] To CORONADO   2021 1938 139 (H) 70 - 130 mg/dL Final     Comment:     Meter: [id number] : [id number] Win LAW   2021 1641 120 70 - 130 mg/dL Final     Comment:     Meter: [id number] : [id number] Salu BRICE   2021 1245 105 70 - 130 mg/dL Final     Comment:     Meter: [id number] : [id number] Liam Emma     Lactate:   Lab Results   Component Value Date    LACTATE 0.9 12/15/2021     Pro-calcitonin: 0.04   BNP: 93   BCx: prelim gram positive cocci in clusters        Assessment/Plan     36 year old  POD8 s/p RCS with T2DM, CHTN, obesity and post op course complicated by endometritis now readmitted with acute hypertension and hypoxia - work up to date most consistent with bilateral PNA and superimposed PEC vs exacerbation of CHTN   Blood cultures preliminarily positive for gram positive cocci. Will await final culture and maintain Vanc/Zosyn until that time. Will likely need prolonged antibiotic course due to bacteremia   Continue Mag x 24 hours   Order maternal echocardiogram. Patient high risk for CHF - particularly diastolic dysfunction. BNP normal.   Continue PO lasix with monitoring of electrolytes   Continue lovonox ppx   Continue insulin at current doses, ADA diet and monitoring of glucoses   Continue home BP meds at current doses as BP well controlled at this time. Will likely need BP meds titrated over next couple days once Mag d/c'd   Encourage ambulation   Pumping       Bettina Schwartz MD  2021  09:48 EST

## 2021-12-17 LAB
ANION GAP SERPL CALCULATED.3IONS-SCNC: 12 MMOL/L (ref 5–15)
B PARAPERT DNA SPEC QL NAA+PROBE: NOT DETECTED
B PERT DNA SPEC QL NAA+PROBE: NOT DETECTED
BACTERIA SPEC AEROBE CULT: ABNORMAL
BUN SERPL-MCNC: 11 MG/DL (ref 6–20)
BUN/CREAT SERPL: 10.7 (ref 7–25)
C PNEUM DNA NPH QL NAA+NON-PROBE: NOT DETECTED
CALCIUM SPEC-SCNC: 8.5 MG/DL (ref 8.6–10.5)
CHLORIDE SERPL-SCNC: 100 MMOL/L (ref 98–107)
CO2 SERPL-SCNC: 26 MMOL/L (ref 22–29)
CREAT SERPL-MCNC: 1.03 MG/DL (ref 0.57–1)
FLUAV SUBTYP SPEC NAA+PROBE: NOT DETECTED
FLUBV RNA ISLT QL NAA+PROBE: NOT DETECTED
GFR SERPL CREATININE-BSD FRML MDRD: 74 ML/MIN/1.73
GLUCOSE BLDC GLUCOMTR-MCNC: 130 MG/DL (ref 70–130)
GLUCOSE BLDC GLUCOMTR-MCNC: 135 MG/DL (ref 70–130)
GLUCOSE BLDC GLUCOMTR-MCNC: 174 MG/DL (ref 70–130)
GLUCOSE BLDC GLUCOMTR-MCNC: 64 MG/DL (ref 70–130)
GLUCOSE BLDC GLUCOMTR-MCNC: 79 MG/DL (ref 70–130)
GLUCOSE BLDC GLUCOMTR-MCNC: 83 MG/DL (ref 70–130)
GLUCOSE SERPL-MCNC: 76 MG/DL (ref 65–99)
GRAM STN SPEC: ABNORMAL
HADV DNA SPEC NAA+PROBE: NOT DETECTED
HCOV 229E RNA SPEC QL NAA+PROBE: NOT DETECTED
HCOV HKU1 RNA SPEC QL NAA+PROBE: NOT DETECTED
HCOV NL63 RNA SPEC QL NAA+PROBE: NOT DETECTED
HCOV OC43 RNA SPEC QL NAA+PROBE: NOT DETECTED
HMPV RNA NPH QL NAA+NON-PROBE: NOT DETECTED
HPIV1 RNA ISLT QL NAA+PROBE: NOT DETECTED
HPIV2 RNA SPEC QL NAA+PROBE: NOT DETECTED
HPIV3 RNA NPH QL NAA+PROBE: NOT DETECTED
HPIV4 P GENE NPH QL NAA+PROBE: NOT DETECTED
ISOLATED FROM: ABNORMAL
M PNEUMO IGG SER IA-ACNC: NOT DETECTED
POTASSIUM SERPL-SCNC: 4.2 MMOL/L (ref 3.5–5.2)
RHINOVIRUS RNA SPEC NAA+PROBE: NOT DETECTED
RSV RNA NPH QL NAA+NON-PROBE: NOT DETECTED
SARS-COV-2 RNA NPH QL NAA+NON-PROBE: NOT DETECTED
SODIUM SERPL-SCNC: 138 MMOL/L (ref 136–145)
VANCOMYCIN PEAK SERPL-MCNC: 17.7 MCG/ML (ref 20–40)
VANCOMYCIN TROUGH SERPL-MCNC: 9.7 MCG/ML (ref 5–20)

## 2021-12-17 PROCEDURE — 80202 ASSAY OF VANCOMYCIN: CPT | Performed by: OBSTETRICS & GYNECOLOGY

## 2021-12-17 PROCEDURE — 0202U NFCT DS 22 TRGT SARS-COV-2: CPT | Performed by: PHYSICIAN ASSISTANT

## 2021-12-17 PROCEDURE — 25010000002 VANCOMYCIN 10 G RECONSTITUTED SOLUTION: Performed by: OBSTETRICS & GYNECOLOGY

## 2021-12-17 PROCEDURE — 80202 ASSAY OF VANCOMYCIN: CPT

## 2021-12-17 PROCEDURE — 25010000002 ENOXAPARIN PER 10 MG: Performed by: OBSTETRICS & GYNECOLOGY

## 2021-12-17 PROCEDURE — 82962 GLUCOSE BLOOD TEST: CPT

## 2021-12-17 PROCEDURE — 80048 BASIC METABOLIC PNL TOTAL CA: CPT

## 2021-12-17 PROCEDURE — 63710000001 INSULIN LISPRO (HUMAN) PER 5 UNITS: Performed by: OBSTETRICS & GYNECOLOGY

## 2021-12-17 PROCEDURE — 25010000002 PIPERACILLIN SOD-TAZOBACTAM PER 1 G: Performed by: OBSTETRICS & GYNECOLOGY

## 2021-12-17 PROCEDURE — 99232 SBSQ HOSP IP/OBS MODERATE 35: CPT | Performed by: OBSTETRICS & GYNECOLOGY

## 2021-12-17 RX ADMIN — FUROSEMIDE 40 MG: 40 TABLET ORAL at 17:24

## 2021-12-17 RX ADMIN — TAZOBACTAM SODIUM AND PIPERACILLIN SODIUM 4.5 G: 500; 4 INJECTION, SOLUTION INTRAVENOUS at 13:57

## 2021-12-17 RX ADMIN — TAZOBACTAM SODIUM AND PIPERACILLIN SODIUM 4.5 G: 500; 4 INJECTION, SOLUTION INTRAVENOUS at 06:02

## 2021-12-17 RX ADMIN — LABETALOL HYDROCHLORIDE 200 MG: 200 TABLET, FILM COATED ORAL at 13:57

## 2021-12-17 RX ADMIN — INSULIN LISPRO 25 UNITS: 100 INJECTION, SOLUTION INTRAVENOUS; SUBCUTANEOUS at 12:17

## 2021-12-17 RX ADMIN — NIFEDIPINE 30 MG: 30 TABLET, FILM COATED, EXTENDED RELEASE ORAL at 21:23

## 2021-12-17 RX ADMIN — ENOXAPARIN SODIUM 40 MG: 40 INJECTION SUBCUTANEOUS at 08:29

## 2021-12-17 RX ADMIN — FUROSEMIDE 40 MG: 40 TABLET ORAL at 08:29

## 2021-12-17 RX ADMIN — TAZOBACTAM SODIUM AND PIPERACILLIN SODIUM 4.5 G: 500; 4 INJECTION, SOLUTION INTRAVENOUS at 21:23

## 2021-12-17 RX ADMIN — LABETALOL HYDROCHLORIDE 200 MG: 200 TABLET, FILM COATED ORAL at 06:02

## 2021-12-17 RX ADMIN — LABETALOL HYDROCHLORIDE 200 MG: 200 TABLET, FILM COATED ORAL at 21:23

## 2021-12-17 RX ADMIN — NIFEDIPINE 30 MG: 30 TABLET, FILM COATED, EXTENDED RELEASE ORAL at 10:33

## 2021-12-17 RX ADMIN — NIFEDIPINE 30 MG: 30 TABLET, FILM COATED, EXTENDED RELEASE ORAL at 10:35

## 2021-12-17 RX ADMIN — BUPROPION HYDROCHLORIDE 150 MG: 150 TABLET, EXTENDED RELEASE ORAL at 08:29

## 2021-12-17 RX ADMIN — VANCOMYCIN HYDROCHLORIDE 1500 MG: 10 INJECTION, POWDER, LYOPHILIZED, FOR SOLUTION INTRAVENOUS at 08:32

## 2021-12-17 RX ADMIN — INSULIN LISPRO 25 UNITS: 100 INJECTION, SOLUTION INTRAVENOUS; SUBCUTANEOUS at 18:22

## 2021-12-17 RX ADMIN — ENOXAPARIN SODIUM 40 MG: 40 INJECTION SUBCUTANEOUS at 21:22

## 2021-12-17 NOTE — CONSULTS
INFECTIOUS DISEASE CONSULT/INITIAL HOSPITAL VISIT    Edd Leung  1985  [id number]    Date of Consult: 2021    Admission Date: 12/15/2021      Requesting Provider: Héctor Hoffman MD  Evaluating Physician: To Hargrove Md    Reason for Consultation: Pneumonia with a positive blood culture    History of present illness:    Patient is a 36 y.o. female with h/o T2DM, abnormal cervical Pap smear, massive obesity, and HTN who we were asked to see for pneumonia and positive blood cultures.  The patient recently delivered her baby by  section on 21.  Her post-surgical course was complicated by fluid overload, massive peripheral edema, and endometritis.  She was given Cefazolin gayle and post-operatively until  and she was changed to Clindamycin and gentamicin until 12/10 and ampicillin until 12/10. She was discharged home on .  She returned to [facility] ED on 12/15 for shortness of breath, headache, and edema.  She has remained afebrile on 2L O2.  She is now on room air. Labs are PCT 0.04, lactic acid 0.9, WBC 14,000 with 66% segs/2% bands,and proBNP 93.  A COVID-19/Flu A/B PCR is negative. Blood cultures are positive in 1 of 2 sets (1 of 4 bottles) with CoNS.  A MRSA PCR is negative. A UA is benign.  A CTA of chest, abd, and pelvis showed patchy GGO diffusely bilaterally with consolidative infiltrate in right lung base and subcutaneous edema in tissues of pannus.  She is currently on Zosyn and Vancomycin.  ID was asked to evaluate and manage her antibiotic therapy.     Past Medical History:   Diagnosis Date   • Anxiety    • Anxiety and depression    • Diabetes mellitus (HCC)    • Gonorrhea    • History of abnormal cervical Pap smear    • Hypertension        Past Surgical History:   Procedure Laterality Date   •  SECTION  2006   • DILATATION AND CURETTAGE         Family History   Problem Relation Age of Onset   • Hypertension Mother    • Hypertension Maternal  "Grandmother    • Diabetes Maternal Grandmother    • Diabetes Paternal Grandmother        Social History     Socioeconomic History   • Marital status:    • Number of children: 1   • Years of education: 14   • Highest education level: Associate degree: occupational, technical, or vocational program   Tobacco Use   • Smoking status: Former Smoker     Packs/day: 0.25     Years: 17.00     Pack years: 4.25     Types: Cigarettes     Quit date: 2019     Years since quittin.6   • Smokeless tobacco: Never Used   Vaping Use   • Vaping Use: Never used   Substance and Sexual Activity   • Alcohol use: Not Currently     Comment: SOCIALLY    • Drug use: Not Currently     Types: Marijuana     Comment: SOCIALLY    • Sexual activity: Yes     Partners: Male     Birth control/protection: None       Allergies   Allergen Reactions   • Latex Rash     \"makes my skin raw\"    • Banana Unknown - Low Severity   • Cucumber Extract Unknown - Low Severity   • Melon Unknown - Low Severity         Medication:    Current Facility-Administered Medications:   •  acetaminophen (TYLENOL) tablet 650 mg, 650 mg, Oral, Q4H PRN, Cesar Leung III, MD, 650 mg at 21 1647  •  buPROPion SR (WELLBUTRIN SR) 12 hr tablet 150 mg, 150 mg, Oral, Daily, Cesar Leung III, MD, 150 mg at 21 0829  •  dextrose (D50W) (25 g/50 mL) IV injection 25 g, 25 g, Intravenous, Q15 Min PRN, Cesar Leung III, MD  •  dextrose (GLUTOSE) oral gel 15 g, 15 g, Oral, Q15 Min PRN, Cesar Leung III, MD  •  enoxaparin (LOVENOX) syringe 40 mg, 40 mg, Subcutaneous, Q12H, Cesar Leung III, MD, 40 mg at 21 0829  •  furosemide (LASIX) tablet 40 mg, 40 mg, Oral, BID, Cesar Leung III, MD, 40 mg at 21 1724  •  glucagon (human recombinant) (GLUCAGEN DIAGNOSTIC) injection 1 mg, 1 mg, Subcutaneous, Q15 Min PRN, Cesar Leung III, MD  •  hydrOXYzine (ATARAX) tablet 50 mg, 50 mg, Oral, Nightly PRN, Cesar Leung III, MD  •  " insulin detemir (LEVEMIR) injection 50 Units, 50 Units, Subcutaneous, BID AC, Rita Busby MD  •  [START ON 12/18/2021] insulin lispro (humaLOG) injection 25 Units, 25 Units, Subcutaneous, Daily With Breakfast, Rita Busby MD  •  insulin lispro (humaLOG) injection 25 Units, 25 Units, Subcutaneous, Daily With Lunch, Rita Busby MD, 25 Units at 12/17/21 1217  •  insulin lispro (humaLOG) injection 25 Units, 25 Units, Subcutaneous, Daily With Dinner, Rita Busby MD  •  labetalol (NORMODYNE) tablet 200 mg, 200 mg, Oral, Q8H, Cesar Leung III, MD, 200 mg at 12/17/21 1357  •  NIFEdipine XL (PROCARDIA XL) 24 hr tablet 30 mg, 30 mg, Oral, Q12H, Cesar Leung III, MD, 30 mg at 12/17/21 1035  •  piperacillin-tazobactam (ZOSYN) 4.5 g in iso-osmotic dextrose 100 mL IVPB (premix), 4.5 g, Intravenous, Q8H, Cesar Leung III, MD, 4.5 g at 12/17/21 1357  •  sodium chloride 0.45 % infusion, 50 mL/hr, Intravenous, Continuous, Cesar Leung III, MD, Last Rate: 50 mL/hr at 12/16/21 1647, 50 mL/hr at 12/16/21 1647  •  sodium chloride 0.9 % flush 10 mL, 10 mL, Intravenous, PRN, Karen, Cesar, DO  •  sodium chloride 0.9 % flush 10 mL, 10 mL, Intravenous, Q12H, Cesar Leung III, MD    Antibiotics:  Anti-Infectives (From admission, onward)    Ordered     Dose/Rate Route Frequency Start Stop    12/15/21 2117  piperacillin-tazobactam (ZOSYN) 4.5 g in iso-osmotic dextrose 100 mL IVPB (premix)        Ordering Provider: Cesar Leung III, MD    4.5 g  over 4 Hours Intravenous Every 8 Hours 12/16/21 2200 12/19/21 2159    12/15/21 1133  vancomycin 3000 mg/500 mL 0.9% NS IVPB ([facility])        Ordering Provider: Daria Carreon PA    20 mg/kg × 193 kg Intravenous Once 12/15/21 1135 12/15/21 1759    12/15/21 1133  piperacillin-tazobactam (ZOSYN) 4.5 g in iso-osmotic dextrose 100 mL IVPB (premix)        Ordering Provider: Daria Carreon PA    4.5 g  over 30 Minutes Intravenous Once 12/15/21 1135  12/15/21 1232            Review of Systems:  Constitutional-- No Fever, chills or sweats.  Appetite good, and no malaise. No fatigue.  HEENT-- No new vision, hearing or throat complaints.  No epistaxis or oral sores.  Denies odynophagia or dysphagia. +/- headache, no photophobia or neck stiffness.  CV-- No chest pain, palpitation or syncope  Resp-- + SOB/+/- cough/no Hemoptysis  GI- No nausea, vomiting, or diarrhea.  No hematochezia, melena, or hematemesis. Denies jaundice or chronic liver disease. Edema in pannus.  -- No dysuria, hematuria, or flank pain.  Denies hesitancy, urgency or burning with urination.  Lymph- no swollen lymph nodes in neck/axilla or groin.   Heme- No active bruising or bleeding; no Hx of DVT or PE.  MS-- no swelling or pain in the bones or joints of arms/legs.  No new back pain.  Neuro-- No acute focal weakness or numbness in the arms or legs.  No seizures.  Skin--No rashes.   surgical site healing well with no pain.      Physical Exam:   Vital Signs  Temp (24hrs), Av.1 °F (36.7 °C), Min:97.5 °F (36.4 °C), Max:98.4 °F (36.9 °C)    Temp  Min: 97.5 °F (36.4 °C)  Max: 98.4 °F (36.9 °C)  BP  Min: 127/65  Max: 154/79  Pulse  Min: 60  Max: 93  Resp  Min: 16  Max: 20  SpO2  Min: 88 %  Max: 100 %    GENERAL: Awake and alert, in no acute distress.   HEENT: Normocephalic, atraumatic.  PERRL. EOMI. No conjunctival injection. No icterus. Oropharynx clear without evidence of thrush or exudate.  NECK: Supple without nuchal rigidity. No mass.  LYMPH: No cervical, axillary or inguinal lymphadenopathy.  HEART: RRR; No murmur, rubs, gallops.   LUNGS: Diminished at lung bases bilaterally without wheezing, rales, rhonchi. Normal respiratory effort. Nonlabored.  ABDOMEN: Soft, nontender, nondistended. Positive bowel sounds. No rebound or guarding. NO mass or HSM. Massively obese  EXT:  No cyanosis, clubbing 2+ edema. No cord.  :  Without Stratton catheter.  MSK:  FROM, no joint effusions  SKIN:  Warm and dry without cutaneous eruptions on Inspection/palpation.    NEURO: Oriented to PPT. Normal speech and cognition.  PSYCHIATRIC: Normal insight and judgment. Cooperative with PE    Laboratory Data    Results from last 7 days   Lab Units 12/16/21  1046 12/15/21  1100 12/12/21  0927   WBC 10*3/mm3 11.38* 14.13* 6.15   HEMOGLOBIN g/dL 7.5* 8.4* 7.2*   HEMATOCRIT % 25.3* 28.3* 24.5*   PLATELETS 10*3/mm3 468* 478* 333     Results from last 7 days   Lab Units 12/17/21  0701   SODIUM mmol/L 138   POTASSIUM mmol/L 4.2   CHLORIDE mmol/L 100   CO2 mmol/L 26.0   BUN mg/dL 11   CREATININE mg/dL 1.03*   GLUCOSE mg/dL 76   CALCIUM mg/dL 8.5*     Results from last 7 days   Lab Units 12/16/21  1046   ALK PHOS U/L 144*   BILIRUBIN mg/dL 0.2   ALT (SGPT) U/L 29   AST (SGOT) U/L 20             Results from last 7 days   Lab Units 12/15/21  1100   LACTATE mmol/L 0.9         Results from last 7 days   Lab Units 12/17/21  0701   VANCOMYCIN TR mcg/mL 9.70     Estimated Creatinine Clearance: 147 mL/min (A) (by C-G formula based on SCr of 1.03 mg/dL (H)).      Microbiology:  Microbiology Results (last 10 days)     Procedure Component Value - Date/Time    MRSA Screen, PCR (Inpatient) - Swab, Nares [[id number]]  (Normal) Collected: 12/15/21 2233    Lab Status: Final result Specimen: Swab from Nares Updated: 12/16/21 0723     MRSA PCR Negative    Narrative:      MRSA Negative    Blood Culture - Blood, Arm, Left [[id number]]  (Normal) Collected: 12/15/21 1145    Lab Status: Preliminary result Specimen: Blood from Arm, Left Updated: 12/17/21 1200     Blood Culture No growth at 2 days    Blood Culture - Blood, Arm, Right [[id number]]  (Abnormal) Collected: 12/15/21 1130    Lab Status: Final result Specimen: Blood from Arm, Right Updated: 12/17/21 0619     Blood Culture Staphylococcus, coagulase negative     Isolated from Aerobic Bottle     Gram Stain Aerobic Bottle Gram positive cocci in pairs and clusters     Comment: Modified report.  Previous result was Gram positive cocci in pairs and clusters on 12/16/2021 at 0748 EST.       Narrative:      Probable contaminant requires clinical correlation, susceptibility not performed unless requested by physician.      Blood Culture ID, PCR - Blood, Arm, Right [[id number]]  (Abnormal) Collected: 12/15/21 1130    Lab Status: Final result Specimen: Blood from Arm, Right Updated: 12/16/21 0904     BCID, PCR Staph spp, not aureus or lugdunesis. Identification by BCID2 PCR.    COVID PRE-OP / PRE-PROCEDURE SCREENING ORDER (NO ISOLATION) - Swab, Nasopharynx [[id number]]  (Normal) Collected: 12/15/21 1100    Lab Status: Final result Specimen: Swab from Nasopharynx Updated: 12/15/21 1134    Narrative:      The following orders were created for panel order COVID PRE-OP / PRE-PROCEDURE SCREENING ORDER (NO ISOLATION) - Swab, Nasopharynx.  Procedure                               Abnormality         Status                     ---------                               -----------         ------                     COVID-19 and FLU A/B PCR...[[id number]]  Normal              Final result                 Please view results for these tests on the individual orders.    COVID-19 and FLU A/B PCR - Swab, Nasopharynx [[id number]]  (Normal) Collected: 12/15/21 1100    Lab Status: Final result Specimen: Swab from Nasopharynx Updated: 12/15/21 1134     COVID19 Not Detected     Influenza A PCR Not Detected     Influenza B PCR Not Detected    Narrative:      Fact sheet for providers: https://www.fda.gov/media/315024/download    Fact sheet for patients: https://www.fda.gov/media/476098/download    Test performed by PCR.    Blood Culture - Blood, Arm, Right [[id number]]  (Normal) Collected: 12/09/21 1048    Lab Status: Final result Specimen: Blood from Arm, Right Updated: 12/14/21 1200     Blood Culture No growth at 5 days    Blood Culture - Blood, Arm, Left [[id number]]  (Normal) Collected: 12/09/21 1035    Lab Status: Final result Specimen:  Blood from Arm, Left Updated: 21 1200     Blood Culture No growth at 5 days                Radiology:  Imaging Results (Last 72 Hours)     Procedure Component Value Units Date/Time    CT Angiogram Chest [[id number]] Collected: 12/15/21 1500     Updated: 12/15/21 1713    Narrative:      EXAMINATION: CT ANGIOGRAM CHEST-, CT ABDOMEN PELVIS W CONTRAST-  12/15/2021     INDICATION: R/O PE, hypoxia, s/p  12-6;  shortness of breath     TECHNIQUE: Multiple axial CT imaging was obtained of the chest pre and  post the administration of intravenous contrast according to the CT  angio protocol. 2-D coronal reformatted images were submitted to further  facilitate diagnostic accuracy and treatment planning. CT imaging was  obtained of the abdomen and pelvis following the administration of  intravenous contrast. Coronal and sagittal reformatted images were  submitted to further facilitate diagnostic accuracy and treatment  planning.     The radiation dose reduction device was turned on for each scan per the  ALARA (As Low as Reasonably Achievable) protocol.      COMPARISON:  None.     FINDINGS:       CHEST: Thyroid is homogeneous in appearance. There is soft tissue in the  anterior mediastinum suggesting residual thymus. There is a small right  pleural effusion with patchy groundglass opacity identified throughout  the mid and lower lung fields bilaterally with consolidative infiltrate  most pronounced at the right lung base. Findings concerning for  infectious process. There is no pericardial effusion. No bulky hilar or  axillary lymphadenopathy. Degenerative changes seen within the spine.     ABDOMEN: The liver is homogeneous. Spleen is unremarkable. Pancreas is  homogeneous. No stones in the gallbladder. Anasarca seen in the  subcutaneous tissues. The abdominal portion of the gastrointestinal  tract are within normal limits. No free fluid or free air. There is  edema identified throughout the subcutaneous tissues  anteriorly with  some skin thickening seen overlying the pannus.     PELVIS: The pelvic organs reveal postpartum uterus with enlargement  identified. There is mild distention of the bladder. The pelvic portion  of the gastrointestinal tract within normal limits. No free fluid or  free air. No abnormal mass or fluid collections identified. Bony  structures reveal no evidence of osseous abnormality.     Delayed imaging reveals contrast seen in the renal collecting systems  bilaterally as well as within the ureters and bladder with no evidence  of obstruction.       Impression:      Patchy groundglass opacity identified diffusely throughout  the mid and lower lung fields bilaterally with consolidative infiltrate  identified at the right lung base concerning for infiltrate. There is a  tiny right pleural effusion. The cardiac chambers are enlarged.  Postpartum uterus identified with no CT evidence of acute  intra-abdominal or pelvic abnormality. The uterus itself is not well  evaluated due to patient's large body habitus. There is extensive edema  identified throughout the subcutaneous tissues within the pannus.     D:  12/15/2021  E:  12/15/2021     This report was finalized on 12/15/2021 5:10 PM by Dr. Chelsie Mejia MD.       CT Abdomen Pelvis With Contrast [[id number]] Collected: 12/15/21 1500     Updated: 12/15/21 1713    Narrative:      EXAMINATION: CT ANGIOGRAM CHEST-, CT ABDOMEN PELVIS W CONTRAST-  12/15/2021     INDICATION: R/O PE, hypoxia, s/p  12-6;  shortness of breath     TECHNIQUE: Multiple axial CT imaging was obtained of the chest pre and  post the administration of intravenous contrast according to the CT  angio protocol. 2-D coronal reformatted images were submitted to further  facilitate diagnostic accuracy and treatment planning. CT imaging was  obtained of the abdomen and pelvis following the administration of  intravenous contrast. Coronal and sagittal reformatted images  were  submitted to further facilitate diagnostic accuracy and treatment  planning.     The radiation dose reduction device was turned on for each scan per the  ALARA (As Low as Reasonably Achievable) protocol.      COMPARISON:  None.     FINDINGS:       CHEST: Thyroid is homogeneous in appearance. There is soft tissue in the  anterior mediastinum suggesting residual thymus. There is a small right  pleural effusion with patchy groundglass opacity identified throughout  the mid and lower lung fields bilaterally with consolidative infiltrate  most pronounced at the right lung base. Findings concerning for  infectious process. There is no pericardial effusion. No bulky hilar or  axillary lymphadenopathy. Degenerative changes seen within the spine.     ABDOMEN: The liver is homogeneous. Spleen is unremarkable. Pancreas is  homogeneous. No stones in the gallbladder. Anasarca seen in the  subcutaneous tissues. The abdominal portion of the gastrointestinal  tract are within normal limits. No free fluid or free air. There is  edema identified throughout the subcutaneous tissues anteriorly with  some skin thickening seen overlying the pannus.     PELVIS: The pelvic organs reveal postpartum uterus with enlargement  identified. There is mild distention of the bladder. The pelvic portion  of the gastrointestinal tract within normal limits. No free fluid or  free air. No abnormal mass or fluid collections identified. Bony  structures reveal no evidence of osseous abnormality.     Delayed imaging reveals contrast seen in the renal collecting systems  bilaterally as well as within the ureters and bladder with no evidence  of obstruction.       Impression:      Patchy groundglass opacity identified diffusely throughout  the mid and lower lung fields bilaterally with consolidative infiltrate  identified at the right lung base concerning for infiltrate. There is a  tiny right pleural effusion. The cardiac chambers are  enlarged.  Postpartum uterus identified with no CT evidence of acute  intra-abdominal or pelvic abnormality. The uterus itself is not well  evaluated due to patient's large body habitus. There is extensive edema  identified throughout the subcutaneous tissues within the pannus.     D:  12/15/2021  E:  12/15/2021     This report was finalized on 12/15/2021 5:10 PM by Dr. Chelsie Mejia MD.       XR Chest 1 View [[id number]] Collected: 12/15/21 1142     Updated: 12/15/21 1658    Narrative:      EXAMINATION: XR CHEST 1 VW-12/15/2021:      INDICATION: SOA, triage protocol.      COMPARISON: 09/02/2020.     FINDINGS: Portable chest reveals the heart to be borderline enlarged.  Mild increased markings identified at the right lung base and left lung  base concerning for infiltrate. No definite pleural effusion or  pneumothorax. Degenerative changes seen within the spine.       Impression:      Increased markings identified of the mid and lower lung  fields bilaterally concerning for infiltrate.     D:  12/15/2021  E:  12/15/2021     This report was finalized on 12/15/2021 4:54 PM by Dr. Chelsie Mejia MD.               Impression:   1. Pneumonia/pulmonary infiltrates-she has bilateral pulmonary infiltrates with acute hypoxic respiratory failure that could be secondary to volume overload in combination with pneumonia.  We will check a bio fire PCR respiratory virus panel.  I will treat her with intravenous Zosyn alone for the time being and I do not think that she will require additional vancomycin therapy.  I think we will be able to give her a brief course of antibacterial antibiotic therapy.  2. CoNS in blood culture-this is almost certainly a skin contaminant.  3. Postpartum hypertension/preeclampsia  4. Anemia  5. Acute hypoxic respiratory failure/now on room air  6. Massive obesity with increased risk for aspiration and hypoventilation syndrome  7. Type 2 diabetes mellitus  8. Essential  hypertension  9. IUP/ section 21  10. Recent endometritis/treated with IV antibiotics.   11. Leukocytosis/neutrophilia    PLAN/RECOMMENDATIONS:   Thank you for asking us to see Edd Leung, I recommend the followin. Follow blood cultures  2. Check Respiratory panel PCR with COVID-19 to r/o viral pneumonia  3. Continue Zosyn  4. Discontinue Vancomycin as no evidence of MRSA  5. Diuretic therapy per Maternal-Fetal Medicine      To Hargrove MD saw and examined patient, verified hx and PE, read all radiographic studies, reviewed labs and micro data, and formulated dx, plan for treatment and all medical decision making.      Dalton Hayes PA-C for MD To Dewey MD  2021  17:42 EST

## 2021-12-17 NOTE — NURSING NOTE
Infectious disease called twice for consult from unit [facility]. First call was at 0813 and the second call per rebecca's request was at 1334. Still waiting on infectious disease to round on patient.

## 2021-12-17 NOTE — PROGRESS NOTES
Antepartum Progress Note    Patient name: Edd Leung  YOB: 1985   MRN: [id number]  Admission Date: 12/15/2021  Date of Service: 2021    Edd Leung is a 36 y.o.    at 37w4d  admitted on 12/15/2021 for SOA and elevated BP's consistent with preeclampsia with severe features.   Patient with negative CT PE protocol, but evidence of bibasilar pneumonia on CT. Started on Vanc/Zosyn and noted to have blood cultures positive for staph species, non-MRSA.   S/p magnesium for 24hrs for neuroprotection.   BP's now better controlled with meds.     Patient Active Problem List    Diagnosis    • Preeclampsia in postpartum period [O14.95]      Hospital day 2    Subjective:      Edd reports feeling much better.   No current SOA. Her O2 sats monitor is still registering occasional sats < 90%, but is usually short-lived and explained by sleep or change of position. She does report feeling like she could hear noise coming from her chest when she would lay down to sleep previously, like had phlegm in her chest. Currently on no oxygen and satting normally.     Pt denies CP/palpitations. Reports that her edema in bilateral lower extremities and abdominal wall feel much better.     Objective:     Vital signs:  Temp:  [97.5 °F (36.4 °C)-98.4 °F (36.9 °C)] 97.9 °F (36.6 °C)  Heart Rate:  [60-93] 73  Resp:  [16-20] 20  BP: (127-153)/(57-76) 140/76     I/O -3,660ml for the day    GEN: no apparent distress. Resting comfortably in chair without oxygen on.   CHEST: normal pulmonary effort, crackles heard in bilateral lower lung fields  CV: R/R/R  ABD: soft superiorly, nontender, pannus with woody edema of lowest portion  INC C/D/I  EXT: nontender; still with edema to bilateral tops of feet and lower extremities, but no longer taut    Medications:  buPROPion SR, 150 mg, Oral, Daily  enoxaparin, 40 mg, Subcutaneous, Q12H  furosemide, 40 mg, Oral, BID  insulin detemir, 50 Units,  Subcutaneous, BID AC  [START ON 12/18/2021] insulin lispro, 25 Units, Subcutaneous, Daily With Breakfast  insulin lispro, 25 Units, Subcutaneous, Daily With Lunch  insulin lispro, 25 Units, Subcutaneous, Daily With Dinner  labetalol, 200 mg, Oral, Q8H  NIFEdipine XL, 30 mg, Oral, Q12H  piperacillin-tazobactam, 4.5 g, Intravenous, Q8H  sodium chloride, 10 mL, Intravenous, Q12H  vancomycin, 1,500 mg, Intravenous, Q12H       Labs:  Lab Results (last 24 hours)     Procedure Component Value Units Date/Time    Vancomycin, Peak [[id number]]  (Abnormal) Collected: 12/17/21 1222    Specimen: Blood Updated: 12/17/21 1334     Vancomycin Peak 17.70 mcg/mL     Narrative:      Therapeutic Ranges for Vancomycin    Vancomycin Random   5.0-40.0 mcg/mL  Vancomycin Trough   5.0-20.0 mcg/mL  Vancomycin Peak     20.0-40.0 mcg/mL    POC Glucose Once [[id number]]  (Normal) Collected: 12/17/21 1210    Specimen: Blood Updated: 12/17/21 1212     Glucose 130 mg/dL      Comment: Meter: [id number] : [id number] Fox Nuñez       Blood Culture - Blood, Arm, Left [[id number]]  (Normal) Collected: 12/15/21 1145    Specimen: Blood from Arm, Left Updated: 12/17/21 1200     Blood Culture No growth at 2 days    POC Glucose Once [[id number]]  (Abnormal) Collected: 12/17/21 1013    Specimen: Blood Updated: 12/17/21 1019     Glucose 174 mg/dL      Comment: Meter: [id number] : [id number] Fox Nuñez       Vancomycin, Trough [[id number]]  (Normal) Collected: 12/17/21 0701    Specimen: Blood Updated: 12/17/21 0751     Vancomycin Trough 9.70 mcg/mL     Narrative:      Therapeutic Ranges for Vancomycin    Vancomycin Random   5.0-40.0 mcg/mL  Vancomycin Trough   5.0-20.0 mcg/mL  Vancomycin Peak     20.0-40.0 mcg/mL    Basic Metabolic Panel [[id number]]  (Abnormal) Collected: 12/17/21 0701    Specimen: Blood Updated: 12/17/21 0750     Glucose 76 mg/dL      BUN 11 mg/dL      Creatinine 1.03 mg/dL      Sodium 138 mmol/L      Potassium 4.2 mmol/L       Chloride 100 mmol/L      CO2 26.0 mmol/L      Calcium 8.5 mg/dL      eGFR  African Amer 74 mL/min/1.73      BUN/Creatinine Ratio 10.7     Anion Gap 12.0 mmol/L     Narrative:      GFR Normal >60  Chronic Kidney Disease <60  Kidney Failure <15      POC Glucose Once [[id number]]  (Normal) Collected: 21 0725    Specimen: Blood Updated: 21 0737     Glucose 79 mg/dL      Comment: Meter: [id number] : [id number] Fox Nuñez       Blood Culture - Blood, Arm, Right [[id number]]  (Abnormal) Collected: 12/15/21 1130    Specimen: Blood from Arm, Right Updated: 21 0619     Blood Culture Staphylococcus, coagulase negative     Isolated from Aerobic Bottle     Gram Stain Aerobic Bottle Gram positive cocci in pairs and clusters     Comment: Modified report. Previous result was Gram positive cocci in pairs and clusters on 2021 at 0748 EST.       Narrative:      Probable contaminant requires clinical correlation, susceptibility not performed unless requested by physician.      POC Glucose Once [[id number]]  (Normal) Collected: 21    Specimen: Blood Updated: 21     Glucose 117 mg/dL      Comment: Meter: [id number] : [id number] Ava Vasquez       POC Glucose Once [[id number]]  (Normal) Collected: 21 1746    Specimen: Blood Updated: 21 1748     Glucose 108 mg/dL      Comment: Meter: [id number] : [id number] Tim Crump       POC Glucose Once [[id number]]  (Normal) Collected: 21 1537    Specimen: Blood Updated: 21 1539     Glucose 70 mg/dL      Comment: Meter: [id number] : [id number] Polo Ellis           Lab Results   Component Value Date    HGB 7.5 (L) 2021       Assessment/Plan:      Edd is a 36 y.o.    S/p repeat  on 21 readmitted for SOA and elevated BP's. Her pregnancy was complicated by Type 2 DM and CHTN. On admission, noted to have bibasilar opacities and assumed pneumonia. Blood cultures with mixed  results, but one bottle growing Staph. Patient also with marked lower extremity and pannus edema. Currently on Vanc/Zosyn with inadequate Vanc peak.  S/p IV lasix x several doses. Now receiving po lasix. Creatinine is increasing to 1.03.  Currently feeling well and oxygenating well off oxygen. AFVSS.     1. Awaiting ID consult to determine if blood cultures are contaminate or real and recommend appropriate management/antibiotics. Would like to stop Vanc/Zosyn and transition to more narrow coverage as creatinine is increasing.  2. Continuing lasix po but will repeat creatinine in am and if continues to increase will hold lasix  3. Encourage incentive spirometer use q hour while awake  4. Decreased lantus and lispro as patient was low prior to meals and they were holding insulin     I spent 25 minutes with patient, in counseling and coordination of care.  All questions were answered to the best of my ablity.    Rita Busby MD  12/17/2021  13:41 EST

## 2021-12-17 NOTE — PROGRESS NOTES
Pharmacy Consult-Vancomycin Dosing  Edd Leung is a  36 y.o. female receiving vancomycin therapy.     Indication: Pneumonia  Consulting Provider: OB/GYN   ID Consult: No    Goal AUC: 400 - 600 mg/L*hr    Current Antimicrobial Therapy  Anti-Infectives (From admission, onward)      Ordered     Dose/Rate Route Frequency Start Stop    12/15/21 2117  piperacillin-tazobactam (ZOSYN) 4.5 g in iso-osmotic dextrose 100 mL IVPB (premix)        Ordering Provider: Cesar Leung III, MD    4.5 g  over 4 Hours Intravenous Every 8 Hours 12/16/21 2200 12/19/21 2159    12/15/21 2117  vancomycin 1500 mg/500 mL 0.9% NS IVPB ([facility])        Note to Pharmacy: Pharmacy to assist with daily dosing please   Ordering Provider: Cesar Leung III, MD    1,500 mg Intravenous Every 12 Hours 12/16/21 0600 12/19/21 0559    12/15/21 2143  Pharmacy to dose vancomycin        Ordering Provider: Cesar Leung III, MD     Does not apply Continuous PRN 12/15/21 2142 12/18/21 2141    12/15/21 1133  vancomycin 3000 mg/500 mL 0.9% NS IVPB ([facility])        Ordering Provider: Daria Carreon PA    20 mg/kg × 193 kg Intravenous Once 12/15/21 1135 12/15/21 1759    12/15/21 1133  piperacillin-tazobactam (ZOSYN) 4.5 g in iso-osmotic dextrose 100 mL IVPB (premix)        Ordering Provider: Daria Carreon PA    4.5 g  over 30 Minutes Intravenous Once 12/15/21 1135 12/15/21 1232            Allergies  Allergies as of 12/15/2021 - Reviewed 12/15/2021   Allergen Reaction Noted    Latex Rash 04/05/2017       Labs    Results from last 7 days   Lab Units 12/17/21  0701 12/16/21  1046 12/15/21  1100   BUN mg/dL 11 7 7   CREATININE mg/dL 1.03* 0.97 0.73       Results from last 7 days   Lab Units 12/16/21  1046 12/15/21  1100 12/12/21  0927   WBC 10*3/mm3 11.38* 14.13* 6.15       Evaluation of Dosing     Last Dose Received in the ED/Outside Facility: Vancomycin 3,000mg IV given 12/15 @ 1253 in ED  Is Patient on Dialysis or Renal Replacement: No    Ht -  "175.3 cm (69.02\")  Wt - (!) 202 kg (445 lb 1.9 oz)    Estimated Creatinine Clearance: 147 mL/min (A) (by C-G formula based on SCr of 1.03 mg/dL (H)).    Intake & Output (last 3 days)         12/14 0701  12/15 0700 12/15 0701  12/16 0700 12/16 0701 12/17 0700 12/17 0701 12/18 0700    P.O.  400 240     IV Piggyback  600      Total Intake(mL/kg)  1000 (5.2) 240 (1.2)     Urine (mL/kg/hr)  3300 3900 (0.8)     Total Output  3300 3900     Net  -2300 -3660             Urine Unmeasured Occurrence   1 x             Microbiology and Radiology  Microbiology Results (last 10 days)       Procedure Component Value - Date/Time    MRSA Screen, PCR (Inpatient) - Swab, Nares [[id number]]  (Normal) Collected: 12/15/21 2233    Lab Status: Final result Specimen: Swab from Nares Updated: 12/16/21 0723     MRSA PCR Negative    Narrative:      MRSA Negative    Blood Culture - Blood, Arm, Left [[id number]]  (Normal) Collected: 12/15/21 1145    Lab Status: Preliminary result Specimen: Blood from Arm, Left Updated: 12/17/21 1200     Blood Culture No growth at 2 days    Blood Culture - Blood, Arm, Right [[id number]]  (Abnormal) Collected: 12/15/21 1130    Lab Status: Final result Specimen: Blood from Arm, Right Updated: 12/17/21 0619     Blood Culture Staphylococcus, coagulase negative     Isolated from Aerobic Bottle     Gram Stain Aerobic Bottle Gram positive cocci in pairs and clusters     Comment: Modified report. Previous result was Gram positive cocci in pairs and clusters on 12/16/2021 at 0748 EST.       Narrative:      Probable contaminant requires clinical correlation, susceptibility not performed unless requested by physician.      Blood Culture ID, PCR - Blood, Arm, Right [[id number]]  (Abnormal) Collected: 12/15/21 1130    Lab Status: Final result Specimen: Blood from Arm, Right Updated: 12/16/21 0904     BCID, PCR Staph spp, not aureus or lugdunesis. Identification by BCID2 PCR.    COVID PRE-OP / PRE-PROCEDURE SCREENING ORDER (NO " ISOLATION) - Swab, Nasopharynx [[id number]]  (Normal) Collected: 12/15/21 1100    Lab Status: Final result Specimen: Swab from Nasopharynx Updated: 12/15/21 1134    Narrative:      The following orders were created for panel order COVID PRE-OP / PRE-PROCEDURE SCREENING ORDER (NO ISOLATION) - Swab, Nasopharynx.  Procedure                               Abnormality         Status                     ---------                               -----------         ------                     COVID-19 and FLU A/B PCR...[[id number]]  Normal              Final result                 Please view results for these tests on the individual orders.    COVID-19 and FLU A/B PCR - Swab, Nasopharynx [[id number]]  (Normal) Collected: 12/15/21 1100    Lab Status: Final result Specimen: Swab from Nasopharynx Updated: 12/15/21 1134     COVID19 Not Detected     Influenza A PCR Not Detected     Influenza B PCR Not Detected    Narrative:      Fact sheet for providers: https://www.fda.gov/media/132112/download    Fact sheet for patients: https://www.fda.gov/media/147313/download    Test performed by PCR.    Blood Culture - Blood, Arm, Right [[id number]]  (Normal) Collected: 12/09/21 1048    Lab Status: Final result Specimen: Blood from Arm, Right Updated: 12/14/21 1200     Blood Culture No growth at 5 days    Blood Culture - Blood, Arm, Left [[id number]]  (Normal) Collected: 12/09/21 1035    Lab Status: Final result Specimen: Blood from Arm, Left Updated: 12/14/21 1200     Blood Culture No growth at 5 days            Reported Vancomycin Levels          Results from last 7 days   Lab Units 12/17/21  1222   VANCOMYCIN PK mcg/mL 17.70*       Results from last 7 days   Lab Units 12/17/21  0701   VANCOMYCIN TR mcg/mL 9.70          [employer] AUC Calculation:    Current AUC: -- mg/L*hr    Predicted Steady State AUC on Current Dose: 400 mg/L*hr  _________________________________    Predicted Steady State AUC on New Dose: N/A mg/L*hr    Assessment/Plan:    1.  Pharmacy to dose vancomycin for pneumonia. Goal -600 mg/L*hr. Patient currently receiving a maintenance dose of vancomycin 1500mg IV q12hr.  2. Vancomycin trough 9.7 mcg/mL. Urine output and SCr appear stable. Today will continue vancomycin 1500mg IV q12hr. Peak level entered by RN following this morning's trough 17.7 mcg/mL but likely not reflective of true peak level due to timing of draw.  3. Will order follow up vancomycin trough for Sunday if vancomycin is not discontinued by ID who were consulted today. CMP ordered for tomorrow by OB/GYN.  4. Pharmacy will continue to monitor renal function, cultures and sensitivities, and clinical status to adjust regimen as necessary.    Adebayo Kurtz, PharmD, BCPS  12/17/2021  13:54 EST

## 2021-12-18 LAB
ALBUMIN SERPL-MCNC: 3.6 G/DL (ref 3.5–5.2)
ALBUMIN/GLOB SERPL: 1 G/DL
ALP SERPL-CCNC: 135 U/L (ref 39–117)
ALT SERPL W P-5'-P-CCNC: 24 U/L (ref 1–33)
ANION GAP SERPL CALCULATED.3IONS-SCNC: 11 MMOL/L (ref 5–15)
AST SERPL-CCNC: 18 U/L (ref 1–32)
BILIRUB SERPL-MCNC: 0.3 MG/DL (ref 0–1.2)
BUN SERPL-MCNC: 10 MG/DL (ref 6–20)
BUN/CREAT SERPL: 11.2 (ref 7–25)
CALCIUM SPEC-SCNC: 8.9 MG/DL (ref 8.6–10.5)
CHLORIDE SERPL-SCNC: 104 MMOL/L (ref 98–107)
CO2 SERPL-SCNC: 27 MMOL/L (ref 22–29)
CREAT SERPL-MCNC: 0.89 MG/DL (ref 0.57–1)
GFR SERPL CREATININE-BSD FRML MDRD: 88 ML/MIN/1.73
GLOBULIN UR ELPH-MCNC: 3.5 GM/DL
GLUCOSE BLDC GLUCOMTR-MCNC: 63 MG/DL (ref 70–130)
GLUCOSE BLDC GLUCOMTR-MCNC: 66 MG/DL (ref 70–130)
GLUCOSE BLDC GLUCOMTR-MCNC: 70 MG/DL (ref 70–130)
GLUCOSE BLDC GLUCOMTR-MCNC: 70 MG/DL (ref 70–130)
GLUCOSE BLDC GLUCOMTR-MCNC: 82 MG/DL (ref 70–130)
GLUCOSE BLDC GLUCOMTR-MCNC: 92 MG/DL (ref 70–130)
GLUCOSE SERPL-MCNC: 85 MG/DL (ref 65–99)
POTASSIUM SERPL-SCNC: 4.6 MMOL/L (ref 3.5–5.2)
PROT SERPL-MCNC: 7.1 G/DL (ref 6–8.5)
SODIUM SERPL-SCNC: 142 MMOL/L (ref 136–145)

## 2021-12-18 PROCEDURE — 63710000001 INSULIN LISPRO (HUMAN) PER 5 UNITS: Performed by: OBSTETRICS & GYNECOLOGY

## 2021-12-18 PROCEDURE — 99231 SBSQ HOSP IP/OBS SF/LOW 25: CPT | Performed by: OBSTETRICS & GYNECOLOGY

## 2021-12-18 PROCEDURE — 80053 COMPREHEN METABOLIC PANEL: CPT | Performed by: OBSTETRICS & GYNECOLOGY

## 2021-12-18 PROCEDURE — 82962 GLUCOSE BLOOD TEST: CPT

## 2021-12-18 PROCEDURE — 25010000002 ENOXAPARIN PER 10 MG: Performed by: OBSTETRICS & GYNECOLOGY

## 2021-12-18 PROCEDURE — 25010000002 PIPERACILLIN SOD-TAZOBACTAM PER 1 G: Performed by: OBSTETRICS & GYNECOLOGY

## 2021-12-18 PROCEDURE — 63710000001 INSULIN DETEMIR PER 5 UNITS: Performed by: OBSTETRICS & GYNECOLOGY

## 2021-12-18 PROCEDURE — 25010000002 PIPERACILLIN SOD-TAZOBACTAM PER 1 G: Performed by: INTERNAL MEDICINE

## 2021-12-18 RX ADMIN — TAZOBACTAM SODIUM AND PIPERACILLIN SODIUM 4.5 G: 500; 4 INJECTION, SOLUTION INTRAVENOUS at 14:42

## 2021-12-18 RX ADMIN — INSULIN LISPRO 25 UNITS: 100 INJECTION, SOLUTION INTRAVENOUS; SUBCUTANEOUS at 17:44

## 2021-12-18 RX ADMIN — BUPROPION HYDROCHLORIDE 150 MG: 150 TABLET, EXTENDED RELEASE ORAL at 09:45

## 2021-12-18 RX ADMIN — TAZOBACTAM SODIUM AND PIPERACILLIN SODIUM 4.5 G: 500; 4 INJECTION, SOLUTION INTRAVENOUS at 06:42

## 2021-12-18 RX ADMIN — LABETALOL HYDROCHLORIDE 200 MG: 200 TABLET, FILM COATED ORAL at 21:24

## 2021-12-18 RX ADMIN — FUROSEMIDE 40 MG: 40 TABLET ORAL at 09:45

## 2021-12-18 RX ADMIN — ENOXAPARIN SODIUM 40 MG: 40 INJECTION SUBCUTANEOUS at 09:44

## 2021-12-18 RX ADMIN — NIFEDIPINE 30 MG: 30 TABLET, FILM COATED, EXTENDED RELEASE ORAL at 20:16

## 2021-12-18 RX ADMIN — ENOXAPARIN SODIUM 40 MG: 40 INJECTION SUBCUTANEOUS at 20:16

## 2021-12-18 RX ADMIN — NIFEDIPINE 30 MG: 30 TABLET, FILM COATED, EXTENDED RELEASE ORAL at 09:45

## 2021-12-18 RX ADMIN — INSULIN LISPRO 25 UNITS: 100 INJECTION, SOLUTION INTRAVENOUS; SUBCUTANEOUS at 08:46

## 2021-12-18 RX ADMIN — SODIUM CHLORIDE 50 ML/HR: 4.5 INJECTION, SOLUTION INTRAVENOUS at 09:29

## 2021-12-18 RX ADMIN — INSULIN DETEMIR 45 UNITS: 100 INJECTION, SOLUTION SUBCUTANEOUS at 21:25

## 2021-12-18 RX ADMIN — LABETALOL HYDROCHLORIDE 200 MG: 200 TABLET, FILM COATED ORAL at 14:42

## 2021-12-18 RX ADMIN — INSULIN DETEMIR 50 UNITS: 100 INJECTION, SOLUTION SUBCUTANEOUS at 08:09

## 2021-12-18 RX ADMIN — INSULIN LISPRO 25 UNITS: 100 INJECTION, SOLUTION INTRAVENOUS; SUBCUTANEOUS at 12:39

## 2021-12-18 RX ADMIN — FUROSEMIDE 40 MG: 40 TABLET ORAL at 17:53

## 2021-12-18 RX ADMIN — TAZOBACTAM SODIUM AND PIPERACILLIN SODIUM 4.5 G: 500; 4 INJECTION, SOLUTION INTRAVENOUS at 22:34

## 2021-12-18 RX ADMIN — LABETALOL HYDROCHLORIDE 200 MG: 200 TABLET, FILM COATED ORAL at 06:42

## 2021-12-18 NOTE — PROGRESS NOTES
INFECTIOUS DISEASE Progress Note    Edd Leung  1985  [id number]    Admission Date: 12/15/2021      Requesting Provider: Héctor Hoffman MD  Evaluating Physician: To Hargrove Md    Reason for Consultation: Pneumonia and respiratory failure.    History of present illness:    21: Patient is a 36 y.o. female with h/o T2DM, abnormal cervical Pap smear, massive obesity, and HTN who we were asked to see for pneumonia  and positive blood cultures.  The patient recently delivered her baby by  section on 21.  Her post-surgical course was complicated by fluid overload, massive peripheral edema, and endometritis.  She was given Cefazolin gayle and post-operatively until  and she was changed to Clindamycin and gentamicin until 12/10 and ampicillin until 12/10. She was discharged home on .  She returned to [facility] ED on 12/15 for shortness of breath, headache, and edema.  She has remained afebrile on 2L O2.  She is now on room air. Labs are PCT 0.04, lactic acid 0.9, WBC 14,000 with 66% segs/2% bands,and proBNP 93.  A COVID-19/Flu A/B PCR is negative. Blood cultures are positive in 1 of 2 sets (1 of 4 bottles) with CoNS.  A MRSA PCR is negative. A UA is benign.  A CTA of chest, abd, and pelvis showed patchy GGO diffusely bilaterally with consolidative infiltrate in right lung base and subcutaneous edema in tissues of pannus.  She is currently on Zosyn and Vancomycin.  ID was asked to evaluate and manage her antibiotic therapy.     21:  She remains afebrile. Her O2 saturation is 93-98% on room air. Her respiratory virus panel PCR was negative. Blood cultures have grown coagulase-negative staph in a single set. Her blood pressure is 139/70.  She feels better today.  She denies increased cough and dyspnea.  She denies sputum production.    Past Medical History:   Diagnosis Date   • Anxiety    • Anxiety and depression    • Diabetes mellitus (HCC)    • Gonorrhea    • History of  "abnormal cervical Pap smear    • Hypertension        Past Surgical History:   Procedure Laterality Date   •  SECTION  2006   • DILATATION AND CURETTAGE         Family History   Problem Relation Age of Onset   • Hypertension Mother    • Hypertension Maternal Grandmother    • Diabetes Maternal Grandmother    • Diabetes Paternal Grandmother        Social History     Socioeconomic History   • Marital status:    • Number of children: 1   • Years of education: 14   • Highest education level: Associate degree: occupational, technical, or vocational program   Tobacco Use   • Smoking status: Former Smoker     Packs/day: 0.25     Years: 17.00     Pack years: 4.25     Types: Cigarettes     Quit date: 2019     Years since quittin.6   • Smokeless tobacco: Never Used   Vaping Use   • Vaping Use: Never used   Substance and Sexual Activity   • Alcohol use: Not Currently     Comment: SOCIALLY    • Drug use: Not Currently     Types: Marijuana     Comment: SOCIALLY    • Sexual activity: Yes     Partners: Male     Birth control/protection: None       Allergies   Allergen Reactions   • Latex Rash     \"makes my skin raw\"    • Banana Unknown - Low Severity   • Cucumber Extract Unknown - Low Severity   • Melon Unknown - Low Severity         Medication:    Current Facility-Administered Medications:   •  acetaminophen (TYLENOL) tablet 650 mg, 650 mg, Oral, Q4H PRN, Cesar Leung III, MD, 650 mg at 21 1647  •  buPROPion SR (WELLBUTRIN SR) 12 hr tablet 150 mg, 150 mg, Oral, Daily, Cesar Leung III, MD, 150 mg at 21 0829  •  dextrose (D50W) (25 g/50 mL) IV injection 25 g, 25 g, Intravenous, Q15 Min PRN, Cesar Leung III, MD  •  dextrose (GLUTOSE) oral gel 15 g, 15 g, Oral, Q15 Min PRN, Cesar Leung III, MD  •  enoxaparin (LOVENOX) syringe 40 mg, 40 mg, Subcutaneous, Q12H, Cesar Leung III, MD, 40 mg at 21 2122  •  furosemide (LASIX) tablet 40 mg, 40 mg, Oral, BID, Madhu" Cesar Hurtado III, MD, 40 mg at 12/17/21 1724  •  glucagon (human recombinant) (GLUCAGEN DIAGNOSTIC) injection 1 mg, 1 mg, Subcutaneous, Q15 Min PRN, Cesar Leung III, MD  •  hydrOXYzine (ATARAX) tablet 50 mg, 50 mg, Oral, Nightly PRN, Cesar Leung III, MD  •  insulin detemir (LEVEMIR) injection 50 Units, 50 Units, Subcutaneous, BID AC, Rita Busby MD  •  insulin lispro (humaLOG) injection 25 Units, 25 Units, Subcutaneous, Daily With Breakfast, Rita Busby MD  •  insulin lispro (humaLOG) injection 25 Units, 25 Units, Subcutaneous, Daily With Lunch, Rita Busby MD, 25 Units at 12/17/21 1217  •  insulin lispro (humaLOG) injection 25 Units, 25 Units, Subcutaneous, Daily With Dinner, Rita Busby MD, 25 Units at 12/17/21 1822  •  labetalol (NORMODYNE) tablet 200 mg, 200 mg, Oral, Q8H, Cesar Leung III, MD, 200 mg at 12/18/21 0642  •  NIFEdipine XL (PROCARDIA XL) 24 hr tablet 30 mg, 30 mg, Oral, Q12H, Cesar Leung III, MD, 30 mg at 12/17/21 2123  •  piperacillin-tazobactam (ZOSYN) 4.5 g in iso-osmotic dextrose 100 mL IVPB (premix), 4.5 g, Intravenous, Q8H, Cesar Leung III, MD, 4.5 g at 12/18/21 0642  •  sodium chloride 0.45 % infusion, 50 mL/hr, Intravenous, Continuous, Cesar Leung III, MD, Last Rate: 50 mL/hr at 12/16/21 1647, 50 mL/hr at 12/16/21 1647  •  sodium chloride 0.9 % flush 10 mL, 10 mL, Intravenous, PRN, Cesar Jones, DO  •  sodium chloride 0.9 % flush 10 mL, 10 mL, Intravenous, Q12H, Cesar Leung III, MD    Antibiotics:  Anti-Infectives (From admission, onward)    Ordered     Dose/Rate Route Frequency Start Stop    12/15/21 2117  piperacillin-tazobactam (ZOSYN) 4.5 g in iso-osmotic dextrose 100 mL IVPB (premix)        Ordering Provider: Cesar Leung III, MD    4.5 g  over 4 Hours Intravenous Every 8 Hours 12/16/21 2200 12/19/21 2159    12/15/21 1133  vancomycin 3000 mg/500 mL 0.9% NS IVPB ([facility])        Ordering Provider: Daria Carreon, PA     20 mg/kg × 193 kg Intravenous Once 12/15/21 1135 12/15/21 1759    12/15/21 1133  piperacillin-tazobactam (ZOSYN) 4.5 g in iso-osmotic dextrose 100 mL IVPB (premix)        Ordering Provider: Daria Carreon PA    4.5 g  over 30 Minutes Intravenous Once 12/15/21 1135 12/15/21 1232            Review of Systems:  See HPI      Physical Exam:   Vital Signs  Temp (24hrs), Av °F (36.7 °C), Min:97.2 °F (36.2 °C), Max:98.3 °F (36.8 °C)    Temp  Min: 97.2 °F (36.2 °C)  Max: 98.3 °F (36.8 °C)  BP  Min: 129/63  Max: 154/79  Pulse  Min: 65  Max: 91  Resp  Min: 16  Max: 20  SpO2  Min: 90 %  Max: 100 %    GENERAL: Awake and alert, in no acute distress.   HEENT: Normocephalic, atraumatic.  PERRL. EOMI. No conjunctival injection. No icterus. Oropharynx clear without evidence of thrush or exudate.  NECK: Supple   HEART: RRR; No murmur.   LUNGS: Diminished at lung bases bilaterally without wheezing, rales, rhonchi. Normal respiratory effort. Nonlabored.  ABDOMEN: Soft, nontender, nondistended. .. No rebound or guarding. NO mass or HSM. Massively obese  EXT:  No cyanosis, clubbing 2+ edema. No cord.  :  Without Stratton catheter.  MSK:  FROM, no joint effusions  SKIN: Warm and dry without cutaneous eruptions on Inspection/palpation.    NEURO: Oriented to PPT. Normal speech and cognition.  PSYCHIATRIC: Normal insight and judgment. Cooperative with PE    Laboratory Data    Results from last 7 days   Lab Units 21  1046 12/15/21  1100 21  0927   WBC 10*3/mm3 11.38* 14.13* 6.15   HEMOGLOBIN g/dL 7.5* 8.4* 7.2*   HEMATOCRIT % 25.3* 28.3* 24.5*   PLATELETS 10*3/mm3 468* 478* 333     Results from last 7 days   Lab Units 21  0701   SODIUM mmol/L 138   POTASSIUM mmol/L 4.2   CHLORIDE mmol/L 100   CO2 mmol/L 26.0   BUN mg/dL 11   CREATININE mg/dL 1.03*   GLUCOSE mg/dL 76   CALCIUM mg/dL 8.5*     Results from last 7 days   Lab Units 21  1046   ALK PHOS U/L 144*   BILIRUBIN mg/dL 0.2   ALT (SGPT) U/L 29   AST (SGOT)  U/L 20             Results from last 7 days   Lab Units 12/15/21  1100   LACTATE mmol/L 0.9         Results from last 7 days   Lab Units 12/17/21  0701   VANCOMYCIN TR mcg/mL 9.70     Estimated Creatinine Clearance: 144.6 mL/min (A) (by C-G formula based on SCr of 1.03 mg/dL (H)).      Microbiology:  Microbiology Results (last 10 days)     Procedure Component Value - Date/Time    Respiratory Panel PCR w/COVID-19(SARS-CoV-2) [facility] In-House, NP Swab in UTM/VTM, 3-4 HR TAT - Swab, Nasopharynx [[id number]]  (Normal) Collected: 12/17/21 2024    Lab Status: Final result Specimen: Swab from Nasopharynx Updated: 12/17/21 2203     ADENOVIRUS, PCR Not Detected     Coronavirus 229E Not Detected     Coronavirus HKU1 Not Detected     Coronavirus NL63 Not Detected     Coronavirus OC43 Not Detected     COVID19 Not Detected     Human Metapneumovirus Not Detected     Human Rhinovirus/Enterovirus Not Detected     Influenza A PCR Not Detected     Influenza B PCR Not Detected     Parainfluenza Virus 1 Not Detected     Parainfluenza Virus 2 Not Detected     Parainfluenza Virus 3 Not Detected     Parainfluenza Virus 4 Not Detected     RSV, PCR Not Detected     Bordetella pertussis pcr Not Detected     Bordetella parapertussis PCR Not Detected     Chlamydophila pneumoniae PCR Not Detected     Mycoplasma pneumo by PCR Not Detected    Narrative:      In the setting of a positive respiratory panel with a viral infection PLUS a negative procalcitonin without other underlying concern for bacterial infection, consider observing off antibiotics or discontinuation of antibiotics and continue supportive care. If the respiratory panel is positive for atypical bacterial infection (Bordetella pertussis, Chlamydophila pneumoniae, or Mycoplasma pneumoniae), consider antibiotic de-escalation to target atypical bacterial infection.    MRSA Screen, PCR (Inpatient) - Swab, Nares [[id number]]  (Normal) Collected: 12/15/21 2233    Lab  Status: Final result Specimen: Swab from Nares Updated: 12/16/21 0723     MRSA PCR Negative    Narrative:      MRSA Negative    Blood Culture - Blood, Arm, Left [[id number]]  (Normal) Collected: 12/15/21 1145    Lab Status: Preliminary result Specimen: Blood from Arm, Left Updated: 12/17/21 1200     Blood Culture No growth at 2 days    Blood Culture - Blood, Arm, Right [[id number]]  (Abnormal) Collected: 12/15/21 1130    Lab Status: Final result Specimen: Blood from Arm, Right Updated: 12/17/21 0619     Blood Culture Staphylococcus, coagulase negative     Isolated from Aerobic Bottle     Gram Stain Aerobic Bottle Gram positive cocci in pairs and clusters     Comment: Modified report. Previous result was Gram positive cocci in pairs and clusters on 12/16/2021 at 0748 EST.       Narrative:      Probable contaminant requires clinical correlation, susceptibility not performed unless requested by physician.      Blood Culture ID, PCR - Blood, Arm, Right [[id number]]  (Abnormal) Collected: 12/15/21 1130    Lab Status: Final result Specimen: Blood from Arm, Right Updated: 12/16/21 0904     BCID, PCR Staph spp, not aureus or lugdunesis. Identification by BCID2 PCR.    COVID PRE-OP / PRE-PROCEDURE SCREENING ORDER (NO ISOLATION) - Swab, Nasopharynx [[id number]]  (Normal) Collected: 12/15/21 1100    Lab Status: Final result Specimen: Swab from Nasopharynx Updated: 12/15/21 1134    Narrative:      The following orders were created for panel order COVID PRE-OP / PRE-PROCEDURE SCREENING ORDER (NO ISOLATION) - Swab, Nasopharynx.  Procedure                               Abnormality         Status                     ---------                               -----------         ------                     COVID-19 and FLU A/B PCR...[[id number]]  Normal              Final result                 Please view results for these tests on the individual orders.    COVID-19 and FLU A/B PCR - Swab, Nasopharynx [[id number]]  (Normal) Collected:  12/15/21 1100    Lab Status: Final result Specimen: Swab from Nasopharynx Updated: 12/15/21 1134     COVID19 Not Detected     Influenza A PCR Not Detected     Influenza B PCR Not Detected    Narrative:      Fact sheet for providers: https://www.fda.gov/media/779170/download    Fact sheet for patients: https://www.fda.gov/media/763705/download    Test performed by PCR.    Blood Culture - Blood, Arm, Right [[id number]]  (Normal) Collected: 21 1048    Lab Status: Final result Specimen: Blood from Arm, Right Updated: 21 1200     Blood Culture No growth at 5 days    Blood Culture - Blood, Arm, Left [[id number]]  (Normal) Collected: 21 1035    Lab Status: Final result Specimen: Blood from Arm, Left Updated: 21 1200     Blood Culture No growth at 5 days                Radiology:  Imaging Results (Last 72 Hours)     Procedure Component Value Units Date/Time    CT Angiogram Chest [[id number]] Collected: 12/15/21 1500     Updated: 12/15/21 1713    Narrative:      EXAMINATION: CT ANGIOGRAM CHEST-, CT ABDOMEN PELVIS W CONTRAST-  12/15/2021     INDICATION: R/O PE, hypoxia, s/p  12-6;  shortness of breath     TECHNIQUE: Multiple axial CT imaging was obtained of the chest pre and  post the administration of intravenous contrast according to the CT  angio protocol. 2-D coronal reformatted images were submitted to further  facilitate diagnostic accuracy and treatment planning. CT imaging was  obtained of the abdomen and pelvis following the administration of  intravenous contrast. Coronal and sagittal reformatted images were  submitted to further facilitate diagnostic accuracy and treatment  planning.     The radiation dose reduction device was turned on for each scan per the  ALARA (As Low as Reasonably Achievable) protocol.      COMPARISON:  None.     FINDINGS:       CHEST: Thyroid is homogeneous in appearance. There is soft tissue in the  anterior mediastinum suggesting residual thymus. There is a  small right  pleural effusion with patchy groundglass opacity identified throughout  the mid and lower lung fields bilaterally with consolidative infiltrate  most pronounced at the right lung base. Findings concerning for  infectious process. There is no pericardial effusion. No bulky hilar or  axillary lymphadenopathy. Degenerative changes seen within the spine.     ABDOMEN: The liver is homogeneous. Spleen is unremarkable. Pancreas is  homogeneous. No stones in the gallbladder. Anasarca seen in the  subcutaneous tissues. The abdominal portion of the gastrointestinal  tract are within normal limits. No free fluid or free air. There is  edema identified throughout the subcutaneous tissues anteriorly with  some skin thickening seen overlying the pannus.     PELVIS: The pelvic organs reveal postpartum uterus with enlargement  identified. There is mild distention of the bladder. The pelvic portion  of the gastrointestinal tract within normal limits. No free fluid or  free air. No abnormal mass or fluid collections identified. Bony  structures reveal no evidence of osseous abnormality.     Delayed imaging reveals contrast seen in the renal collecting systems  bilaterally as well as within the ureters and bladder with no evidence  of obstruction.       Impression:      Patchy groundglass opacity identified diffusely throughout  the mid and lower lung fields bilaterally with consolidative infiltrate  identified at the right lung base concerning for infiltrate. There is a  tiny right pleural effusion. The cardiac chambers are enlarged.  Postpartum uterus identified with no CT evidence of acute  intra-abdominal or pelvic abnormality. The uterus itself is not well  evaluated due to patient's large body habitus. There is extensive edema  identified throughout the subcutaneous tissues within the pannus.     D:  12/15/2021  E:  12/15/2021     This report was finalized on 12/15/2021 5:10 PM by Dr. Chelsie Mejia MD.        CT Abdomen Pelvis With Contrast [[id number]] Collected: 12/15/21 1500     Updated: 12/15/21 1713    Narrative:      EXAMINATION: CT ANGIOGRAM CHEST-, CT ABDOMEN PELVIS W CONTRAST-  12/15/2021     INDICATION: R/O PE, hypoxia, s/p  12-6;  shortness of breath     TECHNIQUE: Multiple axial CT imaging was obtained of the chest pre and  post the administration of intravenous contrast according to the CT  angio protocol. 2-D coronal reformatted images were submitted to further  facilitate diagnostic accuracy and treatment planning. CT imaging was  obtained of the abdomen and pelvis following the administration of  intravenous contrast. Coronal and sagittal reformatted images were  submitted to further facilitate diagnostic accuracy and treatment  planning.     The radiation dose reduction device was turned on for each scan per the  ALARA (As Low as Reasonably Achievable) protocol.      COMPARISON:  None.     FINDINGS:       CHEST: Thyroid is homogeneous in appearance. There is soft tissue in the  anterior mediastinum suggesting residual thymus. There is a small right  pleural effusion with patchy groundglass opacity identified throughout  the mid and lower lung fields bilaterally with consolidative infiltrate  most pronounced at the right lung base. Findings concerning for  infectious process. There is no pericardial effusion. No bulky hilar or  axillary lymphadenopathy. Degenerative changes seen within the spine.     ABDOMEN: The liver is homogeneous. Spleen is unremarkable. Pancreas is  homogeneous. No stones in the gallbladder. Anasarca seen in the  subcutaneous tissues. The abdominal portion of the gastrointestinal  tract are within normal limits. No free fluid or free air. There is  edema identified throughout the subcutaneous tissues anteriorly with  some skin thickening seen overlying the pannus.     PELVIS: The pelvic organs reveal postpartum uterus with enlargement  identified. There is mild distention of  the bladder. The pelvic portion  of the gastrointestinal tract within normal limits. No free fluid or  free air. No abnormal mass or fluid collections identified. Bony  structures reveal no evidence of osseous abnormality.     Delayed imaging reveals contrast seen in the renal collecting systems  bilaterally as well as within the ureters and bladder with no evidence  of obstruction.       Impression:      Patchy groundglass opacity identified diffusely throughout  the mid and lower lung fields bilaterally with consolidative infiltrate  identified at the right lung base concerning for infiltrate. There is a  tiny right pleural effusion. The cardiac chambers are enlarged.  Postpartum uterus identified with no CT evidence of acute  intra-abdominal or pelvic abnormality. The uterus itself is not well  evaluated due to patient's large body habitus. There is extensive edema  identified throughout the subcutaneous tissues within the pannus.     D:  12/15/2021  E:  12/15/2021     This report was finalized on 12/15/2021 5:10 PM by Dr. Chelsie Mejia MD.       XR Chest 1 View [[id number]] Collected: 12/15/21 1142     Updated: 12/15/21 1658    Narrative:      EXAMINATION: XR CHEST 1 VW-12/15/2021:      INDICATION: SOA, triage protocol.      COMPARISON: 09/02/2020.     FINDINGS: Portable chest reveals the heart to be borderline enlarged.  Mild increased markings identified at the right lung base and left lung  base concerning for infiltrate. No definite pleural effusion or  pneumothorax. Degenerative changes seen within the spine.       Impression:      Increased markings identified of the mid and lower lung  fields bilaterally concerning for infiltrate.     D:  12/15/2021  E:  12/15/2021     This report was finalized on 12/15/2021 4:54 PM by Dr. Chelsie Mejia MD.           I read her radiographic images.    Impression:   1. Pneumonia/pulmonary infiltrates-she has bilateral pulmonary infiltrates with acute hypoxic  respiratory failure that could be secondary to volume overload in combination with pneumonia.  We will check a bio fire PCR respiratory virus panel.  I will treat her with intravenous Zosyn alone for the time being and I do not think that she will require additional vancomycin therapy.  I think we will be able to give her a brief course of antibacterial antibiotic therapy.  I will plan to discontinue antibiotic therapy soon.  2. CoNS in blood culture- this is a skin contaminant  3. Postpartum hypertension/preeclampsia  4. Anemia  5. Acute hypoxic respiratory failure/now on room air  6. Massive obesity   7. Type 2 diabetes mellitus  8. Essential hypertension  9. IUP/ section 21  10. Recent endometritis/treated with IV antibiotics.   11. Leukocytosis/neutrophilia    PLAN/RECOMMENDATIONS:   1. Continue Zosyn-I may discontinue antibiotic therapy tomorrow.  2. Diuretic therapy per Maternal-Fetal Medicine    I discussed her complex situation with Dr. Anderson.          To Hargrove MD  2021  06:57 EST

## 2021-12-18 NOTE — PROGRESS NOTES
Daily Progress Note    Patient name: Edd Leung  YOB: 1985   MRN: [id number]  Admission Date: 12/15/2021  Date of Service: 2021  Referring Provider: Cesar Leung III, MD    Edd Leung is a 36 y.o.    at 37w4d  admitted on 12/15/2021 for Preeclampsia in postpartum period    Hospital day 3      Diagnoses:   Patient Active Problem List    Diagnosis    • *Preeclampsia in postpartum period [O14.95]        Chief Complaint:  Chief Complaint   Patient presents with   • Shortness of Breath       Subjective:      Edd feeling better today denies any shortness of breath or chest pain.  She reports lower extremity swelling improving    Patient is ambulating and voiding difficulty.      Objective:     Vital signs:  Temp:  [97.2 °F (36.2 °C)-98.3 °F (36.8 °C)] 98 °F (36.7 °C)  Heart Rate:  [70-93] 74  Resp:  [16-18] 16  BP: (136-154)/(63-92) 144/64    Abdomen: soft, nontender  Uterus: gravid, nontender  Extremities: nontender; 1+ edema                Medications:  buPROPion SR, 150 mg, Oral, Daily  enoxaparin, 40 mg, Subcutaneous, Q12H  furosemide, 40 mg, Oral, BID  insulin detemir, 50 Units, Subcutaneous, BID AC  insulin lispro, 25 Units, Subcutaneous, Daily With Breakfast  insulin lispro, 25 Units, Subcutaneous, Daily With Lunch  insulin lispro, 25 Units, Subcutaneous, Daily With Dinner  labetalol, 200 mg, Oral, Q8H  NIFEdipine XL, 30 mg, Oral, Q12H  piperacillin-tazobactam, 4.5 g, Intravenous, Q8H  sodium chloride, 10 mL, Intravenous, Q12H       •  acetaminophen  •  dextrose  •  dextrose  •  glucagon (human recombinant)  •  hydrOXYzine  •  sodium chloride    Labs:  Lab Results (last 24 hours)     Procedure Component Value Units Date/Time    POC Glucose Once [[id number]]  (Normal) Collected: 21 1209    Specimen: Blood Updated: 21 1211     Glucose 82 mg/dL      Comment: Meter: [id number] : [id number] [facility]       Blood Culture - Blood, Arm, Left  [[id number]]  (Normal) Collected: 12/15/21 1145    Specimen: Blood from Arm, Left Updated: 12/18/21 1200     Blood Culture No growth at 3 days    Comprehensive Metabolic Panel [[id number]]  (Abnormal) Collected: 12/18/21 0935    Specimen: Blood Updated: 12/18/21 1032     Glucose 85 mg/dL      BUN 10 mg/dL      Creatinine 0.89 mg/dL      Sodium 142 mmol/L      Potassium 4.6 mmol/L      Chloride 104 mmol/L      CO2 27.0 mmol/L      Calcium 8.9 mg/dL      Total Protein 7.1 g/dL      Albumin 3.60 g/dL      ALT (SGPT) 24 U/L      AST (SGOT) 18 U/L      Alkaline Phosphatase 135 U/L      Total Bilirubin 0.3 mg/dL      eGFR  African Amer 88 mL/min/1.73      Globulin 3.5 gm/dL      Comment: Calculated Result        A/G Ratio 1.0 g/dL      BUN/Creatinine Ratio 11.2     Anion Gap 11.0 mmol/L     Narrative:      GFR Normal >60  Chronic Kidney Disease <60  Kidney Failure <15      POC Glucose Once [[id number]]  (Normal) Collected: 12/18/21 0753    Specimen: Blood Updated: 12/18/21 0754     Glucose 92 mg/dL      Comment: Meter: [id number] : [id number] [facility]       Respiratory Panel PCR w/COVID-19(SARS-CoV-2) MAE/BECCA/CECY/PAD/COR/MAD/RAHUL In-House, NP Swab in UTM/VTM, 3-4 HR TAT - Swab, Nasopharynx [[id number]]  (Normal) Collected: 12/17/21 2024    Specimen: Swab from Nasopharynx Updated: 12/17/21 2203     ADENOVIRUS, PCR Not Detected     Coronavirus 229E Not Detected     Coronavirus HKU1 Not Detected     Coronavirus NL63 Not Detected     Coronavirus OC43 Not Detected     COVID19 Not Detected     Human Metapneumovirus Not Detected     Human Rhinovirus/Enterovirus Not Detected     Influenza A PCR Not Detected     Influenza B PCR Not Detected     Parainfluenza Virus 1 Not Detected     Parainfluenza Virus 2 Not Detected     Parainfluenza Virus 3 Not Detected     Parainfluenza Virus 4 Not Detected     RSV, PCR Not Detected     Bordetella pertussis pcr Not Detected     Bordetella parapertussis PCR Not Detected     Chlamydophila  pneumoniae PCR Not Detected     Mycoplasma pneumo by PCR Not Detected    Narrative:      In the setting of a positive respiratory panel with a viral infection PLUS a negative procalcitonin without other underlying concern for bacterial infection, consider observing off antibiotics or discontinuation of antibiotics and continue supportive care. If the respiratory panel is positive for atypical bacterial infection (Bordetella pertussis, Chlamydophila pneumoniae, or Mycoplasma pneumoniae), consider antibiotic de-escalation to target atypical bacterial infection.    POC Glucose Once [[id number]]  (Abnormal) Collected: 21    Specimen: Blood Updated: 21     Glucose 135 mg/dL      Comment: Meter: [id number] : [id number] Henokkate Christina       POC Glucose Once [[id number]]  (Abnormal) Collected: 21    Specimen: Blood Updated: 21 165     Glucose 64 mg/dL      Comment: Meter: [id number] : [id number] Fox Savannah       POC Glucose Once [[id number]]  (Normal) Collected: 21 1511    Specimen: Blood Updated: 21 1514     Glucose 83 mg/dL      Comment: Meter: [id number] : [id number] Fox [place]       Vancomycin, Peak [[id number]]  (Abnormal) Collected: 21 1222    Specimen: Blood Updated: 21 1334     Vancomycin Peak 17.70 mcg/mL     Narrative:      Therapeutic Ranges for Vancomycin    Vancomycin Random   5.0-40.0 mcg/mL  Vancomycin Trough   5.0-20.0 mcg/mL  Vancomycin Peak     20.0-40.0 mcg/mL        Lab Results   Component Value Date    HGB 7.5 (L) 2021         Assessment/Plan:      Edd is a 36 y.o.   postop day 11 after a repeat   1. Preeclampsia in postpartum period: Status post magnesium sulfate.  Blood pressure normalized on labetalol 200 mg every 8 and Procardia 30 XL twice daily.  Pulmonary edema resolved  2.  Blood culture positive ID following  3.  Postpartum anemia asymptomatic  4.  Chronic hypertension  5.  Type 2  diabetes mellitus blood sugars are improving  PLAN  1.  Continue inpatient management  2.  Antibiotics per infectious disease  3.  Continue daily Lasix and follow CMP.    All questions were answered to the best of my ability.    Jh Anderson, DO  12/18/2021

## 2021-12-19 LAB
GLUCOSE BLDC GLUCOMTR-MCNC: 110 MG/DL (ref 70–130)
GLUCOSE BLDC GLUCOMTR-MCNC: 110 MG/DL (ref 70–130)
GLUCOSE BLDC GLUCOMTR-MCNC: 128 MG/DL (ref 70–130)
GLUCOSE BLDC GLUCOMTR-MCNC: 165 MG/DL (ref 70–130)
GLUCOSE BLDC GLUCOMTR-MCNC: 67 MG/DL (ref 70–130)
GLUCOSE BLDC GLUCOMTR-MCNC: 68 MG/DL (ref 70–130)
GLUCOSE BLDC GLUCOMTR-MCNC: 94 MG/DL (ref 70–130)

## 2021-12-19 PROCEDURE — 82962 GLUCOSE BLOOD TEST: CPT

## 2021-12-19 PROCEDURE — 63710000001 INSULIN DETEMIR PER 5 UNITS: Performed by: OBSTETRICS & GYNECOLOGY

## 2021-12-19 PROCEDURE — 25010000002 PIPERACILLIN SOD-TAZOBACTAM PER 1 G: Performed by: INTERNAL MEDICINE

## 2021-12-19 PROCEDURE — 99231 SBSQ HOSP IP/OBS SF/LOW 25: CPT | Performed by: OBSTETRICS & GYNECOLOGY

## 2021-12-19 PROCEDURE — 25010000002 ENOXAPARIN PER 10 MG: Performed by: OBSTETRICS & GYNECOLOGY

## 2021-12-19 PROCEDURE — 63710000001 INSULIN LISPRO (HUMAN) PER 5 UNITS: Performed by: OBSTETRICS & GYNECOLOGY

## 2021-12-19 RX ADMIN — ENOXAPARIN SODIUM 40 MG: 40 INJECTION SUBCUTANEOUS at 09:46

## 2021-12-19 RX ADMIN — INSULIN DETEMIR 45 UNITS: 100 INJECTION, SOLUTION SUBCUTANEOUS at 07:57

## 2021-12-19 RX ADMIN — LABETALOL HYDROCHLORIDE 200 MG: 200 TABLET, FILM COATED ORAL at 06:28

## 2021-12-19 RX ADMIN — LABETALOL HYDROCHLORIDE 200 MG: 200 TABLET, FILM COATED ORAL at 14:27

## 2021-12-19 RX ADMIN — ENOXAPARIN SODIUM 40 MG: 40 INJECTION SUBCUTANEOUS at 21:27

## 2021-12-19 RX ADMIN — NIFEDIPINE 30 MG: 30 TABLET, FILM COATED, EXTENDED RELEASE ORAL at 21:28

## 2021-12-19 RX ADMIN — NIFEDIPINE 30 MG: 30 TABLET, FILM COATED, EXTENDED RELEASE ORAL at 09:46

## 2021-12-19 RX ADMIN — INSULIN LISPRO 25 UNITS: 100 INJECTION, SOLUTION INTRAVENOUS; SUBCUTANEOUS at 12:37

## 2021-12-19 RX ADMIN — FUROSEMIDE 40 MG: 40 TABLET ORAL at 09:46

## 2021-12-19 RX ADMIN — TAZOBACTAM SODIUM AND PIPERACILLIN SODIUM 4.5 G: 500; 4 INJECTION, SOLUTION INTRAVENOUS at 05:57

## 2021-12-19 RX ADMIN — INSULIN LISPRO 25 UNITS: 100 INJECTION, SOLUTION INTRAVENOUS; SUBCUTANEOUS at 08:27

## 2021-12-19 RX ADMIN — INSULIN LISPRO 25 UNITS: 100 INJECTION, SOLUTION INTRAVENOUS; SUBCUTANEOUS at 16:56

## 2021-12-19 RX ADMIN — INSULIN DETEMIR 45 UNITS: 100 INJECTION, SOLUTION SUBCUTANEOUS at 21:28

## 2021-12-19 RX ADMIN — FUROSEMIDE 40 MG: 40 TABLET ORAL at 18:04

## 2021-12-19 RX ADMIN — LABETALOL HYDROCHLORIDE 200 MG: 200 TABLET, FILM COATED ORAL at 21:28

## 2021-12-19 RX ADMIN — BUPROPION HYDROCHLORIDE 150 MG: 150 TABLET, EXTENDED RELEASE ORAL at 09:46

## 2021-12-20 ENCOUNTER — HOSPITAL ENCOUNTER (OUTPATIENT)
Facility: HOSPITAL | Age: 36
End: 2021-12-20
Attending: OBSTETRICS & GYNECOLOGY | Admitting: OBSTETRICS & GYNECOLOGY

## 2021-12-20 VITALS
SYSTOLIC BLOOD PRESSURE: 131 MMHG | WEIGHT: 293 LBS | HEART RATE: 79 BPM | RESPIRATION RATE: 16 BRPM | TEMPERATURE: 98.1 F | DIASTOLIC BLOOD PRESSURE: 61 MMHG | BODY MASS INDEX: 43.4 KG/M2 | HEIGHT: 69 IN | OXYGEN SATURATION: 99 %

## 2021-12-20 PROBLEM — E11.65 TYPE 2 DIABETES MELLITUS WITH HYPERGLYCEMIA, WITH LONG-TERM CURRENT USE OF INSULIN: Status: ACTIVE | Noted: 2021-12-20

## 2021-12-20 PROBLEM — Z79.4 TYPE 2 DIABETES MELLITUS WITH HYPERGLYCEMIA, WITH LONG-TERM CURRENT USE OF INSULIN: Status: ACTIVE | Noted: 2021-12-20

## 2021-12-20 LAB
ALBUMIN SERPL-MCNC: 3.8 G/DL (ref 3.5–5.2)
ALBUMIN/GLOB SERPL: 1.1 G/DL
ALP SERPL-CCNC: 130 U/L (ref 39–117)
ALT SERPL W P-5'-P-CCNC: 18 U/L (ref 1–33)
ANION GAP SERPL CALCULATED.3IONS-SCNC: 11 MMOL/L (ref 5–15)
AST SERPL-CCNC: 14 U/L (ref 1–32)
BILIRUB SERPL-MCNC: 0.3 MG/DL (ref 0–1.2)
BUN SERPL-MCNC: 13 MG/DL (ref 6–20)
BUN/CREAT SERPL: 13.7 (ref 7–25)
CALCIUM SPEC-SCNC: 9 MG/DL (ref 8.6–10.5)
CHLORIDE SERPL-SCNC: 103 MMOL/L (ref 98–107)
CO2 SERPL-SCNC: 25 MMOL/L (ref 22–29)
CREAT SERPL-MCNC: 0.95 MG/DL (ref 0.57–1)
GFR SERPL CREATININE-BSD FRML MDRD: 82 ML/MIN/1.73
GLOBULIN UR ELPH-MCNC: 3.4 GM/DL
GLUCOSE BLDC GLUCOMTR-MCNC: 122 MG/DL (ref 70–130)
GLUCOSE SERPL-MCNC: 104 MG/DL (ref 65–99)
POTASSIUM SERPL-SCNC: 4.2 MMOL/L (ref 3.5–5.2)
PROT SERPL-MCNC: 7.2 G/DL (ref 6–8.5)
SODIUM SERPL-SCNC: 139 MMOL/L (ref 136–145)

## 2021-12-20 PROCEDURE — 25010000002 ENOXAPARIN PER 10 MG: Performed by: OBSTETRICS & GYNECOLOGY

## 2021-12-20 PROCEDURE — 99238 HOSP IP/OBS DSCHRG MGMT 30/<: CPT | Performed by: OBSTETRICS & GYNECOLOGY

## 2021-12-20 PROCEDURE — 63710000001 INSULIN LISPRO (HUMAN) PER 5 UNITS: Performed by: OBSTETRICS & GYNECOLOGY

## 2021-12-20 PROCEDURE — 63710000001 INSULIN DETEMIR PER 5 UNITS: Performed by: OBSTETRICS & GYNECOLOGY

## 2021-12-20 PROCEDURE — 82962 GLUCOSE BLOOD TEST: CPT

## 2021-12-20 PROCEDURE — 80053 COMPREHEN METABOLIC PANEL: CPT | Performed by: OBSTETRICS & GYNECOLOGY

## 2021-12-20 RX ORDER — FUROSEMIDE 40 MG/1
40 TABLET ORAL 2 TIMES DAILY
Qty: 7 TABLET | Refills: 0 | Status: SHIPPED | OUTPATIENT
Start: 2021-12-20 | End: 2022-02-19 | Stop reason: HOSPADM

## 2021-12-20 RX ORDER — INSULIN GLARGINE 100 [IU]/ML
40 INJECTION, SOLUTION SUBCUTANEOUS 2 TIMES DAILY
Qty: 72 ML | Refills: 1 | Status: SHIPPED | OUTPATIENT
Start: 2021-12-20 | End: 2022-09-20

## 2021-12-20 RX ADMIN — LABETALOL HYDROCHLORIDE 200 MG: 200 TABLET, FILM COATED ORAL at 05:46

## 2021-12-20 RX ADMIN — INSULIN DETEMIR 45 UNITS: 100 INJECTION, SOLUTION SUBCUTANEOUS at 07:31

## 2021-12-20 RX ADMIN — BUPROPION HYDROCHLORIDE 150 MG: 150 TABLET, EXTENDED RELEASE ORAL at 09:00

## 2021-12-20 RX ADMIN — ACETAMINOPHEN 650 MG: 325 TABLET, FILM COATED ORAL at 01:24

## 2021-12-20 RX ADMIN — INSULIN LISPRO 25 UNITS: 100 INJECTION, SOLUTION INTRAVENOUS; SUBCUTANEOUS at 08:18

## 2021-12-20 RX ADMIN — NIFEDIPINE 30 MG: 30 TABLET, FILM COATED, EXTENDED RELEASE ORAL at 09:01

## 2021-12-20 RX ADMIN — ENOXAPARIN SODIUM 40 MG: 40 INJECTION SUBCUTANEOUS at 09:01

## 2021-12-20 RX ADMIN — FUROSEMIDE 40 MG: 40 TABLET ORAL at 09:01

## 2021-12-20 NOTE — PROGRESS NOTES
INFECTIOUS DISEASE Progress Note    Edd Leung  1985  [id number]    Admission Date: 12/15/2021      Requesting Provider: Héctor Hfofman MD  Evaluating Physician: To Hargrove Md    Reason for Consultation: Pneumonia and respiratory failure.    History of present illness:    21: Patient is a 36 y.o. female with h/o T2DM, abnormal cervical Pap smear, massive obesity, and HTN who we were asked to see for pneumonia  and positive blood cultures.  The patient recently delivered her baby by  section on 21.  Her post-surgical course was complicated by fluid overload, massive peripheral edema, and endometritis.  She was given Cefazolin gayle and post-operatively until  and she was changed to Clindamycin and gentamicin until 12/10 and ampicillin until 12/10. She was discharged home on .  She returned to [facility] ED on 12/15 for shortness of breath, headache, and edema.  She has remained afebrile on 2L O2.  She is now on room air. Labs are PCT 0.04, lactic acid 0.9, WBC 14,000 with 66% segs/2% bands,and proBNP 93.  A COVID-19/Flu A/B PCR is negative. Blood cultures are positive in 1 of 2 sets (1 of 4 bottles) with CoNS.  A MRSA PCR is negative. A UA is benign.  A CTA of chest, abd, and pelvis showed patchy GGO diffusely bilaterally with consolidative infiltrate in right lung base and subcutaneous edema in tissues of pannus.  She is currently on Zosyn and Vancomycin.  ID was asked to evaluate and manage her antibiotic therapy.     21:  She remains afebrile. Her O2 saturation is 93-98% on room air. Her respiratory virus panel PCR was negative. Blood cultures have grown coagulase-negative staph in a single set. Her blood pressure is 139/70.  She feels better today.  She denies increased cough and dyspnea.  She denies sputum production.    21:She feels better.  She has decreased dyspnea.  She has remained afebrile. Her O2 saturation on room air is 99%.    Past Medical  "History:   Diagnosis Date   • Anxiety    • Anxiety and depression    • Diabetes mellitus (HCC)    • Gonorrhea    • History of abnormal cervical Pap smear    • Hypertension        Past Surgical History:   Procedure Laterality Date   •  SECTION  2006   • DILATATION AND CURETTAGE         Family History   Problem Relation Age of Onset   • Hypertension Mother    • Hypertension Maternal Grandmother    • Diabetes Maternal Grandmother    • Diabetes Paternal Grandmother        Social History     Socioeconomic History   • Marital status:    • Number of children: 1   • Years of education: 14   • Highest education level: Associate degree: occupational, technical, or vocational program   Tobacco Use   • Smoking status: Former Smoker     Packs/day: 0.25     Years: 17.00     Pack years: 4.25     Types: Cigarettes     Quit date: 2019     Years since quittin.7   • Smokeless tobacco: Never Used   Vaping Use   • Vaping Use: Never used   Substance and Sexual Activity   • Alcohol use: Not Currently     Comment: SOCIALLY    • Drug use: Not Currently     Types: Marijuana     Comment: SOCIALLY    • Sexual activity: Yes     Partners: Male     Birth control/protection: None       Allergies   Allergen Reactions   • Latex Rash     \"makes my skin raw\"    • Banana Unknown - Low Severity   • Cucumber Extract Unknown - Low Severity   • Melon Unknown - Low Severity         Medication:    Current Facility-Administered Medications:   •  acetaminophen (TYLENOL) tablet 650 mg, 650 mg, Oral, Q4H PRN, Cesar Leung III, MD, 650 mg at 21 1647  •  buPROPion SR (WELLBUTRIN SR) 12 hr tablet 150 mg, 150 mg, Oral, Daily, Cesar Leung III, MD, 150 mg at 21 0946  •  dextrose (D50W) (25 g/50 mL) IV injection 25 g, 25 g, Intravenous, Q15 Min PRN, Cesar Leung III, MD  •  dextrose (GLUTOSE) oral gel 15 g, 15 g, Oral, Q15 Min PRN, Cesar Leung III, MD  •  enoxaparin (LOVENOX) syringe 40 mg, 40 mg, " Subcutaneous, Q12H, Cesar Leung III, MD, 40 mg at 12/19/21 0946  •  furosemide (LASIX) tablet 40 mg, 40 mg, Oral, BID, Cesar Leung III, MD, 40 mg at 12/19/21 1804  •  glucagon (human recombinant) (GLUCAGEN DIAGNOSTIC) injection 1 mg, 1 mg, Subcutaneous, Q15 Min PRN, Cesar Leung III, MD  •  hydrOXYzine (ATARAX) tablet 50 mg, 50 mg, Oral, Nightly PRN, Cesar Leung III, MD  •  insulin detemir (LEVEMIR) injection 45 Units, 45 Units, Subcutaneous, BID AC, HighLiam MD, 45 Units at 12/19/21 0757  •  insulin lispro (humaLOG) injection 25 Units, 25 Units, Subcutaneous, Daily With Breakfast, Rita Busby MD, 25 Units at 12/19/21 0827  •  insulin lispro (humaLOG) injection 25 Units, 25 Units, Subcutaneous, Daily With Lunch, Rita Busby MD, 25 Units at 12/19/21 1237  •  insulin lispro (humaLOG) injection 25 Units, 25 Units, Subcutaneous, Daily With Dinner, Rita Busby MD, 25 Units at 12/19/21 1656  •  labetalol (NORMODYNE) tablet 200 mg, 200 mg, Oral, Q8H, Cesar Leung III, MD, 200 mg at 12/19/21 1427  •  NIFEdipine XL (PROCARDIA XL) 24 hr tablet 30 mg, 30 mg, Oral, Q12H, Cesar Leung III, MD, 30 mg at 12/19/21 0946  •  sodium chloride 0.45 % infusion, 50 mL/hr, Intravenous, Continuous, Cesar Leung III, MD, Last Rate: 50 mL/hr at 12/18/21 0929, 50 mL/hr at 12/18/21 0929  •  sodium chloride 0.9 % flush 10 mL, 10 mL, Intravenous, PRN, Cesar Jones,   •  sodium chloride 0.9 % flush 10 mL, 10 mL, Intravenous, Q12H, Cesar Leung III, MD    Antibiotics:  Anti-Infectives (From admission, onward)    Ordered     Dose/Rate Route Frequency Start Stop    12/15/21 1133  vancomycin 3000 mg/500 mL 0.9% NS IVPB ([facility])        Ordering Provider: Daria Carreon PA    20 mg/kg × 193 kg Intravenous Once 12/15/21 1135 12/15/21 1759    12/15/21 1133  piperacillin-tazobactam (ZOSYN) 4.5 g in iso-osmotic dextrose 100 mL IVPB (premix)        Ordering Provider: Daria Carreon  PA    4.5 g  over 30 Minutes Intravenous Once 12/15/21 1135 12/15/21 1232            Review of Systems:  See HPI      Physical Exam:   Vital Signs  Temp (24hrs), Av.4 °F (36.9 °C), Min:98.1 °F (36.7 °C), Max:98.7 °F (37.1 °C)    Temp  Min: 98.1 °F (36.7 °C)  Max: 98.7 °F (37.1 °C)  BP  Min: 129/61  Max: 149/80  Pulse  Min: 69  Max: 84  Resp  Min: 18  Max: 20  SpO2  Min: 96 %  Max: 100 %    GENERAL: Awake and alert, in no acute distress.   HEENT: Normocephalic, atraumatic.  PERRL. EOMI. No conjunctival injection. No icterus. Oropharynx clear without evidence of thrush or exudate.  NECK: Supple   HEART: RRR; No murmur.   LUNGS: Diminished at lung bases bilaterally without wheezing, rales, rhonchi. Normal respiratory effort. Nonlabored.  ABDOMEN: Soft, nontender, nondistended. .. No rebound or guarding. NO mass or HSM. Massively obese  EXT:  No cyanosis, clubbing 2+ edema. No cord.  :  Without Stratton catheter.  MSK:  FROM, no joint effusions  SKIN: Warm and dry without cutaneous eruptions on Inspection/palpation.    NEURO: Oriented to PPT. Normal speech and cognition.  PSYCHIATRIC: Normal insight and judgment. Cooperative with PE    Laboratory Data    Results from last 7 days   Lab Units 21  1046 12/15/21  1100   WBC 10*3/mm3 11.38* 14.13*   HEMOGLOBIN g/dL 7.5* 8.4*   HEMATOCRIT % 25.3* 28.3*   PLATELETS 10*3/mm3 468* 478*     Results from last 7 days   Lab Units 21  0935   SODIUM mmol/L 142   POTASSIUM mmol/L 4.6   CHLORIDE mmol/L 104   CO2 mmol/L 27.0   BUN mg/dL 10   CREATININE mg/dL 0.89   GLUCOSE mg/dL 85   CALCIUM mg/dL 8.9     Results from last 7 days   Lab Units 21  0935   ALK PHOS U/L 135*   BILIRUBIN mg/dL 0.3   ALT (SGPT) U/L 24   AST (SGOT) U/L 18             Results from last 7 days   Lab Units 12/15/21  1100   LACTATE mmol/L 0.9         Results from last 7 days   Lab Units 21  0701   VANCOMYCIN TR mcg/mL 9.70     Estimated Creatinine Clearance: 165.9 mL/min (by C-G formula  based on SCr of 0.89 mg/dL).      Microbiology:  Microbiology Results (last 10 days)     Procedure Component Value - Date/Time    Respiratory Panel PCR w/COVID-19(SARS-CoV-2) MAE/BECCA/CECY/PAD/COR/MAD/RAHUL In-House, NP Swab in UTM/VTM, 3-4 HR TAT - Swab, Nasopharynx [[id number]]  (Normal) Collected: 12/17/21 2024    Lab Status: Final result Specimen: Swab from Nasopharynx Updated: 12/17/21 2203     ADENOVIRUS, PCR Not Detected     Coronavirus 229E Not Detected     Coronavirus HKU1 Not Detected     Coronavirus NL63 Not Detected     Coronavirus OC43 Not Detected     COVID19 Not Detected     Human Metapneumovirus Not Detected     Human Rhinovirus/Enterovirus Not Detected     Influenza A PCR Not Detected     Influenza B PCR Not Detected     Parainfluenza Virus 1 Not Detected     Parainfluenza Virus 2 Not Detected     Parainfluenza Virus 3 Not Detected     Parainfluenza Virus 4 Not Detected     RSV, PCR Not Detected     Bordetella pertussis pcr Not Detected     Bordetella parapertussis PCR Not Detected     Chlamydophila pneumoniae PCR Not Detected     Mycoplasma pneumo by PCR Not Detected    Narrative:      In the setting of a positive respiratory panel with a viral infection PLUS a negative procalcitonin without other underlying concern for bacterial infection, consider observing off antibiotics or discontinuation of antibiotics and continue supportive care. If the respiratory panel is positive for atypical bacterial infection (Bordetella pertussis, Chlamydophila pneumoniae, or Mycoplasma pneumoniae), consider antibiotic de-escalation to target atypical bacterial infection.    MRSA Screen, PCR (Inpatient) - Swab, Nares [[id number]]  (Normal) Collected: 12/15/21 2233    Lab Status: Final result Specimen: Swab from Nares Updated: 12/16/21 0723     MRSA PCR Negative    Narrative:      MRSA Negative    Blood Culture - Blood, Arm, Left [[id number]]  (Normal) Collected: 12/15/21 1145    Lab Status: Preliminary result Specimen:  Blood from Arm, Left Updated: 12/19/21 1200     Blood Culture No growth at 4 days    Blood Culture - Blood, Arm, Right [[id number]]  (Abnormal) Collected: 12/15/21 1130    Lab Status: Final result Specimen: Blood from Arm, Right Updated: 12/17/21 0619     Blood Culture Staphylococcus, coagulase negative     Isolated from Aerobic Bottle     Gram Stain Aerobic Bottle Gram positive cocci in pairs and clusters     Comment: Modified report. Previous result was Gram positive cocci in pairs and clusters on 12/16/2021 at 0748 EST.       Narrative:      Probable contaminant requires clinical correlation, susceptibility not performed unless requested by physician.      Blood Culture ID, PCR - Blood, Arm, Right [[id number]]  (Abnormal) Collected: 12/15/21 1130    Lab Status: Final result Specimen: Blood from Arm, Right Updated: 12/16/21 0904     BCID, PCR Staph spp, not aureus or lugdunesis. Identification by BCID2 PCR.    COVID PRE-OP / PRE-PROCEDURE SCREENING ORDER (NO ISOLATION) - Swab, Nasopharynx [[id number]]  (Normal) Collected: 12/15/21 1100    Lab Status: Final result Specimen: Swab from Nasopharynx Updated: 12/15/21 1134    Narrative:      The following orders were created for panel order COVID PRE-OP / PRE-PROCEDURE SCREENING ORDER (NO ISOLATION) - Swab, Nasopharynx.  Procedure                               Abnormality         Status                     ---------                               -----------         ------                     COVID-19 and FLU A/B PCR...[[id number]]  Normal              Final result                 Please view results for these tests on the individual orders.    COVID-19 and FLU A/B PCR - Swab, Nasopharynx [[id number]]  (Normal) Collected: 12/15/21 1100    Lab Status: Final result Specimen: Swab from Nasopharynx Updated: 12/15/21 1134     COVID19 Not Detected     Influenza A PCR Not Detected     Influenza B PCR Not Detected    Narrative:      Fact sheet for providers:  https://www.fda.gov/media/093971/download    Fact sheet for patients: https://www.fda.gov/media/186927/download    Test performed by PCR.                Radiology:  Imaging Results (Last 72 Hours)     ** No results found for the last 72 hours. **        I read her radiographic images.    Impression:   1. Pneumonia/pulmonary infiltrates-she has bilateral pulmonary infiltrates with acute hypoxic respiratory failure that could be secondary to volume overload in combination with pneumonia.  She has clinically.  I will discontinue Zosyn therapy.  2. CoNS in blood culture- this is a skin contaminant  3. Postpartum hypertension/preeclampsia  4. Anemia  5. Acute hypoxic respiratory failure/now on room air  6. Massive obesity   7. Type 2 diabetes mellitus  8. Essential hypertension  9. IUP/ section 21  10. Recent endometritis/treated with IV antibiotics.   11. Leukocytosis/neutrophilia    PLAN/RECOMMENDATIONS:   1. Discontinue Zosyn.  2. Discharge from my standpoint    I discussed her complex situation with Dr. Anderson.          To Hargrove MD  2021  21:22 EST

## 2021-12-20 NOTE — PROGRESS NOTES
Progress Note    Patient name: Edd Leung  YOB: 1985   MRN: [id number]  Admission Date: 12/15/2021  Date of Service: 2021    Edd Leung is a 36 y.o.    S/p repeat  at 37w4d on 21 that was re-admitted on 12/15/2021 for elevated BP's, swelling, and SOA. Findings consistent with fluid overload and preeclampsia. Received 24hrs of IV magnesium, lasix, and BP medications. BP's normalized. Patient diureses over 20lbs of fluid during admission.   Pt also treated for pneumonia based on GGO on CT and blood culture positive for Staph species in 1/4 bottles. Per ID recs, discontinued antibiotics as findings of blood culture most likely contaminant. Patient was AF throughout admission and without cough.  Antibiotics stopped 21. Patient remained AF.   Currently BP's well controlled on Nifedipine 30mgs XL BID.   Patient also with Type 2 DM on insulin. Blood glucose overall very well controlled on current doses.     Patient Active Problem List    Diagnosis    • *Preeclampsia in postpartum period [O14.95]        Hospital day 5      Subjective:      Edd has no complaints today. Is maintaining normal O2 saturation without supplemental oxygen. Ambulating, voiding, eating without difficulty. Denies SOA or abdominal pain. Feels lower extremity edema is much improved.     Objective:     Vital signs:  Temp:  [98.1 °F (36.7 °C)-98.7 °F (37.1 °C)] 98.1 °F (36.7 °C)  Heart Rate:  [69-90] 79  Resp:  [16-20] 16  BP: (119-144)/(57-70) 131/61    General: NAD, sitting in bed with baby on her chest  nEURO  CHEST: CTAB  CV: RRR, no M/R/G  Abdomen: soft, nontender, edema of skin of pannus is improved  Uterus: nontender  INC: C/D/I  Extremities: nontender; edema present, but compressible and improved.     Medications:  buPROPion SR, 150 mg, Oral, Daily  enoxaparin, 40 mg, Subcutaneous, Q12H  furosemide, 40 mg, Oral, BID  insulin detemir, 45 Units, Subcutaneous, BID  AC  insulin lispro, 25 Units, Subcutaneous, Daily With Breakfast  insulin lispro, 25 Units, Subcutaneous, Daily With Lunch  insulin lispro, 25 Units, Subcutaneous, Daily With Dinner  labetalol, 200 mg, Oral, Q8H  NIFEdipine XL, 30 mg, Oral, Q12H  sodium chloride, 10 mL, Intravenous, Q12H       Labs:  Lab Results (last 24 hours)     Procedure Component Value Units Date/Time    POC Glucose Once [[id number]]  (Normal) Collected: 21 0742    Specimen: Blood Updated: 21 0750     Glucose 122 mg/dL      Comment: Meter: [id number] : [id number] Kaila Richardson       POC Glucose Once [[id number]]  (Normal) Collected: 218    Specimen: Blood Updated: 21 2130     Glucose 110 mg/dL      Comment: Meter: [id number] : [id number] albhavani Amber       POC Glucose Once [[id number]]  (Abnormal) Collected: 21 1646    Specimen: Blood Updated: 21 1649     Glucose 68 mg/dL      Comment: Meter: [id number] : [id number] Fox [place]       POC Glucose Once [[id number]]  (Abnormal) Collected: 21 1540    Specimen: Blood Updated: 21 1546     Glucose 67 mg/dL      Comment: Meter: [id number] : [id number] Fox [place]       POC Glucose Once [[id number]]  (Normal) Collected: 21 1211    Specimen: Blood Updated: 21 1213     Glucose 110 mg/dL      Comment: Meter: [id number] : [id number] Fox Adamsline       Blood Culture - Blood, Arm, Left [[id number]]  (Normal) Collected: 12/15/21 1145    Specimen: Blood from Arm, Left Updated: 21 1200     Blood Culture No growth at 4 days    POC Glucose Once [[id number]]  (Normal) Collected: 21 1012    Specimen: Blood Updated: 21 1014     Glucose 128 mg/dL      Comment: Meter: [id number] : [id number] Fox Nuñez           Lab Results   Component Value Date    HGB 7.5 (L) 2021       Assessment/Plan:      Edd is a 36 y.o.   s/p repeat  on 21 readmitted for postpartum  preeclampsia with likely pulmonary edema and elevated BP's. S/p 24hr of IV magnesium and diuresis. Treated for possible pneumonia and positive blood culture 1/4 bottles during admission as well. Discontinued per ID recs.  1. Discharge home today with precautions  2. Continue current BP meds and will give 3 additional days of po lasix  3. Continue insulin, but decrease Lantus to 40u BID  4. Follow-up in clinic within 1 week as scheduled     I spent 25 minutes with patient, in counseling and coordination of care.  All questions were answered to the best of my ablity.    Rita Busby MD  12/20/2021  09:03 EST

## 2021-12-20 NOTE — PROGRESS NOTES
INFECTIOUS DISEASE Progress Note    Edd Leung  1985  [id number]    Admission Date: 12/15/2021      Requesting Provider: Héctor Hoffman MD  Evaluating Physician: To Hargrove Md    Reason for Consultation: Pneumonia and respiratory failure.    History of present illness:    21: Patient is a 36 y.o. female with h/o T2DM, abnormal cervical Pap smear, massive obesity, and HTN who we were asked to see for pneumonia  and positive blood cultures.  The patient recently delivered her baby by  section on 21.  Her post-surgical course was complicated by fluid overload, massive peripheral edema, and endometritis.  She was given Cefazolin gayle and post-operatively until  and she was changed to Clindamycin and gentamicin until 12/10 and ampicillin until 12/10. She was discharged home on .  She returned to [facility] ED on 12/15 for shortness of breath, headache, and edema.  She has remained afebrile on 2L O2.  She is now on room air. Labs are PCT 0.04, lactic acid 0.9, WBC 14,000 with 66% segs/2% bands,and proBNP 93.  A COVID-19/Flu A/B PCR is negative. Blood cultures are positive in 1 of 2 sets (1 of 4 bottles) with CoNS.  A MRSA PCR is negative. A UA is benign.  A CTA of chest, abd, and pelvis showed patchy GGO diffusely bilaterally with consolidative infiltrate in right lung base and subcutaneous edema in tissues of pannus.  She is currently on Zosyn and Vancomycin.  ID was asked to evaluate and manage her antibiotic therapy.     21:  She remains afebrile. Her O2 saturation is 93-98% on room air. Her respiratory virus panel PCR was negative. Blood cultures have grown coagulase-negative staph in a single set. Her blood pressure is 139/70.  She feels better today.  She denies increased cough and dyspnea.  She denies sputum production.    21:She feels better.  She has decreased dyspnea.  She has remained afebrile. Her O2 saturation on room air is 99%.    21: She  "remains on room air. She is afebrile. Her blood pressure is 127/61. She has decided to discharge today.  She denies nausea and vomiting.  She denies increased foot and ankle pain.    Past Medical History:   Diagnosis Date   • Anxiety    • Anxiety and depression    • Diabetes mellitus (HCC)    • Gonorrhea    • History of abnormal cervical Pap smear    • Hypertension        Past Surgical History:   Procedure Laterality Date   •  SECTION  2006   • DILATATION AND CURETTAGE         Family History   Problem Relation Age of Onset   • Hypertension Mother    • Hypertension Maternal Grandmother    • Diabetes Maternal Grandmother    • Diabetes Paternal Grandmother        Social History     Socioeconomic History   • Marital status:    • Number of children: 1   • Years of education: 14   • Highest education level: Associate degree: occupational, technical, or vocational program   Tobacco Use   • Smoking status: Former Smoker     Packs/day: 0.25     Years: 17.00     Pack years: 4.25     Types: Cigarettes     Quit date: 2019     Years since quittin.7   • Smokeless tobacco: Never Used   Vaping Use   • Vaping Use: Never used   Substance and Sexual Activity   • Alcohol use: Not Currently     Comment: SOCIALLY    • Drug use: Not Currently     Types: Marijuana     Comment: SOCIALLY    • Sexual activity: Yes     Partners: Male     Birth control/protection: None       Allergies   Allergen Reactions   • Latex Rash     \"makes my skin raw\"    • Banana Unknown - Low Severity   • Cucumber Extract Unknown - Low Severity   • Melon Unknown - Low Severity         Medication:    Current Facility-Administered Medications:   •  acetaminophen (TYLENOL) tablet 650 mg, 650 mg, Oral, Q4H PRN, Cesar Leung III, MD, 650 mg at 21 0124  •  buPROPion SR (WELLBUTRIN SR) 12 hr tablet 150 mg, 150 mg, Oral, Daily, Cesar Leung III, MD, 150 mg at 21 0946  •  dextrose (D50W) (25 g/50 mL) IV injection 25 g, 25 g, " Intravenous, Q15 Min PRN, Cesar Leung III, MD  •  dextrose (GLUTOSE) oral gel 15 g, 15 g, Oral, Q15 Min PRN, Cesar Leung III, MD  •  enoxaparin (LOVENOX) syringe 40 mg, 40 mg, Subcutaneous, Q12H, Cesar Leung III, MD, 40 mg at 12/19/21 2127  •  furosemide (LASIX) tablet 40 mg, 40 mg, Oral, BID, Cesar Leung III, MD, 40 mg at 12/19/21 1804  •  glucagon (human recombinant) (GLUCAGEN DIAGNOSTIC) injection 1 mg, 1 mg, Subcutaneous, Q15 Min PRN, Cesar Leung III, MD  •  hydrOXYzine (ATARAX) tablet 50 mg, 50 mg, Oral, Nightly PRN, Cesar Leung III, MD  •  insulin detemir (LEVEMIR) injection 45 Units, 45 Units, Subcutaneous, BID AC, HighLiam MD, 45 Units at 12/19/21 2128  •  insulin lispro (humaLOG) injection 25 Units, 25 Units, Subcutaneous, Daily With Breakfast, Rita Busby MD, 25 Units at 12/19/21 0827  •  insulin lispro (humaLOG) injection 25 Units, 25 Units, Subcutaneous, Daily With Lunch, Rita Busby MD, 25 Units at 12/19/21 1237  •  insulin lispro (humaLOG) injection 25 Units, 25 Units, Subcutaneous, Daily With Dinner, Rita Busby MD, 25 Units at 12/19/21 1656  •  labetalol (NORMODYNE) tablet 200 mg, 200 mg, Oral, Q8H, Cesar Leung III, MD, 200 mg at 12/20/21 0546  •  NIFEdipine XL (PROCARDIA XL) 24 hr tablet 30 mg, 30 mg, Oral, Q12H, Cesar Leung III, MD, 30 mg at 12/19/21 2128  •  sodium chloride 0.45 % infusion, 50 mL/hr, Intravenous, Continuous, Cesar Leung III, MD, Last Rate: 50 mL/hr at 12/18/21 0929, 50 mL/hr at 12/18/21 0929  •  sodium chloride 0.9 % flush 10 mL, 10 mL, Intravenous, PRN, Cesar Jones,   •  sodium chloride 0.9 % flush 10 mL, 10 mL, Intravenous, Q12H, Cesar Leung III, MD    Antibiotics:  Anti-Infectives (From admission, onward)    Ordered     Dose/Rate Route Frequency Start Stop    12/15/21 1133  vancomycin 3000 mg/500 mL 0.9% NS IVPB ([facility])        Ordering Provider: Daria Carreon PA    20 mg/kg × 193 kg  Intravenous Once 12/15/21 1135 12/15/21 1759    12/15/21 1133  piperacillin-tazobactam (ZOSYN) 4.5 g in iso-osmotic dextrose 100 mL IVPB (premix)        Ordering Provider: Daria Carreon PA    4.5 g  over 30 Minutes Intravenous Once 12/15/21 1135 12/15/21 1232            Review of Systems:  See HPI      Physical Exam:   Vital Signs  Temp (24hrs), Av.4 °F (36.9 °C), Min:98.1 °F (36.7 °C), Max:98.7 °F (37.1 °C)    Temp  Min: 98.1 °F (36.7 °C)  Max: 98.7 °F (37.1 °C)  BP  Min: 119/57  Max: 144/66  Pulse  Min: 69  Max: 90  Resp  Min: 18  Max: 20  SpO2  Min: 99 %  Max: 100 %    GENERAL: Awake and alert, in no acute distress.   HEENT: Normocephalic, atraumatic.  PERRL. EOMI. No conjunctival injection. No icterus. Oropharynx clear without evidence of thrush or exudate.  NECK: Supple   HEART: RRR; No murmur.   LUNGS: Diminished at lung bases bilaterally without wheezing, rales, rhonchi. Normal respiratory effort. Nonlabored.  ABDOMEN: Soft, nontender, nondistended. .. No rebound or guarding. NO mass or HSM. Massively obese  EXT:  No cyanosis, clubbing 2+ edema. No cord.  :  Without Stratton catheter.  MSK:  FROM, no joint effusions  SKIN: Warm and dry without cutaneous eruptions on Inspection/palpation.    NEURO: Oriented to PPT. Normal speech and cognition.  PSYCHIATRIC: Normal insight and judgment. Cooperative with PE    Laboratory Data    Results from last 7 days   Lab Units 21  1046 12/15/21  1100   WBC 10*3/mm3 11.38* 14.13*   HEMOGLOBIN g/dL 7.5* 8.4*   HEMATOCRIT % 25.3* 28.3*   PLATELETS 10*3/mm3 468* 478*     Results from last 7 days   Lab Units 21  0935   SODIUM mmol/L 142   POTASSIUM mmol/L 4.6   CHLORIDE mmol/L 104   CO2 mmol/L 27.0   BUN mg/dL 10   CREATININE mg/dL 0.89   GLUCOSE mg/dL 85   CALCIUM mg/dL 8.9     Results from last 7 days   Lab Units 21  0935   ALK PHOS U/L 135*   BILIRUBIN mg/dL 0.3   ALT (SGPT) U/L 24   AST (SGOT) U/L 18             Results from last 7 days   Lab Units  12/15/21  1100   LACTATE mmol/L 0.9         Results from last 7 days   Lab Units 12/17/21  0701   VANCOMYCIN TR mcg/mL 9.70     Estimated Creatinine Clearance: 165.9 mL/min (by C-G formula based on SCr of 0.89 mg/dL).      Microbiology:  Microbiology Results (last 10 days)     Procedure Component Value - Date/Time    Respiratory Panel PCR w/COVID-19(SARS-CoV-2) [facility]/BECCA/[facility]/PAD/[facility]/MAD/RAHUL In-House, NP Swab in UTM/VTM, 3-4 HR TAT - Swab, Nasopharynx [[id number]]  (Normal) Collected: 12/17/21 2024    Lab Status: Final result Specimen: Swab from Nasopharynx Updated: 12/17/21 2203     ADENOVIRUS, PCR Not Detected     Coronavirus 229E Not Detected     Coronavirus HKU1 Not Detected     Coronavirus NL63 Not Detected     Coronavirus OC43 Not Detected     COVID19 Not Detected     Human Metapneumovirus Not Detected     Human Rhinovirus/Enterovirus Not Detected     Influenza A PCR Not Detected     Influenza B PCR Not Detected     Parainfluenza Virus 1 Not Detected     Parainfluenza Virus 2 Not Detected     Parainfluenza Virus 3 Not Detected     Parainfluenza Virus 4 Not Detected     RSV, PCR Not Detected     Bordetella pertussis pcr Not Detected     Bordetella parapertussis PCR Not Detected     Chlamydophila pneumoniae PCR Not Detected     Mycoplasma pneumo by PCR Not Detected    Narrative:      In the setting of a positive respiratory panel with a viral infection PLUS a negative procalcitonin without other underlying concern for bacterial infection, consider observing off antibiotics or discontinuation of antibiotics and continue supportive care. If the respiratory panel is positive for atypical bacterial infection (Bordetella pertussis, Chlamydophila pneumoniae, or Mycoplasma pneumoniae), consider antibiotic de-escalation to target atypical bacterial infection.    MRSA Screen, PCR (Inpatient) - Swab, Nares [[id number]]  (Normal) Collected: 12/15/21 2233    Lab Status: Final result Specimen: Swab from Nares Updated: 12/16/21  0723     MRSA PCR Negative    Narrative:      MRSA Negative    Blood Culture - Blood, Arm, Left [[id number]]  (Normal) Collected: 12/15/21 1145    Lab Status: Preliminary result Specimen: Blood from Arm, Left Updated: 12/19/21 1200     Blood Culture No growth at 4 days    Blood Culture - Blood, Arm, Right [[id number]]  (Abnormal) Collected: 12/15/21 1130    Lab Status: Final result Specimen: Blood from Arm, Right Updated: 12/17/21 0619     Blood Culture Staphylococcus, coagulase negative     Isolated from Aerobic Bottle     Gram Stain Aerobic Bottle Gram positive cocci in pairs and clusters     Comment: Modified report. Previous result was Gram positive cocci in pairs and clusters on 12/16/2021 at 0748 EST.       Narrative:      Probable contaminant requires clinical correlation, susceptibility not performed unless requested by physician.      Blood Culture ID, PCR - Blood, Arm, Right [[id number]]  (Abnormal) Collected: 12/15/21 1130    Lab Status: Final result Specimen: Blood from Arm, Right Updated: 12/16/21 0904     BCID, PCR Staph spp, not aureus or lugdunesis. Identification by BCID2 PCR.    COVID PRE-OP / PRE-PROCEDURE SCREENING ORDER (NO ISOLATION) - Swab, Nasopharynx [[id number]]  (Normal) Collected: 12/15/21 1100    Lab Status: Final result Specimen: Swab from Nasopharynx Updated: 12/15/21 1134    Narrative:      The following orders were created for panel order COVID PRE-OP / PRE-PROCEDURE SCREENING ORDER (NO ISOLATION) - Swab, Nasopharynx.  Procedure                               Abnormality         Status                     ---------                               -----------         ------                     COVID-19 and FLU A/B PCR...[[id number]]  Normal              Final result                 Please view results for these tests on the individual orders.    COVID-19 and FLU A/B PCR - Swab, Nasopharynx [[id number]]  (Normal) Collected: 12/15/21 1100    Lab Status: Final result Specimen: Swab from  Nasopharynx Updated: 12/15/21 1134     COVID19 Not Detected     Influenza A PCR Not Detected     Influenza B PCR Not Detected    Narrative:      Fact sheet for providers: https://www.fda.gov/media/680647/download    Fact sheet for patients: https://www.fda.gov/media/867048/download    Test performed by PCR.                Radiology:  Imaging Results (Last 72 Hours)     ** No results found for the last 72 hours. **        I read her radiographic images.    Impression:   1. Pneumonia/pulmonary infiltrates-she has bilateral pulmonary infiltrates with acute hypoxic respiratory failure that could be secondary to volume overload in combination with pneumonia.  She has clinically.  I will discontinue Zosyn therapy.  2. CoNS in blood culture- this is a skin contaminant  3. Postpartum hypertension/preeclampsia  4. Anemia  5. Acute hypoxic respiratory failure/now on room air  6. Massive obesity   7. Type 2 diabetes mellitus  8. Essential hypertension  9. IUP/ section 21  10. Recent endometritis/treated with IV antibiotics.   11. Leukocytosis/neutrophilia-Better    PLAN/RECOMMENDATIONS:   1. Continue off of antibiotic therapy  2. Discharge today       To Hargrove MD  2021  06:04 EST

## 2021-12-20 NOTE — DISCHARGE SUMMARY
ANGEL COOLEY  Patient Name: Edd Leung  : 1985  MRN: [id number]  Date of Service: 2021  Referring Provider: Cesar Leung III     ID: 36 y.o.  at 37w4d    Admission Diagnosis: Preeclampsia in postpartum period [O14.95]    Discharge Diagnosis:   Patient Active Problem List   Diagnosis   • Preeclampsia in postpartum period   • Type 2 diabetes mellitus with hyperglycemia, with long-term current use of insulin ([facility])       Date of Admission: 12/15/2021 11:07 AM    Date of Discharge: 2021    Discharge Condition: Stable    Discharge to: Home    Discharge Medications:      Discharge Medications      New Medications      Instructions Start Date   insulin lispro 100 UNIT/ML injection  Commonly known as: humaLOG  Replaces: HumaLOG KwikPen 100 UNIT/ML solution pen-injector   25 Units, Subcutaneous, 3 Times Daily Before Meals      Lantus 100 UNIT/ML injection  Generic drug: insulin glargine  Replaces: Lantus SoloStar 100 UNIT/ML injection pen   40 Units, Subcutaneous, 2 Times Daily         Changes to Medications      Instructions Start Date   furosemide 40 MG tablet  Commonly known as: Lasix  What changed: when to take this   40 mg, Oral, 2 Times Daily         Continue These Medications      Instructions Start Date   acetaminophen 325 MG tablet  Commonly known as: TYLENOL   650 mg, Oral, Every 6 Hours      buPROPion  MG 12 hr tablet  Commonly known as: WELLBUTRIN SR   150 mg, Oral, Daily      enoxaparin 40 MG/0.4ML solution syringe  Commonly known as: LOVENOX   40 mg, Subcutaneous, Every 24 Hours      ibuprofen 600 MG tablet  Commonly known as: ADVIL,MOTRIN   600 mg, Oral, Every 6 Hours      labetalol 100 MG tablet  Commonly known as: NORMODYNE   200 mg, Oral, 2 Times Daily      NIFEDIPINE PO   30 mg, Oral, 2 Times Daily      oxyCODONE 5 MG immediate release tablet  Commonly known as: ROXICODONE   5 mg, Oral, Every 4 Hours PRN      Prenatal 27-1 27-1 MG tablet tablet   Oral,  "Daily      simethicone 80 MG chewable tablet  Commonly known as: MYLICON   80 mg, Oral, 4 Times Daily PRN      Stool Softener 100 MG capsule  Generic drug: docusate sodium   100 mg, Oral, 2 Times Daily PRN         Stop These Medications    HumaLOG KwikPen 100 UNIT/ML solution pen-injector  Generic drug: Insulin Lispro (1 Unit Dial)  Replaced by: insulin lispro 100 UNIT/ML injection     lanolin cream     Lantus SoloStar 100 UNIT/ML injection pen  Generic drug: Insulin Glargine  Replaced by: Lantus 100 UNIT/ML injection            Discharge Diet: 2400-calorie consistent carbohydrate diet    Discharge Activity: Pelvic rest and light activity.    Follow up appointments: Patient will keep her regularly scheduled postpartum visit with  [facility] summary: Patient was admitted with postpartum hypertension and fluid overload.  She underwent aggressive diuresis with Lasix and ongoing treatment with nifedipine XL 30 mg orally twice daily and labetalol 200 mg orally 3 times daily.  There was initial concern regarding positive blood cultures.  However, final ID suggested a skin contaminant.  The patient was treated with vancomycin and Zosyn for presumed pneumonia.  Vancomycin was subsequently discontinued as there was no evidence of MRSA.  Patient remained afebrile.  Her respiratory status improved significantly following diuresis.  She was felt to be ready for discharge on the morning of .    Edd was admitted for Preeclampsia in postpartum period.     She did receive magnesium sulfate during her admission.        Her condition was determined to be appropriate for outpatient management and she was discharged home in stable condition.  She was instructed to follow up in 1 week with  [facility] [place] in 1 week.    Cesar Hurtado \"Silvia\" LINDA Leung MD  10:22 EST  "

## 2021-12-20 NOTE — NURSING NOTE
Pt discharge education complete. Pt has no other questions or concerns at this time. Will discharge to home per order.

## 2021-12-20 NOTE — PLAN OF CARE
Problem: Adult Inpatient Plan of Care  Goal: Plan of Care Review  Outcome: Met  Goal: Patient-Specific Goal (Individualized)  Outcome: Met  Goal: Absence of Hospital-Acquired Illness or Injury  Outcome: Met  Intervention: Identify and Manage Fall Risk  Recent Flowsheet Documentation  Taken 12/20/2021 0734 by Amanda Pacheco RN  Safety Promotion/Fall Prevention:   activity supervised   safety round/check completed  Intervention: Prevent Skin Injury  Recent Flowsheet Documentation  Taken 12/20/2021 0734 by Amanda Pacheco RN  Body Position: sitting up in bed  Intervention: Prevent and Manage VTE (venous thromboembolism) Risk  Recent Flowsheet Documentation  Taken 12/20/2021 0734 by Amanda Pacheco RN  VTE Prevention/Management: (see MAR) other (see comments)  Goal: Optimal Comfort and Wellbeing  Outcome: Met  Intervention: Provide Person-Centered Care  Recent Flowsheet Documentation  Taken 12/20/2021 0734 by Amanda Pacheco RN  Trust Relationship/Rapport:   care explained   choices provided   questions answered   questions encouraged  Goal: Readiness for Transition of Care  Outcome: Met     Problem: Hypertensive Disorders in Pregnancy  Goal: Maternal-Fetal Stabilization  Outcome: Met  Intervention: Monitor and Manage Sign/Symptom Progression  Recent Flowsheet Documentation  Taken 12/20/2021 0734 by Amanda Pacheco RN  Medication Review/Management: medications reviewed     Problem: Fluid Imbalance (Pneumonia)  Goal: Fluid Balance  Outcome: Met     Problem: Infection (Pneumonia)  Goal: Resolution of Infection Signs and Symptoms  Outcome: Met     Problem: Respiratory Compromise (Pneumonia)  Goal: Effective Oxygenation and Ventilation  Outcome: Met  Intervention: Promote Airway Secretion Clearance  Recent Flowsheet Documentation  Taken 12/20/2021 0734 by Amanda Pacheco RN  Cough And Deep Breathing: done independently per patient   Goal Outcome Evaluation:

## 2021-12-21 LAB
QT INTERVAL: 380 MS
QTC INTERVAL: 446 MS

## 2021-12-21 NOTE — PAYOR COMM NOTE
"Yamel Leung (36 y.o. Female)     Auth#[id number]    Discharged 21.    From: Amalia Laguerre LPN, Utilization Review  Phone #[phone]  Fax #[phone]              Date of Birth Social Security Number Address Home Phone MRN    1985  [address] [address] [phone] [id number]    Christianity Marital Status             None        Admission Date Admission Type Admitting Provider Attending Provider Department, Room/Bed    12/15/21 Emergency Cesar Leung III, MD  [facility] ANTEPARTUM, [address]331/    Discharge Date Discharge Disposition Discharge Destination          2021 Home or Self Care              Attending Provider: (none)   Allergies: Latex, Banana, Cucumber Extract, Melon    Isolation: None   Infection: None   Code Status: Prior   Advance Care Planning Activity    Ht: 175.3 cm (69.02\")   Wt: 192 kg (423 lb 12.8 oz)    Admission Cmt: None   Principal Problem: Preeclampsia in postpartum period [O14.95]                 Active Insurance as of 12/15/2021     Primary Coverage     Payor Plan Insurance Group Employer/Plan Group    [employer] [place] [employer] [place]ICA[place]      Payor Plan Address Payor Plan Phone Number Payor Plan Fax Number Effective Dates    [address] [address] [phone]  2021 - None Entered    [facility] [id number]       Subscriber Name Subscriber Birth Date Member ID       YAMEL LEUNG 1985 [id number]                 Emergency Contacts      (Rel.) Home Phone Work Phone Mobile Phone    RAJAN ROWELL (Relative) -- -- [phone]    Joanne Nelson (Mother) -- -- [phone]               Discharge Summary      Cesar Leung III, MD at 21 1014          L John COOLEY  Patient Name: Yamel Leung  : 1985  MRN: [id number]  Date of Service: 2021  Referring Provider: Cesar Leung III     ID: 36 y.o.  at 37w4d    Admission Diagnosis: Preeclampsia in " postpartum period [O14.95]    Discharge Diagnosis:   Patient Active Problem List   Diagnosis   • Preeclampsia in postpartum period   • Type 2 diabetes mellitus with hyperglycemia, with long-term current use of insulin ([facility])       Date of Admission: 12/15/2021 11:07 AM    Date of Discharge: 12/20/2021    Discharge Condition: Stable    Discharge to: Home    Discharge Medications:      Discharge Medications      New Medications      Instructions Start Date   insulin lispro 100 UNIT/ML injection  Commonly known as: humaLOG  Replaces: HumaLOG KwikPen 100 UNIT/ML solution pen-injector   25 Units, Subcutaneous, 3 Times Daily Before Meals      Lantus 100 UNIT/ML injection  Generic drug: insulin glargine  Replaces: Lantus SoloStar 100 UNIT/ML injection pen   40 Units, Subcutaneous, 2 Times Daily         Changes to Medications      Instructions Start Date   furosemide 40 MG tablet  Commonly known as: Lasix  What changed: when to take this   40 mg, Oral, 2 Times Daily         Continue These Medications      Instructions Start Date   acetaminophen 325 MG tablet  Commonly known as: TYLENOL   650 mg, Oral, Every 6 Hours      buPROPion  MG 12 hr tablet  Commonly known as: WELLBUTRIN SR   150 mg, Oral, Daily      enoxaparin 40 MG/0.4ML solution syringe  Commonly known as: LOVENOX   40 mg, Subcutaneous, Every 24 Hours      ibuprofen 600 MG tablet  Commonly known as: ADVIL,MOTRIN   600 mg, Oral, Every 6 Hours      labetalol 100 MG tablet  Commonly known as: NORMODYNE   200 mg, Oral, 2 Times Daily      NIFEDIPINE PO   30 mg, Oral, 2 Times Daily      oxyCODONE 5 MG immediate release tablet  Commonly known as: ROXICODONE   5 mg, Oral, Every 4 Hours PRN      Prenatal 27-1 27-1 MG tablet tablet   Oral, Daily      simethicone 80 MG chewable tablet  Commonly known as: MYLICON   80 mg, Oral, 4 Times Daily PRN      Stool Softener 100 MG capsule  Generic drug: docusate sodium   100 mg, Oral, 2 Times Daily PRN         Stop These  "Medications    HumaLOG KwikPen 100 UNIT/ML solution pen-injector  Generic drug: Insulin Lispro (1 Unit Dial)  Replaced by: insulin lispro 100 UNIT/ML injection     lanolin cream     Lantus SoloStar 100 UNIT/ML injection pen  Generic drug: Insulin Glargine  Replaced by: Lantus 100 UNIT/ML injection            Discharge Diet: 2400-calorie consistent carbohydrate diet    Discharge Activity: Pelvic rest and light activity.    Follow up appointments: Patient [facility]: Patient was admitted with postpartum hypertension and fluid overload.  She underwent aggressive diuresis with Lasix and ongoing treatment with nifedipine XL 30 mg orally twice daily and labetalol 200 mg orally 3 times daily.  There was initial concern regarding positive blood cultures.  However, final ID suggested a skin contaminant.  The patient was treated with vancomycin and Zosyn for presumed pneumonia.  Vancomycin was subsequently discontinued as there was no evidence of MRSA.  Patient remained afebrile.  Her respiratory status improved significantly following diuresis.  She was felt to be ready for discharge on the morning of .    Edd was admitted for Preeclampsia in postpartum period.     She did receive magnesium sulfate during her admission.        Her condition was determined to be appropriate for outpatient management and she was discharged home in stable condition.  She was instructed to follow up in 1 week with  [facility] in [place] in 1 week.    Cesar Hurtado \"Sivlia\" LINDA Leung MD  10:22 EST    Electronically signed by Cesar Leung III, MD at 21 1023       "

## 2021-12-22 LAB
BACTERIA SPEC AEROBE CULT: ABNORMAL
GRAM STN SPEC: ABNORMAL
ISOLATED FROM: ABNORMAL

## 2022-02-18 ENCOUNTER — APPOINTMENT (OUTPATIENT)
Dept: GENERAL RADIOLOGY | Facility: HOSPITAL | Age: 37
End: 2022-02-18

## 2022-02-18 ENCOUNTER — HOSPITAL ENCOUNTER (INPATIENT)
Facility: HOSPITAL | Age: 37
LOS: 1 days | Discharge: HOME | End: 2022-02-19
Attending: EMERGENCY MEDICINE | Admitting: STUDENT IN AN ORGANIZED HEALTH CARE EDUCATION/TRAINING PROGRAM

## 2022-02-18 ENCOUNTER — APPOINTMENT (OUTPATIENT)
Dept: CARDIOLOGY | Facility: HOSPITAL | Age: 37
End: 2022-02-18

## 2022-02-18 ENCOUNTER — APPOINTMENT (OUTPATIENT)
Dept: CT IMAGING | Facility: HOSPITAL | Age: 37
End: 2022-02-18

## 2022-02-18 DIAGNOSIS — E66.01 MORBID OBESITY: ICD-10-CM

## 2022-02-18 DIAGNOSIS — R09.02 HYPOXIA: ICD-10-CM

## 2022-02-18 DIAGNOSIS — I10 HYPERTENSION, UNSPECIFIED TYPE: ICD-10-CM

## 2022-02-18 DIAGNOSIS — R06.02 SHORTNESS OF BREATH: Primary | ICD-10-CM

## 2022-02-18 PROBLEM — D64.9 ANEMIA: Status: ACTIVE | Noted: 2022-02-18

## 2022-02-18 LAB
ALBUMIN SERPL-MCNC: 4.1 G/DL (ref 3.5–5.2)
ALBUMIN/GLOB SERPL: 1.1 G/DL
ALP SERPL-CCNC: 125 U/L (ref 39–117)
ALT SERPL W P-5'-P-CCNC: 15 U/L (ref 1–33)
ANION GAP SERPL CALCULATED.3IONS-SCNC: 11 MMOL/L (ref 5–15)
AST SERPL-CCNC: 13 U/L (ref 1–32)
B-HCG UR QL: NEGATIVE
BASOPHILS # BLD AUTO: 0.04 10*3/MM3 (ref 0–0.2)
BASOPHILS NFR BLD AUTO: 0.4 % (ref 0–1.5)
BILIRUB SERPL-MCNC: 0.3 MG/DL (ref 0–1.2)
BUN SERPL-MCNC: 16 MG/DL (ref 6–20)
BUN/CREAT SERPL: 18.2 (ref 7–25)
CALCIUM SPEC-SCNC: 9.3 MG/DL (ref 8.6–10.5)
CHLORIDE SERPL-SCNC: 104 MMOL/L (ref 98–107)
CO2 SERPL-SCNC: 26 MMOL/L (ref 22–29)
CREAT SERPL-MCNC: 0.88 MG/DL (ref 0.57–1)
D DIMER PPP FEU-MCNC: <0.27 MCGFEU/ML (ref 0–0.56)
DEPRECATED RDW RBC AUTO: 47.9 FL (ref 37–54)
EOSINOPHIL # BLD AUTO: 0.15 10*3/MM3 (ref 0–0.4)
EOSINOPHIL NFR BLD AUTO: 1.3 % (ref 0.3–6.2)
ERYTHROCYTE [DISTWIDTH] IN BLOOD BY AUTOMATED COUNT: 17.6 % (ref 12.3–15.4)
FERRITIN SERPL-MCNC: 14.8 NG/ML (ref 13–150)
FLUAV SUBTYP SPEC NAA+PROBE: NOT DETECTED
FLUBV RNA ISLT QL NAA+PROBE: NOT DETECTED
GFR SERPL CREATININE-BSD FRML MDRD: 89 ML/MIN/1.73
GLOBULIN UR ELPH-MCNC: 3.7 GM/DL
GLUCOSE BLDC GLUCOMTR-MCNC: 171 MG/DL (ref 70–130)
GLUCOSE BLDC GLUCOMTR-MCNC: 176 MG/DL (ref 70–130)
GLUCOSE BLDC GLUCOMTR-MCNC: 190 MG/DL (ref 70–130)
GLUCOSE BLDC GLUCOMTR-MCNC: 202 MG/DL (ref 70–130)
GLUCOSE SERPL-MCNC: 172 MG/DL (ref 65–99)
HBA1C MFR BLD: 6.9 % (ref 4.8–5.6)
HCT VFR BLD AUTO: 33.4 % (ref 34–46.6)
HGB BLD-MCNC: 9.3 G/DL (ref 12–15.9)
IMM GRANULOCYTES # BLD AUTO: 0.06 10*3/MM3 (ref 0–0.05)
IMM GRANULOCYTES NFR BLD AUTO: 0.5 % (ref 0–0.5)
IRON 24H UR-MRATE: 35 MCG/DL (ref 37–145)
IRON SATN MFR SERPL: 7 % (ref 20–50)
LYMPHOCYTES # BLD AUTO: 3.82 10*3/MM3 (ref 0.7–3.1)
LYMPHOCYTES NFR BLD AUTO: 33.7 % (ref 19.6–45.3)
MCH RBC QN AUTO: 21 PG (ref 26.6–33)
MCHC RBC AUTO-ENTMCNC: 27.8 G/DL (ref 31.5–35.7)
MCV RBC AUTO: 75.4 FL (ref 79–97)
MONOCYTES # BLD AUTO: 0.69 10*3/MM3 (ref 0.1–0.9)
MONOCYTES NFR BLD AUTO: 6.1 % (ref 5–12)
NEUTROPHILS NFR BLD AUTO: 58 % (ref 42.7–76)
NEUTROPHILS NFR BLD AUTO: 6.59 10*3/MM3 (ref 1.7–7)
NRBC BLD AUTO-RTO: 0 /100 WBC (ref 0–0.2)
NT-PROBNP SERPL-MCNC: 61.9 PG/ML (ref 0–450)
PHOSPHATE SERPL-MCNC: 4.7 MG/DL (ref 2.5–4.5)
PLATELET # BLD AUTO: 451 10*3/MM3 (ref 140–450)
PMV BLD AUTO: 10.4 FL (ref 6–12)
POTASSIUM SERPL-SCNC: 4 MMOL/L (ref 3.5–5.2)
PROCALCITONIN SERPL-MCNC: <0.02 NG/ML (ref 0–0.25)
PROT SERPL-MCNC: 7.8 G/DL (ref 6–8.5)
RBC # BLD AUTO: 4.43 10*6/MM3 (ref 3.77–5.28)
RETICS # AUTO: 0.08 10*6/MM3 (ref 0.02–0.13)
RETICS/RBC NFR AUTO: 1.9 % (ref 0.7–1.9)
SARS-COV-2 RNA PNL SPEC NAA+PROBE: NOT DETECTED
SODIUM SERPL-SCNC: 141 MMOL/L (ref 136–145)
TIBC SERPL-MCNC: 474 MCG/DL (ref 298–536)
TRANSFERRIN SERPL-MCNC: 318 MG/DL (ref 200–360)
TROPONIN T SERPL-MCNC: <0.01 NG/ML (ref 0–0.03)
WBC NRBC COR # BLD: 11.35 10*3/MM3 (ref 3.4–10.8)

## 2022-02-18 PROCEDURE — 25010000002 ENOXAPARIN PER 10 MG

## 2022-02-18 PROCEDURE — 85045 AUTOMATED RETICULOCYTE COUNT: CPT | Performed by: INTERNAL MEDICINE

## 2022-02-18 PROCEDURE — 99223 1ST HOSP IP/OBS HIGH 75: CPT | Performed by: INTERNAL MEDICINE

## 2022-02-18 PROCEDURE — 93306 TTE W/DOPPLER COMPLETE: CPT | Performed by: INTERNAL MEDICINE

## 2022-02-18 PROCEDURE — 71250 CT THORAX DX C-: CPT

## 2022-02-18 PROCEDURE — 82962 GLUCOSE BLOOD TEST: CPT

## 2022-02-18 PROCEDURE — 83880 ASSAY OF NATRIURETIC PEPTIDE: CPT | Performed by: EMERGENCY MEDICINE

## 2022-02-18 PROCEDURE — 84145 PROCALCITONIN (PCT): CPT | Performed by: INTERNAL MEDICINE

## 2022-02-18 PROCEDURE — G0108 DIAB MANAGE TRN  PER INDIV: HCPCS

## 2022-02-18 PROCEDURE — 94799 UNLISTED PULMONARY SVC/PX: CPT

## 2022-02-18 PROCEDURE — 85025 COMPLETE CBC W/AUTO DIFF WBC: CPT | Performed by: EMERGENCY MEDICINE

## 2022-02-18 PROCEDURE — 99284 EMERGENCY DEPT VISIT MOD MDM: CPT

## 2022-02-18 PROCEDURE — 71045 X-RAY EXAM CHEST 1 VIEW: CPT

## 2022-02-18 PROCEDURE — 93306 TTE W/DOPPLER COMPLETE: CPT

## 2022-02-18 PROCEDURE — 81025 URINE PREGNANCY TEST: CPT | Performed by: INTERNAL MEDICINE

## 2022-02-18 PROCEDURE — 85379 FIBRIN DEGRADATION QUANT: CPT | Performed by: EMERGENCY MEDICINE

## 2022-02-18 PROCEDURE — 83036 HEMOGLOBIN GLYCOSYLATED A1C: CPT | Performed by: INTERNAL MEDICINE

## 2022-02-18 PROCEDURE — 84484 ASSAY OF TROPONIN QUANT: CPT | Performed by: EMERGENCY MEDICINE

## 2022-02-18 PROCEDURE — 63710000001 INSULIN DETEMIR PER 5 UNITS: Performed by: INTERNAL MEDICINE

## 2022-02-18 PROCEDURE — 84466 ASSAY OF TRANSFERRIN: CPT | Performed by: INTERNAL MEDICINE

## 2022-02-18 PROCEDURE — 83540 ASSAY OF IRON: CPT | Performed by: INTERNAL MEDICINE

## 2022-02-18 PROCEDURE — 84100 ASSAY OF PHOSPHORUS: CPT | Performed by: INTERNAL MEDICINE

## 2022-02-18 PROCEDURE — 25010000002 FUROSEMIDE PER 20 MG: Performed by: INTERNAL MEDICINE

## 2022-02-18 PROCEDURE — 82728 ASSAY OF FERRITIN: CPT | Performed by: INTERNAL MEDICINE

## 2022-02-18 PROCEDURE — 94640 AIRWAY INHALATION TREATMENT: CPT

## 2022-02-18 PROCEDURE — 93005 ELECTROCARDIOGRAM TRACING: CPT | Performed by: EMERGENCY MEDICINE

## 2022-02-18 PROCEDURE — 80053 COMPREHEN METABOLIC PANEL: CPT | Performed by: EMERGENCY MEDICINE

## 2022-02-18 PROCEDURE — 99222 1ST HOSP IP/OBS MODERATE 55: CPT | Performed by: INTERNAL MEDICINE

## 2022-02-18 PROCEDURE — 87636 SARSCOV2 & INF A&B AMP PRB: CPT | Performed by: EMERGENCY MEDICINE

## 2022-02-18 PROCEDURE — 63710000001 INSULIN LISPRO (HUMAN) PER 5 UNITS: Performed by: INTERNAL MEDICINE

## 2022-02-18 PROCEDURE — 81003 URINALYSIS AUTO W/O SCOPE: CPT | Performed by: STUDENT IN AN ORGANIZED HEALTH CARE EDUCATION/TRAINING PROGRAM

## 2022-02-18 RX ORDER — BUPROPION HYDROCHLORIDE 150 MG/1
150 TABLET, EXTENDED RELEASE ORAL DAILY
Status: DISCONTINUED | OUTPATIENT
Start: 2022-02-18 | End: 2022-02-19 | Stop reason: HOSPADM

## 2022-02-18 RX ORDER — FUROSEMIDE 10 MG/ML
40 INJECTION INTRAMUSCULAR; INTRAVENOUS ONCE
Status: COMPLETED | OUTPATIENT
Start: 2022-02-18 | End: 2022-02-18

## 2022-02-18 RX ORDER — LOSARTAN POTASSIUM 25 MG/1
25 TABLET ORAL
Status: DISCONTINUED | OUTPATIENT
Start: 2022-02-18 | End: 2022-02-19 | Stop reason: HOSPADM

## 2022-02-18 RX ORDER — IPRATROPIUM BROMIDE AND ALBUTEROL SULFATE 2.5; .5 MG/3ML; MG/3ML
3 SOLUTION RESPIRATORY (INHALATION)
Status: DISCONTINUED | OUTPATIENT
Start: 2022-02-18 | End: 2022-02-19 | Stop reason: HOSPADM

## 2022-02-18 RX ORDER — ACETAMINOPHEN 160 MG/5ML
650 SOLUTION ORAL EVERY 4 HOURS PRN
Status: DISCONTINUED | OUTPATIENT
Start: 2022-02-18 | End: 2022-02-19 | Stop reason: HOSPADM

## 2022-02-18 RX ORDER — ONDANSETRON 2 MG/ML
4 INJECTION INTRAMUSCULAR; INTRAVENOUS EVERY 6 HOURS PRN
Status: DISCONTINUED | OUTPATIENT
Start: 2022-02-18 | End: 2022-02-19 | Stop reason: HOSPADM

## 2022-02-18 RX ORDER — NIFEDIPINE 30 MG/1
30 TABLET, EXTENDED RELEASE ORAL DAILY
COMMUNITY
End: 2022-09-20 | Stop reason: SDUPTHER

## 2022-02-18 RX ORDER — NICOTINE POLACRILEX 4 MG
15 LOZENGE BUCCAL
Status: DISCONTINUED | OUTPATIENT
Start: 2022-02-18 | End: 2022-02-19 | Stop reason: HOSPADM

## 2022-02-18 RX ORDER — SODIUM CHLORIDE 0.9 % (FLUSH) 0.9 %
10 SYRINGE (ML) INJECTION AS NEEDED
Status: DISCONTINUED | OUTPATIENT
Start: 2022-02-18 | End: 2022-02-19 | Stop reason: HOSPADM

## 2022-02-18 RX ORDER — ACETAMINOPHEN 650 MG/1
650 SUPPOSITORY RECTAL EVERY 4 HOURS PRN
Status: DISCONTINUED | OUTPATIENT
Start: 2022-02-18 | End: 2022-02-19 | Stop reason: HOSPADM

## 2022-02-18 RX ORDER — SODIUM CHLORIDE 0.9 % (FLUSH) 0.9 %
10 SYRINGE (ML) INJECTION EVERY 12 HOURS SCHEDULED
Status: DISCONTINUED | OUTPATIENT
Start: 2022-02-18 | End: 2022-02-19 | Stop reason: HOSPADM

## 2022-02-18 RX ORDER — NIFEDIPINE 10 MG/1
30 CAPSULE ORAL 2 TIMES DAILY
Status: DISCONTINUED | OUTPATIENT
Start: 2022-02-18 | End: 2022-02-18

## 2022-02-18 RX ORDER — NIFEDIPINE 30 MG/1
30 TABLET, EXTENDED RELEASE ORAL
Status: DISCONTINUED | OUTPATIENT
Start: 2022-02-18 | End: 2022-02-19 | Stop reason: HOSPADM

## 2022-02-18 RX ORDER — LABETALOL HYDROCHLORIDE 5 MG/ML
10 INJECTION, SOLUTION INTRAVENOUS EVERY 6 HOURS PRN
Status: DISCONTINUED | OUTPATIENT
Start: 2022-02-18 | End: 2022-02-19 | Stop reason: HOSPADM

## 2022-02-18 RX ORDER — LIDOCAINE HYDROCHLORIDE 10 MG/ML
2 INJECTION, SOLUTION EPIDURAL; INFILTRATION; INTRACAUDAL; PERINEURAL ONCE
Status: COMPLETED | OUTPATIENT
Start: 2022-02-18 | End: 2022-02-18

## 2022-02-18 RX ORDER — ONDANSETRON 4 MG/1
4 TABLET, FILM COATED ORAL EVERY 6 HOURS PRN
Status: DISCONTINUED | OUTPATIENT
Start: 2022-02-18 | End: 2022-02-19 | Stop reason: HOSPADM

## 2022-02-18 RX ORDER — ACETAMINOPHEN 325 MG/1
650 TABLET ORAL EVERY 4 HOURS PRN
Status: DISCONTINUED | OUTPATIENT
Start: 2022-02-18 | End: 2022-02-19 | Stop reason: HOSPADM

## 2022-02-18 RX ORDER — DEXTROSE MONOHYDRATE 25 G/50ML
25 INJECTION, SOLUTION INTRAVENOUS
Status: DISCONTINUED | OUTPATIENT
Start: 2022-02-18 | End: 2022-02-19 | Stop reason: HOSPADM

## 2022-02-18 RX ADMIN — LOSARTAN POTASSIUM 25 MG: 25 TABLET, FILM COATED ORAL at 10:36

## 2022-02-18 RX ADMIN — IPRATROPIUM BROMIDE AND ALBUTEROL SULFATE 3 ML: 2.5; .5 SOLUTION RESPIRATORY (INHALATION) at 08:35

## 2022-02-18 RX ADMIN — INSULIN LISPRO 15 UNITS: 100 INJECTION, SOLUTION INTRAVENOUS; SUBCUTANEOUS at 17:27

## 2022-02-18 RX ADMIN — LIDOCAINE HYDROCHLORIDE 2 ML: 10 INJECTION, SOLUTION EPIDURAL; INFILTRATION; INTRACAUDAL; PERINEURAL at 12:29

## 2022-02-18 RX ADMIN — INSULIN LISPRO 4 UNITS: 100 INJECTION, SOLUTION INTRAVENOUS; SUBCUTANEOUS at 13:33

## 2022-02-18 RX ADMIN — NIFEDIPINE 30 MG: 30 TABLET, FILM COATED, EXTENDED RELEASE ORAL at 17:27

## 2022-02-18 RX ADMIN — INSULIN LISPRO 15 UNITS: 100 INJECTION, SOLUTION INTRAVENOUS; SUBCUTANEOUS at 13:32

## 2022-02-18 RX ADMIN — SODIUM CHLORIDE, PRESERVATIVE FREE 10 ML: 5 INJECTION INTRAVENOUS at 21:36

## 2022-02-18 RX ADMIN — ACETAMINOPHEN 650 MG: 325 TABLET ORAL at 21:42

## 2022-02-18 RX ADMIN — INSULIN DETEMIR 25 UNITS: 100 INJECTION, SOLUTION SUBCUTANEOUS at 21:35

## 2022-02-18 RX ADMIN — INSULIN DETEMIR 25 UNITS: 100 INJECTION, SOLUTION SUBCUTANEOUS at 10:51

## 2022-02-18 RX ADMIN — ENOXAPARIN SODIUM 40 MG: 40 INJECTION SUBCUTANEOUS at 21:36

## 2022-02-18 RX ADMIN — ENOXAPARIN SODIUM 40 MG: 40 INJECTION SUBCUTANEOUS at 10:35

## 2022-02-18 RX ADMIN — IPRATROPIUM BROMIDE AND ALBUTEROL SULFATE 3 ML: 2.5; .5 SOLUTION RESPIRATORY (INHALATION) at 21:06

## 2022-02-18 RX ADMIN — FUROSEMIDE 40 MG: 10 INJECTION, SOLUTION INTRAMUSCULAR; INTRAVENOUS at 10:33

## 2022-02-18 RX ADMIN — IPRATROPIUM BROMIDE AND ALBUTEROL SULFATE 3 ML: 2.5; .5 SOLUTION RESPIRATORY (INHALATION) at 15:44

## 2022-02-18 RX ADMIN — BUPROPION HYDROCHLORIDE 150 MG: 150 TABLET, EXTENDED RELEASE ORAL at 17:27

## 2022-02-18 RX ADMIN — INSULIN LISPRO 2 UNITS: 100 INJECTION, SOLUTION INTRAVENOUS; SUBCUTANEOUS at 17:28

## 2022-02-18 RX ADMIN — SODIUM CHLORIDE, PRESERVATIVE FREE 10 ML: 5 INJECTION INTRAVENOUS at 10:43

## 2022-02-19 ENCOUNTER — APPOINTMENT (OUTPATIENT)
Dept: PULMONOLOGY | Facility: HOSPITAL | Age: 37
End: 2022-02-19

## 2022-02-19 VITALS
WEIGHT: 293 LBS | HEART RATE: 62 BPM | DIASTOLIC BLOOD PRESSURE: 69 MMHG | OXYGEN SATURATION: 100 % | BODY MASS INDEX: 43.4 KG/M2 | RESPIRATION RATE: 18 BRPM | HEIGHT: 69 IN | SYSTOLIC BLOOD PRESSURE: 170 MMHG | TEMPERATURE: 98 F

## 2022-02-19 LAB
ANION GAP SERPL CALCULATED.3IONS-SCNC: 10 MMOL/L (ref 5–15)
BASOPHILS # BLD AUTO: 0.03 10*3/MM3 (ref 0–0.2)
BASOPHILS NFR BLD AUTO: 0.4 % (ref 0–1.5)
BH CV ECHO MEAS - AO MAX PG (FULL): 10.9 MMHG
BH CV ECHO MEAS - AO MAX PG: 19.4 MMHG
BH CV ECHO MEAS - AO MEAN PG (FULL): 5.9 MMHG
BH CV ECHO MEAS - AO MEAN PG: 10.6 MMHG
BH CV ECHO MEAS - AO ROOT AREA (BSA CORRECTED): 0.96
BH CV ECHO MEAS - AO ROOT AREA: 5.9 CM^2
BH CV ECHO MEAS - AO ROOT DIAM: 2.7 CM
BH CV ECHO MEAS - AO V2 MAX: 220.1 CM/SEC
BH CV ECHO MEAS - AO V2 MEAN: 154.1 CM/SEC
BH CV ECHO MEAS - AO V2 VTI: 48.8 CM
BH CV ECHO MEAS - ASC AORTA: 2.9 CM
BH CV ECHO MEAS - AVA(I,A): 2.1 CM^2
BH CV ECHO MEAS - AVA(I,D): 2.1 CM^2
BH CV ECHO MEAS - AVA(V,A): 2.1 CM^2
BH CV ECHO MEAS - AVA(V,D): 2.1 CM^2
BH CV ECHO MEAS - BSA(HAYCOCK): 3.2 M^2
BH CV ECHO MEAS - BSA: 2.9 M^2
BH CV ECHO MEAS - BZI_BMI: 63.9 KILOGRAMS/M^2
BH CV ECHO MEAS - BZI_METRIC_HEIGHT: 175.3 CM
BH CV ECHO MEAS - BZI_METRIC_WEIGHT: 196.4 KG
BH CV ECHO MEAS - EDV(CUBED): 148.8 ML
BH CV ECHO MEAS - EDV(MOD-SP2): 194 ML
BH CV ECHO MEAS - EDV(MOD-SP4): 161 ML
BH CV ECHO MEAS - EDV(TEICH): 135.3 ML
BH CV ECHO MEAS - EF(CUBED): 85.9 %
BH CV ECHO MEAS - EF(MOD-BP): 63 %
BH CV ECHO MEAS - EF(MOD-SP2): 69.6 %
BH CV ECHO MEAS - EF(MOD-SP4): 57.8 %
BH CV ECHO MEAS - EF(TEICH): 79 %
BH CV ECHO MEAS - ESV(CUBED): 20.9 ML
BH CV ECHO MEAS - ESV(MOD-SP2): 59 ML
BH CV ECHO MEAS - ESV(MOD-SP4): 68 ML
BH CV ECHO MEAS - ESV(TEICH): 28.4 ML
BH CV ECHO MEAS - FS: 48 %
BH CV ECHO MEAS - IVS/LVPW: 0.94
BH CV ECHO MEAS - IVSD: 1 CM
BH CV ECHO MEAS - LA DIMENSION: 4.7 CM
BH CV ECHO MEAS - LA/AO: 1.7
BH CV ECHO MEAS - LAD MAJOR: 7 CM
BH CV ECHO MEAS - LAT PEAK E' VEL: 11.7 CM/SEC
BH CV ECHO MEAS - LATERAL E/E' RATIO: 11
BH CV ECHO MEAS - LV DIASTOLIC VOL/BSA (35-75): 56.1 ML/M^2
BH CV ECHO MEAS - LV IVRT: 0.06 SEC
BH CV ECHO MEAS - LV MASS(C)D: 218.8 GRAMS
BH CV ECHO MEAS - LV MASS(C)DI: 76.3 GRAMS/M^2
BH CV ECHO MEAS - LV MAX PG: 8.4 MMHG
BH CV ECHO MEAS - LV MEAN PG: 4.7 MMHG
BH CV ECHO MEAS - LV SYSTOLIC VOL/BSA (12-30): 23.7 ML/M^2
BH CV ECHO MEAS - LV V1 MAX: 145.3 CM/SEC
BH CV ECHO MEAS - LV V1 MEAN: 99.8 CM/SEC
BH CV ECHO MEAS - LV V1 VTI: 32 CM
BH CV ECHO MEAS - LVIDD: 5.3 CM
BH CV ECHO MEAS - LVIDS: 2.8 CM
BH CV ECHO MEAS - LVLD AP2: 9.4 CM
BH CV ECHO MEAS - LVLD AP4: 9.7 CM
BH CV ECHO MEAS - LVLS AP2: 7.1 CM
BH CV ECHO MEAS - LVLS AP4: 7.8 CM
BH CV ECHO MEAS - LVOT AREA (M): 3.1 CM^2
BH CV ECHO MEAS - LVOT AREA: 3.1 CM^2
BH CV ECHO MEAS - LVOT DIAM: 2 CM
BH CV ECHO MEAS - LVPWD: 1.1 CM
BH CV ECHO MEAS - MED PEAK E' VEL: 10.2 CM/SEC
BH CV ECHO MEAS - MEDIAL E/E' RATIO: 12.6
BH CV ECHO MEAS - MV A MAX VEL: 137.6 CM/SEC
BH CV ECHO MEAS - MV DEC SLOPE: 556.9 CM/SEC^2
BH CV ECHO MEAS - MV DEC TIME: 0.27 SEC
BH CV ECHO MEAS - MV E MAX VEL: 130.8 CM/SEC
BH CV ECHO MEAS - MV E/A: 0.95
BH CV ECHO MEAS - MV MAX PG: 11.4 MMHG
BH CV ECHO MEAS - MV MEAN PG: 6.2 MMHG
BH CV ECHO MEAS - MV P1/2T MAX VEL: 173.6 CM/SEC
BH CV ECHO MEAS - MV P1/2T: 91.3 MSEC
BH CV ECHO MEAS - MV V2 MAX: 168.7 CM/SEC
BH CV ECHO MEAS - MV V2 MEAN: 119.5 CM/SEC
BH CV ECHO MEAS - MV V2 VTI: 50.1 CM
BH CV ECHO MEAS - MVA P1/2T LCG: 1.3 CM^2
BH CV ECHO MEAS - MVA(P1/2T): 2.4 CM^2
BH CV ECHO MEAS - MVA(VTI): 2 CM^2
BH CV ECHO MEAS - PA ACC SLOPE: 292.2 CM/SEC^2
BH CV ECHO MEAS - PA ACC TIME: 0.25 SEC
BH CV ECHO MEAS - PA MAX PG: 6.4 MMHG
BH CV ECHO MEAS - PA PR(ACCEL): -33.5 MMHG
BH CV ECHO MEAS - PA V2 MAX: 126.2 CM/SEC
BH CV ECHO MEAS - SI(AO): 100.5 ML/M^2
BH CV ECHO MEAS - SI(CUBED): 44.6 ML/M^2
BH CV ECHO MEAS - SI(LVOT): 34.9 ML/M^2
BH CV ECHO MEAS - SI(MOD-SP2): 47.1 ML/M^2
BH CV ECHO MEAS - SI(MOD-SP4): 32.4 ML/M^2
BH CV ECHO MEAS - SI(TEICH): 37.3 ML/M^2
BH CV ECHO MEAS - SV(AO): 288.3 ML
BH CV ECHO MEAS - SV(CUBED): 127.9 ML
BH CV ECHO MEAS - SV(LVOT): 100.1 ML
BH CV ECHO MEAS - SV(MOD-SP2): 135 ML
BH CV ECHO MEAS - SV(MOD-SP4): 93 ML
BH CV ECHO MEAS - SV(TEICH): 106.9 ML
BH CV ECHO MEAS - TAPSE (>1.6): 2.6 CM
BH CV ECHO MEASUREMENTS AVERAGE E/E' RATIO: 11.95
BH CV VAS BP LEFT ARM: NORMAL MMHG
BH CV XLRA - RV BASE: 3.9 CM
BH CV XLRA - RV LENGTH: 7.2 CM
BH CV XLRA - RV MID: 2.9 CM
BH CV XLRA - TDI S': 20.5 CM/SEC
BILIRUB UR QL STRIP: NEGATIVE
BUN SERPL-MCNC: 13 MG/DL (ref 6–20)
BUN/CREAT SERPL: 16.7 (ref 7–25)
CALCIUM SPEC-SCNC: 8.8 MG/DL (ref 8.6–10.5)
CHLORIDE SERPL-SCNC: 105 MMOL/L (ref 98–107)
CLARITY UR: CLEAR
CO2 SERPL-SCNC: 28 MMOL/L (ref 22–29)
COLOR UR: YELLOW
CREAT SERPL-MCNC: 0.78 MG/DL (ref 0.57–1)
DEPRECATED RDW RBC AUTO: 49.2 FL (ref 37–54)
EOSINOPHIL # BLD AUTO: 0.16 10*3/MM3 (ref 0–0.4)
EOSINOPHIL NFR BLD AUTO: 2 % (ref 0.3–6.2)
ERYTHROCYTE [DISTWIDTH] IN BLOOD BY AUTOMATED COUNT: 17.6 % (ref 12.3–15.4)
FOLATE SERPL-MCNC: 9.12 NG/ML (ref 4.78–24.2)
GFR SERPL CREATININE-BSD FRML MDRD: 102 ML/MIN/1.73
GLUCOSE BLDC GLUCOMTR-MCNC: 145 MG/DL (ref 70–130)
GLUCOSE BLDC GLUCOMTR-MCNC: 162 MG/DL (ref 70–130)
GLUCOSE BLDC GLUCOMTR-MCNC: 185 MG/DL (ref 70–130)
GLUCOSE BLDC GLUCOMTR-MCNC: 188 MG/DL (ref 70–130)
GLUCOSE SERPL-MCNC: 130 MG/DL (ref 65–99)
GLUCOSE UR STRIP-MCNC: NEGATIVE MG/DL
HCT VFR BLD AUTO: 32.5 % (ref 34–46.6)
HGB BLD-MCNC: 9 G/DL (ref 12–15.9)
HGB UR QL STRIP.AUTO: NEGATIVE
IMM GRANULOCYTES # BLD AUTO: 0.04 10*3/MM3 (ref 0–0.05)
IMM GRANULOCYTES NFR BLD AUTO: 0.5 % (ref 0–0.5)
KETONES UR QL STRIP: NEGATIVE
LEFT ATRIUM VOLUME INDEX: 31.7 ML/M^2
LEFT ATRIUM VOLUME: 91 ML
LEUKOCYTE ESTERASE UR QL STRIP.AUTO: NEGATIVE
LV EF 2D ECHO EST: 65 %
LYMPHOCYTES # BLD AUTO: 2.97 10*3/MM3 (ref 0.7–3.1)
LYMPHOCYTES NFR BLD AUTO: 37 % (ref 19.6–45.3)
MAXIMAL PREDICTED HEART RATE: 186 BPM
MCH RBC QN AUTO: 21.4 PG (ref 26.6–33)
MCHC RBC AUTO-ENTMCNC: 27.7 G/DL (ref 31.5–35.7)
MCV RBC AUTO: 77.2 FL (ref 79–97)
MONOCYTES # BLD AUTO: 0.52 10*3/MM3 (ref 0.1–0.9)
MONOCYTES NFR BLD AUTO: 6.5 % (ref 5–12)
NEUTROPHILS NFR BLD AUTO: 4.3 10*3/MM3 (ref 1.7–7)
NEUTROPHILS NFR BLD AUTO: 53.6 % (ref 42.7–76)
NITRITE UR QL STRIP: NEGATIVE
NRBC BLD AUTO-RTO: 0 /100 WBC (ref 0–0.2)
PH UR STRIP.AUTO: 6.5 [PH] (ref 5–8)
PLATELET # BLD AUTO: 422 10*3/MM3 (ref 140–450)
PMV BLD AUTO: 10.1 FL (ref 6–12)
POTASSIUM SERPL-SCNC: 3.8 MMOL/L (ref 3.5–5.2)
PROT UR QL STRIP: NEGATIVE
RBC # BLD AUTO: 4.21 10*6/MM3 (ref 3.77–5.28)
SODIUM SERPL-SCNC: 143 MMOL/L (ref 136–145)
SP GR UR STRIP: 1.01 (ref 1–1.03)
STRESS TARGET HR: 158 BPM
UROBILINOGEN UR QL STRIP: NORMAL
VIT B12 BLD-MCNC: 402 PG/ML (ref 211–946)
WBC NRBC COR # BLD: 8.02 10*3/MM3 (ref 3.4–10.8)

## 2022-02-19 PROCEDURE — 94799 UNLISTED PULMONARY SVC/PX: CPT

## 2022-02-19 PROCEDURE — 80048 BASIC METABOLIC PNL TOTAL CA: CPT | Performed by: INTERNAL MEDICINE

## 2022-02-19 PROCEDURE — 82962 GLUCOSE BLOOD TEST: CPT

## 2022-02-19 PROCEDURE — 63710000001 INSULIN DETEMIR PER 5 UNITS: Performed by: INTERNAL MEDICINE

## 2022-02-19 PROCEDURE — 94060 EVALUATION OF WHEEZING: CPT | Performed by: INTERNAL MEDICINE

## 2022-02-19 PROCEDURE — 94729 DIFFUSING CAPACITY: CPT

## 2022-02-19 PROCEDURE — 94729 DIFFUSING CAPACITY: CPT | Performed by: INTERNAL MEDICINE

## 2022-02-19 PROCEDURE — 85025 COMPLETE CBC W/AUTO DIFF WBC: CPT | Performed by: INTERNAL MEDICINE

## 2022-02-19 PROCEDURE — 94727 GAS DIL/WSHOT DETER LNG VOL: CPT

## 2022-02-19 PROCEDURE — 25010000002 ENOXAPARIN PER 10 MG

## 2022-02-19 PROCEDURE — 63710000001 INSULIN LISPRO (HUMAN) PER 5 UNITS: Performed by: INTERNAL MEDICINE

## 2022-02-19 PROCEDURE — 94060 EVALUATION OF WHEEZING: CPT

## 2022-02-19 PROCEDURE — 99239 HOSP IP/OBS DSCHRG MGMT >30: CPT | Performed by: STUDENT IN AN ORGANIZED HEALTH CARE EDUCATION/TRAINING PROGRAM

## 2022-02-19 PROCEDURE — 82607 VITAMIN B-12: CPT | Performed by: INTERNAL MEDICINE

## 2022-02-19 PROCEDURE — 94727 GAS DIL/WSHOT DETER LNG VOL: CPT | Performed by: INTERNAL MEDICINE

## 2022-02-19 PROCEDURE — 82746 ASSAY OF FOLIC ACID SERUM: CPT | Performed by: INTERNAL MEDICINE

## 2022-02-19 RX ORDER — DOXYCYCLINE HYCLATE 50 MG/1
324 CAPSULE, GELATIN COATED ORAL
Qty: 30 TABLET | Refills: 0 | Status: SHIPPED | OUTPATIENT
Start: 2022-02-19 | End: 2023-03-27

## 2022-02-19 RX ORDER — FUROSEMIDE 40 MG/1
40 TABLET ORAL DAILY
Status: DISCONTINUED | OUTPATIENT
Start: 2022-02-19 | End: 2022-02-19 | Stop reason: HOSPADM

## 2022-02-19 RX ORDER — LOSARTAN POTASSIUM 25 MG/1
25 TABLET ORAL
Qty: 30 TABLET | Refills: 0 | Status: SHIPPED | OUTPATIENT
Start: 2022-02-20 | End: 2022-09-20 | Stop reason: SDUPTHER

## 2022-02-19 RX ORDER — POTASSIUM CHLORIDE 750 MG/1
20 CAPSULE, EXTENDED RELEASE ORAL DAILY
Status: DISCONTINUED | OUTPATIENT
Start: 2022-02-19 | End: 2022-02-19 | Stop reason: HOSPADM

## 2022-02-19 RX ORDER — FUROSEMIDE 40 MG/1
40 TABLET ORAL DAILY
Qty: 30 TABLET | Refills: 0 | Status: SHIPPED | OUTPATIENT
Start: 2022-02-19 | End: 2023-03-27

## 2022-02-19 RX ORDER — POTASSIUM CHLORIDE 750 MG/1
20 CAPSULE, EXTENDED RELEASE ORAL DAILY
Qty: 30 CAPSULE | Refills: 0 | Status: SHIPPED | OUTPATIENT
Start: 2022-02-19

## 2022-02-19 RX ORDER — ALBUTEROL SULFATE 2.5 MG/3ML
2.5 SOLUTION RESPIRATORY (INHALATION) ONCE
Status: COMPLETED | OUTPATIENT
Start: 2022-02-19 | End: 2022-02-19

## 2022-02-19 RX ADMIN — LOSARTAN POTASSIUM 25 MG: 25 TABLET, FILM COATED ORAL at 09:20

## 2022-02-19 RX ADMIN — SODIUM CHLORIDE, PRESERVATIVE FREE 10 ML: 5 INJECTION INTRAVENOUS at 09:24

## 2022-02-19 RX ADMIN — INSULIN LISPRO 2 UNITS: 100 INJECTION, SOLUTION INTRAVENOUS; SUBCUTANEOUS at 17:16

## 2022-02-19 RX ADMIN — INSULIN DETEMIR 25 UNITS: 100 INJECTION, SOLUTION SUBCUTANEOUS at 09:28

## 2022-02-19 RX ADMIN — FUROSEMIDE 40 MG: 40 TABLET ORAL at 17:15

## 2022-02-19 RX ADMIN — POTASSIUM CHLORIDE 20 MEQ: 750 CAPSULE, EXTENDED RELEASE ORAL at 17:15

## 2022-02-19 RX ADMIN — BUPROPION HYDROCHLORIDE 150 MG: 150 TABLET, EXTENDED RELEASE ORAL at 09:20

## 2022-02-19 RX ADMIN — INSULIN LISPRO 15 UNITS: 100 INJECTION, SOLUTION INTRAVENOUS; SUBCUTANEOUS at 12:41

## 2022-02-19 RX ADMIN — INSULIN LISPRO 15 UNITS: 100 INJECTION, SOLUTION INTRAVENOUS; SUBCUTANEOUS at 17:15

## 2022-02-19 RX ADMIN — IPRATROPIUM BROMIDE AND ALBUTEROL SULFATE 3 ML: 2.5; .5 SOLUTION RESPIRATORY (INHALATION) at 11:12

## 2022-02-19 RX ADMIN — IPRATROPIUM BROMIDE AND ALBUTEROL SULFATE 3 ML: 2.5; .5 SOLUTION RESPIRATORY (INHALATION) at 15:37

## 2022-02-19 RX ADMIN — NIFEDIPINE 30 MG: 30 TABLET, FILM COATED, EXTENDED RELEASE ORAL at 09:20

## 2022-02-19 RX ADMIN — ACETAMINOPHEN 650 MG: 325 TABLET ORAL at 06:06

## 2022-02-19 RX ADMIN — ALBUTEROL SULFATE 2.5 MG: 2.5 SOLUTION RESPIRATORY (INHALATION) at 07:36

## 2022-02-19 RX ADMIN — INSULIN LISPRO 2 UNITS: 100 INJECTION, SOLUTION INTRAVENOUS; SUBCUTANEOUS at 12:41

## 2022-02-19 RX ADMIN — INSULIN LISPRO 15 UNITS: 100 INJECTION, SOLUTION INTRAVENOUS; SUBCUTANEOUS at 09:22

## 2022-02-19 RX ADMIN — ENOXAPARIN SODIUM 40 MG: 40 INJECTION SUBCUTANEOUS at 09:20

## 2022-02-20 LAB
QT INTERVAL: 400 MS
QTC INTERVAL: 434 MS

## 2022-09-20 ENCOUNTER — OFFICE VISIT (OUTPATIENT)
Dept: BARIATRICS/WEIGHT MGMT | Facility: CLINIC | Age: 37
End: 2022-09-20

## 2022-09-20 ENCOUNTER — OFFICE VISIT (OUTPATIENT)
Dept: BEHAVIORAL HEALTH | Facility: CLINIC | Age: 37
End: 2022-09-20

## 2022-09-20 ENCOUNTER — DOCUMENTATION (OUTPATIENT)
Dept: BARIATRICS/WEIGHT MGMT | Facility: CLINIC | Age: 37
End: 2022-09-20

## 2022-09-20 VITALS
HEART RATE: 103 BPM | RESPIRATION RATE: 18 BRPM | TEMPERATURE: 98 F | SYSTOLIC BLOOD PRESSURE: 134 MMHG | OXYGEN SATURATION: 98 % | WEIGHT: 293 LBS | HEIGHT: 68 IN | DIASTOLIC BLOOD PRESSURE: 88 MMHG | BODY MASS INDEX: 44.41 KG/M2

## 2022-09-20 DIAGNOSIS — R06.02 SHORTNESS OF BREATH: ICD-10-CM

## 2022-09-20 DIAGNOSIS — E11.65 TYPE 2 DIABETES MELLITUS WITH HYPERGLYCEMIA, WITH LONG-TERM CURRENT USE OF INSULIN: ICD-10-CM

## 2022-09-20 DIAGNOSIS — Z71.89 ENCOUNTER FOR PSYCHOLOGICAL ASSESSMENT PRIOR TO BARIATRIC SURGERY: ICD-10-CM

## 2022-09-20 DIAGNOSIS — D50.9 IRON DEFICIENCY ANEMIA, UNSPECIFIED IRON DEFICIENCY ANEMIA TYPE: ICD-10-CM

## 2022-09-20 DIAGNOSIS — I10 HYPERTENSION, UNSPECIFIED TYPE: ICD-10-CM

## 2022-09-20 DIAGNOSIS — M54.9 BACK PAIN, UNSPECIFIED BACK LOCATION, UNSPECIFIED BACK PAIN LATERALITY, UNSPECIFIED CHRONICITY: ICD-10-CM

## 2022-09-20 DIAGNOSIS — A49.02 MRSA INFECTION: ICD-10-CM

## 2022-09-20 DIAGNOSIS — R12 HEARTBURN: ICD-10-CM

## 2022-09-20 DIAGNOSIS — E66.01 OBESITY, MORBID, BMI 50 OR HIGHER: ICD-10-CM

## 2022-09-20 DIAGNOSIS — R51.9 GENERALIZED HEADACHE: ICD-10-CM

## 2022-09-20 DIAGNOSIS — R53.83 FATIGUE, UNSPECIFIED TYPE: ICD-10-CM

## 2022-09-20 DIAGNOSIS — F41.9 ANXIETY: ICD-10-CM

## 2022-09-20 DIAGNOSIS — Z79.4 TYPE 2 DIABETES MELLITUS WITH HYPERGLYCEMIA, WITH LONG-TERM CURRENT USE OF INSULIN: ICD-10-CM

## 2022-09-20 DIAGNOSIS — R29.818 SUSPECTED SLEEP APNEA: ICD-10-CM

## 2022-09-20 DIAGNOSIS — E66.01 MORBID OBESITY: Primary | ICD-10-CM

## 2022-09-20 PROBLEM — A54.9 GONORRHEA: Status: ACTIVE | Noted: 2022-09-20

## 2022-09-20 PROBLEM — F32.A ANXIETY AND DEPRESSION: Status: ACTIVE | Noted: 2022-09-20

## 2022-09-20 PROBLEM — A54.9 GONORRHEA: Status: RESOLVED | Noted: 2022-09-20 | Resolved: 2022-09-20

## 2022-09-20 PROCEDURE — 90791 PSYCH DIAGNOSTIC EVALUATION: CPT | Performed by: PSYCHOLOGIST

## 2022-09-20 PROCEDURE — 99204 OFFICE O/P NEW MOD 45 MIN: CPT | Performed by: PHYSICIAN ASSISTANT

## 2022-09-20 RX ORDER — NIFEDIPINE 30 MG/1
TABLET, FILM COATED, EXTENDED RELEASE ORAL
COMMUNITY
Start: 2022-09-08

## 2022-09-20 RX ORDER — ERGOCALCIFEROL 1.25 MG/1
CAPSULE ORAL
COMMUNITY
Start: 2022-07-15

## 2022-09-20 RX ORDER — INSULIN LISPRO 100 [IU]/ML
INJECTION, SOLUTION INTRAVENOUS; SUBCUTANEOUS
COMMUNITY
Start: 2022-09-01 | End: 2022-09-20 | Stop reason: SDUPTHER

## 2022-09-20 RX ORDER — BUSPIRONE HYDROCHLORIDE 10 MG/1
TABLET ORAL
COMMUNITY
Start: 2022-06-23 | End: 2023-03-27

## 2022-09-20 RX ORDER — INSULIN GLARGINE 100 [IU]/ML
INJECTION, SOLUTION SUBCUTANEOUS
COMMUNITY
Start: 2022-09-01 | End: 2022-09-20 | Stop reason: SDUPTHER

## 2022-09-20 RX ORDER — HYDROCHLOROTHIAZIDE 25 MG/1
TABLET ORAL
COMMUNITY
Start: 2022-07-14

## 2022-09-20 RX ORDER — SEMAGLUTIDE 1.34 MG/ML
INJECTION, SOLUTION SUBCUTANEOUS
COMMUNITY
Start: 2022-09-07 | End: 2023-02-03

## 2022-09-20 RX ORDER — LOSARTAN POTASSIUM 100 MG/1
TABLET ORAL
COMMUNITY
Start: 2022-07-14

## 2022-09-20 NOTE — PROGRESS NOTES
[facility] GROUP BARIATRIC SURGERY  [address]  [address]  [facility] [address]  [phone]      Patient  Name:  Edd Leung  :  1985        Chief Complaint:  weight gain; unable to maintain weight loss    History of Present Illness:  Edd Leung is a 37 y.o. female who presents today for evaluation, education and consultation regarding bariatric and metabolic surgery. The patient is interested in sleeve gastrectomy with Dr. Jacob.     Edd has been overweight for at least 15 years, has been 35 pounds or more overweight for at least 15 years, has been 100 pounds or more overweight for 15 or more years and started dieting at age 27.      Previous diet attempts include: High Protein, Herbal Life, Low Carbohydrate, Low Fat, Calorie Counting, Fasting and Slim Fast; None; Wellbutrin.  T  Her maximum lifetime weight is 447 pounds.    As above, patient has been overweight for many years, with numerous failed dietary/weight loss attempts.  She now has obesity related comorbidities and as such has decided to pursue weight loss surgery.    Patient is pursuing a sleeve gastrectomy to improve her overall health.  She said she started process through [facility] several years ago but ultimately did not pursue surgery.  States she is now ready to proceed.    History of hypertension, suspected sleep apnea, diabetes diagnosed in  on insulin since her last pregnancy 2022.  History of anxiety and depression.  Iron deficiency anemia on iron supplements.  History of MRSA skin abscess in .  Chronic back pain not treated with medication.  Generalized headaches occasionally.  Says she has had goiter evaluated with ultrasound and has had normal thyroid function in the past.    GI: Heartburn treated with Tums as needed, thinks she had an EGD with [facility] in preop evaluation several years ago with unknown findings.  Denies known H. pylori in the past.   Gallbladder is present.  Denies other GI symptoms.    Chart review shows hospitalization in February of this year for acute hypoxic respiratory failure suspected to be obesity hypoventilation syndrome plus or minus volume overload with possible aspiration pneumonitis with recent emesis.  Recommended additional pulmonary evaluation outpatient.  Pulmonary function test at that time showed restrictive lung disease by lung volumes and diffusing capacity substantially reduced.        All other past medical, surgical, social and family history have been obtained and discussed as pertinent to bariatric surgery as below.     Pre-Procedure COVID-19 Questionnaire:    1.  Have you previously been tested for COVID-19?    [x]  No  []  Yes    2.  Were you ever positive for COVID-19?    [x]  No  []  Yes    3.  Are you employed in a healthcare setting?    [x]  No  []  Yes    4.  Are you symptomatic for COVID-19 as defined by the CDC (fever, cough)?  If so, when did symptoms begin?    [x]  No    []  Yes, symptoms began **    5.  Have you been hospitalized for COVID-19?  If so, were you in the ICU?  [x]  No    []  Yes, but not in the ICU    []  Yes, and I was in the ICU    6.  Are you a resident in a congregate (group care setting?)    [x]  No  []  Yes    7.  Are you pregnant?  [x]  No  []  Yes    8.  Are you vaccinated?    [x]  No  []  Yes, but only partially   []  Yes, fully         Past Medical History:   Diagnosis Date   • Anxiety    • Anxiety and depression    • Back pain     not treated with meds   • Diabetes mellitus (HCC)     on insulin with pregnancy 2022   • Generalized headache    • Gonorrhea    • Heartburn     tums prn, thinks she had EGD with [facility]   • History of abnormal cervical Pap smear    • Hypertension    • Iron deficiency anemia    • MRSA infection     skin abscess   • Suspected sleep apnea      Past Surgical History:   Procedure Laterality Date   •  SECTION  2006   •  SECTION N/A  "2021    Procedure:  SECTION REPEAT;  Surgeon: Rita Busby MD;  Location: [facility] LABOR DELIVERY;  Service: Obstetrics/Gynecology;  Laterality: N/A;   • DILATATION AND CURETTAGE         Allergies   Allergen Reactions   • Banana Anaphylaxis   • Cucumber Extract Anaphylaxis   • Kiwi Extract Anaphylaxis   • Melon Anaphylaxis   • Nuts Anaphylaxis   • Latex Rash     \"makes my skin raw\"        Current Outpatient Medications:   •  busPIRone (BUSPAR) 10 MG tablet, , Disp: , Rfl:   •  ferrous gluconate (FERGON) 324 MG tablet, Take 1 tablet by mouth Daily With Breakfast., Disp: 30 tablet, Rfl: 0  •  furosemide (LASIX) 40 MG tablet, Take 1 tablet by mouth Daily., Disp: 30 tablet, Rfl: 0  •  hydroCHLOROthiazide (HYDRODIURIL) 25 MG tablet, , Disp: , Rfl:   •  insulin glargine (Lantus) 100 UNIT/ML injection, Inject 40 Units under the skin into the appropriate area as directed 2 (Two) Times a Day for 90 days., Disp: 72 mL, Rfl: 1  •  insulin lispro (humaLOG) 100 UNIT/ML injection, Inject 25 Units under the skin into the appropriate area as directed 3 (Three) Times a Day Before Meals for 90 days., Disp: 67.5 mL, Rfl: 1  •  losartan (COZAAR) 100 MG tablet, , Disp: , Rfl:   •  NIFEdipine CC (ADALAT CC) 30 MG 24 hr tablet, , Disp: , Rfl:   •  Ozempic, 1 MG/DOSE, 4 MG/3ML solution pen-injector, , Disp: , Rfl:   •  potassium chloride (MICRO-K) 10 MEQ CR capsule, Take 2 capsules by mouth Daily., Disp: 30 capsule, Rfl: 0  •  vitamin D (ERGOCALCIFEROL) 1.25 MG (78302 UT) capsule capsule, , Disp: , Rfl:   •  buPROPion SR (WELLBUTRIN SR) 150 MG 12 hr tablet, Take 150 mg by mouth Daily., Disp: , Rfl:     Social History     Socioeconomic History   • Marital status:    • Number of children: 1   • Years of education: 14   • Highest education level: Associate degree: occupational, technical, or vocational program   Tobacco Use   • Smoking status: Former Smoker     Packs/day: 0.25     Years: 17.00     Pack years: 4.25    "  Types: Cigarettes     Quit date: 4/8/2019     Years since quitting: 3.4   • Smokeless tobacco: Never Used   • Tobacco comment: wasn't a daily smoker-  depended on situation   Vaping Use   • Vaping Use: Never used   Substance and Sexual Activity   • Alcohol use: Yes     Comment: SOCIALLY    • Drug use: Not Currently     Types: Marijuana     Comment: SOCIALLY    • Sexual activity: Yes     Partners: Male     Birth control/protection: None     Family History   Problem Relation Age of Onset   • Hypertension Mother    • Cancer Father    • Hypertension Sister    • Cancer Maternal Grandmother    • Hypertension Maternal Grandmother    • Diabetes Maternal Grandmother    • Diabetes Paternal Grandmother    • Hypertension Paternal Grandfather    • Diabetes Paternal Grandfather        Review of Systems:  Constitutional:  Reports fatigue, weight gain and denies fevers, chills.  HEENT:  denies headache, ear pain or loss of hearing, blurred or double vision, nasal discharge or sore throat.  Cardiovascular:  Reports HTN, edema and denies HLD, CAD, Atrial Fib, hx heart disease, heart murmur, hx MI, chest pain, palpitations, hx DVT.  Respiratory:  Reports suspected sleep apnea and denies dyspnea on exertion, shortness of breath , cough , wheezing, asthma, COPD, hx PE.  Gastrointestinal:  Reports heartburn and denies dysphagia, nausea, vomiting, abdominal pain, IBS, diarrhea, constipation, melena, blood in stool, gallbladder issues, liver disease, hx pancreatitis.  Genitourinary:  Reports none and denies history of  frequent UTI, incontinence, hematuria, dysuria, polyuria, polydipsia, renal insufficiency, renal failure.    Musculoskeletal:  Reports back pain and denies joint pain, fibromyalgia, arthritis and autoimmune disease.  Neurological:  Reports headaches and denies numbness /tingling, dizziness, confusion, seizure, stroke.  Psychiatric:  Reports hx depression, hx anxiety and denies bipolar disorder, suicidal ideation, hx suicide  attempt, hx self injury, hx substance abuse, eating disorder.  Endocrine:  Reports diabetes and denies PCOS, endometriosis, thyroid disease, gout.  Hematologic:  Reports none and denies bruising, bleeding disorder, hx anemia, hx blood transfusion.  Skin:  Reports MRSA and denies rashes.      Physical Exam:  Vital Signs:  Weight: (!) 200 kg (440 lb 8 oz)   Body mass index is 66.98 kg/m².  Temp: 98 °F (36.7 °C)   Heart Rate: 103   BP: 134/88     Physical Exam  Vitals reviewed.   Constitutional:       Appearance: She is well-developed. She is obese.   HENT:      Head: Normocephalic.   Neck:      Thyroid: No thyromegaly.      Comments: Significant thyromegaly R>L  Cardiovascular:      Rate and Rhythm: Normal rate and regular rhythm.      Heart sounds: Normal heart sounds.   Pulmonary:      Effort: Pulmonary effort is normal. No respiratory distress.      Breath sounds: Normal breath sounds. No wheezing.   Abdominal:      General: Bowel sounds are normal. There is no distension.      Palpations: Abdomen is soft.      Tenderness: There is no abdominal tenderness.      Comments: Low transverse scar   Musculoskeletal:         General: Normal range of motion.      Cervical back: Normal range of motion and neck supple.   Skin:     General: Skin is warm and dry.   Neurological:      Mental Status: She is alert and oriented to person, place, and time.   Psychiatric:         Mood and Affect: Mood normal.         Behavior: Behavior normal.         Thought Content: Thought content normal.         Judgment: Judgment normal.         Patient Active Problem List   Diagnosis   • Preeclampsia in postpartum period   • Type 2 diabetes mellitus with hyperglycemia, with long-term current use of insulin ([facility])   • Shortness of breath   • Obesity, morbid, BMI 50 or higher ([facility])   • Essential hypertension   • Anemia   • Hypertension   • Anxiety and depression   • Back pain   • Generalized headache   • Heartburn   • Iron deficiency anemia   •  MRSA infection   • Suspected sleep apnea       Assessment:    Edd Leung is a 37 y.o. year old female with medically complicated obesity pursuing sleeve gastrectomy.    Weight loss surgery is deemed medically necessary given the following obesity related comorbidities including sleep disturbances with possible sleep apnea, hypertension, back pain, GERD and depression with current Weight: (!) 200 kg (440 lb 8 oz) and Body mass index is 66.98 kg/m²..    Plan:  The consultation plan and program requirements were reviewed with the patient.  The patient has been advised that a letter of medical support must be obtained from her primary care physician or referring provider. A psychological evaluation will be arranged.  A nutritional evaluation will be performed.  The patient was advised to start a high protein and low carbohydrate diet.  Necessary lifestyle modifications were discussed.  Instructions on how to access AMAN was given to the patient.  AMAN is an internet based educational video that explains the surgical procedure chosen and answers basic questions regarding that procedure.     Class 3 Severe Obesity (BMI >=40). Obesity-related health conditions include the following: hypertension, diabetes mellitus and GERD. Obesity is worsening. BMI is is above average; BMI management plan is completed. We discussed consulting a Bariatric surgeon.        Preoperative testing will include: CBC, CMP, Lipids, TSH, HgA1C, H.Pylori UBT, EKG, CXR,  Gastric Emptying Study and EGD (cardiac clearance prior)    The risks and benefits of the upper endoscopy were discussed with the patient in detail and all questions were answered.  Possibility of perforation, bleeding, aspiration, and anesthesia reaction were reviewed.  Patient agrees to proceed.      Additional preop clearances required prior to surgery: Cardiology and Pulmonary.      The patient has been educated on expected postoperative lifestyle changes, including  commitment to high protein diet, vitamin regimen, and exercise program.  They are aware that support groups are encouraged for optimal weight loss results. Patient understands that bariatric surgery is not cosmetic surgery but rather a tool to help make a lifelong commitment to lifestyle changes including diet, exercise, behavior modifications, and healthy habits. The procedure was discussed with the patient and all questions were answered. The importance of avoiding ASA/ NSAIDS/ steroids/ tobacco/ hormones/ immunomodulators perioperatively was discussed.         Crystal Sigala PA-C

## 2022-09-20 NOTE — PROGRESS NOTES
Bariatric Nutrition Consult     Name: Edd Leung   : 1985   AGE: 37 y.o.   MRN: [id number]      Consult Date: 2022     Surgery desired: sleeve    Height: 172.7cm                  Current weight: 440lbs                 BMI: 66.98  Highest weight: 447lbs                           Lowest weight: 260lbs    Goals: 260lbs        Past Medical History:   Diagnosis Date   • Anxiety    • Anxiety and depression    • Diabetes mellitus (HCC)    • Gonorrhea    • History of abnormal cervical Pap smear    • Hypertension                                  Diet history reveals 2 meals and 1 snack daily, large portions, high fat 3- when she chooses fast food. Busy with her baby and family, chooses quick meals in the morning and lunch, family member cooks dinner  Breakfast: 2 boiled eggs, 1 slice ham, 1/2 cup diced potato or skips breakfast  Lunch: fast food- fried chicken and fries/double cheeseburger and fries/beef burrito  Dinner: goat/steak (8oz)/ chicken and veg such as green beans/cabbage and sweet potato  Snack: sugar free butterscotch pudding 1 lucio cracker/tomato salad with orange slices    Protein sources: meat, chicken, eggs, cheese    Drinks: water, tea with sweetener, juice    Food allergies/intolerances: melon, nuts    Night eating: not anymore    Patient has/has not been diagnosed with an eating disorder: no    Exercise/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 Patient needs to focus on 100g protein daily, 100-140g carbohydrates daily, healthy fat intake, 64 oz fluid daily, no carbonation, and try protein drinks/protein powders. Avoid high fructose corn syrup. Patient verbalized understanding and queries were answered.  Additional nutritional counseling will be available      Jahaira Lerma RD,DAGOBERTO

## 2022-09-20 NOTE — PROGRESS NOTES
PROGRESS NOTE    Data:    Edd Leung is a 37 y.o. female who met with the undersigned for a scheduled psychological evaluation from 12:45 - 1:30pm.      Clinical Maneuvering/Intervention:      Chief complaint and history of presenting illness/Problems: struggling with obesity for several years. Despite trying different weight loss plans and diets, the pt reported being unsuccessful in losing weight. A psychological evaluation was conducted in order to assess past and current level of functioning. Areas assessed included, but were not limited to: perception of social support, perception of ability to face and deal with challenges in life (positive functioning), anxiety symptoms, depressive symptoms, perspective on beliefs/belief system, coping skills for stress, intelligence level, addiction issues, etc. Therapeutic rapport was established. Interventions conducted today were geared towards assessing the pt's readiness for weight loss surgery and identifying and psychological contraindications for undergoing such a major life change. Social support was deemed strong (specific to weight loss surgery/weight loss in this manner and in a general sense): , mother, grandmother, and friends. Current psychological struggles were described as low, but included mild anxiety and feeling frustrated/defeated by lack of weight loss despite trying to lose weight. Coping skills for distress and related to undergoing a major life change such as weight loss surgery/weight loss were deemed strong and included strong romina in God, good sense of humor, follows directions well, intelligent, responsible person, maintains quality relationships with others, and believes in herself that she will be successful with weight loss surgery. The pt endorsed having characteristics of readiness to undergo major life changes inherent in the journey of weight loss surgery. She could speak to having suffered enough, and the decision  to have weight loss surgery is one she feels confident about. The pt expressed gratitude for today's visit.     Past Family and Social History:      History of family mental health problems: none endorsed    Psychosocial history: treatment of psychiatric care in the past (N/A), alcohol/substance abuse treatment in the past (N/A) , alcohol/substance abuse problems (N/A), inpatient psychiatric care (N/A).    Mental Status Exam (MSE):  Hygiene:  good  Dress: normal  Attitude:  cooperative and proactive  Motor Activity: normal  Speech: normal  Mood:   nervous and excited  Affect:  congruent  Thought Processes: normal  Thought Content:  normal  Suicidal Thoughts:  not endorsed  Homicidal Thoughts:  not endorsed  Crisis Safety Plan: not needed   Hallucinations:  none      Patient's Support Network Includes:  family, friends      Progress toward goal: there is evidence to suggest that she is taking measures to improve the quality of her life including seeking weight loss surgery.       Functional Status: moderate to high      Prognosis: good with weight loss surgery    Evaluation, Diagnoses, and Ability/Capacity to Respond to Treatment:      The pt presented to be struggling with anxiety and obesity (BMI = 63.9, morbid obesity). Results of MSE demonstrated a functional status of moderate to high. Strengths: belief in self that she will be successful with weight loss surgery, etc (see detailed list of coping skills above). Needed for growth (CPT code requirement for Weaknesses): weight loss.      From a psychological standpoint, the pt presents as a good candidate for bariatric surgery. She is motivated for the surgery, has showed readiness for the lifestyle change in terms of starting to adjust her eating habits, and seems to have appropriate expectations of how to prepare and how to live after surgery in order to lose weight successfully.    Treatment Plan:      Short term goals: Start improving her health by following up  with her bariatric surgeon in order to receive weight loss surgery as soon as feasible/appropriate and demonstrate success with compliance to adhering to the recommended diet. Long term goals: reach a healthy weight and alleviation of anxiety via taking control over her health.    Zhane Virk, PhD, LP

## 2022-09-22 LAB
ALBUMIN SERPL-MCNC: 4 G/DL (ref 3.8–4.8)
ALBUMIN/GLOB SERPL: 1.2 {RATIO} (ref 1.2–2.2)
ALP SERPL-CCNC: 113 IU/L (ref 44–121)
ALT SERPL-CCNC: 16 IU/L (ref 0–32)
AST SERPL-CCNC: 10 IU/L (ref 0–40)
BASOPHILS # BLD AUTO: 0 X10E3/UL (ref 0–0.2)
BASOPHILS NFR BLD AUTO: 0 %
BILIRUB SERPL-MCNC: 0.2 MG/DL (ref 0–1.2)
BUN SERPL-MCNC: 11 MG/DL (ref 6–20)
BUN/CREAT SERPL: 13 (ref 9–23)
CALCIUM SERPL-MCNC: 9.5 MG/DL (ref 8.7–10.2)
CHLORIDE SERPL-SCNC: 98 MMOL/L (ref 96–106)
CHOLEST SERPL-MCNC: 193 MG/DL (ref 100–199)
CO2 SERPL-SCNC: 28 MMOL/L (ref 20–29)
CREAT SERPL-MCNC: 0.87 MG/DL (ref 0.57–1)
EGFRCR SERPLBLD CKD-EPI 2021: 89 ML/MIN/1.73
EOSINOPHIL # BLD AUTO: 0.1 X10E3/UL (ref 0–0.4)
EOSINOPHIL NFR BLD AUTO: 1 %
ERYTHROCYTE [DISTWIDTH] IN BLOOD BY AUTOMATED COUNT: 17.6 % (ref 11.7–15.4)
GLOBULIN SER CALC-MCNC: 3.3 G/DL (ref 1.5–4.5)
GLUCOSE SERPL-MCNC: 184 MG/DL (ref 65–99)
HBA1C MFR BLD: 8.5 % (ref 4.8–5.6)
HCT VFR BLD AUTO: 37.1 % (ref 34–46.6)
HDLC SERPL-MCNC: 47 MG/DL
HGB BLD-MCNC: 11 G/DL (ref 11.1–15.9)
IMM GRANULOCYTES # BLD AUTO: 0.1 X10E3/UL (ref 0–0.1)
IMM GRANULOCYTES NFR BLD AUTO: 1 %
LDLC SERPL CALC-MCNC: 120 MG/DL (ref 0–99)
LYMPHOCYTES # BLD AUTO: 3.6 X10E3/UL (ref 0.7–3.1)
LYMPHOCYTES NFR BLD AUTO: 36 %
MCH RBC QN AUTO: 22.4 PG (ref 26.6–33)
MCHC RBC AUTO-ENTMCNC: 29.6 G/DL (ref 31.5–35.7)
MCV RBC AUTO: 76 FL (ref 79–97)
MONOCYTES # BLD AUTO: 0.6 X10E3/UL (ref 0.1–0.9)
MONOCYTES NFR BLD AUTO: 6 %
NEUTROPHILS # BLD AUTO: 5.6 X10E3/UL (ref 1.4–7)
NEUTROPHILS NFR BLD AUTO: 56 %
PLATELET # BLD AUTO: 383 X10E3/UL (ref 150–450)
POTASSIUM SERPL-SCNC: 4.4 MMOL/L (ref 3.5–5.2)
PROT SERPL-MCNC: 7.3 G/DL (ref 6–8.5)
RBC # BLD AUTO: 4.9 X10E6/UL (ref 3.77–5.28)
SODIUM SERPL-SCNC: 139 MMOL/L (ref 134–144)
TRIGL SERPL-MCNC: 146 MG/DL (ref 0–149)
TSH SERPL DL<=0.005 MIU/L-ACNC: 0.52 UIU/ML (ref 0.45–4.5)
UREA BREATH TEST QL: NEGATIVE
VLDLC SERPL CALC-MCNC: 26 MG/DL (ref 5–40)
WBC # BLD AUTO: 10.1 X10E3/UL (ref 3.4–10.8)

## 2022-10-05 ENCOUNTER — OFFICE VISIT (OUTPATIENT)
Dept: PULMONOLOGY | Facility: CLINIC | Age: 37
End: 2022-10-05

## 2022-10-05 VITALS
OXYGEN SATURATION: 98 % | BODY MASS INDEX: 44.41 KG/M2 | DIASTOLIC BLOOD PRESSURE: 98 MMHG | SYSTOLIC BLOOD PRESSURE: 138 MMHG | WEIGHT: 293 LBS | HEART RATE: 80 BPM | HEIGHT: 68 IN | TEMPERATURE: 97.8 F

## 2022-10-05 DIAGNOSIS — R06.02 SHORTNESS OF BREATH: ICD-10-CM

## 2022-10-05 DIAGNOSIS — E66.01 OBESITY, MORBID, BMI 50 OR HIGHER: ICD-10-CM

## 2022-10-05 DIAGNOSIS — Z01.818 PREOPERATIVE CLEARANCE: Primary | ICD-10-CM

## 2022-10-05 DIAGNOSIS — R29.818 SUSPECTED SLEEP APNEA: ICD-10-CM

## 2022-10-05 PROCEDURE — 99214 OFFICE O/P EST MOD 30 MIN: CPT | Performed by: NURSE PRACTITIONER

## 2022-10-05 PROCEDURE — 94726 PLETHYSMOGRAPHY LUNG VOLUMES: CPT | Performed by: NURSE PRACTITIONER

## 2022-10-05 PROCEDURE — 94375 RESPIRATORY FLOW VOLUME LOOP: CPT | Performed by: NURSE PRACTITIONER

## 2022-10-05 PROCEDURE — 94729 DIFFUSING CAPACITY: CPT | Performed by: NURSE PRACTITIONER

## 2022-10-05 RX ORDER — INSULIN GLARGINE 100 [IU]/ML
50 INJECTION, SOLUTION SUBCUTANEOUS DAILY
COMMUNITY
Start: 2022-10-03

## 2022-10-05 RX ORDER — INSULIN LISPRO 100 [IU]/ML
INJECTION, SOLUTION INTRAVENOUS; SUBCUTANEOUS
COMMUNITY
Start: 2022-10-03 | End: 2023-03-27

## 2022-10-05 NOTE — PROGRESS NOTES
"[facility] Pulmonary Evaluation    Chief Complaint  Pre-op Exam (Preop Eval/Clearance requested for Bariatric Surgery DX: SOB, SUSPECTED SLEEP APNEA AND MORBID OBESITY)    Referring Provider:     Mally          Marhakanlio Leung presents to [facility] for pulmonary preoperative evaluation for bariatric surgery.    She does have a history of childhood asthma as well as some bronchitis episodes when she was younger.  As an adult she had no further episodes.    She did have hospitalization early this year in the last year after her  she had some complications and unfortunately had a pneumonia.  She has recovered fully from that.    She also was suspected to have sleep apnea while in the hospital.  She did desaturate at night.  She has witnessed snoring as well as apnea episodes.  She has very poor sleep quality and complains of daytime hypersomnolence.  She also says she wakes up in the mornings with headaches.          Objective     Vital Signs:   /98   Pulse 80   Temp 97.8 °F (36.6 °C)   Ht 172.7 cm (68\")   Wt (!) 204 kg (449 lb)   SpO2 98% Comment: resting, room air  BMI 68.27 kg/m²       Immunization History   Administered Date(s) Administered   • COVID-19 (PFIZER) PURPLE CAP 2021, 2021   • Influenza LAIV (Nasal) 10/31/2008   • Pneumococcal Polysaccharide (PPSV23) 2017   • Tdap 10/28/2021       Physical Exam  Vitals reviewed.   Constitutional:       General: She is not in acute distress.     Appearance: She is well-developed. She is obese. She is not ill-appearing.   HENT:      Head: Normocephalic and atraumatic.   Eyes:      Pupils: Pupils are equal, round, and reactive to light.   Cardiovascular:      Rate and Rhythm: Normal rate and regular rhythm.      Heart sounds: No murmur heard.  Pulmonary:      Effort: Pulmonary effort is normal. No respiratory distress.      Breath sounds: Normal breath sounds. No " wheezing or rales.   Abdominal:      General: Bowel sounds are normal. There is no distension.      Palpations: Abdomen is soft.   Musculoskeletal:         General: Normal range of motion.      Cervical back: Normal range of motion and neck supple.   Skin:     General: Skin is warm and dry.      Findings: No erythema.   Neurological:      Mental Status: She is alert and oriented to person, place, and time.   Psychiatric:         Behavior: Behavior normal.          Result Review :            PFTs done in the office today:  Normal PFTs FVC is 3.12, 86% predicted normal DLCO.    PA and lateral chest x-ray done the office today reviewed by me  Awaiting final MD interpretation                 Assessment and Plan    Problem List Items Addressed This Visit        Endocrine and Metabolic    Obesity, morbid, BMI 50 or higher (HCC)       Neuro    Suspected sleep apnea       Pulmonary and Pneumonias    Shortness of breath      Other Visit Diagnoses     Preoperative clearance    -  Primary    Relevant Orders    XR Chest PA & Lateral (Completed)    Pulmonary Function Test (Completed)        She is here today for preoperative evaluation for bariatric surgery.  Her PFTs were normal.  She likely does have obstructive sleep apnea.  She is willing for an evaluation with a home sleep study, as well as using PAP therapy if indicated.  I will follow-up with her after her home sleep study and then in 30 to 90 days with her compliance report.      ARISCAT Preoperative Pulmonary Risk Index.    Age [x]   <= 50 years old (0 points)    []   51-80 years old (3 points)    []   >80 years old (16 points)       Preoperative Oxygen Saturation [x]   >= 96% (0 points)    []   91-95% (8 points)    []   <= 90% (24 points)       Other Clinical Risk Factors []   Respiratory Infection in the last month (17 points)    []   Pre-operative anemia: Hb < 10 g/dL (11 points)    []   Emergency Surgery (8 points)       Surgical Incision [x]   Upper abdominal (15  points)    []   Intrathoracic (24 points)       Duration of Surgery [x]   < 2 hours (0 points)    []   2-3 hours (16 points)    []   >3 hours (23 points)       Total Criteria Point Count: 15     ARISCAT risk index interpretation:      0-25 points: Low risk  1.6 % pulmonary complication rate.   26-44 points: Intermediate risk 13.3% pulmonary complication rate.    points: High risk 42.1 % pulmonary complication rate.     Postoperative Pulmonary Complications include but are not limited to: Respiratory Failure, Pulmonary Infection, Pleural Effusion, Atelectasis, Pneumothorax Bronchospasm, Aspiration Pneumonia.        I spent 35 minutes caring for Edd on this date of service. This time includes time spent by me in the following activities:preparing for the visit, reviewing tests, obtaining and/or reviewing a separately obtained history, performing a medically appropriate examination and/or evaluation , counseling and educating the patient/family/caregiver, ordering medications, tests, or procedures, referring and communicating with other health care professionals , documenting information in the medical record and independently interpreting results and communicating that information with the patient/family/caregiver    Follow Up     No follow-ups on file.  Patient was given instructions and counseling regarding her condition or for health maintenance advice. Please see specific information pulled into the AVS if appropriate.     BOB Ramirez, ACNP  [facility]  Electronically signed

## 2022-10-06 DIAGNOSIS — R06.02 SHORTNESS OF BREATH: ICD-10-CM

## 2022-10-06 DIAGNOSIS — R29.818 SUSPECTED SLEEP APNEA: Primary | ICD-10-CM

## 2022-10-20 ENCOUNTER — DOCUMENTATION (OUTPATIENT)
Dept: PULMONOLOGY | Facility: CLINIC | Age: 37
End: 2022-10-20

## 2023-02-03 ENCOUNTER — OFFICE VISIT (OUTPATIENT)
Dept: PULMONOLOGY | Facility: CLINIC | Age: 38
End: 2023-02-03
Payer: COMMERCIAL

## 2023-02-03 VITALS
DIASTOLIC BLOOD PRESSURE: 86 MMHG | WEIGHT: 293 LBS | HEIGHT: 68 IN | BODY MASS INDEX: 44.41 KG/M2 | OXYGEN SATURATION: 99 % | RESPIRATION RATE: 14 BRPM | HEART RATE: 69 BPM | TEMPERATURE: 97.7 F | SYSTOLIC BLOOD PRESSURE: 134 MMHG

## 2023-02-03 DIAGNOSIS — E66.01 OBESITY, MORBID, BMI 50 OR HIGHER: ICD-10-CM

## 2023-02-03 DIAGNOSIS — E66.2 OBESITY HYPOVENTILATION SYNDROME: ICD-10-CM

## 2023-02-03 DIAGNOSIS — R29.818 SUSPECTED SLEEP APNEA: Primary | ICD-10-CM

## 2023-02-03 PROCEDURE — 99213 OFFICE O/P EST LOW 20 MIN: CPT | Performed by: NURSE PRACTITIONER

## 2023-02-03 RX ORDER — SEMAGLUTIDE 2.68 MG/ML
INJECTION, SOLUTION SUBCUTANEOUS
COMMUNITY
Start: 2023-01-30

## 2023-02-03 NOTE — PROGRESS NOTES
"[facility] Pulmonary Follow up      Chief Complaint  Pre-op Exam (Pt HST denied by insurance)    Subjective          Edd Leung presents to [facility] for follow up on her evaluation for her obstructive sleep apnea.  Initially saw her back in October for preoperative evaluation for bariatric surgery.  She had been in the hospital last year as well for acute on chronic respiratory failure with obesity hypoventilation syndrome.,  With suspected sleep apnea.  She did desaturate at night, and required oxygen.  She has witnessed snoring as well as apnea episodes at night by her significant other.  She has very poor sleep quality and complains of daytime hypersomnolence.  She also says she wakes up in the mornings with headaches.    We tried to get a home sleep study approved by her insurance which denied a home sleep study.  She is here today to get a in lab study scheduled.    She does plan on having her bariatric surgery in the next few months.        Objective     Vital Signs:   /86 (BP Location: Left arm, Patient Position: Sitting, Cuff Size: Adult)   Pulse 69   Temp 97.7 °F (36.5 °C) (Infrared)   Resp 14   Ht 172.7 cm (68\")   Wt (!) 204 kg (449 lb)   SpO2 99%   BMI 68.27 kg/m²       Immunization History   Administered Date(s) Administered   • COVID-19 (PFIZER) PURPLE CAP 07/18/2021, 08/08/2021   • Influenza LAIV (Nasal) 10/31/2008   • Pneumococcal Polysaccharide (PPSV23) 11/29/2017   • Tdap 10/28/2021       Physical Exam  Vitals reviewed.   Constitutional:       General: She is not in acute distress.     Appearance: She is well-developed. She is obese.   HENT:      Head: Normocephalic and atraumatic.   Eyes:      Pupils: Pupils are equal, round, and reactive to light.   Cardiovascular:      Rate and Rhythm: Normal rate and regular rhythm.      Heart sounds: No murmur heard.  Pulmonary:      Effort: Pulmonary effort is normal. No " respiratory distress.      Breath sounds: Normal breath sounds. No wheezing or rales.   Abdominal:      General: Bowel sounds are normal. There is no distension.      Palpations: Abdomen is soft.   Musculoskeletal:         General: Normal range of motion.      Cervical back: Normal range of motion and neck supple.   Skin:     General: Skin is warm and dry.      Findings: No erythema.   Neurological:      Mental Status: She is alert and oriented to person, place, and time.   Psychiatric:         Behavior: Behavior normal.          Result Review :                         Assessment and Plan    Problem List Items Addressed This Visit        Endocrine and Metabolic    Obesity, morbid, BMI 50 or higher (HCC)       Neuro    Suspected sleep apnea - Primary    Relevant Orders    Polysomnography 4 or More Parameters       Sleep    Obesity hypoventilation syndrome (HCC)     I will place an order for a in lab sleep study.  She has significant symptoms obstructive sleep apnea/obese ambulation syndrome.  We will try to get her on PAP therapy as soon as possible prior to her surgery.    She will follow-up here in the office after set up for compliance follow-up.      Follow Up     No follow-ups on file.  Patient was given instructions and counseling regarding her condition or for health maintenance advice. Please see specific information pulled into the AVS if appropriate.     I spent 26 minutes caring for Edd on this date of service. This time includes time spent by me in the following activities:preparing for the visit, obtaining and/or reviewing a separately obtained history, performing a medically appropriate examination and/or evaluation , counseling and educating the patient/family/caregiver, ordering medications, tests, or procedures and documenting information in the medical record      BOB Ramirez, ACNP  [facility]  Electronically signed

## 2023-03-23 NOTE — PROGRESS NOTES
[facility]    Encounter Date: 2023    Patient ID: Edd Leung is a 37 y.o. female.  : 1985     PCP: Fauzia Gerard MD       Chief Complaint: Hypertension and Diabetes      PROBLEM LIST:  1. Hypertension  a. Echo 2022, Dr. Gandhi: EF 65%, normal diastolic function, valves anatomically and functionally normal   2. Dyslipidemia  3. Diabetes  4. Obstructive sleep apnea  5. Obesity  6. CHINYERE  7. Hx of MRSA after   8. History of tobacco use, cessation     History of Present Illness: Edd Leung is a 37 y.o. female who presents to the cardiology office today by referral from bariatric surgery to be seen in consultation for cardiac clearance prior to bariatric surgery.  Patient reports that she has previously undergone cardiac work-up for bariatric surgery several years ago, but opted not to go through with the procedure.  She states she did have a normal stress test and echocardiogram greater than 3 years ago at [place].  She did have a complicated pregnancy in  here at [facility] which resulted in an echocardiogram that did reveal normal LV and valvular function.  She had a subsequent echocardiogram in  which also revealed normal LV and valvular function.    Patient states that she does live a very active lifestyle and has recently developed a regular exercise routine.  She does have an elliptical at home as well as free weights and has started trying to exercise at least 20 minutes every day.  She is able to do the elliptical for 10 minutes at a time without any complaints.  She has lost 4 pounds in the last week.  She states that regular activity gives her more energy and makes her feel better.  He denies any chest pain, progressive shortness of breath, palpitations, orthopnea, edema, presyncope or syncope.     Past Medical History:   Past Medical History:   Diagnosis Date   • Anxiety    • Anxiety and  depression    • Back pain     not treated with meds   • Diabetes mellitus (HCC)     on insulin with pregnancy 2022   • Generalized headache    • Gonorrhea    • Heartburn     tums prn, thinks she had EGD with [facility]   • History of abnormal cervical Pap smear    • Hypertension    • Iron deficiency anemia    • MRSA infection     skin abscess   • Suspected sleep apnea        Past Surgical History:   Past Surgical History:   Procedure Laterality Date   •  SECTION  2006   •  SECTION N/A 2021    Procedure:  SECTION REPEAT;  Surgeon: Rita Busby MD;  Location: [facility] LABOR DELIVERY;  Service: Obstetrics/Gynecology;  Laterality: N/A;   • DILATATION AND CURETTAGE         Family History:   Family History   Problem Relation Age of Onset   • Hypertension Mother    • Lung cancer Father    • Hypertension Sister    • Cancer Maternal Grandmother    • Hypertension Maternal Grandmother    • Diabetes Maternal Grandmother    • Diabetes Paternal Grandmother    • Hypertension Paternal Grandfather    • Diabetes Paternal Grandfather        Social History:   Social History     Socioeconomic History   • Marital status:    • Number of children: 1   • Years of education: 14   • Highest education level: Associate degree: occupational, technical, or vocational program   Tobacco Use   • Smoking status: Former     Packs/day: 0.25     Years: 17.00     Pack years: 4.25     Types: Cigarettes     Quit date: 2019     Years since quitting: 3.9   • Smokeless tobacco: Never   • Tobacco comments:     wasn't a daily smoker-  depended on situation   Vaping Use   • Vaping Use: Never used   Substance and Sexual Activity   • Alcohol use: Yes     Comment: SOCIALLY    • Drug use: Not Currently     Types: Marijuana     Comment: SOCIALLY    • Sexual activity: Yes     Partners: Male     Birth control/protection: None       Tobacco History:   Social History     Tobacco Use   Smoking Status Former   •  "Packs/day: 0.25   • Years: 17.00   • Pack years: 4.25   • Types: Cigarettes   • Quit date: 4/8/2019   • Years since quitting: 3.9   Smokeless Tobacco Never   Tobacco Comments    wasn't a daily smoker-  depended on situation       Medications:     Current Outpatient Medications:   •  amoxicillin (AMOXIL) 500 MG capsule, , Disp: , Rfl:   •  hydroCHLOROthiazide (HYDRODIURIL) 25 MG tablet, , Disp: , Rfl:   •  Lantus SoloStar 100 UNIT/ML injection pen, 50 Units Daily., Disp: , Rfl:   •  losartan (COZAAR) 100 MG tablet, , Disp: , Rfl:   •  NIFEdipine CC (ADALAT CC) 30 MG 24 hr tablet, , Disp: , Rfl:   •  Ozempic, 2 MG/DOSE, 8 MG/3ML solution pen-injector, , Disp: , Rfl:   •  potassium chloride (MICRO-K) 10 MEQ CR capsule, Take 2 capsules by mouth Daily., Disp: 30 capsule, Rfl: 0  •  vitamin D (ERGOCALCIFEROL) 1.25 MG (38116 UT) capsule capsule, , Disp: , Rfl:   •  insulin glargine (Lantus) 100 UNIT/ML injection, Inject 40 Units under the skin into the appropriate area as directed 2 (Two) Times a Day for 90 days., Disp: 72 mL, Rfl: 1  •  insulin lispro (humaLOG) 100 UNIT/ML injection, Inject 25 Units under the skin into the appropriate area as directed 3 (Three) Times a Day Before Meals for 90 days., Disp: 67.5 mL, Rfl: 1    Allergies:   Allergies   Allergen Reactions   • Banana Anaphylaxis     Other reaction(s): Banana, Cucumber, Kiwis, Melon   • Cucumber Extract Anaphylaxis   • Kiwi Extract Anaphylaxis   • Latex Rash and Hives     \"makes my skin raw\"   Other reaction(s): Other, Unknown  1Replaced free text allergy  \"makes my skin raw\"    • Melon Anaphylaxis   • Nuts Anaphylaxis   • Naproxen Nausea Only     No problems with ibuprofen and aspirin  No problems with ibuprofen and aspirin   • Duloxetine Hcl Unknown - Low Severity     Other reaction(s): Unknown     The following portions of the patient's history were reviewed and updated as appropriate: allergies, current medications, past family history, past medical history, " "past social history, past surgical history and problem list.    Review of Systems   12 point ROS negative except for that listed in the HPI.         Objective:     /84 (BP Location: Right arm, Patient Position: Sitting, Cuff Size: Large Adult)   Pulse 86   Ht 172.7 cm (67.99\")   Wt (!) 197 kg (434 lb 12.8 oz)   SpO2 94%   BMI 66.13 kg/m²      Physical Exam  Constitutional: Patient appears well-developed and well-nourished.   HENT: HEENT exam unremarkable.   Neck: Neck supple. No JVD present. No carotid bruits.   Cardiovascular: Normal rate, regular rhythm and normal heart sounds. No murmur heard.   2+ symmetric pulses.   Pulmonary/Chest: Decreased breath sounds bilaterally. Does not exhibit tenderness.   Abdominal: Abdomen benign.  Obese.  Musculoskeletal: Does not exhibit edema.   Neurological: Neurological exam unremarkable.   Vitals reviewed.    Data Review:   Lab Results   Component Value Date    GLUCOSE 184 (H) 09/20/2022    BUN 11 09/20/2022    CREATININE 0.87 09/20/2022    EGFRIFNONA >60 10/21/2021    EGFRIFAFRI 102 02/19/2022    BCR 13 09/20/2022    K 4.4 09/20/2022    CO2 28 09/20/2022    CALCIUM 9.5 09/20/2022    ALBUMIN 4.0 09/20/2022    AST 10 09/20/2022    ALT 16 09/20/2022     Lab Results   Component Value Date    CHLPL 193 09/20/2022    TRIG 146 09/20/2022    HDL 47 09/20/2022     (H) 09/20/2022      Lab Results   Component Value Date    WBC 10.1 09/20/2022    RBC 4.90 09/20/2022    HGB 11.0 (L) 09/20/2022    HCT 37.1 09/20/2022    MCV 76 (L) 09/20/2022     09/20/2022     Lab Results   Component Value Date    TSH 0.522 09/20/2022     Lab Results   Component Value Date    HGBA1C 8.5 (H) 09/20/2022      Most recent A1c 7.1, per patient        ECG 12 Lead    Date/Time: 3/27/2023 2:44 PM  Performed by: Kaylin Moraes PA-C  Authorized by: Kaylin Moraes PA-C   Comparison: compared with previous ECG from 2/18/2022  Comparison to previous ECG: Now with nonspecific STT " changes  Rhythm: sinus rhythm  Rate: normal  BPM: 64  Other findings: T wave abnormality               Assessment:      Diagnosis   1. Obesity, morbid, BMI 50 or higher ([facility])    2. Hypertension, unspecified type    3. Essential hypertension    4. Preoperative cardiovascular examination    5. Type 2 diabetes mellitus with hyperglycemia, with long-term current use of insulin ([facility])      Plan:   Patient with multiple cardiac risk factors including hypertension, dyslipidemia, diabetes, and obesity with abnormal EKG needs cardiac clearance prior to bariatric surgery.  We will proceed with stress PET for ischemic evaluation.  She did have an echocardiogram in 2022 with normal LV and valvular function.  If stress test is within normal limits, patient will be okay to proceed with bariatric surgery from a cardiac standpoint.  Discussed the importance of heart healthy diet and exercise.  Continue current medications.   FU in 6 MO, sooner as needed.  Thank you for allowing us to participate in the care of your patient.         Kaylin Moraes PA-C    Part of this note may be an electronic transcription/translation of spoken language to printed text using the Dragon Dictation System.

## 2023-03-27 ENCOUNTER — OFFICE VISIT (OUTPATIENT)
Dept: CARDIOLOGY | Facility: CLINIC | Age: 38
End: 2023-03-27
Payer: COMMERCIAL

## 2023-03-27 VITALS
HEIGHT: 68 IN | WEIGHT: 293 LBS | SYSTOLIC BLOOD PRESSURE: 160 MMHG | BODY MASS INDEX: 44.41 KG/M2 | DIASTOLIC BLOOD PRESSURE: 84 MMHG | HEART RATE: 86 BPM | OXYGEN SATURATION: 94 %

## 2023-03-27 DIAGNOSIS — R94.31 ABNORMAL ECG: ICD-10-CM

## 2023-03-27 DIAGNOSIS — Z79.4 TYPE 2 DIABETES MELLITUS WITH HYPERGLYCEMIA, WITH LONG-TERM CURRENT USE OF INSULIN: ICD-10-CM

## 2023-03-27 DIAGNOSIS — I10 ESSENTIAL HYPERTENSION: ICD-10-CM

## 2023-03-27 DIAGNOSIS — E11.65 TYPE 2 DIABETES MELLITUS WITH HYPERGLYCEMIA, WITH LONG-TERM CURRENT USE OF INSULIN: ICD-10-CM

## 2023-03-27 DIAGNOSIS — E66.01 OBESITY, MORBID, BMI 50 OR HIGHER: Primary | ICD-10-CM

## 2023-03-27 DIAGNOSIS — I10 HYPERTENSION, UNSPECIFIED TYPE: ICD-10-CM

## 2023-03-27 DIAGNOSIS — Z01.810 PREOPERATIVE CARDIOVASCULAR EXAMINATION: ICD-10-CM

## 2023-03-27 PROCEDURE — 1159F MED LIST DOCD IN RCRD: CPT

## 2023-03-27 PROCEDURE — 93000 ELECTROCARDIOGRAM COMPLETE: CPT

## 2023-03-27 PROCEDURE — 99204 OFFICE O/P NEW MOD 45 MIN: CPT

## 2023-03-27 PROCEDURE — 3077F SYST BP >= 140 MM HG: CPT

## 2023-03-27 PROCEDURE — 1160F RVW MEDS BY RX/DR IN RCRD: CPT

## 2023-03-27 PROCEDURE — 3079F DIAST BP 80-89 MM HG: CPT

## 2023-03-27 RX ORDER — AMOXICILLIN 500 MG/1
CAPSULE ORAL
COMMUNITY
Start: 2023-03-14

## 2023-04-13 ENCOUNTER — HOSPITAL ENCOUNTER (OUTPATIENT)
Dept: CARDIOLOGY | Facility: HOSPITAL | Age: 38
Discharge: HOME | End: 2023-04-13
Payer: COMMERCIAL

## 2023-04-13 VITALS
SYSTOLIC BLOOD PRESSURE: 155 MMHG | HEART RATE: 59 BPM | DIASTOLIC BLOOD PRESSURE: 76 MMHG | WEIGHT: 293 LBS | BODY MASS INDEX: 44.41 KG/M2 | HEIGHT: 68 IN

## 2023-04-13 DIAGNOSIS — E66.01 OBESITY, MORBID, BMI 50 OR HIGHER: ICD-10-CM

## 2023-04-13 DIAGNOSIS — R94.31 ABNORMAL ECG: ICD-10-CM

## 2023-04-13 DIAGNOSIS — Z01.810 PREOPERATIVE CARDIOVASCULAR EXAMINATION: ICD-10-CM

## 2023-04-13 LAB
BH CV REST NUCLEAR ISOTOPE DOSE: 27.6 MCI
BH CV STRESS BP STAGE 1: NORMAL
BH CV STRESS BP STAGE 3: NORMAL
BH CV STRESS COMMENTS STAGE 1: NORMAL
BH CV STRESS DOSE REGADENOSON STAGE 1: 0.4
BH CV STRESS DURATION MIN STAGE 1: 1
BH CV STRESS DURATION MIN STAGE 2: 1
BH CV STRESS DURATION MIN STAGE 3: 1
BH CV STRESS DURATION MIN STAGE 4: 1
BH CV STRESS DURATION SEC STAGE 1: 0
BH CV STRESS DURATION SEC STAGE 2: 0
BH CV STRESS DURATION SEC STAGE 3: 0
BH CV STRESS DURATION SEC STAGE 4: 0
BH CV STRESS HR STAGE 1: 61
BH CV STRESS HR STAGE 2: 95
BH CV STRESS HR STAGE 3: 71
BH CV STRESS HR STAGE 4: 64
BH CV STRESS NUCLEAR ISOTOPE DOSE: 27.6 MCI
BH CV STRESS O2 STAGE 1: 100
BH CV STRESS O2 STAGE 2: 100
BH CV STRESS O2 STAGE 3: 100
BH CV STRESS O2 STAGE 4: 100
BH CV STRESS PROTOCOL 1: NORMAL
BH CV STRESS RECOVERY BP: NORMAL MMHG
BH CV STRESS RECOVERY HR: 59 BPM
BH CV STRESS RECOVERY O2: 100 %
BH CV STRESS STAGE 1: 1
BH CV STRESS STAGE 2: 2
BH CV STRESS STAGE 3: 3
BH CV STRESS STAGE 4: 4
LV EF NUC BP: 65 %
MAXIMAL PREDICTED HEART RATE: 185 BPM
PERCENT MAX PREDICTED HR: 49.73 %
STRESS BASELINE BP: NORMAL MMHG
STRESS BASELINE HR: 71 BPM
STRESS O2 SAT REST: 98 %
STRESS PERCENT HR: 59 %
STRESS POST ESTIMATED WORKLOAD: 1 METS
STRESS POST EXERCISE DUR MIN: 4 MIN
STRESS POST EXERCISE DUR SEC: 0 SEC
STRESS POST O2 SAT PEAK: 100 %
STRESS POST PEAK BP: NORMAL MMHG
STRESS POST PEAK HR: 92 BPM
STRESS TARGET HR: 157 BPM

## 2023-04-13 PROCEDURE — 0 RUBIDIUM CHLORIDE

## 2023-04-13 PROCEDURE — 78431 MYOCRD IMG PET RST&STRS CT: CPT

## 2023-04-13 PROCEDURE — A9555 RB82 RUBIDIUM: HCPCS

## 2023-04-13 PROCEDURE — 25010000002 REGADENOSON 0.4 MG/5ML SOLUTION

## 2023-04-13 PROCEDURE — 93017 CV STRESS TEST TRACING ONLY: CPT

## 2023-04-13 RX ADMIN — RUBIDIUM CHLORIDE RB-82 1 DOSE: 150 INJECTION, SOLUTION INTRAVENOUS at 10:46

## 2023-04-13 RX ADMIN — RUBIDIUM CHLORIDE RB-82 1 DOSE: 150 INJECTION, SOLUTION INTRAVENOUS at 10:58

## 2023-04-13 RX ADMIN — REGADENOSON 0.4 MG: 0.08 INJECTION, SOLUTION INTRAVENOUS at 10:55

## 2023-04-17 ENCOUNTER — HOSPITAL ENCOUNTER (OUTPATIENT)
Dept: SLEEP MEDICINE | Facility: HOSPITAL | Age: 38
Discharge: HOME | End: 2023-04-17
Admitting: NURSE PRACTITIONER
Payer: COMMERCIAL

## 2023-04-17 VITALS — WEIGHT: 293 LBS | HEIGHT: 68 IN | BODY MASS INDEX: 44.41 KG/M2

## 2023-04-17 PROCEDURE — 95811 POLYSOM 6/>YRS CPAP 4/> PARM: CPT

## 2023-04-19 ENCOUNTER — TELEPHONE (OUTPATIENT)
Dept: CARDIOLOGY | Facility: CLINIC | Age: 38
End: 2023-04-19
Payer: COMMERCIAL

## 2023-04-19 NOTE — TELEPHONE ENCOUNTER
Can call and let this patient know that her stress test is normal and that she is okay to proceed with bariatric surgery from a cardiac standpoint.

## 2023-04-24 DIAGNOSIS — G47.33 OSA (OBSTRUCTIVE SLEEP APNEA): Primary | ICD-10-CM

## 2023-04-24 NOTE — LETTER
April 24, 2023     Crystal Sigala PA-C  [address]  [address]  [facility] [address]    Patient: Edd Leung   YOB: 1985   Date of Visit: 4/24/2023       Dear Dr. Jovon PA-C:    Thank you for referring Edd Leung to me for evaluation. Below are the relevant portions of my assessment and plan of care.    If you have questions, please do not hesitate to call me. I look forward to following Edd along with you.         Sincerely,        BOB Hand        CC: No Recipients    Diana King APRN  04/24/23 0915  Sign when Signing Visit  Mrs. Leung underwent her sleep study with CPAP titration.  She did have evidence of sleep apnea with an AHI greater than 20.  She was placed on CPAP and tolerated a CPAP final pressure of 16 well with no apnea episodes or hypoxemia.    I will go ahead and set Ms. Leung up on an auto CPAP, she can follow-up in the office in 30 to 90 days for compliance check.    At this point she is cleared from a pulmonary standpoint to undergo bariatric surgery.    BOB Ramirez, ACNP  [facility] Critical [facility]

## 2023-04-24 NOTE — PROGRESS NOTES
Mrs. Leung underwent her sleep study with CPAP titration.  She did have evidence of sleep apnea with an AHI greater than 20.  She was placed on CPAP and tolerated a CPAP final pressure of 16 well with no apnea episodes or hypoxemia.    I will go ahead and set Ms. Leung up on an auto CPAP, she can follow-up in the office in 30 to 90 days for compliance check.    At this point she is cleared from a pulmonary standpoint to undergo bariatric surgery.    BOB Ramirez, ACNP  [facility] Pulmonary Critical Care Medicine

## 2023-05-24 ENCOUNTER — OFFICE VISIT (OUTPATIENT)
Dept: BARIATRICS/WEIGHT MGMT | Facility: CLINIC | Age: 38
End: 2023-05-24
Payer: COMMERCIAL

## 2023-05-24 VITALS
HEART RATE: 64 BPM | HEIGHT: 68 IN | BODY MASS INDEX: 44.41 KG/M2 | DIASTOLIC BLOOD PRESSURE: 92 MMHG | TEMPERATURE: 96.9 F | WEIGHT: 293 LBS | SYSTOLIC BLOOD PRESSURE: 142 MMHG

## 2023-05-24 DIAGNOSIS — K21.9 GASTROESOPHAGEAL REFLUX DISEASE, UNSPECIFIED WHETHER ESOPHAGITIS PRESENT: ICD-10-CM

## 2023-05-24 DIAGNOSIS — E66.01 MORBID OBESITY WITH BMI OF 60.0-69.9, ADULT: ICD-10-CM

## 2023-05-24 RX ORDER — DOXYCYCLINE HYCLATE 50 MG/1
324 CAPSULE, GELATIN COATED ORAL
COMMUNITY

## 2023-05-24 NOTE — PROGRESS NOTES
"[facility] Bariatric Surgery  [address]  [address]  [facility] [address] [phone]        Patient Name:  Edd Leung.  :  1985        Reason for Visit:   Weight gain, unable to maintain weightloss, evaluate for possible metabolic and bariatric surgery      HPI: Edd Leung is a 37 y.o. female who presents for evaluation of reflux in preparation for bariatric and metabolic surgery, specifically laparoscopic sleeve gastrectomy with Dr. Jacob.     Edd has been overweight for at least 15 years, has been 35 pounds or more overweight for at least 15 years, has been 100 pounds or more overweight for 15 or more years and started dieting at age 27.       Previous diet attempts include: High Protein, Herbal Life, Low Carbohydrate, Low Fat, Calorie Counting, Fasting and Slim Fast; None; Wellbutrin.  T  Her maximum lifetime weight is 447 pounds.     As above, patient has been overweight for many years, with numerous failed dietary/weight loss attempts.  She now has obesity related comorbidities and as such has decided to pursue weight loss surgery.     Patient is pursuing a sleeve gastrectomy to improve her overall health.  She said she started process through [facility] several years ago but ultimately did not pursue surgery.  States she is now ready to proceed.       Per intitial intake 2022:  \"History of hypertension, suspected sleep apnea, diabetes diagnosed in  on insulin since her last pregnancy 2022.  History of anxiety and depression.  Iron deficiency anemia on iron supplements.  History of MRSA skin abscess in .  Chronic back pain not treated with medication.  Generalized headaches occasionally.  Says she has had goiter evaluated with ultrasound and has had normal thyroid function in the past.     GI: Heartburn treated with Tums as needed, thinks she had an EGD with [facility] in preop evaluation several years ago with unknown " "findings.  Denies known H. pylori in the past.  Gallbladder is present.  Denies other GI symptoms.     Chart review shows hospitalization in February of this year for acute hypoxic respiratory failure suspected to be obesity hypoventilation syndrome plus or minus volume overload with possible aspiration pneumonitis with recent emesis.  Recommended additional pulmonary evaluation outpatient.  Pulmonary function test at that time showed restrictive lung disease by lung volumes and diffusing capacity substantially reduced.\"        Doing well. Has seen cardiology with normal stress test and low risk for surgery. Has also seen pulmonology with plan for PAP therapy for significant symptoms of SOA/ obese ambulation syndrome.   No other  changes in medical history since last office visit. No other issues/concerns today. She is eager to proceed with surgery.  Denies dysphagia, reflux, nausea, vomiting, abdominal pain, pulmonary issues and fevers.  H pylori testing was negative. She has GES scheduled for July.         Past Medical History:   Diagnosis Date   • Anxiety    • Anxiety and depression    • Back pain     not treated with meds   • Diabetes mellitus     on insulin with pregnancy 2022   • Generalized headache    • Gonorrhea    • Heartburn     tums prn, thinks she had EGD with [facility]   • History of abnormal cervical Pap smear    • Hypertension    • Iron deficiency anemia    • MRSA infection     skin abscess   • Suspected sleep apnea      Past Surgical History:   Procedure Laterality Date   •  SECTION  2006   •  SECTION N/A 2021    Procedure:  SECTION REPEAT;  Surgeon: Rita Busby MD;  Location: [facility] LABOR DELIVERY;  Service: Obstetrics/Gynecology;  Laterality: N/A;   • DILATATION AND CURETTAGE       Outpatient Medications Marked as Taking for the 23 encounter (Office Visit) with Crystal Sigala PA-C   Medication Sig Dispense Refill   • ferrous gluconate " "(FERGON) 324 MG tablet Take 1 tablet by mouth Daily With Breakfast.     • hydroCHLOROthiazide (HYDRODIURIL) 25 MG tablet      • Lantus SoloStar 100 UNIT/ML injection pen 50 Units Daily.     • losartan (COZAAR) 100 MG tablet      • Ozempic, 2 MG/DOSE, 8 MG/3ML solution pen-injector      • vitamin D (ERGOCALCIFEROL) 1.25 MG (07347 UT) capsule capsule          Allergies   Allergen Reactions   • Banana Anaphylaxis     Other reaction(s): Banana, Cucumber, Kiwis, Melon   • Cucumber Extract Anaphylaxis   • Kiwi Extract Anaphylaxis   • Latex Rash and Hives     \"makes my skin raw\"   Other reaction(s): Other, Unknown  1Replaced free text allergy  \"makes my skin raw\"    • Melon Anaphylaxis   • Nuts Anaphylaxis   • Naproxen Nausea Only     No problems with ibuprofen and aspirin  No problems with ibuprofen and aspirin   • Duloxetine Hcl Unknown - Low Severity     Mental agitation       Social History     Socioeconomic History   • Marital status:    • Number of children: 1   • Years of education: 14   • Highest education level: Associate degree: occupational, technical, or vocational program   Tobacco Use   • Smoking status: Former     Packs/day: 0.25     Years: 17.00     Pack years: 4.25     Types: Cigarettes     Quit date: 2019     Years since quittin.1   • Smokeless tobacco: Never   • Tobacco comments:     wasn't a daily smoker-  depended on situation   Vaping Use   • Vaping Use: Never used   Substance and Sexual Activity   • Alcohol use: Yes     Comment: SOCIALLY    • Drug use: Not Currently     Types: Marijuana     Comment: SOCIALLY    • Sexual activity: Yes     Partners: Male     Birth control/protection: None       /92 (BP Location: Left arm, Patient Position: Sitting)   Pulse 64   Temp 96.9 °F (36.1 °C)   Ht 172.7 cm (68\")   Wt (!) 196 kg (431 lb)   BMI 65.53 kg/m²     Physical Exam  Constitutional:       Appearance: She is well-developed. She is obese.   HENT:      Head: Normocephalic and " atraumatic.   Cardiovascular:      Rate and Rhythm: Normal rate and regular rhythm.   Pulmonary:      Effort: Pulmonary effort is normal.      Breath sounds: Normal breath sounds.   Abdominal:      General: Bowel sounds are normal.      Palpations: Abdomen is soft.   Skin:     General: Skin is warm and dry.   Neurological:      Mental Status: She is alert.   Psychiatric:         Mood and Affect: Mood normal.         Behavior: Behavior normal.         Thought Content: Thought content normal.         Judgment: Judgment normal.         Unable to get O2 sat with artificial nails    Assessment:      ICD-10-CM ICD-9-CM   1. Body mass index (BMI) of 60.0-69.9 in adult  Z68.44 V85.44   2. Gastroesophageal reflux disease, unspecified whether esophagitis present  K21.9 530.81   3. Morbid obesity with BMI of 60.0-69.9, adult  E66.01 278.01    Z68.44 V85.44         Plan:  Reviewed cardiac and pulmonology notes.   Will proceed with EGD for further evaluation. The risks and benefits of the upper endoscopy were discussed with the patient in detail and all questions were answered.  Possibility of perforation, bleeding, aspiration, and anesthesia reaction were reviewed.  Patient agrees to proceed. Hold oxempic x 2 weeks prior.  There is a possibility general anesthesia may be necessary.      Class 3 Severe Obesity (BMI >=40). Obesity-related health conditions include the following: as above. Obesity is improving with treatment. BMI is is above average; BMI management plan is completed. We discussed low calorie, low carb based diet program, portion control and increasing exercise.

## 2023-05-30 ENCOUNTER — PREP FOR SURGERY (OUTPATIENT)
Dept: OTHER | Facility: HOSPITAL | Age: 38
End: 2023-05-30

## 2023-05-30 DIAGNOSIS — R12 HEARTBURN: Primary | ICD-10-CM

## 2023-05-30 RX ORDER — SODIUM CHLORIDE 9 MG/ML
40 INJECTION, SOLUTION INTRAVENOUS AS NEEDED
OUTPATIENT
Start: 2023-05-30

## 2023-05-30 RX ORDER — SODIUM CHLORIDE 9 MG/ML
150 INJECTION, SOLUTION INTRAVENOUS CONTINUOUS
OUTPATIENT
Start: 2023-05-30

## 2023-05-30 RX ORDER — SODIUM CHLORIDE 0.9 % (FLUSH) 0.9 %
3 SYRINGE (ML) INJECTION EVERY 12 HOURS SCHEDULED
OUTPATIENT
Start: 2023-05-30

## 2023-05-30 RX ORDER — SODIUM CHLORIDE 0.9 % (FLUSH) 0.9 %
3-10 SYRINGE (ML) INJECTION AS NEEDED
OUTPATIENT
Start: 2023-05-30

## 2023-06-07 ENCOUNTER — OFFICE VISIT (OUTPATIENT)
Dept: PULMONOLOGY | Facility: CLINIC | Age: 38
End: 2023-06-07
Payer: COMMERCIAL

## 2023-06-07 VITALS
WEIGHT: 293 LBS | DIASTOLIC BLOOD PRESSURE: 98 MMHG | HEART RATE: 64 BPM | TEMPERATURE: 98.6 F | SYSTOLIC BLOOD PRESSURE: 172 MMHG | BODY MASS INDEX: 44.41 KG/M2 | OXYGEN SATURATION: 99 % | HEIGHT: 68 IN

## 2023-06-07 DIAGNOSIS — E66.2 OBESITY HYPOVENTILATION SYNDROME: ICD-10-CM

## 2023-06-07 DIAGNOSIS — R06.02 SHORTNESS OF BREATH: Primary | ICD-10-CM

## 2023-06-07 PROBLEM — G47.33 OBSTRUCTIVE SLEEP APNEA SYNDROME: Status: ACTIVE | Noted: 2022-09-20

## 2023-06-07 NOTE — PROGRESS NOTES
"[facility] Follow up      Chief Complaint  Suspected sleep apnea (Follow up)    Subjective          Edd Leung presents to [facility] for follow-up on her obstructive sleep apnea.  When I last saw her she went over for a sleep study, she did have signs of sleep apnea with an AHI greater than 20 and some desaturation.  The plan was to set her up on a CPAP, I sent those orders back in March to have [employer], unfortunately that has never been set up.    We did review her sleep study today, with primarily obstructive apneas and some mild hypoxemia.    She is ready to have her bariatric surgery, hopefully in the next month or so.    Unfortunately her son's been sick lately, but her  has been granted his visa to [place] soon.       Objective     Vital Signs:   /98 (BP Location: Right arm, Patient Position: Sitting, Cuff Size: Adult)   Pulse 64   Temp 98.6 °F (37 °C)   Ht 172.7 cm (68\")   Wt (!) 196 kg (432 lb)   SpO2 99% Comment: Room air at rest  BMI 65.69 kg/m²       Immunization History   Administered Date(s) Administered    COVID-19 (PFIZER) Purple Cap Monovalent 07/18/2021, 08/08/2021    Influenza LAIV (Nasal) 10/31/2008    Pneumococcal Polysaccharide (PPSV23) 11/29/2017    Tdap 10/28/2021       Physical Exam  Vitals and nursing note reviewed.   Constitutional:       General: She is not in acute distress.     Appearance: She is well-developed. She is obese. She is not ill-appearing.   HENT:      Head: Normocephalic and atraumatic.   Eyes:      Pupils: Pupils are equal, round, and reactive to light.   Cardiovascular:      Rate and Rhythm: Normal rate and regular rhythm.   Pulmonary:      Effort: Pulmonary effort is normal.      Breath sounds: Normal breath sounds.   Abdominal:      General: Bowel sounds are normal.      Palpations: Abdomen is soft.   Musculoskeletal:         General: Normal range of motion.     "  Cervical back: Normal range of motion and neck supple.   Skin:     General: Skin is warm and dry.   Neurological:      Mental Status: She is alert and oriented to person, place, and time.   Psychiatric:         Behavior: Behavior normal.        Result Review :                             Assessment and Plan    Problem List Items Addressed This Visit          Pulmonary and Pneumonias    Shortness of breath - Primary       Sleep    Obesity hypoventilation syndrome     She was here today to for her compliance report for her CPAP.  Unfortunately that was never set up.  We will reach back out to her DME, [employer].  They have sent just a centimeter and it was signed and faxed back to them so they should have set her up on her therapy by now.    I gave them the number for [facility], instructed her to call me back if she has not heard from them in the next couple of weeks.    We did discuss coming back or doing a video visit in 30 to 90 days after setting up on PAP therapy for her compliance report.    We also discussed following up annually, and then once she has achieved her weight loss goal following up on a repeat sleep study to see if she needs to continue PAP therapy.    Follow Up     No follow-ups on file.  Patient was given instructions and counseling regarding her condition or for health maintenance advice. Please see specific information pulled into the AVS if appropriate.     I spent 32 minutes caring for Edd on this date of service. This time includes time spent by me in the following activities:preparing for the visit, reviewing tests, obtaining and/or reviewing a separately obtained history, performing a medically appropriate examination and/or evaluation , counseling and educating the patient/family/caregiver, ordering medications, tests, or procedures, referring and communicating with other health care professionals , documenting information in the medical record, and independently interpreting results  and communicating that information with the patient/family/caregiver      Diana King APRN, ACNP  [facility]  Electronically signed

## 2023-07-24 ENCOUNTER — HOSPITAL ENCOUNTER (OUTPATIENT)
Dept: NUCLEAR MEDICINE | Facility: HOSPITAL | Age: 38
Discharge: HOME | End: 2023-07-24
Payer: COMMERCIAL

## 2023-07-24 DIAGNOSIS — R12 HEARTBURN: ICD-10-CM

## 2023-07-24 DIAGNOSIS — Z79.4 TYPE 2 DIABETES MELLITUS WITH HYPERGLYCEMIA, WITH LONG-TERM CURRENT USE OF INSULIN: ICD-10-CM

## 2023-07-24 DIAGNOSIS — E11.65 TYPE 2 DIABETES MELLITUS WITH HYPERGLYCEMIA, WITH LONG-TERM CURRENT USE OF INSULIN: ICD-10-CM

## 2023-07-24 PROCEDURE — 78264 GASTRIC EMPTYING IMG STUDY: CPT

## 2023-07-24 PROCEDURE — A9541 TC99M SULFUR COLLOID: HCPCS | Performed by: PHYSICIAN ASSISTANT

## 2023-07-24 PROCEDURE — 0 TECHNETIUM SULFUR COLLOID: Performed by: PHYSICIAN ASSISTANT

## 2023-07-24 RX ORDER — INSULIN LISPRO 100 [IU]/ML
INJECTION, SOLUTION INTRAVENOUS; SUBCUTANEOUS
COMMUNITY

## 2023-07-24 RX ADMIN — TECHNETIUM TC 99M SULFUR COLLOID 1 DOSE: KIT at 08:54

## 2023-07-31 ENCOUNTER — PRE-ADMISSION TESTING (OUTPATIENT)
Dept: PREADMISSION TESTING | Facility: HOSPITAL | Age: 38
End: 2023-07-31
Payer: COMMERCIAL

## 2023-07-31 LAB
DEPRECATED RDW RBC AUTO: 50.3 FL (ref 37–54)
ERYTHROCYTE [DISTWIDTH] IN BLOOD BY AUTOMATED COUNT: 16 % (ref 12.3–15.4)
HCT VFR BLD AUTO: 39.6 % (ref 34–46.6)
HGB BLD-MCNC: 11.4 G/DL (ref 12–15.9)
MCH RBC QN AUTO: 24.7 PG (ref 26.6–33)
MCHC RBC AUTO-ENTMCNC: 28.8 G/DL (ref 31.5–35.7)
MCV RBC AUTO: 85.7 FL (ref 79–97)
PLATELET # BLD AUTO: 318 10*3/MM3 (ref 140–450)
PMV BLD AUTO: 10.6 FL (ref 6–12)
POTASSIUM SERPL-SCNC: 4.3 MMOL/L (ref 3.5–5.2)
RBC # BLD AUTO: 4.62 10*6/MM3 (ref 3.77–5.28)
WBC NRBC COR # BLD: 9.44 10*3/MM3 (ref 3.4–10.8)

## 2023-07-31 PROCEDURE — 85027 COMPLETE CBC AUTOMATED: CPT

## 2023-07-31 PROCEDURE — 36415 COLL VENOUS BLD VENIPUNCTURE: CPT

## 2023-07-31 PROCEDURE — 84132 ASSAY OF SERUM POTASSIUM: CPT

## 2023-08-01 ENCOUNTER — DOCUMENTATION (OUTPATIENT)
Dept: BARIATRICS/WEIGHT MGMT | Facility: CLINIC | Age: 38
End: 2023-08-01
Payer: COMMERCIAL

## 2023-08-01 ENCOUNTER — OFFICE VISIT (OUTPATIENT)
Dept: BEHAVIORAL HEALTH | Facility: CLINIC | Age: 38
End: 2023-08-01
Payer: COMMERCIAL

## 2023-08-01 ENCOUNTER — OFFICE VISIT (OUTPATIENT)
Dept: BARIATRICS/WEIGHT MGMT | Facility: CLINIC | Age: 38
End: 2023-08-01
Payer: COMMERCIAL

## 2023-08-01 VITALS
HEIGHT: 68 IN | DIASTOLIC BLOOD PRESSURE: 88 MMHG | OXYGEN SATURATION: 98 % | RESPIRATION RATE: 18 BRPM | SYSTOLIC BLOOD PRESSURE: 136 MMHG | HEART RATE: 76 BPM | BODY MASS INDEX: 44.41 KG/M2 | TEMPERATURE: 97.5 F | WEIGHT: 293 LBS

## 2023-08-01 DIAGNOSIS — M54.9 BACK PAIN, UNSPECIFIED BACK LOCATION, UNSPECIFIED BACK PAIN LATERALITY, UNSPECIFIED CHRONICITY: ICD-10-CM

## 2023-08-01 DIAGNOSIS — R53.83 FATIGUE, UNSPECIFIED TYPE: ICD-10-CM

## 2023-08-01 DIAGNOSIS — E66.01 OBESITY, MORBID, BMI 50 OR HIGHER: ICD-10-CM

## 2023-08-01 DIAGNOSIS — Z79.4 TYPE 2 DIABETES MELLITUS WITH HYPERGLYCEMIA, WITH LONG-TERM CURRENT USE OF INSULIN: ICD-10-CM

## 2023-08-01 DIAGNOSIS — R12 HEARTBURN: ICD-10-CM

## 2023-08-01 DIAGNOSIS — Z71.89 ENCOUNTER FOR PSYCHOLOGICAL ASSESSMENT PRIOR TO BARIATRIC SURGERY: ICD-10-CM

## 2023-08-01 DIAGNOSIS — I10 ESSENTIAL HYPERTENSION: ICD-10-CM

## 2023-08-01 DIAGNOSIS — R51.9 GENERALIZED HEADACHE: ICD-10-CM

## 2023-08-01 DIAGNOSIS — G47.33 OBSTRUCTIVE SLEEP APNEA SYNDROME: ICD-10-CM

## 2023-08-01 DIAGNOSIS — D50.9 IRON DEFICIENCY ANEMIA, UNSPECIFIED IRON DEFICIENCY ANEMIA TYPE: ICD-10-CM

## 2023-08-01 DIAGNOSIS — E11.65 TYPE 2 DIABETES MELLITUS WITH HYPERGLYCEMIA, WITH LONG-TERM CURRENT USE OF INSULIN: ICD-10-CM

## 2023-08-01 DIAGNOSIS — E66.01 MORBID OBESITY WITH BMI OF 60.0-69.9, ADULT: Primary | ICD-10-CM

## 2023-08-01 DIAGNOSIS — F41.9 MILD ANXIETY: ICD-10-CM

## 2023-08-01 PROCEDURE — 90791 PSYCH DIAGNOSTIC EVALUATION: CPT | Performed by: PSYCHOLOGIST

## 2023-08-01 NOTE — H&P (VIEW-ONLY)
[facility]  [address]  [address]  [facility] [address] [phone]      Patient  Name:  Edd Leung  :  1985        Chief Complaint:  weight gain; unable to maintain weight loss    History of Present Illness:  Edd Leung is a 38 y.o. female who presents today for evaluation, education and consultation regarding bariatric and metabolic surgery. The patient is interested in sleeve gastrectomy with Dr. Jacob.     Edd has been overweight for at least 15 years, has been 35 pounds or more overweight for at least 15 years, has been 100 pounds or more overweight for 15 or more years and started dieting at age 27.      Previous diet attempts include: High Protein, Herbal Life, Low Carbohydrate, Low Fat, Calorie Counting, Fasting and Slim Fast; None; Wellbutrin.  T  Her maximum lifetime weight is 447 pounds.    As above, patient has been overweight for many years, with numerous failed dietary/weight loss attempts.  She now has obesity related comorbidities and as such has decided to pursue weight loss surgery.    Patient is pursuing a sleeve gastrectomy to improve her overall health.  She said she started process through [facility] several years ago but ultimately did not pursue surgery.  States she is now ready to proceed.    History of hypertension, recently dx with ANDREWS now on CPAP,  diabetes diagnosed in  on insulin since her last pregnancy 2022, last A1C 6.5.  History of anxiety and depression.  Iron deficiency anemia on iron supplements.  History of MRSA skin abscess in .  Chronic back pain not treated with medication.  Generalized headaches occasionally.  Says she has had goiter evaluated with ultrasound and has had normal thyroid function in the past.    GI: Heartburn treated with Tums as needed, thinks she had an EGD with [facility] in preop evaluation several years ago with unknown findings. Improved with eating  habit changes.   Denies known H. pylori in the past.  Gallbladder is present.  Denies other GI symptoms.    Chart review shows hospitalization in February 2022 for acute hypoxic respiratory failure suspected to be obesity hypoventilation syndrome plus or minus volume overload with possible aspiration pneumonitis with recent emesis.  Recommended additional pulmonary evaluation outpatient.  Pulmonary function test at that time showed restrictive lung disease by lung volumes and diffusing capacity substantially reduced.    Cardiac clearance 4/19/23    Pulm clearance 4/24/23    GES with accelerated emptying    Intake labs 9/2022 microcytic anemia with Hg 11, A1C 8.5, , normal TSH and negative h pylori    EGD with [facility] coming up next week    Strong FH of anemia but no known other causes outside of iron def.     All other past medical, surgical, social and family history have been obtained and discussed as pertinent to bariatric surgery as below.     Pre-Procedure COVID-19 Questionnaire:     Have you previously been tested for COVID-19?    [x]  No  []  Yes    2.  Were you ever positive for COVID-19?    [x]  No  []  Yes    3.  Are you employed in a healthcare setting?    [x]  No  []  Yes    4.  Are you symptomatic for COVID-19 as defined by the CDC (fever, cough)?  If so, when did symptoms begin?    [x]  No    []  Yes, symptoms began **    5.  Have you been hospitalized for COVID-19?  If so, were you in the ICU?  [x]  No    []  Yes, but not in the ICU    []  Yes, and I was in the ICU    6.  Are you a resident in a congregate (group care setting?)    [x]  No  []  Yes    7.  Are you pregnant?  [x]  No  []  Yes    8.  Are you vaccinated?    []  No  []  Yes, but only partially   [x]  Yes, fully         Past Medical History:   Diagnosis Date    Anemia     Anxiety     Anxiety and depression     Back pain     not treated with meds    Diabetes mellitus 2008    on insulin with pregnancy 1/2022, last A1C 6.5    Generalized  "headache     GERD (gastroesophageal reflux disease)     Goiter     Gonorrhea     Heartburn     tums prn, thinks she had EGD with [facility]    History of abnormal cervical Pap smear     History of COVID-19      no hospital no steroids    Hydradenitis     improved iw diet change    Hypertension     Iron deficiency anemia     MRSA infection     skin abscess ;  facuial lesion    Post-op pneumonia     2021      Sleep apnea     uses c-pap    Spinal headache     Suspected sleep apnea     Urinary incontinence      Past Surgical History:   Procedure Laterality Date     SECTION  2006     SECTION N/A 2021    Procedure:  SECTION REPEAT;  Surgeon: Rita Busby MD;  Location: [facility] LABOR DELIVERY;  Service: Obstetrics/Gynecology;  Laterality: N/A;    DILATATION AND CURETTAGE      ENDOSCOPY         Allergies   Allergen Reactions    Banana Anaphylaxis     Other reaction(s): Banana, Cucumber, Kiwis, Melon    Cucumber Extract Anaphylaxis    Kiwi Extract Anaphylaxis    Latex Rash and Hives     \"makes my skin raw\"   Other reaction(s): Other, Unknown  1Replaced free text allergy  \"makes my skin raw\"     Melon Anaphylaxis    Nuts Anaphylaxis    Duloxetine Hcl Unknown - Low Severity     Mental agitation    Naproxen Nausea Only     No problems with ibuprofen and aspirin  No problems with ibuprofen and aspirin       Current Outpatient Medications:     ferrous gluconate (FERGON) 324 MG tablet, Take 1 tablet by mouth Daily With Breakfast., Disp: , Rfl:     hydroCHLOROthiazide (HYDRODIURIL) 25 MG tablet, Take 1 tablet by mouth Daily., Disp: , Rfl:     insulin glargine (Lantus) 100 UNIT/ML injection, Inject 40 Units under the skin into the appropriate area as directed 2 (Two) Times a Day for 90 days. (Patient taking differently: Inject 50 Units under the skin into the appropriate area as directed Daily.), Disp: 72 mL, Rfl: 1    losartan (COZAAR) 100 MG tablet, Take 1 tablet " by mouth Daily., Disp: , Rfl:     norethindrone-ethinyl estradiol (FEMHRT ) 1-5 MG-MCG tablet, Take  by mouth Daily. Unsure of brand name, Disp: , Rfl:     Ozempic, 2 MG/DOSE, 8 MG/3ML solution pen-injector, , Disp: , Rfl:     vitamin D (ERGOCALCIFEROL) 1.25 MG (41545 UT) capsule capsule, Take 1 capsule by mouth Every 7 (Seven) Days. , Disp: , Rfl:     Social History     Socioeconomic History    Marital status:     Number of children: 1    Years of education: 14    Highest education level: Associate degree: occupational, technical, or vocational program   Tobacco Use    Smoking status: Former     Packs/day: 0.25     Years: 17.00     Pack years: 4.25     Types: Cigarettes     Quit date: 2019     Years since quittin.3     Passive exposure: Never    Smokeless tobacco: Never    Tobacco comments:     wasn't a daily smoker-  depended on situation   Vaping Use    Vaping Use: Never used   Substance and Sexual Activity    Alcohol use: Yes     Comment: SOCIALLY     Drug use: Yes     Frequency: 4.0 times per week     Types: Marijuana     Comment: occasionally    Sexual activity: Defer     Family History   Problem Relation Age of Onset    Hypertension Mother     Lung cancer Father     Hypertension Sister     Breast cancer Maternal Grandmother     Hypertension Maternal Grandmother     Diabetes Maternal Grandmother     Diabetes Maternal Grandfather     Diabetes Paternal Grandmother     Hypertension Paternal Grandmother     Lung cancer Paternal Grandmother     Hypertension Paternal Grandfather     Diabetes Paternal Grandfather        Review of Systems:  Constitutional:  Reports fatigue, weight gain and denies fevers, chills.  HEENT:  denies headache, ear pain or loss of hearing, blurred or double vision, nasal discharge or sore throat.  Cardiovascular:  Reports HTN, HLD, palpitations, edema and denies CAD, Atrial Fib, hx heart disease, heart murmur, hx MI, chest pain, palpitations, hx DVT.  Respiratory:   Reports ANDREWS  and denies dyspnea on exertion, shortness of breath , cough , wheezing, asthma, COPD, hx PE.  Gastrointestinal:  Reports heartburn and denies dysphagia, nausea, vomiting, abdominal pain, IBS, diarrhea, constipation, melena, blood in stool, gallbladder issues, liver disease, hx pancreatitis.  Genitourinary:  Reports none and denies history of  frequent UTI, incontinence, hematuria, dysuria, polyuria, polydipsia, renal insufficiency, renal failure.    Musculoskeletal:  Reports back pain and denies joint pain, fibromyalgia, arthritis and autoimmune disease.  Neurological:  Reports headaches and denies numbness /tingling, dizziness, confusion, seizure, stroke.  Psychiatric:  Reports hx depression, hx anxiety and denies bipolar disorder, suicidal ideation, hx suicide attempt, hx self injury, hx substance abuse, eating disorder.  Endocrine:  Reports diabetes and denies PCOS, endometriosis, thyroid disease, gout.  Hematologic:  Reports iron def anemia and denies bruising, bleeding disorder, hx blood transfusion.  Skin:  Reports MRSA and denies rashes.      Physical Exam:  Vital Signs:  Weight: (!) 193 kg (425 lb 8 oz)   Body mass index is 64.7 kg/mý.  Temp: 97.5 øF (36.4 øC)   Heart Rate: 76   BP: 136/88     Physical Exam  Vitals reviewed.   Constitutional:       Appearance: She is well-developed. She is obese.   HENT:      Head: Normocephalic.   Neck:      Thyroid: No thyromegaly.      Comments: Significant thyromegaly R>L  Cardiovascular:      Rate and Rhythm: Normal rate and regular rhythm.      Heart sounds: Normal heart sounds.   Pulmonary:      Effort: Pulmonary effort is normal. No respiratory distress.      Breath sounds: Normal breath sounds. No wheezing.   Abdominal:      General: Bowel sounds are normal. There is no distension.      Palpations: Abdomen is soft.      Tenderness: There is no abdominal tenderness.      Comments: Low transverse scar   Musculoskeletal:         General: Normal range of  motion.      Cervical back: Normal range of motion and neck supple.   Skin:     General: Skin is warm and dry.   Neurological:      Mental Status: She is alert and oriented to person, place, and time.   Psychiatric:         Mood and Affect: Mood normal.         Behavior: Behavior normal.         Thought Content: Thought content normal.         Judgment: Judgment normal.       Patient Active Problem List   Diagnosis    Preeclampsia in postpartum period    Type 2 diabetes mellitus with hyperglycemia, with long-term current use of insulin    Shortness of breath    Obesity, morbid, BMI 50 or higher    Essential hypertension    Anemia    Hypertension    Anxiety and depression    Back pain    Generalized headache    Heartburn    Iron deficiency anemia    MRSA infection    Obstructive sleep apnea syndrome    Obesity hypoventilation syndrome    Preoperative cardiovascular examination       Assessment:    Edd Leung is a 38 y.o. year old female with medically complicated obesity pursuing sleeve gastrectomy.    Weight loss surgery is deemed medically necessary given the following obesity related comorbidities including sleep disturbances with possible sleep apnea, hypertension, back pain, GERD and depression with current Weight: (!) 193 kg (425 lb 8 oz) and Body mass index is 64.7 kg/mý..    Plan:  The consultation plan and program requirements were reviewed with the patient.  The patient has been advised that a letter of medical support must be obtained from her primary care physician or referring provider. A psychological evaluation will be arranged.  A nutritional evaluation will be performed.  The patient was advised to start a high protein and low carbohydrate diet.  Necessary lifestyle modifications were discussed.  Instructions on how to access AMAN was given to the patient.  AMAN is an internet based educational video that explains the surgical procedure chosen and answers basic questions regarding that  procedure.     Class 3 Severe Obesity (BMI >=40). Obesity-related health conditions include the following: hypertension, diabetes mellitus and GERD. Obesity is worsening. BMI is is above average; BMI management plan is completed. We discussed consulting a Bariatric surgeon.        Preoperative testing will include: CBC, CMP, Lipids, TSH, HgA1C, H.Pylori UBT, EKG, CXR,  Gastric Emptying Study and EGD     The risks and benefits of the upper endoscopy were discussed with the patient in detail and all questions were answered.  Possibility of perforation, bleeding, aspiration, and anesthesia reaction were reviewed.  Patient agrees to proceed.      Additional preop clearances required prior to surgery: Cardiology and Pulmonary.      The patient has been educated on expected postoperative lifestyle changes, including commitment to high protein diet, vitamin regimen, and exercise program.  They are aware that support groups are encouraged for optimal weight loss results. Patient understands that bariatric surgery is not cosmetic surgery but rather a tool to help make a lifelong commitment to lifestyle changes including diet, exercise, behavior modifications, and healthy habits. The procedure was discussed with the patient and all questions were answered. The importance of avoiding ASA/ NSAIDS/ steroids/ tobacco/ hormones/ immunomodulators perioperatively was discussed.         Crystal Sigala PA-C      I spent 40 minutes caring for Edd on this date of service. This time includes time spent by me in the following activities: preparing for the visit, reviewing tests, counseling and educating the patient/family/caregiver, ordering medications, tests, or procedures, and documenting information in the medical record.

## 2023-08-02 LAB
25(OH)D3+25(OH)D2 SERPL-MCNC: 34 NG/ML (ref 30–100)
ALBUMIN SERPL-MCNC: 3.9 G/DL (ref 3.5–5.2)
ALBUMIN/GLOB SERPL: 1.1 G/DL
ALP SERPL-CCNC: 91 U/L (ref 39–117)
ALT SERPL-CCNC: 11 U/L (ref 1–33)
AST SERPL-CCNC: 9 U/L (ref 1–32)
BILIRUB SERPL-MCNC: 0.2 MG/DL (ref 0–1.2)
BUN SERPL-MCNC: 14 MG/DL (ref 6–20)
BUN/CREAT SERPL: 17.1 (ref 7–25)
CALCIUM SERPL-MCNC: 10 MG/DL (ref 8.6–10.5)
CHLORIDE SERPL-SCNC: 98 MMOL/L (ref 98–107)
CHOLEST SERPL-MCNC: 173 MG/DL (ref 0–200)
CO2 SERPL-SCNC: 26.4 MMOL/L (ref 22–29)
CREAT SERPL-MCNC: 0.82 MG/DL (ref 0.57–1)
EGFRCR SERPLBLD CKD-EPI 2021: 95.2 ML/MIN/1.73
FERRITIN SERPL-MCNC: 29.5 NG/ML (ref 13–150)
FOLATE SERPL-MCNC: 5.4 NG/ML (ref 4.78–24.2)
GLOBULIN SER CALC-MCNC: 3.4 GM/DL
GLUCOSE SERPL-MCNC: 205 MG/DL (ref 65–99)
HBA1C MFR BLD: 7.7 % (ref 4.8–5.6)
HDLC SERPL-MCNC: 46 MG/DL (ref 40–60)
IRON SERPL-MCNC: 65 MCG/DL (ref 37–145)
LDLC SERPL CALC-MCNC: 96 MG/DL (ref 0–100)
POTASSIUM SERPL-SCNC: 4.8 MMOL/L (ref 3.5–5.2)
PROT SERPL-MCNC: 7.3 G/DL (ref 6–8.5)
SODIUM SERPL-SCNC: 137 MMOL/L (ref 136–145)
TRIGL SERPL-MCNC: 181 MG/DL (ref 0–150)
TSH SERPL DL<=0.005 MIU/L-ACNC: 0.54 UIU/ML (ref 0.27–4.2)
UREA BREATH TEST QL: NEGATIVE
VIT B12 SERPL-MCNC: 506 PG/ML (ref 211–946)
VLDLC SERPL CALC-MCNC: 31 MG/DL (ref 5–40)

## 2023-08-07 ENCOUNTER — ANESTHESIA EVENT (OUTPATIENT)
Dept: GASTROENTEROLOGY | Facility: HOSPITAL | Age: 38
End: 2023-08-07
Payer: COMMERCIAL

## 2023-08-07 ENCOUNTER — ANESTHESIA (OUTPATIENT)
Dept: GASTROENTEROLOGY | Facility: HOSPITAL | Age: 38
End: 2023-08-07
Payer: COMMERCIAL

## 2023-08-07 ENCOUNTER — HOSPITAL ENCOUNTER (OUTPATIENT)
Facility: HOSPITAL | Age: 38
Setting detail: HOSPITAL OUTPATIENT SURGERY
Discharge: HOME | End: 2023-08-07
Attending: SURGERY | Admitting: SURGERY
Payer: COMMERCIAL

## 2023-08-07 VITALS
RESPIRATION RATE: 16 BRPM | OXYGEN SATURATION: 98 % | SYSTOLIC BLOOD PRESSURE: 158 MMHG | HEART RATE: 75 BPM | TEMPERATURE: 97.4 F | DIASTOLIC BLOOD PRESSURE: 89 MMHG

## 2023-08-07 DIAGNOSIS — R12 HEARTBURN: ICD-10-CM

## 2023-08-07 LAB
B-HCG UR QL: NEGATIVE
EXPIRATION DATE: NORMAL
GLUCOSE BLDC GLUCOMTR-MCNC: 157 MG/DL (ref 70–130)
INTERNAL NEGATIVE CONTROL: NORMAL
INTERNAL POSITIVE CONTROL: NORMAL
Lab: NORMAL

## 2023-08-07 PROCEDURE — 82948 REAGENT STRIP/BLOOD GLUCOSE: CPT

## 2023-08-07 PROCEDURE — 81025 URINE PREGNANCY TEST: CPT | Performed by: ANESTHESIOLOGY

## 2023-08-07 PROCEDURE — 25010000002 PROPOFOL 10 MG/ML EMULSION

## 2023-08-07 PROCEDURE — 25010000002 FENTANYL CITRATE (PF) 100 MCG/2ML SOLUTION

## 2023-08-07 PROCEDURE — 88305 TISSUE EXAM BY PATHOLOGIST: CPT | Performed by: SURGERY

## 2023-08-07 RX ORDER — SODIUM CHLORIDE 9 MG/ML
150 INJECTION, SOLUTION INTRAVENOUS CONTINUOUS
Status: DISCONTINUED | OUTPATIENT
Start: 2023-08-07 | End: 2023-08-07 | Stop reason: HOSPADM

## 2023-08-07 RX ORDER — FENTANYL CITRATE 50 UG/ML
INJECTION, SOLUTION INTRAMUSCULAR; INTRAVENOUS AS NEEDED
Status: DISCONTINUED | OUTPATIENT
Start: 2023-08-07 | End: 2023-08-07 | Stop reason: SURG

## 2023-08-07 RX ORDER — PROPOFOL 10 MG/ML
VIAL (ML) INTRAVENOUS AS NEEDED
Status: DISCONTINUED | OUTPATIENT
Start: 2023-08-07 | End: 2023-08-07 | Stop reason: SURG

## 2023-08-07 RX ORDER — ONDANSETRON 2 MG/ML
4 INJECTION INTRAMUSCULAR; INTRAVENOUS ONCE AS NEEDED
Status: DISCONTINUED | OUTPATIENT
Start: 2023-08-07 | End: 2023-08-07 | Stop reason: HOSPADM

## 2023-08-07 RX ORDER — SODIUM CHLORIDE 0.9 % (FLUSH) 0.9 %
3-10 SYRINGE (ML) INJECTION AS NEEDED
Status: DISCONTINUED | OUTPATIENT
Start: 2023-08-07 | End: 2023-08-07 | Stop reason: HOSPADM

## 2023-08-07 RX ORDER — SODIUM CHLORIDE 9 MG/ML
40 INJECTION, SOLUTION INTRAVENOUS AS NEEDED
Status: DISCONTINUED | OUTPATIENT
Start: 2023-08-07 | End: 2023-08-07 | Stop reason: HOSPADM

## 2023-08-07 RX ORDER — SODIUM CHLORIDE 0.9 % (FLUSH) 0.9 %
3 SYRINGE (ML) INJECTION EVERY 12 HOURS SCHEDULED
Status: DISCONTINUED | OUTPATIENT
Start: 2023-08-07 | End: 2023-08-07 | Stop reason: HOSPADM

## 2023-08-07 RX ORDER — LIDOCAINE HYDROCHLORIDE 10 MG/ML
INJECTION, SOLUTION EPIDURAL; INFILTRATION; INTRACAUDAL; PERINEURAL AS NEEDED
Status: DISCONTINUED | OUTPATIENT
Start: 2023-08-07 | End: 2023-08-07 | Stop reason: SURG

## 2023-08-07 RX ADMIN — LIDOCAINE HYDROCHLORIDE 100 MG: 10 INJECTION, SOLUTION EPIDURAL; INFILTRATION; INTRACAUDAL; PERINEURAL at 10:26

## 2023-08-07 RX ADMIN — SODIUM CHLORIDE 500 ML: 9 INJECTION, SOLUTION INTRAVENOUS at 10:13

## 2023-08-07 RX ADMIN — TOPICAL ANESTHETIC 2 SPRAY: 200 SPRAY DENTAL; PERIODONTAL at 10:14

## 2023-08-07 RX ADMIN — FENTANYL CITRATE 100 MCG: 50 INJECTION, SOLUTION INTRAMUSCULAR; INTRAVENOUS at 10:26

## 2023-08-07 RX ADMIN — PROPOFOL 100 MG: 10 INJECTION, EMULSION INTRAVENOUS at 10:26

## 2023-08-07 RX ADMIN — PROPOFOL 100 MCG/KG/MIN: 10 INJECTION, EMULSION INTRAVENOUS at 10:25

## 2023-08-07 RX ADMIN — Medication 10 ML: at 10:13

## 2023-08-07 NOTE — OP NOTE
PROCEDURE NOTE.    Date: 08/07/23    Patient: Edd Leung     Surgeon: Alice Atwood MD      Preoperative Diagnosis: GERD     Postoperative Diagnosis: hiatal hernia     Procedure Performed: Esophagogastroduodenoscopy with biopsy (CPT 48628)    Estimated Blood Loss: minimal    Complications: none    Findings: GEJ at 44 cm.  Sliding hiatal hernia seen on retroflexed view.     Specimens: Distal esophageal (43 cm) and antral biopsies     Indications: Edd Leung is a 38 y.o. year old supermorbidly obese female who is preparing for sleeve gastrectomy.  The patient has reflux for which she takes PRN Tums.  She presents today for diagnostic endoscopy.       Procedure:      After informed consent was taken, the patient was brought to the operating room and placed in the supine position. MAC was given by anesthesia staff. A bite block was placed and time out performed. A flexible endoscope was passed transorally down to the second portion of the duodenum without difficulty. The scope was slowly withdrawn and the anatomy examined with photodocumentation throughout. The duodenum was normal. The pylorus was without spasm. The antrum of the stomach was without significant gastritis. A biopsy was taken to rule out H. Pylori. The scope was retroflexed and the body, fundus, and cardia were examined. There was a sliding hiatal hernia seen from this angle. The scope was withdrawn back into the esophagus after decompressing the stomach. The GE junction was at 44 cm and the distal esophagus showed no evidence of reflux esophagitis. Biopsy was taken. The scope was further withdrawn. There was no other esophageal abnormality. The vocal cords were normal. The scope was withdrawn completely and the procedure concluded. The patient was awakened and taken to recovery in good condition.      Recommendations: We will discuss biopsy results at next office appointment.

## 2023-08-07 NOTE — ANESTHESIA POSTPROCEDURE EVALUATION
Patient: Edd Aguayo Madhu    Procedure Summary       Date: 08/07/23 Room / Location:  [facility] /  [facility]    Anesthesia Start: 1026 Anesthesia Stop: 1041    Procedure: ESOPHAGOGASTRODUODENOSCOPY WITH BIOPSY Diagnosis:       Heartburn      (Heartburn [R12])    Surgeons: Alice Atwood MD Provider: Lalo Rivera MD    Anesthesia Type: general, MAC ASA Status: 3            Anesthesia Type: general, MAC    Vitals  Vitals Value Taken Time   /99 08/07/23 1037   Temp 97.4 øF (36.3 øC) 08/07/23 1039   Pulse 84 08/07/23 1040   Resp 20    SpO2 96 % 08/07/23 1040   Vitals shown include unvalidated device data.        Post Anesthesia Care and Evaluation    Patient location during evaluation: PACU  Patient participation: complete - patient participated  Level of consciousness: awake and alert  Pain management: adequate    Airway patency: patent  Anesthetic complications: No anesthetic complications  PONV Status: none  Cardiovascular status: hemodynamically stable and acceptable  Respiratory status: nonlabored ventilation, acceptable and nasal cannula  Hydration status: acceptable

## 2023-08-08 LAB
CYTO UR: NORMAL
LAB AP CASE REPORT: NORMAL
LAB AP CLINICAL INFORMATION: NORMAL
PATH REPORT.FINAL DX SPEC: NORMAL
PATH REPORT.GROSS SPEC: NORMAL

## 2023-08-17 DIAGNOSIS — R06.00 DYSPNEA, UNSPECIFIED TYPE: ICD-10-CM

## 2023-08-17 DIAGNOSIS — R53.83 FATIGUE, UNSPECIFIED TYPE: Primary | ICD-10-CM

## 2023-09-07 ENCOUNTER — LAB (OUTPATIENT)
Dept: LAB | Facility: HOSPITAL | Age: 38
End: 2023-09-07
Payer: COMMERCIAL

## 2023-09-07 DIAGNOSIS — R06.00 DYSPNEA, UNSPECIFIED TYPE: ICD-10-CM

## 2023-09-07 DIAGNOSIS — R53.83 FATIGUE, UNSPECIFIED TYPE: ICD-10-CM

## 2023-09-07 LAB
ALBUMIN SERPL-MCNC: 3.8 G/DL (ref 3.5–5.2)
ALBUMIN/GLOB SERPL: 1.1 G/DL
ALP SERPL-CCNC: 94 U/L (ref 39–117)
ALT SERPL W P-5'-P-CCNC: 12 U/L (ref 1–33)
ANION GAP SERPL CALCULATED.3IONS-SCNC: 9 MMOL/L (ref 5–15)
AST SERPL-CCNC: 10 U/L (ref 1–32)
BASOPHILS # BLD AUTO: 0.02 10*3/MM3 (ref 0–0.2)
BASOPHILS NFR BLD AUTO: 0.2 % (ref 0–1.5)
BILIRUB SERPL-MCNC: 0.4 MG/DL (ref 0–1.2)
BUN SERPL-MCNC: 11 MG/DL (ref 6–20)
BUN/CREAT SERPL: 12.9 (ref 7–25)
CALCIUM SPEC-SCNC: 8.8 MG/DL (ref 8.6–10.5)
CHLORIDE SERPL-SCNC: 101 MMOL/L (ref 98–107)
CO2 SERPL-SCNC: 29 MMOL/L (ref 22–29)
CREAT SERPL-MCNC: 0.85 MG/DL (ref 0.57–1)
DEPRECATED RDW RBC AUTO: 47.2 FL (ref 37–54)
EGFRCR SERPLBLD CKD-EPI 2021: 91.2 ML/MIN/1.73
EOSINOPHIL # BLD AUTO: 0.14 10*3/MM3 (ref 0–0.4)
EOSINOPHIL NFR BLD AUTO: 1.4 % (ref 0.3–6.2)
ERYTHROCYTE [DISTWIDTH] IN BLOOD BY AUTOMATED COUNT: 15.3 % (ref 12.3–15.4)
GLOBULIN UR ELPH-MCNC: 3.6 GM/DL
GLUCOSE SERPL-MCNC: 136 MG/DL (ref 65–99)
HCT VFR BLD AUTO: 40.1 % (ref 34–46.6)
HGB BLD-MCNC: 12 G/DL (ref 12–15.9)
IMM GRANULOCYTES # BLD AUTO: 0.04 10*3/MM3 (ref 0–0.05)
IMM GRANULOCYTES NFR BLD AUTO: 0.4 % (ref 0–0.5)
LYMPHOCYTES # BLD AUTO: 3.65 10*3/MM3 (ref 0.7–3.1)
LYMPHOCYTES NFR BLD AUTO: 37.6 % (ref 19.6–45.3)
MCH RBC QN AUTO: 25.3 PG (ref 26.6–33)
MCHC RBC AUTO-ENTMCNC: 29.9 G/DL (ref 31.5–35.7)
MCV RBC AUTO: 84.4 FL (ref 79–97)
MONOCYTES # BLD AUTO: 0.57 10*3/MM3 (ref 0.1–0.9)
MONOCYTES NFR BLD AUTO: 5.9 % (ref 5–12)
NEUTROPHILS NFR BLD AUTO: 5.3 10*3/MM3 (ref 1.7–7)
NEUTROPHILS NFR BLD AUTO: 54.5 % (ref 42.7–76)
NRBC BLD AUTO-RTO: 0 /100 WBC (ref 0–0.2)
PLATELET # BLD AUTO: 328 10*3/MM3 (ref 140–450)
PMV BLD AUTO: 9.9 FL (ref 6–12)
POTASSIUM SERPL-SCNC: 4 MMOL/L (ref 3.5–5.2)
PROT SERPL-MCNC: 7.4 G/DL (ref 6–8.5)
RBC # BLD AUTO: 4.75 10*6/MM3 (ref 3.77–5.28)
SODIUM SERPL-SCNC: 139 MMOL/L (ref 136–145)
WBC NRBC COR # BLD: 9.72 10*3/MM3 (ref 3.4–10.8)

## 2023-09-07 PROCEDURE — 85025 COMPLETE CBC W/AUTO DIFF WBC: CPT

## 2023-09-07 PROCEDURE — 36415 COLL VENOUS BLD VENIPUNCTURE: CPT

## 2023-09-07 PROCEDURE — 80053 COMPREHEN METABOLIC PANEL: CPT

## 2023-09-14 ENCOUNTER — CONSULT (OUTPATIENT)
Dept: BARIATRICS/WEIGHT MGMT | Facility: CLINIC | Age: 38
End: 2023-09-14
Payer: COMMERCIAL

## 2023-09-14 VITALS
DIASTOLIC BLOOD PRESSURE: 68 MMHG | HEIGHT: 68 IN | TEMPERATURE: 97.8 F | SYSTOLIC BLOOD PRESSURE: 118 MMHG | WEIGHT: 293 LBS | OXYGEN SATURATION: 98 % | BODY MASS INDEX: 44.41 KG/M2 | HEART RATE: 66 BPM

## 2023-09-14 DIAGNOSIS — I10 ESSENTIAL HYPERTENSION: ICD-10-CM

## 2023-09-14 DIAGNOSIS — R53.83 FATIGUE, UNSPECIFIED TYPE: ICD-10-CM

## 2023-09-14 DIAGNOSIS — G47.33 OBSTRUCTIVE SLEEP APNEA SYNDROME: ICD-10-CM

## 2023-09-14 DIAGNOSIS — E11.65 TYPE 2 DIABETES MELLITUS WITH HYPERGLYCEMIA, WITH LONG-TERM CURRENT USE OF INSULIN: ICD-10-CM

## 2023-09-14 DIAGNOSIS — K44.9 HIATAL HERNIA: ICD-10-CM

## 2023-09-14 DIAGNOSIS — Z79.4 TYPE 2 DIABETES MELLITUS WITH HYPERGLYCEMIA, WITH LONG-TERM CURRENT USE OF INSULIN: ICD-10-CM

## 2023-09-14 DIAGNOSIS — K21.9 GASTROESOPHAGEAL REFLUX DISEASE, UNSPECIFIED WHETHER ESOPHAGITIS PRESENT: ICD-10-CM

## 2023-09-14 DIAGNOSIS — M54.9 BACK PAIN, UNSPECIFIED BACK LOCATION, UNSPECIFIED BACK PAIN LATERALITY, UNSPECIFIED CHRONICITY: ICD-10-CM

## 2023-09-14 DIAGNOSIS — A49.02 MRSA INFECTION: ICD-10-CM

## 2023-09-14 PROBLEM — E66.01 MORBID OBESITY: Status: ACTIVE | Noted: 2023-09-14

## 2023-09-14 RX ORDER — PANTOPRAZOLE SODIUM 40 MG/10ML
40 INJECTION, POWDER, LYOPHILIZED, FOR SOLUTION INTRAVENOUS ONCE
OUTPATIENT
Start: 2023-09-14 | End: 2023-09-14

## 2023-09-14 RX ORDER — ENOXAPARIN SODIUM 100 MG/ML
40 INJECTION SUBCUTANEOUS ONCE
OUTPATIENT
Start: 2023-09-14 | End: 2023-09-14

## 2023-09-14 RX ORDER — SCOLOPAMINE TRANSDERMAL SYSTEM 1 MG/1
1 PATCH, EXTENDED RELEASE TRANSDERMAL ONCE
OUTPATIENT
Start: 2023-09-14 | End: 2023-09-14

## 2023-09-14 RX ORDER — CHLORHEXIDINE GLUCONATE 0.12 MG/ML
15 RINSE ORAL
OUTPATIENT
Start: 2023-09-14 | End: 2023-09-14

## 2023-09-14 RX ORDER — GABAPENTIN 100 MG/1
600 CAPSULE ORAL ONCE
OUTPATIENT
Start: 2023-09-14 | End: 2023-09-14

## 2023-09-14 RX ORDER — SODIUM CHLORIDE 0.9 % (FLUSH) 0.9 %
10 SYRINGE (ML) INJECTION AS NEEDED
OUTPATIENT
Start: 2023-09-14

## 2023-09-14 RX ORDER — SODIUM CHLORIDE 9 MG/ML
40 INJECTION, SOLUTION INTRAVENOUS AS NEEDED
OUTPATIENT
Start: 2023-09-14

## 2023-09-14 RX ORDER — SODIUM CHLORIDE 0.9 % (FLUSH) 0.9 %
3 SYRINGE (ML) INJECTION EVERY 12 HOURS SCHEDULED
OUTPATIENT
Start: 2023-09-14

## 2023-09-14 RX ORDER — SODIUM CHLORIDE 9 MG/ML
150 INJECTION, SOLUTION INTRAVENOUS CONTINUOUS
OUTPATIENT
Start: 2023-09-14

## 2023-09-14 NOTE — H&P (VIEW-ONLY)
"[facility] BARIATRIC SURGERY  [address]  [address]  [facility] [id number] [phone]      Patient  Name:  Edd Leung  :  1985      Date of Visit: 2023      Chief Complaint:  weight gain; unable to maintain weight loss    History of Present Illness:  Edd Leung is a 38 y.o. female who presents today for evaluation, education and consultation regarding weight loss surgery.     Patient has been overweight for many years, with numerous failed dietary/weight loss attempts.  She now has obesity related comorbidities of DM2, depression, back pain, GERD, HTN, ANDREWS, stress incontinence and as such has decided to pursue weight loss surgery.    From intake:  \"Patient is pursuing a sleeve gastrectomy to improve her overall health.  She said she started process through [facility] several years ago but ultimately did not pursue surgery.  States she is now ready to proceed.    History of hypertension, recently dx with ANDREWS now on CPAP,  diabetes diagnosed in  on insulin since her last pregnancy 2022, last A1C 6.5.  History of anxiety and depression.  Iron deficiency anemia on iron supplements.  History of MRSA skin abscess in .  Chronic back pain not treated with medication.  Generalized headaches occasionally.  Says she has had goiter evaluated with ultrasound and has had normal thyroid function in the past.    GI: Heartburn treated with Tums as needed, thinks she had an EGD with [facility] in preop evaluation several years ago with unknown findings. Improved with eating habit changes.   Denies known H. pylori in the past.  Gallbladder is present.  Denies other GI symptoms.    Chart review shows hospitalization in 2022 for acute hypoxic respiratory failure suspected to be obesity hypoventilation syndrome plus or minus volume overload with possible aspiration pneumonitis with recent emesis.  Recommended additional pulmonary evaluation " "outpatient.  Pulmonary function test at that time showed restrictive lung disease by lung volumes and diffusing capacity substantially reduced.    Cardiac clearance 4/19/23    Pulm clearance 4/24/23    GES with accelerated emptying    Intake labs 9/2022 microcytic anemia with Hg 11, A1C 8.5, , normal TSH and negative h pylori    EGD with PQ coming up next week    Strong FH of anemia but no known other causes outside of iron def.   \"    9/14/2023 Update:    Has lost 30 pounds since LOV.  On Ozempic    MRSA skin abscess (cultured).    Hx of goiter, per pt evaluated with U/S in the past.  Last dose of Ozempic 3 days ago.    Takes Lantus 50 U daily.  Previously did ISS.  Pt admits she is barely compliant with Lantus anyway.      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    No personal or family hx of VTE or clotting d/o.  No liver, lung, heart, or renal disease      Review of data:    JOAQUIN: nothign  CBC: nl  CMP: nl  HP neg    EKG: NSR, reviewed tracing and agree with interpretation       EGD: 8/7/23 PQ, GEJ at 44 cm. Sliding hiatal hernia seen on retroflexed view.   Path-benign    Cardiac clearance: cleared     Pulm clearance: cleared    GES with accelerated emptying      Last tobacco: last cigarette 2019  Last NSAIDs: none recent  Last ASA: n/a  Last steroids: n/a  Last hormones: off OCP x 8/16/23.        Past Medical History:   Diagnosis Date    Anemia     Anxiety     Anxiety and depression     Back pain     not treated with meds    Diabetes mellitus 2008    on insulin with pregnancy 1/2022, last A1C 6.5    DM2 (diabetes mellitus, type 2)     bariatric intake A1C 7.7    Generalized headache     GERD (gastroesophageal reflux disease)     Goiter     Gonorrhea     Heartburn     tums prn, thinks she had EGD with [facility]    History of abnormal cervical Pap smear     History of COVID-19 2022 no hospital no steroids    Hydradenitis     improved iwth diet change    " "Hypertension     Iron deficiency anemia     MRSA infection     skin abscess ;  facuial lesion    Post-op pneumonia     2021      Sleep apnea     uses c-pap    Spinal headache     Suspected sleep apnea     Urinary incontinence      Past Surgical History:   Procedure Laterality Date     SECTION  2006     SECTION N/A 2021    Procedure:  SECTION REPEAT;  Surgeon: Rita Busby MD;  Location: [facility] LABOR DELIVERY;  Service: Obstetrics/Gynecology;  Laterality: N/A;    DILATATION AND CURETTAGE      ENDOSCOPY      ENDOSCOPY N/A 2023    Procedure: ESOPHAGOGASTRODUODENOSCOPY WITH BIOPSY;  Surgeon: Alice Atwood MD;  Location: [facility] ENDOSCOPY;  Service: General;  Laterality: N/A;       Allergies   Allergen Reactions    Banana Anaphylaxis     Other reaction(s): Banana, Cucumber, Kiwis, Melon    Cucumber Extract Anaphylaxis    Kiwi Extract Anaphylaxis    Latex Rash and Hives     \"makes my skin raw\"   Other reaction(s): Other, Unknown  1Replaced free text allergy  \"makes my skin raw\"     Melon Anaphylaxis    Nuts Anaphylaxis    Morphine Hallucinations     Dilaudid is ok, tolerates Percocet/Lortab    Duloxetine Hcl Unknown - Low Severity     Mental agitation    Naproxen Nausea Only     No problems with ibuprofen and aspirin  No problems with ibuprofen and aspirin       Current Outpatient Medications:     ferrous gluconate (FERGON) 324 MG tablet, Take 1 tablet by mouth Daily With Breakfast., Disp: , Rfl:     hydroCHLOROthiazide (HYDRODIURIL) 25 MG tablet, Take 1 tablet by mouth Daily., Disp: , Rfl:     insulin glargine (Lantus) 100 UNIT/ML injection, Inject 40 Units under the skin into the appropriate area as directed 2 (Two) Times a Day for 90 days. (Patient taking differently: Inject 50 Units under the skin into the appropriate area as directed Daily.), Disp: 72 mL, Rfl: 1    losartan (COZAAR) 100 MG tablet, Take 1 tablet by mouth Daily., Disp: , " Rfl:     Ozempic, 2 MG/DOSE, 8 MG/3ML solution pen-injector, , Disp: , Rfl:     vitamin D (ERGOCALCIFEROL) 1.25 MG (30556 UT) capsule capsule, Take 1 capsule by mouth 2 (Two) Times a Week.  and , Disp: , Rfl:     apixaban (Eliquis) 2.5 MG tablet tablet, Take 1 tablet by mouth 2 (Two) Times a Day., Disp: 42 tablet, Rfl: 0    norethindrone-ethinyl estradiol (FEMHRT 1/) 1-5 MG-MCG tablet, Take  by mouth Daily. Unsure of brand name (Patient not taking: Reported on 2023), Disp: , Rfl:     Social History     Socioeconomic History    Marital status:     Number of children: 1    Years of education: 14    Highest education level: Associate degree: occupational, technical, or vocational program   Tobacco Use    Smoking status: Former     Packs/day: 0.25     Years: 17.00     Pack years: 4.25     Types: Cigarettes     Quit date: 2019     Years since quittin.4     Passive exposure: Never    Smokeless tobacco: Never    Tobacco comments:     wasn't a daily smoker-  depended on situation   Vaping Use    Vaping Use: Never used   Substance and Sexual Activity    Alcohol use: Yes     Comment: SOCIALLY     Drug use: Yes     Frequency: 4.0 times per week     Types: Marijuana     Comment: occasionally    Sexual activity: Defer     Social History     Social History Narrative    Patient lives in [facility], she is  and a mother of 2 that works full time at [employer]        Family History   Problem Relation Age of Onset    Hypertension Mother     Lung cancer Father     Hypertension Sister     Breast cancer Maternal Grandmother     Hypertension Maternal Grandmother     Diabetes Maternal Grandmother     Diabetes Maternal Grandfather     Diabetes Paternal Grandmother     Hypertension Paternal Grandmother     Lung cancer Paternal Grandmother     Hypertension Paternal Grandfather     Diabetes Paternal Grandfather        Review of Systems   Constitutional:  Positive for fatigue and unexpected weight gain.  Negative for chills, diaphoresis, fever and unexpected weight loss.   HENT:  Negative for congestion and facial swelling.    Eyes:  Negative for blurred vision, double vision and discharge.   Respiratory:  Negative for chest tightness, shortness of breath and stridor.    Cardiovascular:  Negative for chest pain, palpitations and leg swelling.   Gastrointestinal:  Negative for blood in stool.   Endocrine: Negative for polydipsia.   Genitourinary:  Negative for hematuria.   Musculoskeletal:  Positive for arthralgias.   Skin:  Negative for color change.   Allergic/Immunologic: Negative for immunocompromised state.   Neurological:  Negative for confusion.   Psychiatric/Behavioral:  Negative for self-injury.      Physical Exam:  Vital Signs:  Weight: (!) 189 kg (417 lb)   Body mass index is 63.4 kg/mý.  Temp: 97.8 øF (36.6 øC)   Heart Rate: 66   BP: 118/68     Physical Exam  Vitals reviewed.   Constitutional:       Appearance: She is well-developed.   HENT:      Head: Normocephalic and atraumatic.      Comments: Lip and earrings     Nose: Nose normal.   Eyes:      Conjunctiva/sclera: Conjunctivae normal.      Pupils: Pupils are equal, round, and reactive to light.   Neck:      Thyroid: No thyromegaly.      Vascular: No carotid bruit.      Trachea: No tracheal deviation.      Comments: Large, firm goiter  Cardiovascular:      Rate and Rhythm: Normal rate and regular rhythm.      Heart sounds: Normal heart sounds.   Pulmonary:      Effort: Pulmonary effort is normal. No respiratory distress.      Breath sounds: Normal breath sounds.   Abdominal:      General: There is no distension.      Palpations: Abdomen is soft.      Tenderness: There is no abdominal tenderness.      Comments: Low transverse C Section scar   Musculoskeletal:         General: No deformity. Normal range of motion.      Cervical back: Normal range of motion and neck supple.      Right lower leg: Edema present.      Left lower leg: Edema present.   Skin:      General: Skin is warm and dry.      Findings: No rash.   Neurological:      Mental Status: She is alert and oriented to person, place, and time.      Cranial Nerves: No cranial nerve deficit.      Coordination: Coordination normal.      Comments: Acanthosis nigricans   Psychiatric:         Behavior: Behavior normal.         Thought Content: Thought content normal.         Judgment: Judgment normal.     Problem List Items Addressed This Visit       Back pain    Overview     not treated with meds         Essential hypertension    MRSA infection    Overview     skin abscess         Obstructive sleep apnea syndrome    Type 2 diabetes mellitus with hyperglycemia, with long-term current use of insulin     Other Visit Diagnoses       Body mass index (BMI) of 60.0-69.9 in adult    -  Primary    Fatigue, unspecified type        Gastroesophageal reflux disease, unspecified whether esophagitis present                Assessment:    Edd Leung is a 38 y.o. year old female with medically complicated obesity.    Weight loss surgery is deemed medically necessary given the following obesity related comorbidities including DM2, depression, back pain, GERD, HTN, ANDREWS, stress incontinence with current Weight: (!) 189 kg (417 lb) and Body mass index is 63.4 kg/mý..    Patient is aware that surgery is a tool, and that weight loss is not guaranteed but only seen in the context of appropriate use, follow up and exercise.    The patient was present for an approximately a 2.5 hour discussion of the purpose of weight loss surgery, how WLS is a tool to assist in achieving weight loss goals, the most common complications and how best to avoid them, and the strategies for short and long term weight loss.  Ample opportunity to discuss questions was available both in group and during the time of individual examination.    I reviewed all available preop labs, Xrays, tests, clearances, etc and signed off on these in the record.  All  "of this in addition to the patient's unique history and exam has been taken into consideration in determining their appropriate candidacy for weight loss surgery.    Complications  of laparoscopic/possible robotic gastric sleeve were discussed. The patient is well aware of the potential complications of surgery that include but not limited to bleeding, infections, deep venous thrombosis, pulmonary embolism, pulmonary complications such as pneumonia, cardiac events, hernias, small bowel obstruction, damage to the spleen or other organs, bowel injury, disfiguring scars, failure to lose weight, need for additional surgery, conversion to an open procedure, and death. Patient is also aware of complications which apply in this particular procedure that can include but are not limited to a \"leak\" at the staple line which in some instances may require conversion to gastric bypass.    The patient is aware if a hiatal hernia is encountered, it likely will be repaired.  R/B/A Rx to hiatal hernia repair were discussed as outlined in our long consent form.  Briefly risks in addition to those for LSG include recurrent hernia, KAROL, dysphagia, esophageal injury, pneumothorax, injury to the vagus nerves, injury to the thoracic duct, aorta or vena cava.    Greater than 3 minutes was spent with the patient discussing avoiding all tobacco products and second hand smoke at least 2 weeks pre-operatively and 6 weeks post-operatively to minimize the risk of sleeve leak.  This included discussing the importance of avoiding even secondhand smoke as the risk of leak is increased.  Examples discussed:  I made it very clear that the patient understands they should avoid even riding in a car where someone has previously smoked in the last 2 weeks, living in a house where someone smokes (even if it's in a separate room/patio/attached garage, etc.) we discussed that they should not have a conversation with a group of people who are smoking even if " it's outside.  They can be around wood burning fires and barbecue.  I told them I do not know if marijuana has a same effects but my overall recommendation is to avoid it for 2 weeks prior in 6 weeks after surgery.  They also are aware that nicotine may also increase the risk of leak and I strongly encouraged him to avoid that as well for 2 weeks prior in 6 weeks after surgery.    Discussed the risks, benefits and alternative therapies at great length as outlined in our extensive consent forms, consent videos, and educational teaching process under the direction of the center's .    A copy of the patient's signed informed consent is on file.    Plan:  Robotic assisted sleeve gastrectomy + HHR at [facility].    Hold Ozempic starting now and do not resume after surgery.    Recommend Lantus 20-25 U daily when start diet.        R/B/A Rx discussed to postop anticoagulation including but not limited to bleeding, drug reaction, venothromboembolic events, etc. and she is agreeable to taking 3 weeks of Eliquis.    MDM high:  Elective procedure with the following risk factors: morbid obesity, Dm2, HTN  4+ chronic medical problems reviewed.      Thank you Erinn Renee PA for allowing me to share in the care of our mutual patient.             Alice Atwood MD

## 2023-09-14 NOTE — LETTER
"2023       No Recipients    Patient: Edd Leung   YOB: 1985   Date of Visit: 2023     Dear NICOLE Soto:       Thank you for referring Edd Leung to me for evaluation. Below are the relevant portions of my assessment and plan of care.    If you have questions, please do not hesitate to call me. I look forward to following Edd along with you.         Sincerely,        Alice Atwood MD        CC:   No Recipients    Alice Atwood MD  [phone]  Sign when Signing Visit  [facility] BARIATRIC SURGERY  [address]  [address]  [facility] [address] [phone]      Patient  Name:  Edd Leung  :  1985      Date of Visit: 2023      Chief Complaint:  weight gain; unable to maintain weight loss    History of Present Illness:  Edd Leung is a 38 y.o. female who presents today for evaluation, education and consultation regarding weight loss surgery.     Patient has been overweight for many years, with numerous failed dietary/weight loss attempts.  She now has obesity related comorbidities of DM2, depression, back pain, GERD, HTN, ANDREWS, stress incontinence and as such has decided to pursue weight loss surgery.    From intake:  \"Patient is pursuing a sleeve gastrectomy to improve her overall health.  She said she started process through [facility] several years ago but ultimately did not pursue surgery.  States she is now ready to proceed.    History of hypertension, recently dx with ANDREWS now on CPAP,  diabetes diagnosed in  on insulin since her last pregnancy 2022, last A1C 6.5.  History of anxiety and depression.  Iron deficiency anemia on iron supplements.  History of MRSA skin abscess in .  Chronic back pain not treated with medication.  Generalized headaches occasionally.  Says she has had goiter evaluated with ultrasound and has had normal thyroid " "function in the past.    GI: Heartburn treated with Tums as needed, thinks she had an EGD with [facility] in preop evaluation several years ago with unknown findings. Improved with eating habit changes.   Denies known H. pylori in the past.  Gallbladder is present.  Denies other GI symptoms.    Chart review shows hospitalization in February 2022 for acute hypoxic respiratory failure suspected to be obesity hypoventilation syndrome plus or minus volume overload with possible aspiration pneumonitis with recent emesis.  Recommended additional pulmonary evaluation outpatient.  Pulmonary function test at that time showed restrictive lung disease by lung volumes and diffusing capacity substantially reduced.    Cardiac clearance 4/19/23    Pulm clearance 4/24/23    GES with accelerated emptying    Intake labs 9/2022 microcytic anemia with Hg 11, A1C 8.5, , normal TSH and negative h pylori    EGD with PQ coming up next week    Strong FH of anemia but no known other causes outside of iron def.   \"    9/14/2023 Update:    Has lost 30 pounds since LOV.  On Ozempic    MRSA skin abscess (cultured).    Hx of goiter, per pt evaluated with U/S in the past.  Last dose of Ozempic 3 days ago.    Takes Lantus 50 U daily.  Previously did ISS.  Pt admits she is barely compliant with Lantus anyway.      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    No personal or family hx of VTE or clotting d/o.  No liver, lung, heart, or renal disease      Review of data:    JOAQUIN: nothign  CBC: nl  CMP: nl  HP neg    EKG: NSR, reviewed tracing and agree with interpretation       EGD: 8/7/23 PQ, GEJ at 44 cm. Sliding hiatal hernia seen on retroflexed view.   Path-benign    Cardiac clearance: cleared     Pulm clearance: cleared    GES with accelerated emptying      Last tobacco: last cigarette 2019  Last NSAIDs: none recent  Last ASA: n/a  Last steroids: n/a  Last hormones: off OCP x " "23.        Past Medical History:   Diagnosis Date   • Anemia    • Anxiety    • Anxiety and depression    • Back pain     not treated with meds   • Diabetes mellitus 2008    on insulin with pregnancy 2022, last A1C 6.5   • DM2 (diabetes mellitus, type 2)     bariatric intake A1C 7.7   • Generalized headache    • GERD (gastroesophageal reflux disease)    • Goiter    • Gonorrhea    • Heartburn     tums prn, thinks she had EGD with [facility]   • History of abnormal cervical Pap smear    • History of COVID-19      no hospital no steroids   • Hydradenitis     improved iwth diet change   • Hypertension    • Iron deficiency anemia    • MRSA infection     skin abscess ;  facuial lesion   • Post-op pneumonia     2021     • Sleep apnea     uses c-pap   • Spinal headache    • Suspected sleep apnea    • Urinary incontinence      Past Surgical History:   Procedure Laterality Date   •  SECTION  2006   •  SECTION N/A 2021    Procedure:  SECTION REPEAT;  Surgeon: Rita Busby MD;  Location: [facility] LABOR DELIVERY;  Service: Obstetrics/Gynecology;  Laterality: N/A;   • DILATATION AND CURETTAGE     • ENDOSCOPY     • ENDOSCOPY N/A 2023    Procedure: ESOPHAGOGASTRODUODENOSCOPY WITH BIOPSY;  Surgeon: Alice Atwood MD;  Location: [facility] ENDOSCOPY;  Service: General;  Laterality: N/A;       Allergies   Allergen Reactions   • Banana Anaphylaxis     Other reaction(s): Banana, Cucumber, Kiwis, Melon   • Cucumber Extract Anaphylaxis   • Kiwi Extract Anaphylaxis   • Latex Rash and Hives     \"makes my skin raw\"   Other reaction(s): Other, Unknown  1Replaced free text allergy  \"makes my skin raw\"    • Melon Anaphylaxis   • Nuts Anaphylaxis   • Morphine Hallucinations     Dilaudid is ok, tolerates Percocet/Lortab   • Duloxetine Hcl Unknown - Low Severity     Mental agitation   • Naproxen Nausea Only     No problems with ibuprofen and aspirin  No problems with " ibuprofen and aspirin       Current Outpatient Medications:   •  ferrous gluconate (FERGON) 324 MG tablet, Take 1 tablet by mouth Daily With Breakfast., Disp: , Rfl:   •  hydroCHLOROthiazide (HYDRODIURIL) 25 MG tablet, Take 1 tablet by mouth Daily., Disp: , Rfl:   •  insulin glargine (Lantus) 100 UNIT/ML injection, Inject 40 Units under the skin into the appropriate area as directed 2 (Two) Times a Day for 90 days. (Patient taking differently: Inject 50 Units under the skin into the appropriate area as directed Daily.), Disp: 72 mL, Rfl: 1  •  losartan (COZAAR) 100 MG tablet, Take 1 tablet by mouth Daily., Disp: , Rfl:   •  Ozempic, 2 MG/DOSE, 8 MG/3ML solution pen-injector, , Disp: , Rfl:   •  vitamin D (ERGOCALCIFEROL) 1.25 MG (23666 UT) capsule capsule, Take 1 capsule by mouth 2 (Two) Times a Week.  and , Disp: , Rfl:   •  apixaban (Eliquis) 2.5 MG tablet tablet, Take 1 tablet by mouth 2 (Two) Times a Day., Disp: 42 tablet, Rfl: 0  •  norethindrone-ethinyl estradiol (FEMHRT 1/5) 1-5 MG-MCG tablet, Take  by mouth Daily. Unsure of brand name (Patient not taking: Reported on 2023), Disp: , Rfl:     Social History     Socioeconomic History   • Marital status:    • Number of children: 1   • Years of education: 14   • Highest education level: Associate degree: occupational, technical, or vocational program   Tobacco Use   • Smoking status: Former     Packs/day: 0.25     Years: 17.00     Pack years: 4.25     Types: Cigarettes     Quit date: 2019     Years since quittin.4     Passive exposure: Never   • Smokeless tobacco: Never   • Tobacco comments:     wasn't a daily smoker-  depended on situation   Vaping Use   • Vaping Use: Never used   Substance and Sexual Activity   • Alcohol use: Yes     Comment: SOCIALLY    • Drug use: Yes     Frequency: 4.0 times per week     Types: Marijuana     Comment: occasionally   • Sexual activity: Defer     Social History     Social History Narrative     Patient lives in [facility], she is  and a mother of 2 that works full time at [employer]        Family History   Problem Relation Age of Onset   • Hypertension Mother    • Lung cancer Father    • Hypertension Sister    • Breast cancer Maternal Grandmother    • Hypertension Maternal Grandmother    • Diabetes Maternal Grandmother    • Diabetes Maternal Grandfather    • Diabetes Paternal Grandmother    • Hypertension Paternal Grandmother    • Lung cancer Paternal Grandmother    • Hypertension Paternal Grandfather    • Diabetes Paternal Grandfather        Review of Systems   Constitutional:  Positive for fatigue and unexpected weight gain. Negative for chills, diaphoresis, fever and unexpected weight loss.   HENT:  Negative for congestion and facial swelling.    Eyes:  Negative for blurred vision, double vision and discharge.   Respiratory:  Negative for chest tightness, shortness of breath and stridor.    Cardiovascular:  Negative for chest pain, palpitations and leg swelling.   Gastrointestinal:  Negative for blood in stool.   Endocrine: Negative for polydipsia.   Genitourinary:  Negative for hematuria.   Musculoskeletal:  Positive for arthralgias.   Skin:  Negative for color change.   Allergic/Immunologic: Negative for immunocompromised state.   Neurological:  Negative for confusion.   Psychiatric/Behavioral:  Negative for self-injury.      Physical Exam:  Vital Signs:  Weight: (!) 189 kg (417 lb)   Body mass index is 63.4 kg/m².  Temp: 97.8 °F (36.6 °C)   Heart Rate: 66   BP: 118/68     Physical Exam  Vitals reviewed.   Constitutional:       Appearance: She is well-developed.   HENT:      Head: Normocephalic and atraumatic.      Comments: Lip and earrings     Nose: Nose normal.   Eyes:      Conjunctiva/sclera: Conjunctivae normal.      Pupils: Pupils are equal, round, and reactive to light.   Neck:      Thyroid: No thyromegaly.      Vascular: No carotid bruit.      Trachea: No tracheal deviation.       Comments: Large, firm goiter  Cardiovascular:      Rate and Rhythm: Normal rate and regular rhythm.      Heart sounds: Normal heart sounds.   Pulmonary:      Effort: Pulmonary effort is normal. No respiratory distress.      Breath sounds: Normal breath sounds.   Abdominal:      General: There is no distension.      Palpations: Abdomen is soft.      Tenderness: There is no abdominal tenderness.      Comments: Low transverse C Section scar   Musculoskeletal:         General: No deformity. Normal range of motion.      Cervical back: Normal range of motion and neck supple.      Right lower leg: Edema present.      Left lower leg: Edema present.   Skin:     General: Skin is warm and dry.      Findings: No rash.   Neurological:      Mental Status: She is alert and oriented to person, place, and time.      Cranial Nerves: No cranial nerve deficit.      Coordination: Coordination normal.      Comments: Acanthosis nigricans   Psychiatric:         Behavior: Behavior normal.         Thought Content: Thought content normal.         Judgment: Judgment normal.     Problem List Items Addressed This Visit       Back pain    Overview     not treated with meds         Essential hypertension    MRSA infection    Overview     skin abscess         Obstructive sleep apnea syndrome    Type 2 diabetes mellitus with hyperglycemia, with long-term current use of insulin     Other Visit Diagnoses       Body mass index (BMI) of 60.0-69.9 in adult    -  Primary    Fatigue, unspecified type        Gastroesophageal reflux disease, unspecified whether esophagitis present                Assessment:    Edd Leung is a 38 y.o. year old female with medically complicated obesity.    Weight loss surgery is deemed medically necessary given the following obesity related comorbidities including DM2, depression, back pain, GERD, HTN, ANDREWS, stress incontinence with current Weight: (!) 189 kg (417 lb) and Body mass index is 63.4  "kg/m²..    Patient is aware that surgery is a tool, and that weight loss is not guaranteed but only seen in the context of appropriate use, follow up and exercise.    The patient was present for an approximately a 2.5 hour discussion of the purpose of weight loss surgery, how WLS is a tool to assist in achieving weight loss goals, the most common complications and how best to avoid them, and the strategies for short and long term weight loss.  Ample opportunity to discuss questions was available both in group and during the time of individual examination.    I reviewed all available preop labs, Xrays, tests, clearances, etc and signed off on these in the record.  All of this in addition to the patient's unique history and exam has been taken into consideration in determining their appropriate candidacy for weight loss surgery.    Complications  of laparoscopic/possible robotic gastric sleeve were discussed. The patient is well aware of the potential complications of surgery that include but not limited to bleeding, infections, deep venous thrombosis, pulmonary embolism, pulmonary complications such as pneumonia, cardiac events, hernias, small bowel obstruction, damage to the spleen or other organs, bowel injury, disfiguring scars, failure to lose weight, need for additional surgery, conversion to an open procedure, and death. Patient is also aware of complications which apply in this particular procedure that can include but are not limited to a \"leak\" at the staple line which in some instances may require conversion to gastric bypass.    The patient is aware if a hiatal hernia is encountered, it likely will be repaired.  R/B/A Rx to hiatal hernia repair were discussed as outlined in our long consent form.  Briefly risks in addition to those for LSG include recurrent hernia, KAROL, dysphagia, esophageal injury, pneumothorax, injury to the vagus nerves, injury to the thoracic duct, aorta or vena cava.    Greater than 3 " minutes was spent with the patient discussing avoiding all tobacco products and second hand smoke at least 2 weeks pre-operatively and 6 weeks post-operatively to minimize the risk of sleeve leak.  This included discussing the importance of avoiding even secondhand smoke as the risk of leak is increased.  Examples discussed:  I made it very clear that the patient understands they should avoid even riding in a car where someone has previously smoked in the last 2 weeks, living in a house where someone smokes (even if it's in a separate room/patio/attached garage, etc.) we discussed that they should not have a conversation with a group of people who are smoking even if it's outside.  They can be around wood burning fires and barbecue.  I told them I do not know if marijuana has a same effects but my overall recommendation is to avoid it for 2 weeks prior in 6 weeks after surgery.  They also are aware that nicotine may also increase the risk of leak and I strongly encouraged him to avoid that as well for 2 weeks prior in 6 weeks after surgery.    Discussed the risks, benefits and alternative therapies at great length as outlined in our extensive consent forms, consent videos, and educational teaching process under the direction of the center's .    A copy of the patient's signed informed consent is on file.    Plan:  Robotic assisted sleeve gastrectomy + HHR at [facility].    Hold Ozempic starting now and do not resume after surgery.    Recommend Lantus 20-25 U daily when start diet.        R/B/A Rx discussed to postop anticoagulation including but not limited to bleeding, drug reaction, venothromboembolic events, etc. and she is agreeable to taking 3 weeks of Eliquis.    MDM high:  Elective procedure with the following risk factors: morbid obesity, Dm2, HTN  4+ chronic medical problems reviewed.      Thank you Erinn Renee PA for allowing me to share in the care of our mutual patient.              Alice Atwood MD

## 2023-09-14 NOTE — PROGRESS NOTES
"[facility] BARIATRIC SURGERY  [address]  [address]  [facility] [id number] [phone]      Patient  Name:  Edd Leung  :  1985      Date of Visit: 2023      Chief Complaint:  weight gain; unable to maintain weight loss    History of Present Illness:  Edd Leung is a 38 y.o. female who presents today for evaluation, education and consultation regarding weight loss surgery.     Patient has been overweight for many years, with numerous failed dietary/weight loss attempts.  She now has obesity related comorbidities of DM2, depression, back pain, GERD, HTN, ANDREWS, stress incontinence and as such has decided to pursue weight loss surgery.    From intake:  \"Patient is pursuing a sleeve gastrectomy to improve her overall health.  She said she started process through [facility] several years ago but ultimately did not pursue surgery.  States she is now ready to proceed.    History of hypertension, recently dx with ANDREWS now on CPAP,  diabetes diagnosed in  on insulin since her last pregnancy 2022, last A1C 6.5.  History of anxiety and depression.  Iron deficiency anemia on iron supplements.  History of MRSA skin abscess in .  Chronic back pain not treated with medication.  Generalized headaches occasionally.  Says she has had goiter evaluated with ultrasound and has had normal thyroid function in the past.    GI: Heartburn treated with Tums as needed, thinks she had an EGD with [facility] in preop evaluation several years ago with unknown findings. Improved with eating habit changes.   Denies known H. pylori in the past.  Gallbladder is present.  Denies other GI symptoms.    Chart review shows hospitalization in 2022 for acute hypoxic respiratory failure suspected to be obesity hypoventilation syndrome plus or minus volume overload with possible aspiration pneumonitis with recent emesis.  Recommended additional pulmonary evaluation " "outpatient.  Pulmonary function test at that time showed restrictive lung disease by lung volumes and diffusing capacity substantially reduced.    Cardiac clearance 4/19/23    Pulm clearance 4/24/23    GES with accelerated emptying    Intake labs 9/2022 microcytic anemia with Hg 11, A1C 8.5, , normal TSH and negative h pylori    EGD with PQ coming up next week    Strong FH of anemia but no known other causes outside of iron def.   \"    9/14/2023 Update:    Has lost 30 pounds since LOV.  On Ozempic    MRSA skin abscess (cultured).    Hx of goiter, per pt evaluated with U/S in the past.  Last dose of Ozempic 3 days ago.    Takes Lantus 50 U daily.  Previously did ISS.  Pt admits she is barely compliant with Lantus anyway.      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    No personal or family hx of VTE or clotting d/o.  No liver, lung, heart, or renal disease      Review of data:    JOAQUIN: nothign  CBC: nl  CMP: nl  HP neg    EKG: NSR, reviewed tracing and agree with interpretation       EGD: 8/7/23 PQ, GEJ at 44 cm. Sliding hiatal hernia seen on retroflexed view.   Path-benign    Cardiac clearance: cleared     Pulm clearance: cleared    GES with accelerated emptying      Last tobacco: last cigarette 2019  Last NSAIDs: none recent  Last ASA: n/a  Last steroids: n/a  Last hormones: off OCP x 8/16/23.        Past Medical History:   Diagnosis Date    Anemia     Anxiety     Anxiety and depression     Back pain     not treated with meds    Diabetes mellitus 2008    on insulin with pregnancy 1/2022, last A1C 6.5    DM2 (diabetes mellitus, type 2)     bariatric intake A1C 7.7    Generalized headache     GERD (gastroesophageal reflux disease)     Goiter     Gonorrhea     Heartburn     tums prn, thinks she had EGD with [facility]    History of abnormal cervical Pap smear     History of COVID-19 2022 no hospital no steroids    Hydradenitis     improved iwth diet change    " "Hypertension     Iron deficiency anemia     MRSA infection     skin abscess ;  facuial lesion    Post-op pneumonia     2021      Sleep apnea     uses c-pap    Spinal headache     Suspected sleep apnea     Urinary incontinence      Past Surgical History:   Procedure Laterality Date     SECTION  2006     SECTION N/A 2021    Procedure:  SECTION REPEAT;  Surgeon: Rita Busby MD;  Location: [facility] LABOR DELIVERY;  Service: Obstetrics/Gynecology;  Laterality: N/A;    DILATATION AND CURETTAGE      ENDOSCOPY      ENDOSCOPY N/A 2023    Procedure: ESOPHAGOGASTRODUODENOSCOPY WITH BIOPSY;  Surgeon: Alice Atwood MD;  Location: [facility] ENDOSCOPY;  Service: General;  Laterality: N/A;       Allergies   Allergen Reactions    Banana Anaphylaxis     Other reaction(s): Banana, Cucumber, Kiwis, Melon    Cucumber Extract Anaphylaxis    Kiwi Extract Anaphylaxis    Latex Rash and Hives     \"makes my skin raw\"   Other reaction(s): Other, Unknown  1Replaced free text allergy  \"makes my skin raw\"     Melon Anaphylaxis    Nuts Anaphylaxis    Morphine Hallucinations     Dilaudid is ok, tolerates Percocet/Lortab    Duloxetine Hcl Unknown - Low Severity     Mental agitation    Naproxen Nausea Only     No problems with ibuprofen and aspirin  No problems with ibuprofen and aspirin       Current Outpatient Medications:     ferrous gluconate (FERGON) 324 MG tablet, Take 1 tablet by mouth Daily With Breakfast., Disp: , Rfl:     hydroCHLOROthiazide (HYDRODIURIL) 25 MG tablet, Take 1 tablet by mouth Daily., Disp: , Rfl:     insulin glargine (Lantus) 100 UNIT/ML injection, Inject 40 Units under the skin into the appropriate area as directed 2 (Two) Times a Day for 90 days. (Patient taking differently: Inject 50 Units under the skin into the appropriate area as directed Daily.), Disp: 72 mL, Rfl: 1    losartan (COZAAR) 100 MG tablet, Take 1 tablet by mouth Daily., Disp: , " Rfl:     Ozempic, 2 MG/DOSE, 8 MG/3ML solution pen-injector, , Disp: , Rfl:     vitamin D (ERGOCALCIFEROL) 1.25 MG (85158 UT) capsule capsule, Take 1 capsule by mouth 2 (Two) Times a Week.  and , Disp: , Rfl:     apixaban (Eliquis) 2.5 MG tablet tablet, Take 1 tablet by mouth 2 (Two) Times a Day., Disp: 42 tablet, Rfl: 0    norethindrone-ethinyl estradiol (FEMHRT 1/) 1-5 MG-MCG tablet, Take  by mouth Daily. Unsure of brand name (Patient not taking: Reported on 2023), Disp: , Rfl:     Social History     Socioeconomic History    Marital status:     Number of children: 1    Years of education: 14    Highest education level: Associate degree: occupational, technical, or vocational program   Tobacco Use    Smoking status: Former     Packs/day: 0.25     Years: 17.00     Pack years: 4.25     Types: Cigarettes     Quit date: 2019     Years since quittin.4     Passive exposure: Never    Smokeless tobacco: Never    Tobacco comments:     wasn't a daily smoker-  depended on situation   Vaping Use    Vaping Use: Never used   Substance and Sexual Activity    Alcohol use: Yes     Comment: SOCIALLY     Drug use: Yes     Frequency: 4.0 times per week     Types: Marijuana     Comment: occasionally    Sexual activity: Defer     Social History     Social History Narrative    Patient lives in [facility], she is  and a mother of 2 that works full time at [employer]        Family History   Problem Relation Age of Onset    Hypertension Mother     Lung cancer Father     Hypertension Sister     Breast cancer Maternal Grandmother     Hypertension Maternal Grandmother     Diabetes Maternal Grandmother     Diabetes Maternal Grandfather     Diabetes Paternal Grandmother     Hypertension Paternal Grandmother     Lung cancer Paternal Grandmother     Hypertension Paternal Grandfather     Diabetes Paternal Grandfather        Review of Systems   Constitutional:  Positive for fatigue and unexpected weight gain.  Negative for chills, diaphoresis, fever and unexpected weight loss.   HENT:  Negative for congestion and facial swelling.    Eyes:  Negative for blurred vision, double vision and discharge.   Respiratory:  Negative for chest tightness, shortness of breath and stridor.    Cardiovascular:  Negative for chest pain, palpitations and leg swelling.   Gastrointestinal:  Negative for blood in stool.   Endocrine: Negative for polydipsia.   Genitourinary:  Negative for hematuria.   Musculoskeletal:  Positive for arthralgias.   Skin:  Negative for color change.   Allergic/Immunologic: Negative for immunocompromised state.   Neurological:  Negative for confusion.   Psychiatric/Behavioral:  Negative for self-injury.      Physical Exam:  Vital Signs:  Weight: (!) 189 kg (417 lb)   Body mass index is 63.4 kg/m².  Temp: 97.8 °F (36.6 °C)   Heart Rate: 66   BP: 118/68     Physical Exam  Vitals reviewed.   Constitutional:       Appearance: She is well-developed.   HENT:      Head: Normocephalic and atraumatic.      Comments: Lip and earrings     Nose: Nose normal.   Eyes:      Conjunctiva/sclera: Conjunctivae normal.      Pupils: Pupils are equal, round, and reactive to light.   Neck:      Thyroid: No thyromegaly.      Vascular: No carotid bruit.      Trachea: No tracheal deviation.      Comments: Large, firm goiter  Cardiovascular:      Rate and Rhythm: Normal rate and regular rhythm.      Heart sounds: Normal heart sounds.   Pulmonary:      Effort: Pulmonary effort is normal. No respiratory distress.      Breath sounds: Normal breath sounds.   Abdominal:      General: There is no distension.      Palpations: Abdomen is soft.      Tenderness: There is no abdominal tenderness.      Comments: Low transverse C Section scar   Musculoskeletal:         General: No deformity. Normal range of motion.      Cervical back: Normal range of motion and neck supple.      Right lower leg: Edema present.      Left lower leg: Edema present.   Skin:      General: Skin is warm and dry.      Findings: No rash.   Neurological:      Mental Status: She is alert and oriented to person, place, and time.      Cranial Nerves: No cranial nerve deficit.      Coordination: Coordination normal.      Comments: Acanthosis nigricans   Psychiatric:         Behavior: Behavior normal.         Thought Content: Thought content normal.         Judgment: Judgment normal.     Problem List Items Addressed This Visit       Back pain    Overview     not treated with meds         Essential hypertension    MRSA infection    Overview     skin abscess         Obstructive sleep apnea syndrome    Type 2 diabetes mellitus with hyperglycemia, with long-term current use of insulin     Other Visit Diagnoses       Body mass index (BMI) of 60.0-69.9 in adult    -  Primary    Fatigue, unspecified type        Gastroesophageal reflux disease, unspecified whether esophagitis present                Assessment:    Edd Leung is a 38 y.o. year old female with medically complicated obesity.    Weight loss surgery is deemed medically necessary given the following obesity related comorbidities including DM2, depression, back pain, GERD, HTN, ANDREWS, stress incontinence with current Weight: (!) 189 kg (417 lb) and Body mass index is 63.4 kg/m²..    Patient is aware that surgery is a tool, and that weight loss is not guaranteed but only seen in the context of appropriate use, follow up and exercise.    The patient was present for an approximately a 2.5 hour discussion of the purpose of weight loss surgery, how WLS is a tool to assist in achieving weight loss goals, the most common complications and how best to avoid them, and the strategies for short and long term weight loss.  Ample opportunity to discuss questions was available both in group and during the time of individual examination.    I reviewed all available preop labs, Xrays, tests, clearances, etc and signed off on these in the record.  All  "of this in addition to the patient's unique history and exam has been taken into consideration in determining their appropriate candidacy for weight loss surgery.    Complications  of laparoscopic/possible robotic gastric sleeve were discussed. The patient is well aware of the potential complications of surgery that include but not limited to bleeding, infections, deep venous thrombosis, pulmonary embolism, pulmonary complications such as pneumonia, cardiac events, hernias, small bowel obstruction, damage to the spleen or other organs, bowel injury, disfiguring scars, failure to lose weight, need for additional surgery, conversion to an open procedure, and death. Patient is also aware of complications which apply in this particular procedure that can include but are not limited to a \"leak\" at the staple line which in some instances may require conversion to gastric bypass.    The patient is aware if a hiatal hernia is encountered, it likely will be repaired.  R/B/A Rx to hiatal hernia repair were discussed as outlined in our long consent form.  Briefly risks in addition to those for LSG include recurrent hernia, KAROL, dysphagia, esophageal injury, pneumothorax, injury to the vagus nerves, injury to the thoracic duct, aorta or vena cava.    Greater than 3 minutes was spent with the patient discussing avoiding all tobacco products and second hand smoke at least 2 weeks pre-operatively and 6 weeks post-operatively to minimize the risk of sleeve leak.  This included discussing the importance of avoiding even secondhand smoke as the risk of leak is increased.  Examples discussed:  I made it very clear that the patient understands they should avoid even riding in a car where someone has previously smoked in the last 2 weeks, living in a house where someone smokes (even if it's in a separate room/patio/attached garage, etc.) we discussed that they should not have a conversation with a group of people who are smoking even if " it's outside.  They can be around wood burning fires and barbecue.  I told them I do not know if marijuana has a same effects but my overall recommendation is to avoid it for 2 weeks prior in 6 weeks after surgery.  They also are aware that nicotine may also increase the risk of leak and I strongly encouraged him to avoid that as well for 2 weeks prior in 6 weeks after surgery.    Discussed the risks, benefits and alternative therapies at great length as outlined in our extensive consent forms, consent videos, and educational teaching process under the direction of the center's .    A copy of the patient's signed informed consent is on file.    Plan:  Robotic assisted sleeve gastrectomy + HHR at [facility].    Hold Ozempic starting now and do not resume after surgery.    Recommend Lantus 20-25 U daily when start diet.        R/B/A Rx discussed to postop anticoagulation including but not limited to bleeding, drug reaction, venothromboembolic events, etc. and she is agreeable to taking 3 weeks of Eliquis.    MDM high:  Elective procedure with the following risk factors: morbid obesity, Dm2, HTN  4+ chronic medical problems reviewed.      Thank you Erinn Renee PA for allowing me to share in the care of our mutual patient.             Alice Atwood MD

## 2023-09-15 PROBLEM — K44.9 HIATAL HERNIA: Status: ACTIVE | Noted: 2023-09-15

## 2023-09-26 ENCOUNTER — HOSPITAL ENCOUNTER (EMERGENCY)
Facility: HOSPITAL | Age: 38
Discharge: HOME | End: 2023-09-26
Attending: EMERGENCY MEDICINE | Admitting: EMERGENCY MEDICINE
Payer: COMMERCIAL

## 2023-09-26 VITALS
DIASTOLIC BLOOD PRESSURE: 85 MMHG | SYSTOLIC BLOOD PRESSURE: 178 MMHG | HEIGHT: 68 IN | WEIGHT: 293 LBS | RESPIRATION RATE: 16 BRPM | TEMPERATURE: 98.8 F | BODY MASS INDEX: 44.41 KG/M2 | HEART RATE: 83 BPM | OXYGEN SATURATION: 98 %

## 2023-09-26 DIAGNOSIS — N76.0 ACUTE VAGINITIS: Primary | ICD-10-CM

## 2023-09-26 DIAGNOSIS — B37.31 YEAST VAGINITIS: ICD-10-CM

## 2023-09-26 DIAGNOSIS — N76.0 BACTERIAL VAGINITIS: ICD-10-CM

## 2023-09-26 DIAGNOSIS — B96.89 BACTERIAL VAGINITIS: ICD-10-CM

## 2023-09-26 LAB
CLUE CELLS SPEC QL WET PREP: ABNORMAL
HYDATID CYST SPEC WET PREP: ABNORMAL
KOH PREP NAIL: ABNORMAL
T VAGINALIS SPEC QL WET PREP: ABNORMAL
WBC SPEC QL WET PREP: ABNORMAL
YEAST GENITAL QL WET PREP: ABNORMAL

## 2023-09-26 PROCEDURE — 25010000002 CEFTRIAXONE PER 250 MG: Performed by: PHYSICIAN ASSISTANT

## 2023-09-26 PROCEDURE — 63710000001 ONDANSETRON PER 8 MG: Performed by: PHYSICIAN ASSISTANT

## 2023-09-26 PROCEDURE — 87210 SMEAR WET MOUNT SALINE/INK: CPT | Performed by: PHYSICIAN ASSISTANT

## 2023-09-26 PROCEDURE — 87591 N.GONORRHOEAE DNA AMP PROB: CPT | Performed by: PHYSICIAN ASSISTANT

## 2023-09-26 PROCEDURE — 87491 CHLMYD TRACH DNA AMP PROBE: CPT | Performed by: PHYSICIAN ASSISTANT

## 2023-09-26 PROCEDURE — 96372 THER/PROPH/DIAG INJ SC/IM: CPT

## 2023-09-26 PROCEDURE — 87220 TISSUE EXAM FOR FUNGI: CPT | Performed by: PHYSICIAN ASSISTANT

## 2023-09-26 PROCEDURE — 99283 EMERGENCY DEPT VISIT LOW MDM: CPT

## 2023-09-26 RX ORDER — ONDANSETRON 4 MG/1
4 TABLET, FILM COATED ORAL ONCE
Status: COMPLETED | OUTPATIENT
Start: 2023-09-26 | End: 2023-09-26

## 2023-09-26 RX ORDER — METRONIDAZOLE 500 MG/1
500 TABLET ORAL 2 TIMES DAILY
Qty: 20 TABLET | Refills: 0 | Status: SHIPPED | OUTPATIENT
Start: 2023-09-26 | End: 2023-10-06

## 2023-09-26 RX ORDER — FLUCONAZOLE 150 MG/1
150 TABLET ORAL ONCE
Qty: 1 TABLET | Refills: 0 | Status: SHIPPED | OUTPATIENT
Start: 2023-09-26 | End: 2023-09-26

## 2023-09-26 RX ORDER — AZITHROMYCIN 250 MG/1
1000 TABLET, FILM COATED ORAL ONCE
Status: COMPLETED | OUTPATIENT
Start: 2023-09-26 | End: 2023-09-26

## 2023-09-26 RX ADMIN — AZITHROMYCIN MONOHYDRATE 1000 MG: 250 TABLET ORAL at 18:58

## 2023-09-26 RX ADMIN — LIDOCAINE HYDROCHLORIDE 500 MG: 10 INJECTION, SOLUTION EPIDURAL; INFILTRATION; INTRACAUDAL; PERINEURAL at 18:59

## 2023-09-26 RX ADMIN — ONDANSETRON HYDROCHLORIDE 4 MG: 4 TABLET, FILM COATED ORAL at 18:58

## 2023-09-26 NOTE — ED PROVIDER NOTES
Subjective   History of Present Illness  Pt is a 37 yo female presenting to ED with complaints of vaginal itching. PMHx significant for DM (insulin), HTN, HLD, ANDREWS, Anxiety, Depression, GERD, Anemia and Obesity. Pt reports [place]  [place] they had intercourse for first time 5 days ago. She thought they were using a latex free condom because she has had allergic reactions in the past. She reports vaginal irritation after intercourse which has gradually worsened over last several days.She reports while vaginal discharge with itching. She denies significant concern for STDs. She denies abdominal pain, flank pain, urinary sx or fevers. She denies recent antibiotics. She denies tobacco use and does use ETOH and marijuana.    History provided by:  Patient and medical records    Review of Systems   Constitutional:  Negative for fever.   Gastrointestinal:  Negative for abdominal pain, diarrhea, nausea and vomiting.   Genitourinary:  Positive for vaginal discharge and vaginal pain. Negative for difficulty urinating, dysuria, flank pain, genital sores and vaginal bleeding.     Past Medical History:   Diagnosis Date    Anemia     Anxiety     Anxiety and depression     Back pain     not treated with meds    Diabetes mellitus 2008    on insulin with pregnancy 2022, last A1C 6.5    DM2 (diabetes mellitus, type 2)     bariatric intake A1C 7.7    Generalized headache     GERD (gastroesophageal reflux disease)     Goiter     Gonorrhea     Heartburn     tums prn, thinks she had EGD with [facility]    History of abnormal cervical Pap smear     History of COVID-19      no hospital no steroids    Hydradenitis     improved iw diet change    Hypertension     Iron deficiency anemia     MRSA infection 2010    skin abscess ; 2006 facuial lesion    Post-op pneumonia     2021      Sleep apnea     uses c-pap    Spinal headache     Suspected sleep apnea     Urinary incontinence   "      Allergies   Allergen Reactions    Banana Anaphylaxis     Other reaction(s): Banana, Cucumber, Kiwis, Melon    Cucumber Extract Anaphylaxis    Kiwi Extract Anaphylaxis    Latex Rash and Hives     \"makes my skin raw\"   Other reaction(s): Other, Unknown  1Replaced free text allergy  \"makes my skin raw\"     Melon Anaphylaxis    Nuts Anaphylaxis    Morphine Hallucinations     Dilaudid is ok, tolerates Percocet/Lortab    Duloxetine Hcl Unknown - Low Severity     Mental agitation    Naproxen Nausea Only     No problems with ibuprofen and aspirin  No problems with ibuprofen and aspirin       Past Surgical History:   Procedure Laterality Date     SECTION  2006     SECTION N/A 2021    Procedure:  SECTION REPEAT;  Surgeon: Rita Busby MD;  Location: [facility] LABOR DELIVERY;  Service: Obstetrics/Gynecology;  Laterality: N/A;    DILATATION AND CURETTAGE      ENDOSCOPY      ENDOSCOPY N/A 2023    Procedure: ESOPHAGOGASTRODUODENOSCOPY WITH BIOPSY;  Surgeon: Alice Atwood MD;  Location: [facility] ENDOSCOPY;  Service: General;  Laterality: N/A;       Family History   Problem Relation Age of Onset    Hypertension Mother     Lung cancer Father     Hypertension Sister     Breast cancer Maternal Grandmother     Hypertension Maternal Grandmother     Diabetes Maternal Grandmother     Diabetes Maternal Grandfather     Diabetes Paternal Grandmother     Hypertension Paternal Grandmother     Lung cancer Paternal Grandmother     Hypertension Paternal Grandfather     Diabetes Paternal Grandfather        Social History     Socioeconomic History    Marital status:     Number of children: 1    Years of education: 14    Highest education level: Associate degree: occupational, technical, or vocational program   Tobacco Use    Smoking status: Former     Packs/day: 0.25     Years: 17.00     Pack years: 4.25     Types: Cigarettes     Quit date: 2019     Years since quittin.4     " Passive exposure: Never    Smokeless tobacco: Never    Tobacco comments:     wasn't a daily smoker-  depended on situation   Vaping Use    Vaping Use: Never used   Substance and Sexual Activity    Alcohol use: Yes     Comment: SOCIALLY     Drug use: Yes     Frequency: 4.0 times per week     Types: Marijuana     Comment: occasionally    Sexual activity: Defer           Objective   Physical Exam  Vitals and nursing note reviewed. Exam conducted with a chaperone present.   Constitutional:       General: She is not in acute distress.     Appearance: She is obese.   HENT:      Head: Atraumatic.   Eyes:      Extraocular Movements: Extraocular movements intact.      Conjunctiva/sclera: Conjunctivae normal.   Cardiovascular:      Rate and Rhythm: Normal rate.   Pulmonary:      Effort: Pulmonary effort is normal. No respiratory distress.   Abdominal:      Palpations: Abdomen is soft.      Tenderness: There is no abdominal tenderness.   Genitourinary:     Exam position: Knee-chest position.      Labia:         Right: No rash or tenderness.         Left: No rash or tenderness.       Vagina: Vaginal discharge (white) present. No tenderness or bleeding.      Cervix: Discharge present. No cervical motion tenderness or cervical bleeding.   Musculoskeletal:         General: Normal range of motion.      Cervical back: Normal range of motion and neck supple.   Skin:     General: Skin is warm.   Neurological:      General: No focal deficit present.      Mental Status: She is alert.   Psychiatric:         Mood and Affect: Mood normal.         Behavior: Behavior normal.       Procedures           ED Course  ED Course as of 09/26/23 1907   Tue Sep 26, 2023   1840 Clue Cells, Wet Prep(!): 4+ Clue cells seen [RT]   1840 KOH Prep(!): Yeast and hyphal elements seen [RT]      ED Course User Index  [RT] Daria Carreon PA           Recent Results (from the past 24 hour(s))   DALTON Prep - Swab, Vagina    Collection Time: 09/26/23  6:20 PM     "Specimen: Vagina; Swab   Result Value Ref Range    KOH Prep Yeast and hyphal elements seen (A) No yeast or hyphal elements seen   Wet Prep, Genital - Swab, Vagina    Collection Time: 09/26/23  6:20 PM    Specimen: Vagina; Swab   Result Value Ref Range    YEAST 1+ Yeast seen (A) No yeast seen    HYPHAL ELEMENTS 1+ Hyphal elements seen (A) No Hyphal elements seen    WBC'S 2+ WBC's seen (A) No WBC's seen    Clue Cells, Wet Prep 4+ Clue cells seen (A) No Clue cells seen    Trichomonas, Wet Prep No Trichomonas seen No Trichomonas seen     Note: In addition to lab results from this visit, the labs listed above may include labs taken at another facility or during a different encounter within the last 24 hours. Please correlate lab times with ED admission and discharge times for further clarification of the services performed during this visit.    No orders to display     Vitals:    09/26/23 1748   BP: 178/85   BP Location: Left arm   Patient Position: Sitting   Pulse: 83   Resp: 16   Temp: 98.8 °F (37.1 °C)   TempSrc: Oral   SpO2: 98%   Weight: (!) 189 kg (417 lb)   Height: 172.7 cm (68\")     Medications   cefTRIAXone (ROCEPHIN) 350 mg/ml in lidocaine 1% IM syringe (500 mg vial) (500 mg Intramuscular Given 9/26/23 1859)   ondansetron (ZOFRAN) tablet 4 mg (4 mg Oral Given 9/26/23 1858)   azithromycin (ZITHROMAX) tablet 1,000 mg (1,000 mg Oral Given 9/26/23 1858)     ECG/EMG Results (last 24 hours)       ** No results found for the last 24 hours. **          No orders to display       DISCHARGE    Patient discharged in stable condition.    Reviewed implications of results, diagnosis, meds, responsibility to follow up, warning signs and symptoms of possible worsening, potential complications and reasons to return to ER.    Patient/Family voiced understanding of above instructions.    Discussed plan for discharge, as there is no emergent indication for admission.  Pt/family is agreeable and understands need for follow up and " possible repeat testing.  Pt/family is aware that discharge does not mean that nothing is wrong but that it indicates no emergency is currently present that requires admission and they must continue care with follow-up as given below or with a physician of their choice.     FOLLOW-UP  Erinn Renee, PA  [address]  [place] [id number]  [phone]    Schedule an appointment as soon as possible for a visit       [facility] EMERGENCY DEPARTMENT  [address] [facility] [address] [phone]    If symptoms worsen         Medication List        New Prescriptions      fluconazole 150 MG tablet  Commonly known as: DIFLUCAN  Take 1 tablet by mouth 1 (One) Time for 1 dose.     metroNIDAZOLE 500 MG tablet  Commonly known as: FLAGYL  Take 1 tablet by mouth 2 (Two) Times a Day for 10 days.            Changed      Lantus 100 UNIT/ML injection  Generic drug: insulin glargine  Inject 40 Units under the skin into the appropriate area as directed 2 (Two) Times a Day for 90 days.  What changed:   how much to take  when to take this               Where to Get Your Medications        These medications were sent to [facility] [id number] - [place], [place] - [facility] [address] [facility] [address] - [phone]  - [phone] [address] [place], [facility] [address]      Phone: [phone]   fluconazole 150 MG tablet  metroNIDAZOLE 500 MG tablet                                         Medical Decision Making  Pt is a 37 yo female presenting to ED with complaints of vaginal discharge, irritation and itching. Positive for BV and yeast in ED. G/C sent off. Discussed results and tx plan. She elects to go forward with G/C treatment. Will dc home on Flagyl and Diflucan. She will f/u with PCP and Gyno. She will return to ED if new / worse sx.     DDx  STD, yeast vaginitis, Allergic reaction, Pregnancy     Problems Addressed:  Acute vaginitis: complicated  acute illness or injury  Bacterial vaginitis: complicated acute illness or injury  Yeast vaginitis: complicated acute illness or injury    Amount and/or Complexity of Data Reviewed  External Data Reviewed: notes.     Details: PCP  Labs: ordered. Decision-making details documented in ED Course.    Risk  Prescription drug management.        Final diagnoses:   Acute vaginitis   Yeast vaginitis   Bacterial vaginitis       ED Disposition  ED Disposition       ED Disposition   Discharge    Condition   Stable    Comment   --               Erinn Renee PA  [address] [place] [address]  [phone]    Schedule an appointment as soon as possible for a visit       [facility] EMERGENCY DEPARTMENT  [address] [facility] [address] [phone]    If symptoms worsen         Medication List        New Prescriptions      fluconazole 150 MG tablet  Commonly known as: DIFLUCAN  Take 1 tablet by mouth 1 (One) Time for 1 dose.     metroNIDAZOLE 500 MG tablet  Commonly known as: FLAGYL  Take 1 tablet by mouth 2 (Two) Times a Day for 10 days.            Changed      Lantus 100 UNIT/ML injection  Generic drug: insulin glargine  Inject 40 Units under the skin into the appropriate area as directed 2 (Two) Times a Day for 90 days.  What changed:   how much to take  when to take this               Where to Get Your Medications        These medications were sent to [facility] [id number] - [place], [place] - [facility] [address] AT [facility] [address] - [phone] PH - [phone] FX  [address], [facility] [address]      Phone: [phone]   fluconazole 150 MG tablet  metroNIDAZOLE 500 MG tablet            Daria Carreon PA  09/26/23 0500

## 2023-09-28 LAB
C TRACH RRNA SPEC QL NAA+PROBE: NEGATIVE
N GONORRHOEA RRNA SPEC QL NAA+PROBE: NEGATIVE

## 2023-10-03 ENCOUNTER — PRE-ADMISSION TESTING (OUTPATIENT)
Dept: PREADMISSION TESTING | Facility: HOSPITAL | Age: 38
End: 2023-10-03
Payer: COMMERCIAL

## 2023-10-03 LAB
DEPRECATED RDW RBC AUTO: 46 FL (ref 37–54)
ERYTHROCYTE [DISTWIDTH] IN BLOOD BY AUTOMATED COUNT: 14.9 % (ref 12.3–15.4)
HBA1C MFR BLD: 7.8 % (ref 4.8–5.6)
HCT VFR BLD AUTO: 39.2 % (ref 34–46.6)
HGB BLD-MCNC: 11.6 G/DL (ref 12–15.9)
MCH RBC QN AUTO: 25.2 PG (ref 26.6–33)
MCHC RBC AUTO-ENTMCNC: 29.6 G/DL (ref 31.5–35.7)
MCV RBC AUTO: 85 FL (ref 79–97)
MRSA DNA SPEC QL NAA+PROBE: NEGATIVE
PLATELET # BLD AUTO: 361 10*3/MM3 (ref 140–450)
PMV BLD AUTO: 10.3 FL (ref 6–12)
POTASSIUM SERPL-SCNC: 4 MMOL/L (ref 3.5–5.2)
QT INTERVAL: 444 MS
QTC INTERVAL: 424 MS
RBC # BLD AUTO: 4.61 10*6/MM3 (ref 3.77–5.28)
WBC NRBC COR # BLD: 10.48 10*3/MM3 (ref 3.4–10.8)

## 2023-10-03 PROCEDURE — 83036 HEMOGLOBIN GLYCOSYLATED A1C: CPT

## 2023-10-03 PROCEDURE — 87641 MR-STAPH DNA AMP PROBE: CPT

## 2023-10-03 PROCEDURE — 93005 ELECTROCARDIOGRAM TRACING: CPT

## 2023-10-03 PROCEDURE — 84132 ASSAY OF SERUM POTASSIUM: CPT

## 2023-10-03 PROCEDURE — 36415 COLL VENOUS BLD VENIPUNCTURE: CPT

## 2023-10-03 PROCEDURE — 85027 COMPLETE CBC AUTOMATED: CPT

## 2023-10-03 NOTE — PAT
Patient to apply Chlorhexadine wipes  to surgical area (as instructed) the night before procedure and the AM of procedure. Wipes provided.   Patient instructed to drink 20 ounces of Gatorade or Gatorlyte (if diabetic) and it needs to be completed 1 hour (for Main OR patients) or 2 hours ([facility]) before given arrival time for procedure (NO RED Gatorade and NO Gatorade Zero).    Patient verbalized understanding.   Per Anesthesia Request, patient instructed not to take their ACE/ARB medications on the AM of surgery.   It was noted during Pre Admission Testing that patient was wearing some form of fingernail polish (gel/regular) and/or acrylic/artificial nails.  Patient was told that polish and/or artificial nails must be removed for surgery.  If a patient had recent manicure, and would rather not remove polish or artificial nails. Then the minimum requirement is that the polish/artificial nails must be removed from the middle finger on each hand.  Patient verbalized understanding.    If patient was having surgery on an upper extremity, then the patient was instructed that fingernail polish/artificial fingernails must be removed for surgery.  NO EXCEPTIONS.  Patient verbalized understanding.    If patient was having surgery on a lower extremity, then the patient was instructed that toenail polish on both extremities must be removed for surgery.  NO EXCEPTIONS. Patient verbalized understanding.   An arrival time for procedure was not provided during PAT visit. If patient had any questions or concerns about their arrival time, they were instructed to contact their surgeon/physician.  Additionally, if the patient referred to an arrival time that was acquired from their my chart account, patient was encouraged to verify that time with their surgeon/physician. Arrival times are NOT provided in Pre Admission Testing Department.   Joanne Salinas was paged re: pt states she is taking Flagyl  500 BID d/t yeast infection.  Pt states she only has a few more doses left before her RX is complete.  No c/o of fever.  No orders were given during call.

## 2023-10-09 ENCOUNTER — ANESTHESIA EVENT (OUTPATIENT)
Dept: PERIOP | Facility: HOSPITAL | Age: 38
DRG: 621 | End: 2023-10-09
Payer: COMMERCIAL

## 2023-10-09 RX ORDER — SODIUM CHLORIDE, SODIUM LACTATE, POTASSIUM CHLORIDE, CALCIUM CHLORIDE 600; 310; 30; 20 MG/100ML; MG/100ML; MG/100ML; MG/100ML
9 INJECTION, SOLUTION INTRAVENOUS CONTINUOUS
Status: CANCELLED | OUTPATIENT
Start: 2023-10-09

## 2023-10-09 RX ORDER — FAMOTIDINE 20 MG/1
20 TABLET, FILM COATED ORAL ONCE
Status: CANCELLED | OUTPATIENT
Start: 2023-10-09 | End: 2023-10-09

## 2023-10-09 RX ORDER — FAMOTIDINE 10 MG/ML
20 INJECTION, SOLUTION INTRAVENOUS ONCE
Status: CANCELLED | OUTPATIENT
Start: 2023-10-09 | End: 2023-10-09

## 2023-10-09 RX ORDER — SODIUM CHLORIDE 0.9 % (FLUSH) 0.9 %
10 SYRINGE (ML) INJECTION AS NEEDED
Status: CANCELLED | OUTPATIENT
Start: 2023-10-09

## 2023-10-09 RX ORDER — SODIUM CHLORIDE 0.9 % (FLUSH) 0.9 %
10 SYRINGE (ML) INJECTION EVERY 12 HOURS SCHEDULED
Status: CANCELLED | OUTPATIENT
Start: 2023-10-09

## 2023-10-09 RX ORDER — SODIUM CHLORIDE 9 MG/ML
40 INJECTION, SOLUTION INTRAVENOUS AS NEEDED
Status: CANCELLED | OUTPATIENT
Start: 2023-10-09

## 2023-10-10 ENCOUNTER — HOSPITAL ENCOUNTER (INPATIENT)
Facility: HOSPITAL | Age: 38
LOS: 2 days | Discharge: HOME | DRG: 621 | End: 2023-10-12
Attending: SURGERY | Admitting: SURGERY
Payer: COMMERCIAL

## 2023-10-10 ENCOUNTER — ANESTHESIA (OUTPATIENT)
Dept: PERIOP | Facility: HOSPITAL | Age: 38
DRG: 621 | End: 2023-10-10
Payer: COMMERCIAL

## 2023-10-10 ENCOUNTER — ANESTHESIA EVENT CONVERTED (OUTPATIENT)
Dept: ANESTHESIOLOGY | Facility: HOSPITAL | Age: 38
DRG: 621 | End: 2023-10-10
Payer: COMMERCIAL

## 2023-10-10 DIAGNOSIS — K44.9 HIATAL HERNIA: ICD-10-CM

## 2023-10-10 LAB
B-HCG UR QL: NEGATIVE
EXPIRATION DATE: NORMAL
GLUCOSE BLDC GLUCOMTR-MCNC: 160 MG/DL (ref 70–130)
GLUCOSE BLDC GLUCOMTR-MCNC: 214 MG/DL (ref 70–130)
GLUCOSE BLDC GLUCOMTR-MCNC: 268 MG/DL (ref 70–130)
GLUCOSE BLDC GLUCOMTR-MCNC: 306 MG/DL (ref 70–130)
INTERNAL NEGATIVE CONTROL: NEGATIVE
INTERNAL POSITIVE CONTROL: POSITIVE
Lab: NORMAL

## 2023-10-10 PROCEDURE — 25010000002 GLUCAGON (RDNA) PER 1 MG: Performed by: NURSE ANESTHETIST, CERTIFIED REGISTERED

## 2023-10-10 PROCEDURE — 0FB04ZX EXCISION OF LIVER, PERCUTANEOUS ENDOSCOPIC APPROACH, DIAGNOSTIC: ICD-10-PCS | Performed by: SURGERY

## 2023-10-10 PROCEDURE — 25010000002 BUPIVACAINE (PF) 0.25 % SOLUTION: Performed by: NURSE ANESTHETIST, CERTIFIED REGISTERED

## 2023-10-10 PROCEDURE — 88307 TISSUE EXAM BY PATHOLOGIST: CPT | Performed by: SURGERY

## 2023-10-10 PROCEDURE — 47001 NDL BIOPSY LVR TM OTH MAJ PX: CPT | Performed by: SURGERY

## 2023-10-10 PROCEDURE — 25010000002 FENTANYL CITRATE (PF) 50 MCG/ML SOLUTION

## 2023-10-10 PROCEDURE — 43775 LAP SLEEVE GASTRECTOMY: CPT | Performed by: SURGERY

## 2023-10-10 PROCEDURE — 63710000001 INSULIN LISPRO (HUMAN) PER 5 UNITS: Performed by: SURGERY

## 2023-10-10 PROCEDURE — 25010000002 MIDAZOLAM PER 1 MG: Performed by: NURSE ANESTHETIST, CERTIFIED REGISTERED

## 2023-10-10 PROCEDURE — S2900 ROBOTIC SURGICAL SYSTEM: HCPCS | Performed by: SURGERY

## 2023-10-10 PROCEDURE — 25010000002 PROPOFOL 10 MG/ML EMULSION: Performed by: NURSE ANESTHETIST, CERTIFIED REGISTERED

## 2023-10-10 PROCEDURE — 25010000002 HYDRALAZINE PER 20 MG: Performed by: SURGERY

## 2023-10-10 PROCEDURE — 25010000002 ONDANSETRON PER 1 MG

## 2023-10-10 PROCEDURE — 94799 UNLISTED PULMONARY SVC/PX: CPT

## 2023-10-10 PROCEDURE — 25010000002 SUGAMMADEX 500 MG/5ML SOLUTION: Performed by: NURSE ANESTHETIST, CERTIFIED REGISTERED

## 2023-10-10 PROCEDURE — 25010000002 DEXAMETHASONE PER 1 MG: Performed by: NURSE ANESTHETIST, CERTIFIED REGISTERED

## 2023-10-10 PROCEDURE — 0BQT4ZZ REPAIR DIAPHRAGM, PERCUTANEOUS ENDOSCOPIC APPROACH: ICD-10-PCS | Performed by: SURGERY

## 2023-10-10 PROCEDURE — C9153 AMISULPRIDE (ANTIEMETIC) 10 MG/4ML SOLUTION: HCPCS | Performed by: NURSE ANESTHETIST, CERTIFIED REGISTERED

## 2023-10-10 PROCEDURE — 25810000003 LACTATED RINGERS PER 1000 ML: Performed by: SURGERY

## 2023-10-10 PROCEDURE — 25010000002 ONDANSETRON PER 1 MG: Performed by: NURSE ANESTHETIST, CERTIFIED REGISTERED

## 2023-10-10 PROCEDURE — 25010000002 DEXAMETHASONE SODIUM PHOSPHATE 10 MG/ML SOLUTION: Performed by: NURSE ANESTHETIST, CERTIFIED REGISTERED

## 2023-10-10 PROCEDURE — 43775 LAP SLEEVE GASTRECTOMY: CPT | Performed by: PHYSICIAN ASSISTANT

## 2023-10-10 PROCEDURE — 25810000003 SODIUM CHLORIDE 0.9 % SOLUTION 250 ML FLEX CONT: Performed by: SURGERY

## 2023-10-10 PROCEDURE — 94660 CPAP INITIATION&MGMT: CPT

## 2023-10-10 PROCEDURE — 25010000002 PROCHLORPERAZINE 10 MG/2ML SOLUTION: Performed by: SURGERY

## 2023-10-10 PROCEDURE — 82948 REAGENT STRIP/BLOOD GLUCOSE: CPT

## 2023-10-10 PROCEDURE — 8E0W4CZ ROBOTIC ASSISTED PROCEDURE OF TRUNK REGION, PERCUTANEOUS ENDOSCOPIC APPROACH: ICD-10-PCS | Performed by: SURGERY

## 2023-10-10 PROCEDURE — 0DJ08ZZ INSPECTION OF UPPER INTESTINAL TRACT, VIA NATURAL OR ARTIFICIAL OPENING ENDOSCOPIC: ICD-10-PCS | Performed by: SURGERY

## 2023-10-10 PROCEDURE — 0DB64Z3 EXCISION OF STOMACH, PERCUTANEOUS ENDOSCOPIC APPROACH, VERTICAL: ICD-10-PCS | Performed by: SURGERY

## 2023-10-10 PROCEDURE — 25010000002 CEFAZOLIN PER 500 MG: Performed by: SURGERY

## 2023-10-10 PROCEDURE — 25010000002 ENOXAPARIN PER 10 MG: Performed by: SURGERY

## 2023-10-10 PROCEDURE — 25010000002 AMISULPRIDE (ANTIEMETIC) 10 MG/4ML SOLUTION: Performed by: NURSE ANESTHETIST, CERTIFIED REGISTERED

## 2023-10-10 PROCEDURE — 63710000001 INSULIN DETEMIR PER 5 UNITS: Performed by: SURGERY

## 2023-10-10 PROCEDURE — 25010000002 HYDROMORPHONE PER 4 MG: Performed by: SURGERY

## 2023-10-10 PROCEDURE — 81025 URINE PREGNANCY TEST: CPT | Performed by: ANESTHESIOLOGY

## 2023-10-10 PROCEDURE — 25810000003 SODIUM CHLORIDE 0.9 % SOLUTION: Performed by: SURGERY

## 2023-10-10 PROCEDURE — 25010000002 ONDANSETRON PER 1 MG: Performed by: SURGERY

## 2023-10-10 PROCEDURE — 25010000002 HYDROMORPHONE 1 MG/ML SOLUTION

## 2023-10-10 PROCEDURE — 25010000002 FENTANYL CITRATE (PF) 100 MCG/2ML SOLUTION: Performed by: NURSE ANESTHETIST, CERTIFIED REGISTERED

## 2023-10-10 PROCEDURE — 88313 SPECIAL STAINS GROUP 2: CPT | Performed by: SURGERY

## 2023-10-10 PROCEDURE — 25010000002 HYDRALAZINE PER 20 MG

## 2023-10-10 DEVICE — STAPLER 60 RELOAD WHITE
Type: IMPLANTABLE DEVICE | Site: STOMACH | Status: FUNCTIONAL
Brand: SUREFORM

## 2023-10-10 DEVICE — STAPLER 60 RELOAD BLUE
Type: IMPLANTABLE DEVICE | Site: STOMACH | Status: FUNCTIONAL
Brand: SUREFORM

## 2023-10-10 RX ORDER — CEFAZOLIN SODIUM IN 0.9 % NACL 3 G/100 ML
3 INTRAVENOUS SOLUTION, PIGGYBACK (ML) INTRAVENOUS ONCE
Status: COMPLETED | OUTPATIENT
Start: 2023-10-10 | End: 2023-10-10

## 2023-10-10 RX ORDER — SODIUM CHLORIDE AND POTASSIUM CHLORIDE 150; 450 MG/100ML; MG/100ML
100 INJECTION, SOLUTION INTRAVENOUS CONTINUOUS
Status: DISCONTINUED | OUTPATIENT
Start: 2023-10-11 | End: 2023-10-12 | Stop reason: HOSPADM

## 2023-10-10 RX ORDER — METOCLOPRAMIDE HYDROCHLORIDE 5 MG/ML
10 INJECTION INTRAMUSCULAR; INTRAVENOUS EVERY 6 HOURS PRN
Status: DISCONTINUED | OUTPATIENT
Start: 2023-10-10 | End: 2023-10-12 | Stop reason: HOSPADM

## 2023-10-10 RX ORDER — INSULIN LISPRO 100 [IU]/ML
1-200 INJECTION, SOLUTION INTRAVENOUS; SUBCUTANEOUS AS NEEDED
Status: DISCONTINUED | OUTPATIENT
Start: 2023-10-10 | End: 2023-10-10

## 2023-10-10 RX ORDER — FENTANYL CITRATE 50 UG/ML
50 INJECTION, SOLUTION INTRAMUSCULAR; INTRAVENOUS
Status: DISCONTINUED | OUTPATIENT
Start: 2023-10-10 | End: 2023-10-10 | Stop reason: HOSPADM

## 2023-10-10 RX ORDER — HYDRALAZINE HYDROCHLORIDE 20 MG/ML
10 INJECTION INTRAMUSCULAR; INTRAVENOUS
Status: COMPLETED | OUTPATIENT
Start: 2023-10-10 | End: 2023-10-10

## 2023-10-10 RX ORDER — ACETAMINOPHEN 500 MG
1000 TABLET ORAL ONCE
Status: COMPLETED | OUTPATIENT
Start: 2023-10-10 | End: 2023-10-10

## 2023-10-10 RX ORDER — PANTOPRAZOLE SODIUM 40 MG/10ML
40 INJECTION, POWDER, LYOPHILIZED, FOR SOLUTION INTRAVENOUS ONCE
Status: COMPLETED | OUTPATIENT
Start: 2023-10-10 | End: 2023-10-10

## 2023-10-10 RX ORDER — BUPIVACAINE HYDROCHLORIDE 2.5 MG/ML
INJECTION, SOLUTION EPIDURAL; INFILTRATION; INTRACAUDAL
Status: COMPLETED | OUTPATIENT
Start: 2023-10-10 | End: 2023-10-10

## 2023-10-10 RX ORDER — ONDANSETRON 4 MG/1
4 TABLET, FILM COATED ORAL EVERY 6 HOURS PRN
Status: DISCONTINUED | OUTPATIENT
Start: 2023-10-14 | End: 2023-10-12 | Stop reason: HOSPADM

## 2023-10-10 RX ORDER — CYANOCOBALAMIN 1000 UG/ML
1000 INJECTION, SOLUTION INTRAMUSCULAR; SUBCUTANEOUS ONCE
Status: COMPLETED | OUTPATIENT
Start: 2023-10-11 | End: 2023-10-11

## 2023-10-10 RX ORDER — FENTANYL CITRATE 50 UG/ML
INJECTION, SOLUTION INTRAMUSCULAR; INTRAVENOUS
Status: COMPLETED
Start: 2023-10-10 | End: 2023-10-10

## 2023-10-10 RX ORDER — LOSARTAN POTASSIUM 50 MG/1
100 TABLET ORAL DAILY
Status: DISCONTINUED | OUTPATIENT
Start: 2023-10-10 | End: 2023-10-12 | Stop reason: HOSPADM

## 2023-10-10 RX ORDER — NICOTINE POLACRILEX 4 MG
15 LOZENGE BUCCAL
Status: DISCONTINUED | OUTPATIENT
Start: 2023-10-10 | End: 2023-10-12 | Stop reason: HOSPADM

## 2023-10-10 RX ORDER — ENOXAPARIN SODIUM 100 MG/ML
40 INJECTION SUBCUTANEOUS ONCE
Status: DISCONTINUED | OUTPATIENT
Start: 2023-10-10 | End: 2023-10-10

## 2023-10-10 RX ORDER — NICOTINE POLACRILEX 4 MG
15 LOZENGE BUCCAL
Status: DISCONTINUED | OUTPATIENT
Start: 2023-10-10 | End: 2023-10-10

## 2023-10-10 RX ORDER — DEXAMETHASONE SODIUM PHOSPHATE 10 MG/ML
INJECTION, SOLUTION INTRAMUSCULAR; INTRAVENOUS
Status: COMPLETED | OUTPATIENT
Start: 2023-10-10 | End: 2023-10-10

## 2023-10-10 RX ORDER — SODIUM CHLORIDE 9 MG/ML
40 INJECTION, SOLUTION INTRAVENOUS AS NEEDED
Status: DISCONTINUED | OUTPATIENT
Start: 2023-10-10 | End: 2023-10-12 | Stop reason: HOSPADM

## 2023-10-10 RX ORDER — ACETAMINOPHEN 160 MG/5ML
1000 SOLUTION ORAL EVERY 8 HOURS SCHEDULED
Status: ACTIVE | OUTPATIENT
Start: 2023-10-10 | End: 2023-10-12

## 2023-10-10 RX ORDER — DEXAMETHASONE SODIUM PHOSPHATE 4 MG/ML
INJECTION, SOLUTION INTRA-ARTICULAR; INTRALESIONAL; INTRAMUSCULAR; INTRAVENOUS; SOFT TISSUE AS NEEDED
Status: DISCONTINUED | OUTPATIENT
Start: 2023-10-10 | End: 2023-10-10 | Stop reason: SURG

## 2023-10-10 RX ORDER — CHLORHEXIDINE GLUCONATE ORAL RINSE 1.2 MG/ML
15 SOLUTION DENTAL
Status: COMPLETED | OUTPATIENT
Start: 2023-10-10 | End: 2023-10-10

## 2023-10-10 RX ORDER — ONDANSETRON 2 MG/ML
4 INJECTION INTRAMUSCULAR; INTRAVENOUS EVERY 6 HOURS PRN
Status: DISCONTINUED | OUTPATIENT
Start: 2023-10-10 | End: 2023-10-12 | Stop reason: HOSPADM

## 2023-10-10 RX ORDER — LIDOCAINE HYDROCHLORIDE 10 MG/ML
INJECTION, SOLUTION EPIDURAL; INFILTRATION; INTRACAUDAL; PERINEURAL AS NEEDED
Status: DISCONTINUED | OUTPATIENT
Start: 2023-10-10 | End: 2023-10-10 | Stop reason: SURG

## 2023-10-10 RX ORDER — SCOLOPAMINE TRANSDERMAL SYSTEM 1 MG/1
1 PATCH, EXTENDED RELEASE TRANSDERMAL ONCE
Status: DISCONTINUED | OUTPATIENT
Start: 2023-10-10 | End: 2023-10-10

## 2023-10-10 RX ORDER — SIMETHICONE 80 MG
TABLET,CHEWABLE ORAL
Status: COMPLETED
Start: 2023-10-10 | End: 2023-10-10

## 2023-10-10 RX ORDER — PROCHLORPERAZINE MALEATE 10 MG
10 TABLET ORAL EVERY 6 HOURS PRN
Status: DISCONTINUED | OUTPATIENT
Start: 2023-10-10 | End: 2023-10-12 | Stop reason: HOSPADM

## 2023-10-10 RX ORDER — IBUPROFEN 600 MG/1
TABLET ORAL AS NEEDED
Status: DISCONTINUED | OUTPATIENT
Start: 2023-10-10 | End: 2023-10-10 | Stop reason: SURG

## 2023-10-10 RX ORDER — ENOXAPARIN SODIUM 100 MG/ML
60 INJECTION SUBCUTANEOUS ONCE
Status: DISCONTINUED | OUTPATIENT
Start: 2023-10-10 | End: 2023-10-10 | Stop reason: HOSPADM

## 2023-10-10 RX ORDER — LIDOCAINE HYDROCHLORIDE 10 MG/ML
0.5 INJECTION, SOLUTION EPIDURAL; INFILTRATION; INTRACAUDAL; PERINEURAL ONCE AS NEEDED
Status: COMPLETED | OUTPATIENT
Start: 2023-10-10 | End: 2023-10-10

## 2023-10-10 RX ORDER — MAGNESIUM HYDROXIDE 1200 MG/15ML
LIQUID ORAL AS NEEDED
Status: DISCONTINUED | OUTPATIENT
Start: 2023-10-10 | End: 2023-10-10 | Stop reason: HOSPADM

## 2023-10-10 RX ORDER — SUCCINYLCHOLINE/SOD CL,ISO/PF 200MG/10ML
SYRINGE (ML) INTRAVENOUS AS NEEDED
Status: DISCONTINUED | OUTPATIENT
Start: 2023-10-10 | End: 2023-10-10 | Stop reason: SURG

## 2023-10-10 RX ORDER — SIMETHICONE 80 MG
80 TABLET,CHEWABLE ORAL 4 TIMES DAILY PRN
Status: DISCONTINUED | OUTPATIENT
Start: 2023-10-10 | End: 2023-10-12 | Stop reason: HOSPADM

## 2023-10-10 RX ORDER — OXYCODONE HYDROCHLORIDE 5 MG/1
5 TABLET ORAL EVERY 6 HOURS PRN
Status: DISCONTINUED | OUTPATIENT
Start: 2023-10-10 | End: 2023-10-12 | Stop reason: HOSPADM

## 2023-10-10 RX ORDER — HYDROMORPHONE HYDROCHLORIDE 1 MG/ML
0.5 INJECTION, SOLUTION INTRAMUSCULAR; INTRAVENOUS; SUBCUTANEOUS
Status: DISCONTINUED | OUTPATIENT
Start: 2023-10-10 | End: 2023-10-12 | Stop reason: HOSPADM

## 2023-10-10 RX ORDER — GABAPENTIN 250 MG/5ML
100 SOLUTION ORAL 3 TIMES DAILY
Status: ACTIVE | OUTPATIENT
Start: 2023-10-10 | End: 2023-10-12

## 2023-10-10 RX ORDER — ONDANSETRON 2 MG/ML
INJECTION INTRAMUSCULAR; INTRAVENOUS AS NEEDED
Status: DISCONTINUED | OUTPATIENT
Start: 2023-10-10 | End: 2023-10-10 | Stop reason: SURG

## 2023-10-10 RX ORDER — MIDAZOLAM HYDROCHLORIDE 1 MG/ML
1 INJECTION INTRAMUSCULAR; INTRAVENOUS
Status: DISCONTINUED | OUTPATIENT
Start: 2023-10-10 | End: 2023-10-10 | Stop reason: HOSPADM

## 2023-10-10 RX ORDER — ALPRAZOLAM 0.25 MG/1
0.25 TABLET ORAL 2 TIMES DAILY PRN
Status: DISCONTINUED | OUTPATIENT
Start: 2023-10-10 | End: 2023-10-12 | Stop reason: HOSPADM

## 2023-10-10 RX ORDER — SODIUM CHLORIDE, SODIUM LACTATE, POTASSIUM CHLORIDE, CALCIUM CHLORIDE 600; 310; 30; 20 MG/100ML; MG/100ML; MG/100ML; MG/100ML
75 INJECTION, SOLUTION INTRAVENOUS CONTINUOUS
Status: ACTIVE | OUTPATIENT
Start: 2023-10-10 | End: 2023-10-11

## 2023-10-10 RX ORDER — DEXTROSE MONOHYDRATE 25 G/50ML
10-50 INJECTION, SOLUTION INTRAVENOUS
Status: DISCONTINUED | OUTPATIENT
Start: 2023-10-10 | End: 2023-10-10

## 2023-10-10 RX ORDER — SODIUM CHLORIDE 0.9 % (FLUSH) 0.9 %
10 SYRINGE (ML) INJECTION AS NEEDED
Status: DISCONTINUED | OUTPATIENT
Start: 2023-10-10 | End: 2023-10-10 | Stop reason: HOSPADM

## 2023-10-10 RX ORDER — GABAPENTIN 100 MG/1
100 CAPSULE ORAL 3 TIMES DAILY
Status: DISPENSED | OUTPATIENT
Start: 2023-10-10 | End: 2023-10-12

## 2023-10-10 RX ORDER — IBUPROFEN 600 MG/1
1 TABLET ORAL
Status: DISCONTINUED | OUTPATIENT
Start: 2023-10-10 | End: 2023-10-10

## 2023-10-10 RX ORDER — NICARDIPINE HYDROCHLORIDE 2.5 MG/ML
INJECTION INTRAVENOUS
Status: DISPENSED
Start: 2023-10-10 | End: 2023-10-11

## 2023-10-10 RX ORDER — PANTOPRAZOLE SODIUM 40 MG/1
40 TABLET, DELAYED RELEASE ORAL
Status: DISCONTINUED | OUTPATIENT
Start: 2023-10-11 | End: 2023-10-12 | Stop reason: HOSPADM

## 2023-10-10 RX ORDER — SODIUM CHLORIDE 0.9 % (FLUSH) 0.9 %
3 SYRINGE (ML) INJECTION EVERY 12 HOURS SCHEDULED
Status: DISCONTINUED | OUTPATIENT
Start: 2023-10-10 | End: 2023-10-10 | Stop reason: HOSPADM

## 2023-10-10 RX ORDER — SODIUM CHLORIDE 0.9 % (FLUSH) 0.9 %
1-10 SYRINGE (ML) INJECTION AS NEEDED
Status: DISCONTINUED | OUTPATIENT
Start: 2023-10-10 | End: 2023-10-12 | Stop reason: HOSPADM

## 2023-10-10 RX ORDER — HYDROMORPHONE HYDROCHLORIDE 1 MG/ML
0.5 INJECTION, SOLUTION INTRAMUSCULAR; INTRAVENOUS; SUBCUTANEOUS
Status: DISCONTINUED | OUTPATIENT
Start: 2023-10-10 | End: 2023-10-10 | Stop reason: HOSPADM

## 2023-10-10 RX ORDER — ENOXAPARIN SODIUM 100 MG/ML
40 INJECTION SUBCUTANEOUS DAILY
Status: DISCONTINUED | OUTPATIENT
Start: 2023-10-11 | End: 2023-10-12 | Stop reason: HOSPADM

## 2023-10-10 RX ORDER — DEXTROSE MONOHYDRATE 25 G/50ML
25 INJECTION, SOLUTION INTRAVENOUS
Status: DISCONTINUED | OUTPATIENT
Start: 2023-10-10 | End: 2023-10-12 | Stop reason: HOSPADM

## 2023-10-10 RX ORDER — NALOXONE HCL 0.4 MG/ML
0.1 VIAL (ML) INJECTION
Status: DISCONTINUED | OUTPATIENT
Start: 2023-10-10 | End: 2023-10-12 | Stop reason: HOSPADM

## 2023-10-10 RX ORDER — FENTANYL CITRATE 50 UG/ML
INJECTION, SOLUTION INTRAMUSCULAR; INTRAVENOUS AS NEEDED
Status: DISCONTINUED | OUTPATIENT
Start: 2023-10-10 | End: 2023-10-10 | Stop reason: SURG

## 2023-10-10 RX ORDER — INSULIN LISPRO 100 [IU]/ML
1-200 INJECTION, SOLUTION INTRAVENOUS; SUBCUTANEOUS
Status: DISCONTINUED | OUTPATIENT
Start: 2023-10-10 | End: 2023-10-10

## 2023-10-10 RX ORDER — PROCHLORPERAZINE EDISYLATE 5 MG/ML
10 INJECTION INTRAMUSCULAR; INTRAVENOUS EVERY 6 HOURS PRN
Status: DISCONTINUED | OUTPATIENT
Start: 2023-10-10 | End: 2023-10-12 | Stop reason: HOSPADM

## 2023-10-10 RX ORDER — SODIUM CHLORIDE 0.9 % (FLUSH) 0.9 %
10 SYRINGE (ML) INJECTION EVERY 12 HOURS SCHEDULED
Status: DISCONTINUED | OUTPATIENT
Start: 2023-10-10 | End: 2023-10-12 | Stop reason: HOSPADM

## 2023-10-10 RX ORDER — PROPOFOL 10 MG/ML
VIAL (ML) INTRAVENOUS AS NEEDED
Status: DISCONTINUED | OUTPATIENT
Start: 2023-10-10 | End: 2023-10-10 | Stop reason: SURG

## 2023-10-10 RX ORDER — ROCURONIUM BROMIDE 10 MG/ML
INJECTION, SOLUTION INTRAVENOUS AS NEEDED
Status: DISCONTINUED | OUTPATIENT
Start: 2023-10-10 | End: 2023-10-10 | Stop reason: SURG

## 2023-10-10 RX ORDER — METOCLOPRAMIDE 10 MG/1
10 TABLET ORAL EVERY 6 HOURS PRN
Status: DISCONTINUED | OUTPATIENT
Start: 2023-10-10 | End: 2023-10-12 | Stop reason: HOSPADM

## 2023-10-10 RX ORDER — ENOXAPARIN SODIUM 100 MG/ML
INJECTION SUBCUTANEOUS AS NEEDED
Status: DISCONTINUED | OUTPATIENT
Start: 2023-10-10 | End: 2023-10-10 | Stop reason: HOSPADM

## 2023-10-10 RX ORDER — GABAPENTIN 300 MG/1
600 CAPSULE ORAL ONCE
Status: COMPLETED | OUTPATIENT
Start: 2023-10-10 | End: 2023-10-10

## 2023-10-10 RX ORDER — HYDRALAZINE HYDROCHLORIDE 20 MG/ML
INJECTION INTRAMUSCULAR; INTRAVENOUS
Status: COMPLETED
Start: 2023-10-10 | End: 2023-10-10

## 2023-10-10 RX ORDER — INSULIN LISPRO 100 [IU]/ML
2-7 INJECTION, SOLUTION INTRAVENOUS; SUBCUTANEOUS
Status: DISCONTINUED | OUTPATIENT
Start: 2023-10-10 | End: 2023-10-12 | Stop reason: HOSPADM

## 2023-10-10 RX ORDER — MIDAZOLAM HYDROCHLORIDE 1 MG/ML
INJECTION INTRAMUSCULAR; INTRAVENOUS AS NEEDED
Status: DISCONTINUED | OUTPATIENT
Start: 2023-10-10 | End: 2023-10-10 | Stop reason: SURG

## 2023-10-10 RX ORDER — DIPHENHYDRAMINE HYDROCHLORIDE 50 MG/ML
25 INJECTION INTRAMUSCULAR; INTRAVENOUS EVERY 4 HOURS PRN
Status: DISCONTINUED | OUTPATIENT
Start: 2023-10-10 | End: 2023-10-12 | Stop reason: HOSPADM

## 2023-10-10 RX ORDER — ONDANSETRON 2 MG/ML
INJECTION INTRAMUSCULAR; INTRAVENOUS
Status: COMPLETED
Start: 2023-10-10 | End: 2023-10-10

## 2023-10-10 RX ORDER — CEFAZOLIN SODIUM IN 0.9 % NACL 3 G/100 ML
3000 INTRAVENOUS SOLUTION, PIGGYBACK (ML) INTRAVENOUS EVERY 8 HOURS
Status: COMPLETED | OUTPATIENT
Start: 2023-10-10 | End: 2023-10-11

## 2023-10-10 RX ORDER — PROMETHAZINE HYDROCHLORIDE 25 MG/1
12.5 TABLET ORAL EVERY 6 HOURS PRN
Status: DISCONTINUED | OUTPATIENT
Start: 2023-10-10 | End: 2023-10-12 | Stop reason: HOSPADM

## 2023-10-10 RX ORDER — SODIUM CHLORIDE 9 MG/ML
150 INJECTION, SOLUTION INTRAVENOUS CONTINUOUS
Status: DISCONTINUED | OUTPATIENT
Start: 2023-10-10 | End: 2023-10-12

## 2023-10-10 RX ORDER — FIBRINOGEN HUMAN, HUMAN THROMBIN 10 ML
KIT TOPICAL AS NEEDED
Status: DISCONTINUED | OUTPATIENT
Start: 2023-10-10 | End: 2023-10-10 | Stop reason: HOSPADM

## 2023-10-10 RX ORDER — IBUPROFEN 600 MG/1
1 TABLET ORAL
Status: DISCONTINUED | OUTPATIENT
Start: 2023-10-10 | End: 2023-10-12 | Stop reason: HOSPADM

## 2023-10-10 RX ORDER — HYDROMORPHONE HYDROCHLORIDE 2 MG/1
2 TABLET ORAL EVERY 4 HOURS PRN
Status: DISCONTINUED | OUTPATIENT
Start: 2023-10-10 | End: 2023-10-12 | Stop reason: HOSPADM

## 2023-10-10 RX ORDER — ACETAMINOPHEN 500 MG
1000 TABLET ORAL EVERY 8 HOURS SCHEDULED
Status: DISPENSED | OUTPATIENT
Start: 2023-10-10 | End: 2023-10-12

## 2023-10-10 RX ORDER — PANTOPRAZOLE SODIUM 40 MG/10ML
40 INJECTION, POWDER, LYOPHILIZED, FOR SOLUTION INTRAVENOUS ONCE
Qty: 10 ML | Refills: 0 | Status: COMPLETED | OUTPATIENT
Start: 2023-10-10 | End: 2023-10-10

## 2023-10-10 RX ORDER — SODIUM CHLORIDE 9 MG/ML
40 INJECTION, SOLUTION INTRAVENOUS AS NEEDED
Status: DISCONTINUED | OUTPATIENT
Start: 2023-10-10 | End: 2023-10-10 | Stop reason: HOSPADM

## 2023-10-10 RX ADMIN — LIDOCAINE HYDROCHLORIDE 50 MG: 10 INJECTION, SOLUTION EPIDURAL; INFILTRATION; INTRACAUDAL; PERINEURAL at 10:23

## 2023-10-10 RX ADMIN — SODIUM CHLORIDE 1000 ML: 9 INJECTION, SOLUTION INTRAVENOUS at 08:30

## 2023-10-10 RX ADMIN — HYDROMORPHONE HYDROCHLORIDE 0.5 MG: 1 INJECTION, SOLUTION INTRAMUSCULAR; INTRAVENOUS; SUBCUTANEOUS at 12:43

## 2023-10-10 RX ADMIN — SIMETHICONE 80 MG: 80 TABLET, CHEWABLE ORAL at 12:59

## 2023-10-10 RX ADMIN — Medication 10 ML: at 14:39

## 2023-10-10 RX ADMIN — PROCHLORPERAZINE EDISYLATE 10 MG: 5 INJECTION INTRAMUSCULAR; INTRAVENOUS at 14:39

## 2023-10-10 RX ADMIN — ONDANSETRON 4 MG: 2 INJECTION INTRAMUSCULAR; INTRAVENOUS at 11:50

## 2023-10-10 RX ADMIN — NICARDIPINE HYDROCHLORIDE 5 MG/HR: 25 INJECTION, SOLUTION INTRAVENOUS at 23:31

## 2023-10-10 RX ADMIN — HYDROMORPHONE HYDROCHLORIDE 0.5 MG: 1 INJECTION, SOLUTION INTRAMUSCULAR; INTRAVENOUS; SUBCUTANEOUS at 12:25

## 2023-10-10 RX ADMIN — ONDANSETRON 4 MG: 2 INJECTION INTRAMUSCULAR; INTRAVENOUS at 13:06

## 2023-10-10 RX ADMIN — HYDROMORPHONE HYDROCHLORIDE 0.5 MG: 1 INJECTION, SOLUTION INTRAMUSCULAR; INTRAVENOUS; SUBCUTANEOUS at 17:58

## 2023-10-10 RX ADMIN — NICARDIPINE HYDROCHLORIDE 5 MG/HR: 25 INJECTION, SOLUTION INTRAVENOUS at 12:37

## 2023-10-10 RX ADMIN — GABAPENTIN 100 MG: 100 CAPSULE ORAL at 20:10

## 2023-10-10 RX ADMIN — SIMETHICONE 80 MG: 80 TABLET, CHEWABLE ORAL at 19:33

## 2023-10-10 RX ADMIN — INSULIN LISPRO 4 UNITS: 100 INJECTION, SOLUTION INTRAVENOUS; SUBCUTANEOUS at 17:58

## 2023-10-10 RX ADMIN — ACETAMINOPHEN 1000 MG: 500 TABLET ORAL at 21:39

## 2023-10-10 RX ADMIN — ONDANSETRON 4 MG: 2 INJECTION INTRAMUSCULAR; INTRAVENOUS at 19:33

## 2023-10-10 RX ADMIN — FENTANYL CITRATE 50 MCG: 50 INJECTION, SOLUTION INTRAMUSCULAR; INTRAVENOUS at 12:35

## 2023-10-10 RX ADMIN — FENTANYL CITRATE 100 MCG: 50 INJECTION, SOLUTION INTRAMUSCULAR; INTRAVENOUS at 10:23

## 2023-10-10 RX ADMIN — BUPIVACAINE HYDROCHLORIDE 60 ML: 2.5 INJECTION, SOLUTION EPIDURAL; INFILTRATION; INTRACAUDAL; PERINEURAL at 10:26

## 2023-10-10 RX ADMIN — PROPOFOL 200 MG: 10 INJECTION, EMULSION INTRAVENOUS at 10:23

## 2023-10-10 RX ADMIN — DEXAMETHASONE SODIUM PHOSPHATE 4 MG: 10 INJECTION, SOLUTION INTRAMUSCULAR; INTRAVENOUS at 10:26

## 2023-10-10 RX ADMIN — CEFAZOLIN 3000 MG: 10 INJECTION, POWDER, FOR SOLUTION INTRAVENOUS at 17:59

## 2023-10-10 RX ADMIN — INSULIN DETEMIR 25 UNITS: 100 INJECTION, SOLUTION SUBCUTANEOUS at 21:39

## 2023-10-10 RX ADMIN — SCOPOLAMINE 1 PATCH: 1.5 PATCH, EXTENDED RELEASE TRANSDERMAL at 08:45

## 2023-10-10 RX ADMIN — Medication 10 ML: at 08:45

## 2023-10-10 RX ADMIN — ACETAMINOPHEN 1000 MG: 500 TABLET ORAL at 08:40

## 2023-10-10 RX ADMIN — HYDRALAZINE HYDROCHLORIDE 10 MG: 20 INJECTION INTRAMUSCULAR; INTRAVENOUS at 20:46

## 2023-10-10 RX ADMIN — CHLORHEXIDINE GLUCONATE 15 ML: 1.2 SOLUTION ORAL at 08:45

## 2023-10-10 RX ADMIN — MIDAZOLAM HYDROCHLORIDE 2 MG: 1 INJECTION, SOLUTION INTRAMUSCULAR; INTRAVENOUS at 10:18

## 2023-10-10 RX ADMIN — PANTOPRAZOLE SODIUM 40 MG: 40 INJECTION, POWDER, FOR SOLUTION INTRAVENOUS at 08:35

## 2023-10-10 RX ADMIN — GABAPENTIN 600 MG: 300 CAPSULE ORAL at 08:45

## 2023-10-10 RX ADMIN — ROCURONIUM BROMIDE 10 MG: 10 SOLUTION INTRAVENOUS at 10:23

## 2023-10-10 RX ADMIN — INSULIN LISPRO 5 UNITS: 100 INJECTION, SOLUTION INTRAVENOUS; SUBCUTANEOUS at 20:45

## 2023-10-10 RX ADMIN — ROCURONIUM BROMIDE 20 MG: 10 SOLUTION INTRAVENOUS at 11:20

## 2023-10-10 RX ADMIN — ACETAMINOPHEN 1000 MG: 500 TABLET ORAL at 14:41

## 2023-10-10 RX ADMIN — CEFAZOLIN 3 G: 10 INJECTION, POWDER, FOR SOLUTION INTRAVENOUS at 10:17

## 2023-10-10 RX ADMIN — DEXAMETHASONE SODIUM PHOSPHATE 12 MG: 4 INJECTION, SOLUTION INTRAMUSCULAR; INTRAVENOUS at 10:27

## 2023-10-10 RX ADMIN — HYDRALAZINE HYDROCHLORIDE 10 MG: 20 INJECTION INTRAMUSCULAR; INTRAVENOUS at 13:06

## 2023-10-10 RX ADMIN — ROCURONIUM BROMIDE 50 MG: 10 SOLUTION INTRAVENOUS at 10:25

## 2023-10-10 RX ADMIN — CHLORHEXIDINE GLUCONATE 15 ML: 1.2 SOLUTION ORAL at 09:00

## 2023-10-10 RX ADMIN — SODIUM CHLORIDE, POTASSIUM CHLORIDE, SODIUM LACTATE AND CALCIUM CHLORIDE 150 ML/HR: 600; 310; 30; 20 INJECTION, SOLUTION INTRAVENOUS at 23:31

## 2023-10-10 RX ADMIN — Medication 180 MG: at 10:23

## 2023-10-10 RX ADMIN — SODIUM CHLORIDE 150 ML/HR: 9 INJECTION, SOLUTION INTRAVENOUS at 09:21

## 2023-10-10 RX ADMIN — LOSARTAN POTASSIUM 100 MG: 50 TABLET, FILM COATED ORAL at 14:40

## 2023-10-10 RX ADMIN — GLUCAGON 1 MG: KIT at 11:22

## 2023-10-10 RX ADMIN — HYDROMORPHONE HYDROCHLORIDE 2 MG: 2 TABLET ORAL at 20:10

## 2023-10-10 RX ADMIN — OXYCODONE HYDROCHLORIDE 5 MG: 5 TABLET ORAL at 14:40

## 2023-10-10 RX ADMIN — SUGAMMADEX 500 MG: 100 INJECTION, SOLUTION INTRAVENOUS at 12:01

## 2023-10-10 RX ADMIN — SODIUM CHLORIDE, POTASSIUM CHLORIDE, SODIUM LACTATE AND CALCIUM CHLORIDE 150 ML/HR: 600; 310; 30; 20 INJECTION, SOLUTION INTRAVENOUS at 14:38

## 2023-10-10 RX ADMIN — PANTOPRAZOLE SODIUM 40 MG: 40 INJECTION, POWDER, LYOPHILIZED, FOR SOLUTION INTRAVENOUS at 14:39

## 2023-10-10 RX ADMIN — LIDOCAINE HYDROCHLORIDE 0.5 ML: 10 INJECTION, SOLUTION EPIDURAL; INFILTRATION; INTRACAUDAL; PERINEURAL at 08:16

## 2023-10-10 RX ADMIN — AMISULPRIDE 10 MG: 2.5 INJECTION, SOLUTION INTRAVENOUS at 11:59

## 2023-10-10 NOTE — PLAN OF CARE
Goal Outcome Evaluation:  Plan of Care Reviewed With: patient        Progress: improving  Outcome Evaluation: VSS on RA. Complaints of pain and  nausea relieved with PRNs. IVF infusing. Antibiotics infusing per order. Ambulating and voiding. Discussed plan of care with patient who verbalizes understanding.

## 2023-10-10 NOTE — ANESTHESIA PREPROCEDURE EVALUATION
Anesthesia Evaluation     Patient summary reviewed and Nursing notes reviewed   history of anesthetic complications:   NPO Solid Status: > 8 hours  NPO Liquid Status: > 2 hours           Airway   Mallampati: II  TM distance: >3 FB  Neck ROM: full  No difficulty expected  Dental      Pulmonary    (+) a smoker (Remote) Former,shortness of breath (chronic), sleep apnea on CPAP  (-) pneumonia (post op), asthma, pulmonary embolism  Cardiovascular     ECG reviewed    (+) hypertension, dysrhythmias (w/u included holter benign tachcardia)  (-) past MI, CAD, angina    ROS comment: ECG NSR M NS TW abn   ECHO EF 60% ow normal   GXT MPS normal no evidence of ischemia.EF = 65% low risk study      Neuro/Psych  (+) headaches  (-) seizures, CVA  GI/Hepatic/Renal/Endo    (+) obesity, morbid obesity, GERD, diabetes mellitus (A1C <6.5), thyroid problem (goiter)     Musculoskeletal     (+) back pain  Abdominal    Substance History      OB/GYN    (-)  Pregnant, Preeclampsia and history of pregnancy induced hypertension        Other                      Anesthesia Plan    ASA 3     general with block     intravenous induction     Anesthetic plan, risks, benefits, and alternatives have been provided, discussed and informed consent has been obtained with: patient.    Plan discussed with CRNA.      CODE STATUS:

## 2023-10-10 NOTE — ANESTHESIA POSTPROCEDURE EVALUATION
Patient: Edd Aguayo Madhu    Procedure Summary       Date: 10/10/23 Room / Location:  [facility]  /  [facility]    Anesthesia Start: 1017 Anesthesia Stop: 1223    Procedure: GASTRIC SLEEVE WITH HIATAL HERNIA LAPAROSCOPIC WITH DAVINCI ROBOT, EGD, LIVER BIOPSY (Abdomen) Diagnosis:       Body mass index (BMI) of 60.0-69.9 in adult      Hiatal hernia      (Body mass index (BMI) of 60.0-69.9 in adult [Z68.44])      (Hiatal hernia [K44.9])    Surgeons: Alice Atwood MD Provider: Den Puentes Jr., MD    Anesthesia Type: general with block ASA Status: 3            Anesthesia Type: general with block    Vitals  Vitals Value Taken Time   /99    Temp 97.6    Pulse 56 10/10/23 1223   Resp 12    SpO2 97 % 10/10/23 1223   Vitals shown include unfiled device data.        Post Anesthesia Care and Evaluation    Patient location during evaluation: PACU  Patient participation: complete - patient participated  Level of consciousness: awake and alert  Pain score: 4  Pain management: adequate    Airway patency: patent  Anesthetic complications: No anesthetic complications  PONV Status: none  Cardiovascular status: hemodynamically stable and acceptable  Respiratory status: nonlabored ventilation, acceptable and nasal cannula  Hydration status: acceptable

## 2023-10-10 NOTE — BRIEF OP NOTE
GASTRIC SLEEVE WITH HIATAL HERNIA LAPAROSCOPIC WITH DAVINCI ROBOT  Progress Note    Edd Leung  10/10/2023    Pre-op Diagnosis:   Body mass index (BMI) of 60.0-69.9 in adult [Z68.44]  Hiatal hernia [K44.9]       Post-Op Diagnosis Codes:     * Body mass index (BMI) of 60.0-69.9 in adult [Z68.44]     * Hiatal hernia [K44.9]    Procedure/CPTr Codes:  NH LAPS GSTRC RSTRICTIV PX LONGITUDINAL GASTRECTOMY [99695]  NH ESOPHAGOGASTRODUODENOSCOPY TRANSORAL DIAGNOSTIC [93366]  NH LAPS SURG ESOPG/GSTR FUNDOPLASTY [74018]      Procedure(s):  GASTRIC SLEEVE WITH HIATAL HERNIA LAPAROSCOPIC WITH DAVINCI ROBOT, EGD, LIVER BIOPSY              Surgeon(s):  Alice Atwood MD    Anesthesia: General with Block    Staff:   Circulator: Buddy Kowalski RN; Ismael Nair RN  Scrub Person: Lorena Romero; Ginny Marinelli  Nursing Assistant: Rylie Mcintyre CNA; Violet Spangler PCT  Assistant: Marc Trejo PA-C  Assistant: Marc Trejo PA-C      Estimated Blood Loss: minimal    Urine Voided: * No values recorded between 10/10/2023 10:17 AM and 10/10/2023 12:18 PM *    Specimens:                Specimens       ID Source Type Tests Collected By Collected At Frozen?    A Stomach Tissue TISSUE PATHOLOGY EXAM   Alice Atwood MD 10/10/23 1142 No    Description: SUB-TOTAL GASTRECTOMY    B Liver Tissue TISSUE PATHOLOGY EXAM   Alice Atwood MD 10/10/23 1148 No    Description: LIVER BIOPSY                  Drains: * No LDAs found *    Findings: Hepatomegaly.  Thick stomach.  Small hiatal hernia.        Complications: None immediate.    Assistant: Marc Trejo PA-C  was responsible for performing the following activities: Retraction, Suction, Irrigation, Suturing, Closing, Placing Dressing, and Held/Positioned Camera and their skilled assistance was necessary for the success of this case.    Alice Atwood MD     Date: 10/10/2023  Time: 12:44 EDT

## 2023-10-10 NOTE — ANESTHESIA PROCEDURE NOTES
Airway  Urgency: elective    Date/Time: 10/10/2023 10:24 AM  Airway not difficult    General Information and Staff    Patient location during procedure: OR  Anesthesiologist: Den Puentes Jr., MD  CRNA/CAA: Mylene Landin CRNA    Indications and Patient Condition  Indications for airway management: airway protection    Preoxygenated: yes  MILS not maintained throughout  Mask difficulty assessment: 0 - not attempted    Final Airway Details  Final airway type: endotracheal airway      Successful airway: ETT  Cuffed: yes   Successful intubation technique: video laryngoscopy and RSI  Facilitating devices/methods: cricoid pressure and intubating stylet  Endotracheal tube insertion site: oral  Blade: Melo  Blade size: 4  ETT size (mm): 7.5  Cormack-Lehane Classification: grade I - full view of glottis  Placement verified by: chest auscultation and capnometry   Measured from: lips  ETT/EBT  to lips (cm): 20  Number of attempts at approach: 1  Assessment: Cut to bottom/inner lip. Difficulty inserting Melo secondary to delayed relaxation and unable to open jaw. Patient experienced desaturation quickly. Melo quickly inserted and required manuevering around large tongue.    Additional Comments  Negative epigastric sounds, Breath sound equal bilaterally with symmetric chest rise and fall

## 2023-10-10 NOTE — OP NOTE
OPERATIVE REPORT    DATE: 10/10/23    PATIENT: Edd Leung    PREOPERATIVE DIAGNOSIS:    1. Morbid obesity with comorbidities  2.  Hiatal hernia    POSTOPERATIVE DIAGNOSIS:    1. Morbid obesity with multiple comorbidities.  2.  Hiatal hernia  3.  Hepatomegaly with steatosis    PROCEDURES PERFORMED:  1. Robotic assisted laparoscopic sleeve gastrectomy (85% subtotal vertical gastrectomy) and type I sliding hiatal hernia repair  2.  Esophagogastroduodenoscopy  3.  Laparoscopic Gianluca-Cut liver biopsy    SURGEON:  Alice Atwood M.D.    ASSISTANT: NICOLE John was instrumental in the success of the case and provided retraction, suction, camera holding, and skin closure.    ANESTHESIA:  General endotracheal with TAP block    ESTIMATED BLOOD LOSS:   minimal    FLUIDS:  Crystalloids.    SPECIMENS:  Subtotal gastrectomy, Gianluca-Cut liver biopsy    DRAINS:  None    COUNTS:  Correct.    COMPLICATIONS:  None.    FINDINGS: Hepatomegaly.  Thick stomach.  Small hiatal hernia.    INDICATIONS:   Edd Leung is a 38 y.o. year old female with morbid obesity and associated comorbidities who presents for elective laparoscopic sleeve gastrectomy. The patient has undergone our extensive preoperative education, teaching, and consent process. Everything is in order and they wish to proceed.         DESCRIPTION OF PROCEDURE:   The patient was brought to the operating room and placed supine upon the operating room table. SCDs were placed. The patient underwent uneventful general endotracheal anesthesia and bilateral TAP blocks per the anesthesiology staff.  She received subcutaneous Lovenox and was given Ancef. The abdomen was prepped with ChloraPrep and draped in the usual sterile fashion. An Ioban was used as well.  Timeout was performed.     A small transverse incision was made a few centimeters above and to the left of the umbilicus and the peritoneal cavity entered under direct camera visualization using  an 8 mm 0ø laparoscope and an OptiView trocar.  The abdomen was then insufflated to a pressure of 16 mmHg with CO2 gas. Exploratory laparoscopy revealed no evidence of injury from the entrance technique. The gallbladder was present and enlarged without evidence of inflammation.  The liver showed uniform macrovesicular steatosis and there was impressive hepatomegaly.  Two 8 mm ports were placed to the patient's left, lateral of the  first port, and a 12 mm port was placed to the patient's right.  Due to the size of the liver, I used a Zabrina liver retractor.  The Zabrina liver retractor was inserted through a subxiphoid incision and used to retract the left lobe of the liver.  The robot was docked, all safety checks were performed, and I moved to the robot console.     The stomach was decompressed with an 18 Bulgarian orogastric tube, which was then positioned in the antrum. The greater curvature vessels were taken down with the vessel sealer energy device beginning at the midpoint of the stomach and continued up to the angle of His, including the short gastrics. The left kateryna was completely exposed and there was a small hiatal hernia seen here. This was photodocumented. The GE junction fat pad was elevated to make a clear landing zone for the stapler later. The greater curvature vessels were taken down with the energy device extending distally within 3 cm of the pylorus. Filmy adhesions to the stomach were taken down posteriorly.     I dissected the left kateryna with the vessel sealer, then divided the pars flaccida.  There was no replaced right hepatic vessel in the gastrohepatic ligament.  I dissected the right kateryna, then passed a penrose drain around the esophagus for retraction.  The phrenoesophageal ligament was divided.  Once the hiatus was completely dissected, I performed a posterior cruroplasty with running nonabsorbable 2-0 V-Loc suture.  The penrose was removed.     A 36 Bulgarian bougie was inserted and  positioned along the lesser curvature of the stomach.  The stomach was marked at 1 cm from the angle of His, 3 cm from the incisura, and 5 cm from the pylorus using an ink saturated Kittner.  1 mg of IV glucagon was given to relax gastric smooth muscle.  Using the bougie as a template, a vertical sleeve gastrectomy was fashioned with successive staple loads.  The stomach was thick, and I had upsize the staple load after the second and second to last firings.  The loads were fired in this order: Blue, white, blue, white, white, blue.   The staple line was examined and was hemostatic. The gastrectomy specimen was removed through the medial 12 mm port site. The specimen was later examined, and the staples were well-formed. Palpation of the specimen revealed no masses. The staple line of the sleeve gastrectomy revealed staples that were well-formed. The upper abdomen was flooded with saline, and endoscopy was performed.     The flexible endoscope was passed transorally down to the pylorus easily. The sleeve extended to within 6 cm proximal to the pylorus and was hemostatic. The sleeve was not narrow or twisted. There was no hiatal hernia or distal esophagitis. The rest of the esophagus was normal. The scope was removed. The leak test was normal.        I rescrubbed and suctioned the irrigation fluid from the upper abdomen, making sure it was dry. The staple line on the stomach was hemostatic.  The staple line was treated with 4 ml of aerosolized Tisseel fibrin glue. The liver retractor was removed easily.     An 18 gauge Gianluca-Cut needle was inserted through the subxiphoid incision.  A core needle biopsy was taken from the left lobe of the liver.  Hemostasis was obtained immediately with cautery.  The specimen was examined and was adequate.  The medial 12 mm port was removed under direct visualization and there was no bleeding. This incision was closed with an 0-Vicryl transfascial suture using a suture passer under  direct visualization in a horizontal mattress fashion. All other ports were removed and then replaced under direct visualization and all of these were hemostatic. The instruments were removed and the abdomen was desufflated. The ports were removed.  3-0 Monocryl plus was used to close skin incisions with interrupted sutures. Skin glue was placed. 10 mg of IV Barhemsys was given at the end of the case.The patient was awakened and taken to recovery in good condition, having tolerated the procedure well. All sponge, needle, and instrument counts were correct.

## 2023-10-10 NOTE — ANESTHESIA PROCEDURE NOTES
"Peripheral Block      Patient reassessed immediately prior to procedure    Patient location during procedure: OR  Reason for block: at surgeon's request and post-op pain management  Performed by  Anesthesiologist: Den Puentes Jr., MD CRNA/CAA: Mylene Landin CRNA  Preanesthetic Checklist  Completed: patient identified, IV checked, site marked, risks and benefits discussed, surgical consent, monitors and equipment checked, pre-op evaluation and timeout performed  Prep:  Pt Position: supine  Sterile barriers:cap, gloves, mask and washed/disinfected hands  Prep: ChloraPrep  Patient monitoring: blood pressure monitoring, continuous pulse oximetry and EKG  Procedure    Sedation: yes  Performed under: general  Guidance:ultrasound guided  Images:still images obtained, printed/placed on chart    Laterality:Bilateral  Block Type:TAP  Injection Technique:single-shot  Needle Type:short-bevel and echogenic  Needle Gauge:20 G  Resistance on Injection: none    Medications Used: dexamethasone sodium phosphate injection - Injection   4 mg - 10/10/2023 10:26:00 AM  bupivacaine PF (MARCAINE) 0.25 % injection - Injection   60 mL - 10/10/2023 10:26:00 AM      Medications  Comment:Block Injection:  LA dose divided between Right and Left block        Post Assessment  Injection Assessment: negative aspiration for heme, incremental injection and no paresthesia on injection  Patient Tolerance:comfortable throughout block  Complications:no  Additional Notes    Subcostal TAPs    A high-frequency linear transducer, with sterile cover, was placed sub-xiphoid to identify Linea Alba, right and left Rectus Abdominus Muscles (JULIAN). The transducer was moved either right or left subcostally to identify the JULIAN and the Transverse Abdominus Muscle (CASTAÑEDA). The insertion site was prepped in sterile fashion and then localized with 2-5 ml of 1% Lidocaine. Using ultrasound-guidance, a 20-gauge B-Mota 4\" Ultraplex 360 non-stimulating echogenic " needle was advanced in plane, from medial to lateral, until the tip of the needle was in the fascial plane between the JULIAN and CASTAÑEDA. 1-3ml of preservative free normal saline was used to hydro-dissect the fascial planes. After the fascial plane was verified, the local anesthetic (LA) was injected. The procedure was repeated on the opposite side for bilateral coverage. Aspiration every 5 ml to prevent intravascular injection. Injection was completed with negative aspiration of blood and negative intravascular injection. Injection pressures were normal with minimal resistance. The subcostal approach to the TAP nerve block ideally anesthetizes the intercostal nerves T6-T9.

## 2023-10-11 ENCOUNTER — APPOINTMENT (OUTPATIENT)
Dept: GENERAL RADIOLOGY | Facility: HOSPITAL | Age: 38
DRG: 621 | End: 2023-10-11
Payer: COMMERCIAL

## 2023-10-11 LAB
ALBUMIN SERPL-MCNC: 4.6 G/DL (ref 3.5–5.2)
ALBUMIN/GLOB SERPL: 1.2 G/DL
ALP SERPL-CCNC: 95 U/L (ref 39–117)
ALT SERPL W P-5'-P-CCNC: 39 U/L (ref 1–33)
ANION GAP SERPL CALCULATED.3IONS-SCNC: 13 MMOL/L (ref 5–15)
AST SERPL-CCNC: 33 U/L (ref 1–32)
BASOPHILS # BLD AUTO: 0.03 10*3/MM3 (ref 0–0.2)
BASOPHILS NFR BLD AUTO: 0.2 % (ref 0–1.5)
BILIRUB SERPL-MCNC: 0.4 MG/DL (ref 0–1.2)
BILIRUB UR QL STRIP: NEGATIVE
BUN SERPL-MCNC: 8 MG/DL (ref 6–20)
BUN/CREAT SERPL: 9.6 (ref 7–25)
CALCIUM SPEC-SCNC: 9.3 MG/DL (ref 8.6–10.5)
CHLORIDE SERPL-SCNC: 102 MMOL/L (ref 98–107)
CLARITY UR: CLEAR
CO2 SERPL-SCNC: 27 MMOL/L (ref 22–29)
COLOR UR: YELLOW
CREAT SERPL-MCNC: 0.83 MG/DL (ref 0.57–1)
CYTO UR: NORMAL
DEPRECATED RDW RBC AUTO: 47.4 FL (ref 37–54)
EGFRCR SERPLBLD CKD-EPI 2021: 93.8 ML/MIN/1.73
EOSINOPHIL # BLD AUTO: 0 10*3/MM3 (ref 0–0.4)
EOSINOPHIL NFR BLD AUTO: 0 % (ref 0.3–6.2)
ERYTHROCYTE [DISTWIDTH] IN BLOOD BY AUTOMATED COUNT: 15.6 % (ref 12.3–15.4)
GLOBULIN UR ELPH-MCNC: 3.8 GM/DL
GLUCOSE BLDC GLUCOMTR-MCNC: 154 MG/DL (ref 70–130)
GLUCOSE BLDC GLUCOMTR-MCNC: 164 MG/DL (ref 70–130)
GLUCOSE BLDC GLUCOMTR-MCNC: 173 MG/DL (ref 70–130)
GLUCOSE BLDC GLUCOMTR-MCNC: 201 MG/DL (ref 70–130)
GLUCOSE SERPL-MCNC: 217 MG/DL (ref 65–99)
GLUCOSE UR STRIP-MCNC: NEGATIVE MG/DL
HCT VFR BLD AUTO: 41.2 % (ref 34–46.6)
HGB BLD-MCNC: 12.6 G/DL (ref 12–15.9)
HGB UR QL STRIP.AUTO: NEGATIVE
IMM GRANULOCYTES # BLD AUTO: 0.29 10*3/MM3 (ref 0–0.05)
IMM GRANULOCYTES NFR BLD AUTO: 1.7 % (ref 0–0.5)
IRON 24H UR-MRATE: 53 MCG/DL (ref 37–145)
KETONES UR QL STRIP: NEGATIVE
LAB AP CASE REPORT: NORMAL
LAB AP CLINICAL INFORMATION: NORMAL
LEUKOCYTE ESTERASE UR QL STRIP.AUTO: NEGATIVE
LYMPHOCYTES # BLD AUTO: 2.12 10*3/MM3 (ref 0.7–3.1)
LYMPHOCYTES NFR BLD AUTO: 12.7 % (ref 19.6–45.3)
MCH RBC QN AUTO: 25.8 PG (ref 26.6–33)
MCHC RBC AUTO-ENTMCNC: 30.6 G/DL (ref 31.5–35.7)
MCV RBC AUTO: 84.4 FL (ref 79–97)
MONOCYTES # BLD AUTO: 0.71 10*3/MM3 (ref 0.1–0.9)
MONOCYTES NFR BLD AUTO: 4.3 % (ref 5–12)
NEUTROPHILS NFR BLD AUTO: 13.49 10*3/MM3 (ref 1.7–7)
NEUTROPHILS NFR BLD AUTO: 81.1 % (ref 42.7–76)
NITRITE UR QL STRIP: NEGATIVE
NRBC BLD AUTO-RTO: 0 /100 WBC (ref 0–0.2)
PATH REPORT.FINAL DX SPEC: NORMAL
PATH REPORT.GROSS SPEC: NORMAL
PH UR STRIP.AUTO: 7 [PH] (ref 5–8)
PLATELET # BLD AUTO: 408 10*3/MM3 (ref 140–450)
PMV BLD AUTO: 10.6 FL (ref 6–12)
POTASSIUM SERPL-SCNC: 4.1 MMOL/L (ref 3.5–5.2)
PROT SERPL-MCNC: 8.4 G/DL (ref 6–8.5)
PROT UR QL STRIP: NEGATIVE
RBC # BLD AUTO: 4.88 10*6/MM3 (ref 3.77–5.28)
SODIUM SERPL-SCNC: 142 MMOL/L (ref 136–145)
SODIUM UR-SCNC: 190 MMOL/L
SP GR UR STRIP: 1.02 (ref 1–1.03)
UROBILINOGEN UR QL STRIP: NORMAL
WBC NRBC COR # BLD: 16.64 10*3/MM3 (ref 3.4–10.8)

## 2023-10-11 PROCEDURE — 81003 URINALYSIS AUTO W/O SCOPE: CPT | Performed by: SURGERY

## 2023-10-11 PROCEDURE — 85025 COMPLETE CBC W/AUTO DIFF WBC: CPT | Performed by: SURGERY

## 2023-10-11 PROCEDURE — 25810000003 SODIUM CHLORIDE 0.9 % SOLUTION 250 ML FLEX CONT: Performed by: SURGERY

## 2023-10-11 PROCEDURE — 25010000002 POTASSIUM CHLORIDE PER 2 MEQ: Performed by: SURGERY

## 2023-10-11 PROCEDURE — 83540 ASSAY OF IRON: CPT | Performed by: SURGERY

## 2023-10-11 PROCEDURE — 25810000003 SODIUM CHLORIDE 0.9 % SOLUTION 1,000 ML FLEX CONT: Performed by: SURGERY

## 2023-10-11 PROCEDURE — 25010000002 THIAMINE PER 100 MG: Performed by: SURGERY

## 2023-10-11 PROCEDURE — 25010000002 ENOXAPARIN PER 10 MG: Performed by: SURGERY

## 2023-10-11 PROCEDURE — 63710000001 INSULIN DETEMIR PER 5 UNITS: Performed by: SURGERY

## 2023-10-11 PROCEDURE — 25010000002 HYDROMORPHONE PER 4 MG: Performed by: SURGERY

## 2023-10-11 PROCEDURE — 0 DIATRIZOATE MEGLUMINE & SODIUM PER 1 ML: Performed by: SURGERY

## 2023-10-11 PROCEDURE — 25010000002 CEFAZOLIN PER 500 MG: Performed by: SURGERY

## 2023-10-11 PROCEDURE — 82948 REAGENT STRIP/BLOOD GLUCOSE: CPT

## 2023-10-11 PROCEDURE — 74240 X-RAY XM UPR GI TRC 1CNTRST: CPT

## 2023-10-11 PROCEDURE — 25010000002 CYANOCOBALAMIN PER 1000 MCG: Performed by: SURGERY

## 2023-10-11 PROCEDURE — 63710000001 INSULIN LISPRO (HUMAN) PER 5 UNITS: Performed by: SURGERY

## 2023-10-11 PROCEDURE — 84300 ASSAY OF URINE SODIUM: CPT | Performed by: SURGERY

## 2023-10-11 PROCEDURE — 99024 POSTOP FOLLOW-UP VISIT: CPT | Performed by: SURGERY

## 2023-10-11 PROCEDURE — 80053 COMPREHEN METABOLIC PANEL: CPT | Performed by: SURGERY

## 2023-10-11 RX ORDER — HYDRALAZINE HYDROCHLORIDE 50 MG/1
50 TABLET, FILM COATED ORAL 3 TIMES DAILY PRN
Status: DISCONTINUED | OUTPATIENT
Start: 2023-10-11 | End: 2023-10-12 | Stop reason: HOSPADM

## 2023-10-11 RX ORDER — HYDROCHLOROTHIAZIDE 25 MG/1
25 TABLET ORAL DAILY
Status: DISCONTINUED | OUTPATIENT
Start: 2023-10-11 | End: 2023-10-12 | Stop reason: HOSPADM

## 2023-10-11 RX ADMIN — GABAPENTIN 100 MG: 100 CAPSULE ORAL at 16:05

## 2023-10-11 RX ADMIN — POTASSIUM CHLORIDE AND SODIUM CHLORIDE 100 ML/HR: 450; 150 INJECTION, SOLUTION INTRAVENOUS at 15:47

## 2023-10-11 RX ADMIN — INSULIN DETEMIR 25 UNITS: 100 INJECTION, SOLUTION SUBCUTANEOUS at 08:38

## 2023-10-11 RX ADMIN — LOSARTAN POTASSIUM 100 MG: 50 TABLET, FILM COATED ORAL at 08:34

## 2023-10-11 RX ADMIN — INSULIN LISPRO 2 UNITS: 100 INJECTION, SOLUTION INTRAVENOUS; SUBCUTANEOUS at 12:38

## 2023-10-11 RX ADMIN — ACETAMINOPHEN 1000 MG: 500 TABLET ORAL at 05:19

## 2023-10-11 RX ADMIN — NICARDIPINE HYDROCHLORIDE 15 MG/HR: 25 INJECTION, SOLUTION INTRAVENOUS at 06:19

## 2023-10-11 RX ADMIN — HYDROMORPHONE HYDROCHLORIDE 0.5 MG: 1 INJECTION, SOLUTION INTRAMUSCULAR; INTRAVENOUS; SUBCUTANEOUS at 13:28

## 2023-10-11 RX ADMIN — ENOXAPARIN SODIUM 40 MG: 100 INJECTION SUBCUTANEOUS at 08:37

## 2023-10-11 RX ADMIN — HYDROMORPHONE HYDROCHLORIDE 0.5 MG: 1 INJECTION, SOLUTION INTRAMUSCULAR; INTRAVENOUS; SUBCUTANEOUS at 01:12

## 2023-10-11 RX ADMIN — CEFAZOLIN 3000 MG: 10 INJECTION, POWDER, FOR SOLUTION INTRAVENOUS at 01:11

## 2023-10-11 RX ADMIN — GABAPENTIN 100 MG: 100 CAPSULE ORAL at 08:34

## 2023-10-11 RX ADMIN — OXYCODONE HYDROCHLORIDE 5 MG: 5 TABLET ORAL at 20:44

## 2023-10-11 RX ADMIN — GABAPENTIN 100 MG: 100 CAPSULE ORAL at 20:44

## 2023-10-11 RX ADMIN — ACETAMINOPHEN 1000 MG: 500 TABLET ORAL at 20:43

## 2023-10-11 RX ADMIN — INSULIN DETEMIR 25 UNITS: 100 INJECTION, SOLUTION SUBCUTANEOUS at 20:44

## 2023-10-11 RX ADMIN — INSULIN LISPRO 2 UNITS: 100 INJECTION, SOLUTION INTRAVENOUS; SUBCUTANEOUS at 18:31

## 2023-10-11 RX ADMIN — HYDROCHLOROTHIAZIDE 25 MG: 25 TABLET ORAL at 08:34

## 2023-10-11 RX ADMIN — CYANOCOBALAMIN 1000 MCG: 1000 INJECTION, SOLUTION INTRAMUSCULAR; SUBCUTANEOUS at 08:34

## 2023-10-11 RX ADMIN — SIMETHICONE 80 MG: 80 TABLET, CHEWABLE ORAL at 20:43

## 2023-10-11 RX ADMIN — FOLIC ACID 200 ML/HR: 5 INJECTION, SOLUTION INTRAMUSCULAR; INTRAVENOUS; SUBCUTANEOUS at 05:19

## 2023-10-11 RX ADMIN — Medication 10 ML: at 20:45

## 2023-10-11 RX ADMIN — SIMETHICONE 80 MG: 80 TABLET, CHEWABLE ORAL at 04:00

## 2023-10-11 RX ADMIN — PANTOPRAZOLE SODIUM 40 MG: 40 TABLET, DELAYED RELEASE ORAL at 05:19

## 2023-10-11 RX ADMIN — HYDROMORPHONE HYDROCHLORIDE 2 MG: 2 TABLET ORAL at 05:19

## 2023-10-11 RX ADMIN — NICARDIPINE HYDROCHLORIDE 7.5 MG/HR: 25 INJECTION, SOLUTION INTRAVENOUS at 03:26

## 2023-10-11 RX ADMIN — HYDROMORPHONE HYDROCHLORIDE 2 MG: 2 TABLET ORAL at 10:57

## 2023-10-11 RX ADMIN — INSULIN LISPRO 3 UNITS: 100 INJECTION, SOLUTION INTRAVENOUS; SUBCUTANEOUS at 08:38

## 2023-10-11 RX ADMIN — INSULIN LISPRO 2 UNITS: 100 INJECTION, SOLUTION INTRAVENOUS; SUBCUTANEOUS at 20:44

## 2023-10-11 NOTE — CONSULTS
[facility] Medicine Services  CONSULT NOTE      Patient Name: Edd Leung  : 1985  MRN: [id number]    Primary Care Physician: Erinn Renee PA  Provider requesting consultation: Alice Atwood MD    Subjective   Subjective     Reason for Consultation:  Hypertension     HPI:  Edd Leung is a 38 y.o. female with PMH significant for chronic back pain, DM 2, HTN, GERD, ANDREWS on CPAP, who presented to [facility] for [facility].  [facility] asked to see in consultation secondary to hypertension.  She has required postop Cardene drip for BP control.  She is normally on losartan and HCTZ for which she reports compliance.      Review of Systems   Constitutional:  Positive for activity change and appetite change. Negative for chills, diaphoresis, fatigue, fever and unexpected weight change.   HENT: Negative.  Negative for congestion, sneezing, sore throat and trouble swallowing.    Eyes: Negative.  Negative for visual disturbance.   Respiratory: Negative.  Negative for cough, chest tightness, shortness of breath and wheezing.    Cardiovascular:  Positive for leg swelling. Negative for chest pain and palpitations.   Gastrointestinal:  Positive for abdominal pain and nausea. Negative for abdominal distention, constipation, diarrhea and vomiting.   Endocrine: Negative.  Negative for polydipsia and polyphagia.   Genitourinary: Negative.  Negative for difficulty urinating, dysuria, frequency and urgency.   Musculoskeletal:  Positive for back pain. Negative for arthralgias, gait problem, myalgias and neck pain.   Skin: Negative.  Negative for color change, pallor, rash and wound.   Allergic/Immunologic: Negative.  Negative for immunocompromised state.   Neurological: Negative.  Negative for dizziness, tremors, seizures, syncope, facial asymmetry, speech difficulty, weakness, light-headedness, numbness and headaches.   Hematological: Negative.   Does not bruise/bleed easily.   Psychiatric/Behavioral: Negative.  Negative for confusion. The patient is not nervous/anxious.        All other systems reviewed and are negative.     Personal History     Past Medical History:   Diagnosis Date    Anemia     Anxiety and depression     Back pain     not treated with meds    Diabetes mellitus 2008    on insulin with pregnancy 2022, last A1C 6.5    DM2 (diabetes mellitus, type 2)     bariatric intake A1C 7.7    GERD (gastroesophageal reflux disease)     Goiter     Gonorrhea     Hidradenitis suppurativa     History of abnormal cervical Pap smear     History of COVID-19      no hospital no steroids    Hydradenitis     improved iwth diet change    Hypertension     Iron deficiency anemia     MRSA infection     skin abscess ;  facial lesion    ANDREWS on CPAP     Post-op pneumonia     2021      Spinal headache     Urinary incontinence     Wears glasses     RX       Past Surgical History:   Procedure Laterality Date     SECTION  2006     SECTION N/A 2021    Procedure:  SECTION REPEAT;  Surgeon: Rita Busby MD;  Location:  [facility];  Service: Obstetrics/Gynecology;  Laterality: N/A;    DILATATION AND CURETTAGE      ENDOSCOPY      ENDOSCOPY N/A 2023    Procedure: ESOPHAGOGASTRODUODENOSCOPY WITH BIOPSY;  Surgeon: Alice Atwood MD;  Location:  [facility];  Service: General;  Laterality: N/A;       Family History:  family history includes Breast cancer in her maternal grandmother; Diabetes in her maternal grandfather, maternal grandmother, paternal grandfather, and paternal grandmother; Hypertension in her maternal grandmother, mother, paternal grandfather, paternal grandmother, and sister; Lung cancer in her father and paternal grandmother. Otherwise pertinent FHx was reviewed and unremarkable.     Social History:  reports that she quit smoking about 4 years ago. Her smoking use  included cigarettes. She has a 4.25 pack-year smoking history. She has never been exposed to tobacco smoke. She has never used smokeless tobacco. She reports current alcohol use. She reports current drug use. Drug: Marijuana.    Medications:  Semaglutide (2 MG/DOSE), apixaban, ferrous gluconate, hydroCHLOROthiazide, insulin glargine, losartan, norethindrone-ethinyl estradiol, and vitamin D    Scheduled Meds:acetaminophen, 1,000 mg, Oral, Q8H   Or  acetaminophen, 1,000 mg, Oral, Q8H  ceFAZolin, 3,000 mg, Intravenous, Q8H  cyanocobalamin, 1,000 mcg, Intramuscular, Once  enoxaparin, 40 mg, Subcutaneous, Daily  gabapentin, 100 mg, Oral, TID   Or  gabapentin, 100 mg, Oral, TID  insulin detemir, 25 Units, Subcutaneous, Q12H  insulin lispro, 2-7 Units, Subcutaneous, 4x Daily AC & at Bedtime  losartan, 100 mg, Oral, Daily  pantoprazole, 40 mg, Oral, Q AM  sodium chloride, 10 mL, Intravenous, Q12H  thiamine (B-1) 100 mg, folic acid 1 mg in sodium chloride 0.9 % 1,000 mL infusion, 200 mL/hr, Intravenous, Once      Continuous Infusions:lactated ringers, 150 mL/hr, Last Rate: 150 mL/hr (10/10/23 2331)  niCARdipine, 5-15 mg/hr, Last Rate: 7.5 mg/hr (10/11/23 0016)  sodium chloride 0.45 % with KCl 20 mEq, 100 mL/hr  sodium chloride, 150 mL/hr, Last Rate: 150 mL/hr (10/10/23 0921)      PRN Meds:.  ALPRAZolam    amisulpride (antiemetic)    dextrose    dextrose    diphenhydrAMINE    ferric gluconate    glucagon (human recombinant)    HYDROmorphone **AND** naloxone    HYDROmorphone    metoclopramide    metoclopramide    ondansetron **FOLLOWED BY** [START ON 10/14/2023] ondansetron    oxyCODONE    phenol    prochlorperazine    prochlorperazine    promethazine    simethicone    sodium chloride    sodium chloride    Allergies   Allergen Reactions    Banana Anaphylaxis     Other reaction(s): Banana, Cucumber, Kiwis, Melon    Cucumber Extract Anaphylaxis    Kiwi Extract Anaphylaxis    Melon Anaphylaxis    Nuts Anaphylaxis    Latex Hives  "and Rash     \"makes my skin raw\"       Morphine Hallucinations     Dilaudid is ok, tolerates Percocet/Lortab    Duloxetine Hcl Unknown - Low Severity     Mental agitation    Naproxen Nausea Only     No problems with ibuprofen and aspirin         Objective   Objective     Vital Signs:   Temp:  [97.6 øF (36.4 øC)-99.7 øF (37.6 øC)] 99.7 øF (37.6 øC)  Heart Rate:  [54-88] 84  Resp:  [12-20] 18  BP: (150-198)/() 179/91  Flow (L/min):  [2] 2    Physical Exam  Constitutional: Awake, alert  Eyes: PERRLA, sclerae anicteric, no conjunctival injection  HENT: NCAT, mucous membranes moist  Neck: Supple, no thyromegaly, no lymphadenopathy, trachea midline  Respiratory: Clear to auscultation bilaterally, nonlabored respirations   Cardiovascular: RRR, no murmurs, rubs, or gallops, palpable pedal pulses bilaterally  Gastrointestinal: Positive bowel sounds, soft, nontender, nondistended  Musculoskeletal: No bilateral ankle edema, no clubbing or cyanosis to extremities  Psychiatric: Appropriate affect, cooperative  Neurologic: Oriented x 3, strength symmetric in all extremities, Cranial Nerves grossly intact to confrontation, speech clear  Skin: No rashes         Result Review:  I have personally reviewed the results from the time of admission and agree with these findings:  [x]  Laboratory  [x]  Radiology  []  EKG/Telemetry   []  Pathology  [x]  Old records  []  Other:  Most notable findings include:     LAB RESULTS:                              Brief Urine Lab Results  (Last result in the past 365 days)        Color   Clarity   Blood   Leuk Est   Nitrite   Protein   CREAT   Urine HCG        10/10/23 0927               Negative             Microbiology Results (last 10 days)       Procedure Component Value - Date/Time    MRSA Screen, PCR (Inpatient) - Swab, Nares [[id number]]  (Normal) Collected: 10/03/23 0929    Lab Status: Final result Specimen: Swab from Nares Updated: 10/03/23 1117     MRSA PCR Negative    Narrative:      " The negative predictive value of this diagnostic test is high and should only be used to consider de-escalating anti-MRSA therapy. A positive result may indicate colonization with MRSA and must be correlated clinically.  MRSA Negative            Peripheral Block    Result Date: 10/10/2023  Mylene Landin CRNA     10/10/2023 10:47 AM Peripheral Block Patient reassessed immediately prior to procedure Patient location during procedure: OR Reason for block: at surgeon's request and post-op pain management Performed by Anesthesiologist: Den Puentes Jr., MD CRNA/CAA: Mylene Landin CRNA Preanesthetic Checklist Completed: patient identified, IV checked, site marked, risks and benefits discussed, surgical consent, monitors and equipment checked, pre-op evaluation and timeout performed Prep: Pt Position: supine Sterile barriers:cap, gloves, mask and washed/disinfected hands Prep: ChloraPrep Patient monitoring: blood pressure monitoring, continuous pulse oximetry and EKG Procedure Sedation: yes Performed under: general Guidance:ultrasound guided Images:still images obtained, printed/placed on chart Laterality:Bilateral Block Type:TAP Injection Technique:single-shot Needle Type:short-bevel and echogenic Needle Gauge:20 G Resistance on Injection: none Medications Used: dexamethasone sodium phosphate injection - Injection  4 mg - 10/10/2023 10:26:00 AM bupivacaine PF (MARCAINE) 0.25 % injection - Injection  60 mL - 10/10/2023 10:26:00 AM Medications Comment:Block Injection:  LA dose divided between Right and Left block Post Assessment Injection Assessment: negative aspiration for heme, incremental injection and no paresthesia on injection Patient Tolerance:comfortable throughout block Complications:no Additional Notes Subcostal TAPs A high-frequency linear transducer, with sterile cover, was placed sub-xiphoid to identify Linea Alba, right and left Rectus Abdominus Muscles (JULIAN). The transducer was moved either  "right or left subcostally to identify the JULIAN and the Transverse Abdominus Muscle (CASTAÑEDA). The insertion site was prepped in sterile fashion and then localized with 2-5 ml of 1% Lidocaine. Using ultrasound-guidance, a 20-gauge B-Mota 4\" Ultraplex 360 non-stimulating echogenic needle was advanced in plane, from medial to lateral, until the tip of the needle was in the fascial plane between the JULIAN and CASTAÑEDA. 1-3ml of preservative free normal saline was used to hydro-dissect the fascial planes. After the fascial plane was verified, the local anesthetic (LA) was injected. The procedure was repeated on the opposite side for bilateral coverage. Aspiration every 5 ml to prevent intravascular injection. Injection was completed with negative aspiration of blood and negative intravascular injection. Injection pressures were normal with minimal resistance. The subcostal approach to the TAP nerve block ideally anesthetizes the intercostal nerves T6-T9.       Results for orders placed during the hospital encounter of 02/18/22    Adult Transthoracic Echo Complete W/ Cont if Necessary Per Protocol    Interpretation Summary  ú Left ventricular systolic function is normal. Estimated left ventricular EF = 65%.  ú Left ventricular diastolic function was normal.  ú The cardiac valves are anatomically and functionally normal.      Assessment & Plan   Assessment & Plan     Active Hospital Problems    Diagnosis  POA    **Body mass index (BMI) of 60.0-69.9 in adult [Z68.44]  Not Applicable    Hiatal hernia [K44.9]  Yes    Morbid obesity [E66.01]  Yes    Hypertension [I10]  Yes    Obstructive sleep apnea syndrome [G47.33]  Yes      Resolved Hospital Problems   No resolved problems to display.     38 y.o. female with PMH significant for chronic back pain, DM 2, HTN, GERD, ANDREWS on CPAP, who presented to [facility] for planned gastric sleeve who was found to have concern for uncontrolled hypertension.    Morbid obesity  S/p gastric sleeve 10/10/2023  - " Management per bariatric surgery  - Pain controlled with as needed medications    Essential hypertension  - Requiring Cardene drip postop  - Restart losartan and HCTZ in the a.m. with plan to wean drip as appropriate  - Previously on nifedipine but reports no use in the last several months.    ANDREWS  - CPAP      Thank you for allowing [facility] to provide consultative care for your patient, we will continue to follow while clinically appropriate.    Jayne Quinn, APRN  10/11/23

## 2023-10-11 NOTE — PROGRESS NOTES
"Cc: POD#1 RSG/HHR/liver biopsy \"sore, gas pain\"    She is sitting up in bed.  Her  is in the room.  She complains of soreness \"gas pain\" and postprandial epigastric pain.  She also has had issues with elevated blood pressure requiring a Cardene drip and seen by the hospitalist service.  She says she has ambulated and is voiding okay.  Denies pulmonary complaints, spirometer 1500 observed.  No bowel movement or flatus.  Has drank about half a protein shake.    No fever, Tmax 99 currently 98.5 pulse 65 respirations 18 blood pressure 119/64 but as high as 198/117 saturation 97%.   UO 5000 - 1000 She is in no apparent distress.  Lungs clear to auscultation.  Abdomen soft, nontender/appropriate, nondistended, bowel sounds hypoactive.  Wounds look okay    Iron normal at 53.  CMP normal except glucose of 217 ALT 39 AST 33.  White count 16.6 with 81 segs 13 lymphs 4 monocytes no bands H&H 12.6 and 41.2 with microcytic indices.  This is actually higher than her preoperative H&H of 11.6 and 39.2.  Hemoglobin A1c 7.80.  Upper GI images and report reviewed, unremarkable.    Impression: Postoperative #1 robotic sleeve gastrectomy, hiatal hernia repair, and liver biopsy.  Some gas pain and postprandial abdominal pain, suboptimal oral intake.  Hypertension requiring a Cardene drip.  Mild elevation in transaminases likely related to fatty liver and liver biopsy.  Diabetes.  Leukocytosis with slight left shift without evidence of infection.  Preadmission microcytic anemia with normal iron level.  Signs of intravascular volume depletion with elevated H&H over baseline, very high urine output.    Plan: Continue liquids, out of bed, pulmonary toilet, VTE prophylaxis.  Check urinalysis and urine sodium.  Appreciate hospitalist assistance.  See orders      "

## 2023-10-11 NOTE — CASE MANAGEMENT/SOCIAL WORK
Discharge Planning Assessment  [facility]     Patient Name: Edd Leung  MRN: [id number]  Today's Date: 10/11/2023    Admit Date: 10/10/2023    Plan: IDP   Discharge Needs Assessment       Row Name 10/11/23 0857       Living Environment    People in Home spouse;parent(s);child(bob), dependent    Current Living Arrangements home    Potentially Unsafe Housing Conditions none    Primary Care Provided by self    Provides Primary Care For child(bob)    Family Caregiver if Needed spouse    Quality of Family Relationships helpful;involved;supportive    Able to Return to Prior Arrangements yes       Transition Planning    Patient/Family Anticipates Transition to home    Transportation Anticipated family or friend will provide                   Discharge Plan       Row Name 10/11/23 0858       Plan    Plan IDP    Patient/Family in Agreement with Plan yes    Plan Comments I spoke with pt and spouse at bedside. Pt currently being taken down by w/c to [facility]. She states that her spouse may not be able to answer all questions. He recently moved here from [facility]. SDOH completed with pt. CM will follow up with pt once she is returned to her room.  Pt, spouse, 3 YO son, and mother live in [facility]. Pt is Independent with ADL's, works FT. Wears Cpap at night. PCP-Erinn Renee. Confirmed [facility] [place].  No needs voiced or identified.  will continue to follow.    Final Discharge Disposition Code 01 - home or self-care                  Continued Care and Services - Admitted Since 10/10/2023    Coordination has not been started for this encounter.          Demographic Summary       Row Name 10/11/23 0857       General Information    Admission Type inpatient    Reason for Consult discharge planning    Preferred Language English       Contact Information    Permission Granted to Share Info With                    Functional Status       Row Name 10/11/23 0857       Functional Status    Usual  Activity Tolerance good       Functional Status, IADL    Medications independent    Meal Preparation independent    Laundry independent    Shopping independent                   Psychosocial    No documentation.                  Abuse/Neglect    No documentation.                  Legal    No documentation.                  Substance Abuse    No documentation.                  Patient Forms    No documentation.                     Tatianna Yo RN

## 2023-10-11 NOTE — PLAN OF CARE
Goal Outcome Evaluation:           Progress: improving  Outcome Evaluation: VSS. RA to CPAP at night. NSR on the monitor. PRNs given for complaints of pain and nausea with relief. IVFs infusing. Cardene gtt infusing at  12.5 mg/hr for elevated BP. Pt ambulating with assistance, using IS with encouragement, and voiding without difficulty. Rally bag infusing. ABx administered. No further complaints at this time. WIll continue to follow POC.

## 2023-10-12 VITALS
RESPIRATION RATE: 20 BRPM | DIASTOLIC BLOOD PRESSURE: 91 MMHG | SYSTOLIC BLOOD PRESSURE: 154 MMHG | TEMPERATURE: 98.5 F | OXYGEN SATURATION: 100 % | HEIGHT: 68 IN | WEIGHT: 293 LBS | BODY MASS INDEX: 44.41 KG/M2 | HEART RATE: 54 BPM

## 2023-10-12 PROBLEM — K44.9 HIATAL HERNIA: Status: RESOLVED | Noted: 2023-09-15 | Resolved: 2023-10-12

## 2023-10-12 LAB
ALBUMIN SERPL-MCNC: 4.1 G/DL (ref 3.5–5.2)
ALBUMIN/GLOB SERPL: 1.1 G/DL
ALP SERPL-CCNC: 84 U/L (ref 39–117)
ALT SERPL W P-5'-P-CCNC: 25 U/L (ref 1–33)
ANION GAP SERPL CALCULATED.3IONS-SCNC: 11 MMOL/L (ref 5–15)
AST SERPL-CCNC: 14 U/L (ref 1–32)
BASOPHILS # BLD AUTO: 0.04 10*3/MM3 (ref 0–0.2)
BASOPHILS NFR BLD AUTO: 0.3 % (ref 0–1.5)
BILIRUB SERPL-MCNC: 0.4 MG/DL (ref 0–1.2)
BUN SERPL-MCNC: 16 MG/DL (ref 6–20)
BUN/CREAT SERPL: 20.5 (ref 7–25)
CALCIUM SPEC-SCNC: 9.3 MG/DL (ref 8.6–10.5)
CHLORIDE SERPL-SCNC: 102 MMOL/L (ref 98–107)
CO2 SERPL-SCNC: 28 MMOL/L (ref 22–29)
CREAT SERPL-MCNC: 0.78 MG/DL (ref 0.57–1)
DEPRECATED RDW RBC AUTO: 48.5 FL (ref 37–54)
EGFRCR SERPLBLD CKD-EPI 2021: 101.1 ML/MIN/1.73
EOSINOPHIL # BLD AUTO: 0.02 10*3/MM3 (ref 0–0.4)
EOSINOPHIL NFR BLD AUTO: 0.1 % (ref 0.3–6.2)
ERYTHROCYTE [DISTWIDTH] IN BLOOD BY AUTOMATED COUNT: 15.7 % (ref 12.3–15.4)
GLOBULIN UR ELPH-MCNC: 3.8 GM/DL
GLUCOSE BLDC GLUCOMTR-MCNC: 134 MG/DL (ref 70–130)
GLUCOSE BLDC GLUCOMTR-MCNC: 149 MG/DL (ref 70–130)
GLUCOSE SERPL-MCNC: 158 MG/DL (ref 65–99)
HCT VFR BLD AUTO: 37.6 % (ref 34–46.6)
HGB BLD-MCNC: 11.2 G/DL (ref 12–15.9)
IMM GRANULOCYTES # BLD AUTO: 0.09 10*3/MM3 (ref 0–0.05)
IMM GRANULOCYTES NFR BLD AUTO: 0.6 % (ref 0–0.5)
LYMPHOCYTES # BLD AUTO: 3.94 10*3/MM3 (ref 0.7–3.1)
LYMPHOCYTES NFR BLD AUTO: 27.9 % (ref 19.6–45.3)
MCH RBC QN AUTO: 25.4 PG (ref 26.6–33)
MCHC RBC AUTO-ENTMCNC: 29.8 G/DL (ref 31.5–35.7)
MCV RBC AUTO: 85.3 FL (ref 79–97)
MONOCYTES # BLD AUTO: 0.81 10*3/MM3 (ref 0.1–0.9)
MONOCYTES NFR BLD AUTO: 5.7 % (ref 5–12)
NEUTROPHILS NFR BLD AUTO: 65.4 % (ref 42.7–76)
NEUTROPHILS NFR BLD AUTO: 9.21 10*3/MM3 (ref 1.7–7)
NRBC BLD AUTO-RTO: 0 /100 WBC (ref 0–0.2)
PLATELET # BLD AUTO: 373 10*3/MM3 (ref 140–450)
PMV BLD AUTO: 10.4 FL (ref 6–12)
POTASSIUM SERPL-SCNC: 3.8 MMOL/L (ref 3.5–5.2)
PROT SERPL-MCNC: 7.9 G/DL (ref 6–8.5)
RBC # BLD AUTO: 4.41 10*6/MM3 (ref 3.77–5.28)
SODIUM SERPL-SCNC: 141 MMOL/L (ref 136–145)
WBC NRBC COR # BLD: 14.11 10*3/MM3 (ref 3.4–10.8)

## 2023-10-12 PROCEDURE — 82948 REAGENT STRIP/BLOOD GLUCOSE: CPT

## 2023-10-12 PROCEDURE — 80053 COMPREHEN METABOLIC PANEL: CPT | Performed by: SURGERY

## 2023-10-12 PROCEDURE — 94799 UNLISTED PULMONARY SVC/PX: CPT

## 2023-10-12 PROCEDURE — 99024 POSTOP FOLLOW-UP VISIT: CPT | Performed by: SURGERY

## 2023-10-12 PROCEDURE — 63710000001 INSULIN DETEMIR PER 5 UNITS: Performed by: SURGERY

## 2023-10-12 PROCEDURE — 85025 COMPLETE CBC W/AUTO DIFF WBC: CPT | Performed by: SURGERY

## 2023-10-12 PROCEDURE — 94660 CPAP INITIATION&MGMT: CPT

## 2023-10-12 PROCEDURE — 25010000002 ENOXAPARIN PER 10 MG: Performed by: SURGERY

## 2023-10-12 RX ORDER — OMEPRAZOLE 40 MG/1
40 CAPSULE, DELAYED RELEASE ORAL DAILY
Qty: 60 CAPSULE | Refills: 0 | Status: SHIPPED | OUTPATIENT
Start: 2023-10-12 | End: 2023-12-11

## 2023-10-12 RX ORDER — ONDANSETRON 4 MG/1
4 TABLET, FILM COATED ORAL EVERY 4 HOURS PRN
Qty: 8 TABLET | Refills: 0 | Status: SHIPPED | OUTPATIENT
Start: 2023-10-12

## 2023-10-12 RX ORDER — OXYCODONE HYDROCHLORIDE 5 MG/1
5 TABLET ORAL EVERY 4 HOURS PRN
Qty: 10 TABLET | Refills: 0 | Status: SHIPPED | OUTPATIENT
Start: 2023-10-12

## 2023-10-12 RX ADMIN — PANTOPRAZOLE SODIUM 40 MG: 40 TABLET, DELAYED RELEASE ORAL at 05:18

## 2023-10-12 RX ADMIN — INSULIN DETEMIR 25 UNITS: 100 INJECTION, SOLUTION SUBCUTANEOUS at 08:49

## 2023-10-12 RX ADMIN — ENOXAPARIN SODIUM 40 MG: 100 INJECTION SUBCUTANEOUS at 08:49

## 2023-10-12 RX ADMIN — HYDROCHLOROTHIAZIDE 25 MG: 25 TABLET ORAL at 08:49

## 2023-10-12 RX ADMIN — HYDROMORPHONE HYDROCHLORIDE 2 MG: 2 TABLET ORAL at 14:33

## 2023-10-12 RX ADMIN — ACETAMINOPHEN 1000 MG: 500 TABLET ORAL at 05:18

## 2023-10-12 RX ADMIN — GABAPENTIN 100 MG: 100 CAPSULE ORAL at 08:49

## 2023-10-12 RX ADMIN — SIMETHICONE 80 MG: 80 TABLET, CHEWABLE ORAL at 05:18

## 2023-10-12 RX ADMIN — LOSARTAN POTASSIUM 100 MG: 50 TABLET, FILM COATED ORAL at 08:49

## 2023-10-12 RX ADMIN — SIMETHICONE 80 MG: 80 TABLET, CHEWABLE ORAL at 14:33

## 2023-10-12 NOTE — CASE MANAGEMENT/SOCIAL WORK
Continued Stay Note  [facility]     Patient Name: Edd Leung  MRN: [id number]  Today's Date: 10/12/2023    Admit Date: 10/10/2023    Plan: Home at discharge   Discharge Plan       Row Name 10/12/23 1259       Plan    Plan Home at discharge    Plan Comments Plan remains for patient to return home upon discharge. No new discharge needs identified. Remains independent of ADLs and will return home with spouse at discharge - homar [phone]    Final Discharge Disposition Code 01 - home or self-care                   Discharge Codes    No documentation.                 Expected Discharge Date and Time       Expected Discharge Date Expected Discharge Time    Oct 12, 2023               Homar Billings RN

## 2023-10-12 NOTE — PAYOR COMM NOTE
"Amanda Moore, RN  Utilization Management  P:[phone]  F:[phone]    [facility]# [id number]     Yamel Leung (38 y.o. Female)       Date of Birth   1985    Social Security Number       Address   [address] [address]    Home Phone   [phone]    MRN   [id number]       Sikhism   Yazidi    Marital Status                               Admission Date   10/10/23    Admission Type   Elective    Admitting Provider   Alice Atwood MD    Attending Provider   Alice Atwood MD    Department, Room/Bed   [facility] 2F, S215/1       Discharge Date       Discharge Disposition       Discharge Destination                                 Attending Provider: Alice Atwood MD    Allergies: Banana, Cucumber Extract, Kiwi Extract, Melon, Nuts, Latex, Morphine, Duloxetine Hcl, Naproxen    Isolation: None   Infection: None   Code Status: CPR    Ht: 172.7 cm (68\")   Wt: 189 kg (417 lb)    Admission Cmt: None   Principal Problem: Body mass index (BMI) of 60.0-69.9 in adult [Z68.44]                   Active Insurance as of 10/10/2023       Primary Coverage       Payor Plan Insurance Group Employer/Plan Group    [employer] [place] [employer] [place]ICAID        Payor Plan Address Payor Plan Phone Number Payor Plan Fax Number Effective Dates    [address] [address] [phone]  11/23/2021 - None Entered    [facility] [id number]         Subscriber Name Subscriber Birth Date Member ID       YAMEL LEUNG 1985 [id number]                     Emergency Contacts        (Rel.) Home Phone Work Phone Mobile Phone    RAJAN ROWELL (Relative) [phone] -- [phone]    INGE OSBORNE (Mother) [phone] -- [phone]    ANSELMO MARTÍNEZ (Spouse) -- -- [phone]                 History & Physical        Alice Atwood MD at 10/10/23 0928            H&P reviewed. The patient was examined and there are no changes to the " "H&P.          Electronically signed by Alice Atwood MD at 10/10/23 1816   Source Note          [facility] BARIATRIC SURGERY  [address]  [address]  [facility] [address] [phone]      Patient  Name:  Edd Leung  :  1985      Date of Visit: 2023      Chief Complaint:  weight gain; unable to maintain weight loss    History of Present Illness:  Edd Leung is a 38 y.o. female who presents today for evaluation, education and consultation regarding weight loss surgery.     Patient has been overweight for many years, with numerous failed dietary/weight loss attempts.  She now has obesity related comorbidities of DM2, depression, back pain, GERD, HTN, ANDREWS, stress incontinence and as such has decided to pursue weight loss surgery.    From intake:  \"Patient is pursuing a sleeve gastrectomy to improve her overall health.  She said she started process through [facility] several years ago but ultimately did not pursue surgery.  States she is now ready to proceed.    History of hypertension, recently dx with ANDREWS now on CPAP,  diabetes diagnosed in  on insulin since her last pregnancy 2022, last A1C 6.5.  History of anxiety and depression.  Iron deficiency anemia on iron supplements.  History of MRSA skin abscess in .  Chronic back pain not treated with medication.  Generalized headaches occasionally.  Says she has had goiter evaluated with ultrasound and has had normal thyroid function in the past.    GI: Heartburn treated with Tums as needed, thinks she had an EGD with [facility] in preop evaluation several years ago with unknown findings. Improved with eating habit changes.   Denies known H. pylori in the past.  Gallbladder is present.  Denies other GI symptoms.    Chart review shows hospitalization in 2022 for acute hypoxic respiratory failure suspected to be obesity hypoventilation syndrome plus or minus volume " "overload with possible aspiration pneumonitis with recent emesis.  Recommended additional pulmonary evaluation outpatient.  Pulmonary function test at that time showed restrictive lung disease by lung volumes and diffusing capacity substantially reduced.    Cardiac clearance 4/19/23    Pulm clearance 4/24/23    GES with accelerated emptying    Intake labs 9/2022 microcytic anemia with Hg 11, A1C 8.5, , normal TSH and negative h pylori    EGD with PQ coming up next week    Strong FH of anemia but no known other causes outside of iron def.   \"    9/14/2023 Update:    Has lost 30 pounds since LOV.  On Ozempic    MRSA skin abscess (cultured).    Hx of goiter, per pt evaluated with U/S in the past.  Last dose of Ozempic 3 days ago.    Takes Lantus 50 U daily.  Previously did ISS.  Pt admits she is barely compliant with Lantus anyway.      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    No personal or family hx of VTE or clotting d/o.  No liver, lung, heart, or renal disease      Review of data:    JOAQUIN: nothign  CBC: nl  CMP: nl  HP neg    EKG: NSR, reviewed tracing and agree with interpretation       EGD: 8/7/23 PQ, GEJ at 44 cm. Sliding hiatal hernia seen on retroflexed view.   Path-benign    Cardiac clearance: cleared     Pulm clearance: cleared    GES with accelerated emptying      Last tobacco: last cigarette 2019  Last NSAIDs: none recent  Last ASA: n/a  Last steroids: n/a  Last hormones: off OCP x 8/16/23.        Past Medical History:   Diagnosis Date    Anemia     Anxiety     Anxiety and depression     Back pain     not treated with meds    Diabetes mellitus 2008    on insulin with pregnancy 1/2022, last A1C 6.5    DM2 (diabetes mellitus, type 2)     bariatric intake A1C 7.7    Generalized headache     GERD (gastroesophageal reflux disease)     Goiter     Gonorrhea     Heartburn     tums prn, thinks she had EGD with [facility]    History of abnormal cervical Pap " "smear     History of COVID-19      no hospital no steroids    Hydradenitis     improved iwth diet change    Hypertension     Iron deficiency anemia     MRSA infection     skin abscess ;  facuial lesion    Post-op pneumonia     2021      Sleep apnea     uses c-pap    Spinal headache     Suspected sleep apnea     Urinary incontinence      Past Surgical History:   Procedure Laterality Date     SECTION  2006     SECTION N/A 2021    Procedure:  SECTION REPEAT;  Surgeon: Rita Busby MD;  Location: [facility] LABOR DELIVERY;  Service: Obstetrics/Gynecology;  Laterality: N/A;    DILATATION AND CURETTAGE      ENDOSCOPY      ENDOSCOPY N/A 2023    Procedure: ESOPHAGOGASTRODUODENOSCOPY WITH BIOPSY;  Surgeon: Alice Atwood MD;  Location: [facility] ENDOSCOPY;  Service: General;  Laterality: N/A;     Allergies   Allergen Reactions    Banana Anaphylaxis     Other reaction(s): Banana, Cucumber, Kiwis, Melon    Cucumber Extract Anaphylaxis    Kiwi Extract Anaphylaxis    Latex Rash and Hives     \"makes my skin raw\"   Other reaction(s): Other, Unknown  1Replaced free text allergy  \"makes my skin raw\"     Melon Anaphylaxis    Nuts Anaphylaxis    Morphine Hallucinations     Dilaudid is ok, tolerates Percocet/Lortab    Duloxetine Hcl Unknown - Low Severity     Mental agitation    Naproxen Nausea Only     No problems with ibuprofen and aspirin  No problems with ibuprofen and aspirin     Current Outpatient Medications:     ferrous gluconate (FERGON) 324 MG tablet, Take 1 tablet by mouth Daily With Breakfast., Disp: , Rfl:     hydroCHLOROthiazide (HYDRODIURIL) 25 MG tablet, Take 1 tablet by mouth Daily., Disp: , Rfl:     insulin glargine (Lantus) 100 UNIT/ML injection, Inject 40 Units under the skin into the appropriate area as directed 2 (Two) Times a Day for 90 days. (Patient taking differently: Inject 50 Units under the skin into the appropriate area as " directed Daily.), Disp: 72 mL, Rfl: 1    losartan (COZAAR) 100 MG tablet, Take 1 tablet by mouth Daily., Disp: , Rfl:     Ozempic, 2 MG/DOSE, 8 MG/3ML solution pen-injector, , Disp: , Rfl:     vitamin D (ERGOCALCIFEROL) 1.25 MG (55340 UT) capsule capsule, Take 1 capsule by mouth 2 (Two) Times a Week.  and , Disp: , Rfl:     apixaban (Eliquis) 2.5 MG tablet tablet, Take 1 tablet by mouth 2 (Two) Times a Day., Disp: 42 tablet, Rfl: 0    norethindrone-ethinyl estradiol (FEMHRT /) 1-5 MG-MCG tablet, Take  by mouth Daily. Unsure of brand name (Patient not taking: Reported on 2023), Disp: , Rfl:     Social History     Socioeconomic History    Marital status:     Number of children: 1    Years of education: 14    Highest education level: Associate degree: occupational, technical, or vocational program   Tobacco Use    Smoking status: Former     Packs/day: 0.25     Years: 17.00     Pack years: 4.25     Types: Cigarettes     Quit date: 2019     Years since quittin.4     Passive exposure: Never    Smokeless tobacco: Never    Tobacco comments:     wasn't a daily smoker-  depended on situation   Vaping Use    Vaping Use: Never used   Substance and Sexual Activity    Alcohol use: Yes     Comment: SOCIALLY     Drug use: Yes     Frequency: 4.0 times per week     Types: Marijuana     Comment: occasionally    Sexual activity: Defer     Social History     Social History Narrative    Patient lives in [facility], she is  and a mother of 2 that works full time at [employer]        Family History   Problem Relation Age of Onset    Hypertension Mother     Lung cancer Father     Hypertension Sister     Breast cancer Maternal Grandmother     Hypertension Maternal Grandmother     Diabetes Maternal Grandmother     Diabetes Maternal Grandfather     Diabetes Paternal Grandmother     Hypertension Paternal Grandmother     Lung cancer Paternal Grandmother     Hypertension Paternal Grandfather     Diabetes  Paternal Grandfather      Review of Systems   Constitutional:  Positive for fatigue and unexpected weight gain. Negative for chills, diaphoresis, fever and unexpected weight loss.   HENT:  Negative for congestion and facial swelling.    Eyes:  Negative for blurred vision, double vision and discharge.   Respiratory:  Negative for chest tightness, shortness of breath and stridor.    Cardiovascular:  Negative for chest pain, palpitations and leg swelling.   Gastrointestinal:  Negative for blood in stool.   Endocrine: Negative for polydipsia.   Genitourinary:  Negative for hematuria.   Musculoskeletal:  Positive for arthralgias.   Skin:  Negative for color change.   Allergic/Immunologic: Negative for immunocompromised state.   Neurological:  Negative for confusion.   Psychiatric/Behavioral:  Negative for self-injury.      Physical Exam:  Vital Signs:  Weight: (!) 189 kg (417 lb)   Body mass index is 63.4 kg/mý.  Temp: 97.8 øF (36.6 øC)   Heart Rate: 66   BP: 118/68     Physical Exam  Vitals reviewed.   Constitutional:       Appearance: She is well-developed.   HENT:      Head: Normocephalic and atraumatic.      Comments: Lip and earrings     Nose: Nose normal.   Eyes:      Conjunctiva/sclera: Conjunctivae normal.      Pupils: Pupils are equal, round, and reactive to light.   Neck:      Thyroid: No thyromegaly.      Vascular: No carotid bruit.      Trachea: No tracheal deviation.      Comments: Large, firm goiter  Cardiovascular:      Rate and Rhythm: Normal rate and regular rhythm.      Heart sounds: Normal heart sounds.   Pulmonary:      Effort: Pulmonary effort is normal. No respiratory distress.      Breath sounds: Normal breath sounds.   Abdominal:      General: There is no distension.      Palpations: Abdomen is soft.      Tenderness: There is no abdominal tenderness.      Comments: Low transverse C Section scar   Musculoskeletal:         General: No deformity. Normal range of motion.      Cervical back: Normal  range of motion and neck supple.      Right lower leg: Edema present.      Left lower leg: Edema present.   Skin:     General: Skin is warm and dry.      Findings: No rash.   Neurological:      Mental Status: She is alert and oriented to person, place, and time.      Cranial Nerves: No cranial nerve deficit.      Coordination: Coordination normal.      Comments: Acanthosis nigricans   Psychiatric:         Behavior: Behavior normal.         Thought Content: Thought content normal.         Judgment: Judgment normal.     Problem List Items Addressed This Visit       Back pain    Overview     not treated with meds         Essential hypertension    MRSA infection    Overview     skin abscess         Obstructive sleep apnea syndrome    Type 2 diabetes mellitus with hyperglycemia, with long-term current use of insulin     Other Visit Diagnoses       Body mass index (BMI) of 60.0-69.9 in adult    -  Primary    Fatigue, unspecified type        Gastroesophageal reflux disease, unspecified whether esophagitis present              Assessment:    Edd Leung is a 38 y.o. year old female with medically complicated obesity.    Weight loss surgery is deemed medically necessary given the following obesity related comorbidities including DM2, depression, back pain, GERD, HTN, ANDREWS, stress incontinence with current Weight: (!) 189 kg (417 lb) and Body mass index is 63.4 kg/mý..    Patient is aware that surgery is a tool, and that weight loss is not guaranteed but only seen in the context of appropriate use, follow up and exercise.    The patient was present for an approximately a 2.5 hour discussion of the purpose of weight loss surgery, how WLS is a tool to assist in achieving weight loss goals, the most common complications and how best to avoid them, and the strategies for short and long term weight loss.  Ample opportunity to discuss questions was available both in group and during the time of individual  "examination.    I reviewed all available preop labs, Xrays, tests, clearances, etc and signed off on these in the record.  All of this in addition to the patient's unique history and exam has been taken into consideration in determining their appropriate candidacy for weight loss surgery.    Complications  of laparoscopic/possible robotic gastric sleeve were discussed. The patient is well aware of the potential complications of surgery that include but not limited to bleeding, infections, deep venous thrombosis, pulmonary embolism, pulmonary complications such as pneumonia, cardiac events, hernias, small bowel obstruction, damage to the spleen or other organs, bowel injury, disfiguring scars, failure to lose weight, need for additional surgery, conversion to an open procedure, and death. Patient is also aware of complications which apply in this particular procedure that can include but are not limited to a \"leak\" at the staple line which in some instances may require conversion to gastric bypass.    The patient is aware if a hiatal hernia is encountered, it likely will be repaired.  R/B/A Rx to hiatal hernia repair were discussed as outlined in our long consent form.  Briefly risks in addition to those for LSG include recurrent hernia, KAROL, dysphagia, esophageal injury, pneumothorax, injury to the vagus nerves, injury to the thoracic duct, aorta or vena cava.    Greater than 3 minutes was spent with the patient discussing avoiding all tobacco products and second hand smoke at least 2 weeks pre-operatively and 6 weeks post-operatively to minimize the risk of sleeve leak.  This included discussing the importance of avoiding even secondhand smoke as the risk of leak is increased.  Examples discussed:  I made it very clear that the patient understands they should avoid even riding in a car where someone has previously smoked in the last 2 weeks, living in a house where someone smokes (even if it's in a separate " room/patio/attached garage, etc.) we discussed that they should not have a conversation with a group of people who are smoking even if it's outside.  They can be around wood burning fires and barbecue.  I told them I do not know if marijuana has a same effects but my overall recommendation is to avoid it for 2 weeks prior in 6 weeks after surgery.  They also are aware that nicotine may also increase the risk of leak and I strongly encouraged him to avoid that as well for 2 weeks prior in 6 weeks after surgery.    Discussed the risks, benefits and alternative therapies at great length as outlined in our extensive consent forms, consent videos, and educational teaching process under the direction of the center's .    A copy of the patient's signed informed consent is on file.    Plan:  Robotic assisted sleeve gastrectomy + HHR at [facility].    Hold Ozempic starting now and do not resume after surgery.    Recommend Lantus 20-25 U daily when start diet.        R/B/A Rx discussed to postop anticoagulation including but not limited to bleeding, drug reaction, venothromboembolic events, etc. and she is agreeable to taking 3 weeks of Eliquis.    MDM high:  Elective procedure with the following risk factors: morbid obesity, Dm2, HTN  4+ chronic medical problems reviewed.      Thank you Erinn Renee PA for allowing me to share in the care of our mutual patient.             Alice Atwood MD                                             Electronically signed by Alice Atwood MD at 23 [phone]                 Alice Atwood MD at 23 1300          [facility] BARIATRIC SURGERY  [address] [facility] [address]  [facility] [address] [phone]      Patient  Name:  Edd Aguayo Madhu  :  1985      Date of Visit: 2023      Chief Complaint:  weight gain; unable to maintain weight loss    History of Present Illness:  Edd Leung  "is a 38 y.o. female who presents today for evaluation, education and consultation regarding weight loss surgery.     Patient has been overweight for many years, with numerous failed dietary/weight loss attempts.  She now has obesity related comorbidities of DM2, depression, back pain, GERD, HTN, ANDREWS, stress incontinence and as such has decided to pursue weight loss surgery.    From intake:  \"Patient is pursuing a sleeve gastrectomy to improve her overall health.  She said she started process through [facility] several years ago but ultimately did not pursue surgery.  States she is now ready to proceed.    History of hypertension, recently dx with ANDREWS now on CPAP,  diabetes diagnosed in 2008 on insulin since her last pregnancy January 2022, last A1C 6.5.  History of anxiety and depression.  Iron deficiency anemia on iron supplements.  History of MRSA skin abscess in 2010.  Chronic back pain not treated with medication.  Generalized headaches occasionally.  Says she has had goiter evaluated with ultrasound and has had normal thyroid function in the past.    GI: Heartburn treated with Tums as needed, thinks she had an EGD with [facility] in preop evaluation several years ago with unknown findings. Improved with eating habit changes.   Denies known H. pylori in the past.  Gallbladder is present.  Denies other GI symptoms.    Chart review shows hospitalization in February 2022 for acute hypoxic respiratory failure suspected to be obesity hypoventilation syndrome plus or minus volume overload with possible aspiration pneumonitis with recent emesis.  Recommended additional pulmonary evaluation outpatient.  Pulmonary function test at that time showed restrictive lung disease by lung volumes and diffusing capacity substantially reduced.    Cardiac clearance 4/19/23    Pulm clearance 4/24/23    GES with accelerated emptying    Intake labs 9/2022 microcytic anemia with Hg 11, A1C 8.5, , normal TSH and negative h " "pylori    EGD with PQ coming up next week    Strong FH of anemia but no known other causes outside of iron def.   \"    2023 Update:    Has lost 30 pounds since LOV.  On Ozempic    MRSA skin abscess (cultured).    Hx of goiter, per pt evaluated with U/S in the past.  Last dose of Ozempic 3 days ago.    Takes Lantus 50 U daily.  Previously did ISS.  Pt admits she is barely compliant with Lantus anyway.      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    No personal or family hx of VTE or clotting d/o.  No liver, lung, heart, or renal disease      Review of data:    JOAQUIN: nothign  CBC: nl  CMP: nl  HP neg    EKG: NSR, reviewed tracing and agree with interpretation       EGD: 23 PQ, GEJ at 44 cm. Sliding hiatal hernia seen on retroflexed view.   Path-benign    Cardiac clearance: cleared     Pulm clearance: cleared    GES with accelerated emptying      Last tobacco: last cigarette   Last NSAIDs: none recent  Last ASA: n/a  Last steroids: n/a  Last hormones: off OCP x 23.        Past Medical History:   Diagnosis Date    Anemia     Anxiety     Anxiety and depression     Back pain     not treated with meds    Diabetes mellitus 2008    on insulin with pregnancy 2022, last A1C 6.5    DM2 (diabetes mellitus, type 2)     bariatric intake A1C 7.7    Generalized headache     GERD (gastroesophageal reflux disease)     Goiter     Gonorrhea     Heartburn     tums prn, thinks she had EGD with [facility]    History of abnormal cervical Pap smear     History of COVID-19      no hospital no steroids    Hydradenitis     improved iwth diet change    Hypertension     Iron deficiency anemia     MRSA infection 2010    skin abscess ; 2006 facuial lesion    Post-op pneumonia     2021      Sleep apnea     uses c-pap    Spinal headache     Suspected sleep apnea     Urinary incontinence      Past Surgical History:   Procedure Laterality Date     SECTION  " "2006     SECTION N/A 2021    Procedure:  SECTION REPEAT;  Surgeon: Rita Busby MD;  Location: [facility] LABOR DELIVERY;  Service: Obstetrics/Gynecology;  Laterality: N/A;    DILATATION AND CURETTAGE      ENDOSCOPY      ENDOSCOPY N/A 2023    Procedure: ESOPHAGOGASTRODUODENOSCOPY WITH BIOPSY;  Surgeon: Alice Atwood MD;  Location:  [facility] ENDOSCOPY;  Service: General;  Laterality: N/A;       Allergies   Allergen Reactions    Banana Anaphylaxis     Other reaction(s): Banana, Cucumber, Kiwis, Melon    Cucumber Extract Anaphylaxis    Kiwi Extract Anaphylaxis    Latex Rash and Hives     \"makes my skin raw\"   Other reaction(s): Other, Unknown  1Replaced free text allergy  \"makes my skin raw\"     Melon Anaphylaxis    Nuts Anaphylaxis    Morphine Hallucinations     Dilaudid is ok, tolerates Percocet/Lortab    Duloxetine Hcl Unknown - Low Severity     Mental agitation    Naproxen Nausea Only     No problems with ibuprofen and aspirin  No problems with ibuprofen and aspirin       Current Outpatient Medications:     ferrous gluconate (FERGON) 324 MG tablet, Take 1 tablet by mouth Daily With Breakfast., Disp: , Rfl:     hydroCHLOROthiazide (HYDRODIURIL) 25 MG tablet, Take 1 tablet by mouth Daily., Disp: , Rfl:     insulin glargine (Lantus) 100 UNIT/ML injection, Inject 40 Units under the skin into the appropriate area as directed 2 (Two) Times a Day for 90 days. (Patient taking differently: Inject 50 Units under the skin into the appropriate area as directed Daily.), Disp: 72 mL, Rfl: 1    losartan (COZAAR) 100 MG tablet, Take 1 tablet by mouth Daily., Disp: , Rfl:     Ozempic, 2 MG/DOSE, 8 MG/3ML solution pen-injector, , Disp: , Rfl:     vitamin D (ERGOCALCIFEROL) 1.25 MG (08748 UT) capsule capsule, Take 1 capsule by mouth 2 (Two) Times a Week.  and , Disp: , Rfl:     apixaban (Eliquis) 2.5 MG tablet tablet, Take 1 tablet by mouth 2 (Two) Times a Day., Disp: 42 tablet, " Rfl: 0    norethindrone-ethinyl estradiol (FEMHRT ) 1-5 MG-MCG tablet, Take  by mouth Daily. Unsure of brand name (Patient not taking: Reported on 2023), Disp: , Rfl:     Social History     Socioeconomic History    Marital status:     Number of children: 1    Years of education: 14    Highest education level: Associate degree: occupational, technical, or vocational program   Tobacco Use    Smoking status: Former     Packs/day: 0.25     Years: 17.00     Pack years: 4.25     Types: Cigarettes     Quit date: 2019     Years since quittin.4     Passive exposure: Never    Smokeless tobacco: Never    Tobacco comments:     wasn't a daily smoker-  depended on situation   Vaping Use    Vaping Use: Never used   Substance and Sexual Activity    Alcohol use: Yes     Comment: SOCIALLY     Drug use: Yes     Frequency: 4.0 times per week     Types: Marijuana     Comment: occasionally    Sexual activity: Defer     Social History     Social History Narrative    Patient lives in [facility] [place] [facility], she is  and a mother of 2 that works full time at [employer]        Family History   Problem Relation Age of Onset    Hypertension Mother     Lung cancer Father     Hypertension Sister     Breast cancer Maternal Grandmother     Hypertension Maternal Grandmother     Diabetes Maternal Grandmother     Diabetes Maternal Grandfather     Diabetes Paternal Grandmother     Hypertension Paternal Grandmother     Lung cancer Paternal Grandmother     Hypertension Paternal Grandfather     Diabetes Paternal Grandfather        Review of Systems   Constitutional:  Positive for fatigue and unexpected weight gain. Negative for chills, diaphoresis, fever and unexpected weight loss.   HENT:  Negative for congestion and facial swelling.    Eyes:  Negative for blurred vision, double vision and discharge.   Respiratory:  Negative for chest tightness, shortness of breath and stridor.    Cardiovascular:  Negative for chest pain, palpitations  and leg swelling.   Gastrointestinal:  Negative for blood in stool.   Endocrine: Negative for polydipsia.   Genitourinary:  Negative for hematuria.   Musculoskeletal:  Positive for arthralgias.   Skin:  Negative for color change.   Allergic/Immunologic: Negative for immunocompromised state.   Neurological:  Negative for confusion.   Psychiatric/Behavioral:  Negative for self-injury.      Physical Exam:  Vital Signs:  Weight: (!) 189 kg (417 lb)   Body mass index is 63.4 kg/mý.  Temp: 97.8 øF (36.6 øC)   Heart Rate: 66   BP: 118/68     Physical Exam  Vitals reviewed.   Constitutional:       Appearance: She is well-developed.   HENT:      Head: Normocephalic and atraumatic.      Comments: Lip and earrings     Nose: Nose normal.   Eyes:      Conjunctiva/sclera: Conjunctivae normal.      Pupils: Pupils are equal, round, and reactive to light.   Neck:      Thyroid: No thyromegaly.      Vascular: No carotid bruit.      Trachea: No tracheal deviation.      Comments: Large, firm goiter  Cardiovascular:      Rate and Rhythm: Normal rate and regular rhythm.      Heart sounds: Normal heart sounds.   Pulmonary:      Effort: Pulmonary effort is normal. No respiratory distress.      Breath sounds: Normal breath sounds.   Abdominal:      General: There is no distension.      Palpations: Abdomen is soft.      Tenderness: There is no abdominal tenderness.      Comments: Low transverse C Section scar   Musculoskeletal:         General: No deformity. Normal range of motion.      Cervical back: Normal range of motion and neck supple.      Right lower leg: Edema present.      Left lower leg: Edema present.   Skin:     General: Skin is warm and dry.      Findings: No rash.   Neurological:      Mental Status: She is alert and oriented to person, place, and time.      Cranial Nerves: No cranial nerve deficit.      Coordination: Coordination normal.      Comments: Acanthosis nigricans   Psychiatric:         Behavior: Behavior normal.          Thought Content: Thought content normal.         Judgment: Judgment normal.     Problem List Items Addressed This Visit       Back pain    Overview     not treated with meds         Essential hypertension    MRSA infection    Overview     skin abscess         Obstructive sleep apnea syndrome    Type 2 diabetes mellitus with hyperglycemia, with long-term current use of insulin     Other Visit Diagnoses       Body mass index (BMI) of 60.0-69.9 in adult    -  Primary    Fatigue, unspecified type        Gastroesophageal reflux disease, unspecified whether esophagitis present                Assessment:    Edd Leung is a 38 y.o. year old female with medically complicated obesity.    Weight loss surgery is deemed medically necessary given the following obesity related comorbidities including DM2, depression, back pain, GERD, HTN, ANDREWS, stress incontinence with current Weight: (!) 189 kg (417 lb) and Body mass index is 63.4 kg/mý..    Patient is aware that surgery is a tool, and that weight loss is not guaranteed but only seen in the context of appropriate use, follow up and exercise.    The patient was present for an approximately a 2.5 hour discussion of the purpose of weight loss surgery, how WLS is a tool to assist in achieving weight loss goals, the most common complications and how best to avoid them, and the strategies for short and long term weight loss.  Ample opportunity to discuss questions was available both in group and during the time of individual examination.    I reviewed all available preop labs, Xrays, tests, clearances, etc and signed off on these in the record.  All of this in addition to the patient's unique history and exam has been taken into consideration in determining their appropriate candidacy for weight loss surgery.    Complications  of laparoscopic/possible robotic gastric sleeve were discussed. The patient is well aware of the potential complications of surgery that include  "but not limited to bleeding, infections, deep venous thrombosis, pulmonary embolism, pulmonary complications such as pneumonia, cardiac events, hernias, small bowel obstruction, damage to the spleen or other organs, bowel injury, disfiguring scars, failure to lose weight, need for additional surgery, conversion to an open procedure, and death. Patient is also aware of complications which apply in this particular procedure that can include but are not limited to a \"leak\" at the staple line which in some instances may require conversion to gastric bypass.    The patient is aware if a hiatal hernia is encountered, it likely will be repaired.  R/B/A Rx to hiatal hernia repair were discussed as outlined in our long consent form.  Briefly risks in addition to those for LSG include recurrent hernia, KAROL, dysphagia, esophageal injury, pneumothorax, injury to the vagus nerves, injury to the thoracic duct, aorta or vena cava.    Greater than 3 minutes was spent with the patient discussing avoiding all tobacco products and second hand smoke at least 2 weeks pre-operatively and 6 weeks post-operatively to minimize the risk of sleeve leak.  This included discussing the importance of avoiding even secondhand smoke as the risk of leak is increased.  Examples discussed:  I made it very clear that the patient understands they should avoid even riding in a car where someone has previously smoked in the last 2 weeks, living in a house where someone smokes (even if it's in a separate room/patio/attached garage, etc.) we discussed that they should not have a conversation with a group of people who are smoking even if it's outside.  They can be around wood burning fires and barbecue.  I told them I do not know if marijuana has a same effects but my overall recommendation is to avoid it for 2 weeks prior in 6 weeks after surgery.  They also are aware that nicotine may also increase the risk of leak and I strongly encouraged him to avoid " that as well for 2 weeks prior in 6 weeks after surgery.    Discussed the risks, benefits and alternative therapies at great length as outlined in our extensive consent forms, consent videos, and educational teaching process under the direction of the center's .    A copy of the patient's signed informed consent is on file.    Plan:  Robotic assisted sleeve gastrectomy + HHR at [facility].    Hold Ozempic starting now and do not resume after surgery.    Recommend Lantus 20-25 U daily when start diet.        R/B/A Rx discussed to postop anticoagulation including but not limited to bleeding, drug reaction, venothromboembolic events, etc. and she is agreeable to taking 3 weeks of Eliquis.    MDM high:  Elective procedure with the following risk factors: morbid obesity, Dm2, HTN  4+ chronic medical problems reviewed.      Thank you Erinn Renee PA for allowing me to share in the care of our mutual patient.             Alice Atwood MD                                             Electronically signed by Alice Atwood MD at 09/14/23 1356       Current Facility-Administered Medications   Medication Dose Route Frequency Provider Last Rate Last Admin    acetaminophen (TYLENOL) tablet 1,000 mg  1,000 mg Oral Q8H Alice Atwood MD   1,000 mg at 10/12/23 0518    Or    acetaminophen (TYLENOL) 160 MG/5ML oral solution 1,000 mg  1,000 mg Oral Q8H Alice Atwood MD        ALPRAZolam (XANAX) tablet 0.25 mg  0.25 mg Oral BID PRN Alice Atwood MD        Amisulpride (Antiemetic) (BARHEMSYS) injection 10 mg  10 mg Intravenous Once PRN Alice Atwood MD        dextrose (D50W) (25 g/50 mL) IV injection 25 g  25 g Intravenous Q15 Min PRN Alice Atwood MD        dextrose (GLUTOSE) oral gel 15 g  15 g Oral Q15 Min PRN Alice Atwood MD        diphenhydrAMINE (BENADRYL) injection 25 mg  25 mg Intravenous Q4H PRN Alice Atwood MD        Enoxaparin  Sodium (LOVENOX) syringe 40 mg  40 mg Subcutaneous Daily Alice Atwood MD   40 mg at 10/12/23 0849    ferric gluconate (FERRLECIT)125 MG in sodium chloride 0.9 % 100 mL IVPB  125 mg Intravenous Once PRN Alice Atwood MD        gabapentin (NEURONTIN) capsule 100 mg  100 mg Oral TID Alice Atwood MD   100 mg at 10/12/23 0849    Or    gabapentin (NEURONTIN) 50 mg/mL solution 100 mg  100 mg Oral TID Alice Atwood MD        glucagon (GLUCAGEN) injection 1 mg  1 mg Intramuscular Q15 Min PRN Alice Atwood MD        hydrALAZINE (APRESOLINE) tablet 50 mg  50 mg Oral TID PRN Violet Still MD        hydroCHLOROthiazide (HYDRODIURIL) tablet 25 mg  25 mg Oral Daily Jayne Quinn, APRN   25 mg at 10/12/23 0849    HYDROmorphone (DILAUDID) injection 0.5 mg  0.5 mg Intravenous Q2H PRN Alice Atwood MD   0.5 mg at 10/11/23 1328    And    naloxone (NARCAN) injection 0.1 mg  0.1 mg Intravenous Q5 Min PRN Alice Atwood MD        HYDROmorphone (DILAUDID) tablet 2 mg  2 mg Oral Q4H PRN Alice Atwood MD   2 mg at 10/11/23 1057    insulin detemir (LEVEMIR) injection 25 Units  25 Units Subcutaneous Q12H Alice Atwood MD   25 Units at 10/12/23 0849    Insulin Lispro (humaLOG) injection 2-7 Units  2-7 Units Subcutaneous 4x Daily AC & at Bedtime Alice Atwood MD   2 Units at 10/11/23 2044    losartan (COZAAR) tablet 100 mg  100 mg Oral Daily Alice Atwood MD   100 mg at 10/12/23 0849    metoclopramide (REGLAN) injection 10 mg  10 mg Intravenous Q6H PRN Alice Atwood MD        metoclopramide (REGLAN) tablet 10 mg  10 mg Oral Q6H PRN Alice Atwood MD        ondansetron (ZOFRAN) injection 4 mg  4 mg Intravenous Q6H PRN Alice Atwood MD   4 mg at 10/10/23 1933    Followed by    [START ON 10/14/2023] ondansetron (ZOFRAN) tablet 4 mg  4 mg Oral Q6H PRN Alice Atwood MD        oxyCODONE (ROXICODONE) immediate  release tablet 5 mg  5 mg Oral Q6H PRN Alice Atwood MD   5 mg at 10/11/23 2044    pantoprazole (PROTONIX) EC tablet 40 mg  40 mg Oral Q AM Alice Atwood MD   40 mg at 10/12/23 0518    phenol (CHLORASEPTIC) 1.4 % liquid 1 spray  1 spray Mouth/Throat Q2H PRN Alice Atwood MD        prochlorperazine (COMPAZINE) injection 10 mg  10 mg Intravenous Q6H PRN Alice Atwood MD   10 mg at 10/10/23 1439    prochlorperazine (COMPAZINE) tablet 10 mg  10 mg Oral Q6H PRN Alice Atwood MD        promethazine (PHENERGAN) tablet 12.5 mg  12.5 mg Oral Q6H PRN Alice Atwood MD        simethicone (MYLICON) chewable tablet 80 mg  80 mg Oral 4x Daily PRN Alice Atwood MD   80 mg at 10/12/23 0518    sodium chloride 0.45 % with KCl 20 mEq/L infusion  100 mL/hr Intravenous Continuous Alice Atwood MD   Stopped at 10/12/23 0858    sodium chloride 0.9 % flush 1-10 mL  1-10 mL Intravenous PRN Alice Awtood MD        sodium chloride 0.9 % flush 10 mL  10 mL Intravenous Q12H Alice Atwood MD   10 mL at 10/11/23 2045    sodium chloride 0.9 % infusion 40 mL  40 mL Intravenous PRN Alice Atwood MD         Lab Results (all)       Procedure Component Value Units Date/Time    POC Glucose Once [[id number]]  (Abnormal) Collected: 10/12/23 1131    Specimen: Blood Updated: 10/12/23 1142     Glucose 149 mg/dL     Comprehensive Metabolic Panel [[id number]]  (Abnormal) Collected: 10/12/23 0847    Specimen: Blood Updated: 10/12/23 1004     Glucose 158 mg/dL      BUN 16 mg/dL      Creatinine 0.78 mg/dL      Sodium 141 mmol/L      Potassium 3.8 mmol/L      Chloride 102 mmol/L      CO2 28.0 mmol/L      Calcium 9.3 mg/dL      Total Protein 7.9 g/dL      Albumin 4.1 g/dL      ALT (SGPT) 25 U/L      AST (SGOT) 14 U/L      Alkaline Phosphatase 84 U/L      Total Bilirubin 0.4 mg/dL      Globulin 3.8 gm/dL      Comment: Calculated Result        A/G Ratio 1.1 g/dL       BUN/Creatinine Ratio 20.5     Anion Gap 11.0 mmol/L      eGFR 101.1 mL/min/1.73     Narrative:      GFR Normal >60  Chronic Kidney Disease <60  Kidney Failure <15      CBC & Differential [[id number]]  (Abnormal) Collected: 10/12/23 0847    Specimen: Blood Updated: 10/12/23 0913    Narrative:      The following orders were created for panel order CBC & Differential.  Procedure                               Abnormality         Status                     ---------                               -----------         ------                     CBC Auto Differential[[id number]]        Abnormal            Final result                 Please view results for these tests on the individual orders.    CBC Auto Differential [[id number]]  (Abnormal) Collected: 10/12/23 0847    Specimen: Blood Updated: 10/12/23 0913     WBC 14.11 10*3/mm3      RBC 4.41 10*6/mm3      Hemoglobin 11.2 g/dL      Hematocrit 37.6 %      MCV 85.3 fL      MCH 25.4 pg      MCHC 29.8 g/dL      RDW 15.7 %      RDW-SD 48.5 fl      MPV 10.4 fL      Platelets 373 10*3/mm3      Neutrophil % 65.4 %      Lymphocyte % 27.9 %      Monocyte % 5.7 %      Eosinophil % 0.1 %      Basophil % 0.3 %      Immature Grans % 0.6 %      Neutrophils, Absolute 9.21 10*3/mm3      Lymphocytes, Absolute 3.94 10*3/mm3      Monocytes, Absolute 0.81 10*3/mm3      Eosinophils, Absolute 0.02 10*3/mm3      Basophils, Absolute 0.04 10*3/mm3      Immature Grans, Absolute 0.09 10*3/mm3      nRBC 0.0 /100 WBC     POC Glucose Once [[id number]]  (Abnormal) Collected: 10/12/23 0729    Specimen: Blood Updated: 10/12/23 0731     Glucose 134 mg/dL     POC Glucose Once [[id number]]  (Abnormal) Collected: 10/11/23 2022    Specimen: Blood Updated: 10/11/23 2027     Glucose 154 mg/dL     Sodium, Urine, Random - Urine, Clean Catch [[id number]] Collected: 10/11/23 1704    Specimen: Urine, Clean Catch Updated: 10/11/23 1947     Sodium, Urine 190 mmol/L     Narrative:      Reference intervals for random urine  have not been established.  Clinical usage is dependent upon physician's interpretation in combination with other laboratory tests.       Urinalysis With Microscopic If Indicated (No Culture) - Urine, Clean Catch [[id number]]  (Normal) Collected: 10/11/23 1704    Specimen: Urine, Clean Catch Updated: 10/11/23 1940     Color, UA Yellow     Appearance, UA Clear     pH, UA 7.0     Specific Gravity, UA 1.016     Glucose, UA Negative     Ketones, UA Negative     Bilirubin, UA Negative     Blood, UA Negative     Protein, UA Negative     Leuk Esterase, UA Negative     Nitrite, UA Negative     Urobilinogen, UA 0.2 E.U./dL    Narrative:      Urine microscopic not indicated.    POC Glucose Once [[id number]]  (Abnormal) Collected: 10/11/23 1616    Specimen: Blood Updated: 10/11/23 1617     Glucose 164 mg/dL     Tissue Pathology Exam [[id number]] Collected: 10/10/23 1142    Specimen: Tissue from Stomach, Tissue from Liver Updated: 10/11/23 1250     Case Report --     Surgical Pathology Report                         Case: [id number]                                  Authorizing Provider:  Alice Atwood MD  Collected:           10/10/2023 11:42 AM          Ordering Location:     [facility]   Received:            10/10/2023 01:09 PM                                 OR                                                                           Pathologist:           Perry Charles MD                                                       Specimens:   1) - Stomach, SUB-TOTAL GASTRECTOMY                                                                 2) - Liver, LIVER BIOPSY                                                                    Clinical Information --     Body mass index (BMI) of 60.0-69.9 in adult  Hiatal hernia       Final Diagnosis --     1.  STOMACH, SUBTOTAL GASTRECTOMY:  Benign gastric mucosa with mild chronic inactive gastritis  Negative for dysplasia or carcinoma    2.  LIVER, BIOPSY:  Minute  "core of benign hepatic parenchyma with mild (~5%) macrovesicular steatosis  Trichrome stain reveals no significant fibrosis  Prussian blue iron stain shows no hepatocellular iron deposition  No evidence of malignancy         Gross Description --     1. Stomach.  Received in formalin labeled \"subtotal gastrectomy\" is a 27 x 4 x 3.5 cm intact, unopened portion of stomach with a minimal amount of attached fat and pink, moderately disrupted serosa.  The lumen contains viscous blood and the mucosa is red-pink with extensive surgical disruption.  No polyps, ulcers or areas of mucosal thickening are noted.  Representative sections are submitted in blocks 1A-1C.  2. Liver.  Received in formalin labeled \"liver biopsy\" is a 1.2 cm long by 0.1 cm in diameter tan needle core submitted entirely in block 2A. HDM         Microscopic Description --     The slides are reviewed and demonstrate histopathologic features supporting the above rendered diagnosis.        POC Glucose Once [[id number]]  (Abnormal) Collected: 10/11/23 1232    Specimen: Blood Updated: 10/11/23 1233     Glucose 173 mg/dL     POC Glucose Once [[id number]]  (Abnormal) Collected: 10/11/23 0743    Specimen: Blood Updated: 10/11/23 0745     Glucose 201 mg/dL     Iron [[id number]]  (Normal) Collected: 10/11/23 0618    Specimen: Blood Updated: 10/11/23 0647     Iron 53 mcg/dL     Comprehensive Metabolic Panel [[id number]]  (Abnormal) Collected: 10/11/23 0618    Specimen: Blood Updated: 10/11/23 0647     Glucose 217 mg/dL      BUN 8 mg/dL      Creatinine 0.83 mg/dL      Sodium 142 mmol/L      Potassium 4.1 mmol/L      Chloride 102 mmol/L      CO2 27.0 mmol/L      Calcium 9.3 mg/dL      Total Protein 8.4 g/dL      Albumin 4.6 g/dL      ALT (SGPT) 39 U/L      AST (SGOT) 33 U/L      Alkaline Phosphatase 95 U/L      Total Bilirubin 0.4 mg/dL      Globulin 3.8 gm/dL      Comment: Calculated Result        A/G Ratio 1.2 g/dL      BUN/Creatinine Ratio 9.6     Anion Gap 13.0 mmol/L "      eGFR 93.8 mL/min/1.73     Narrative:      GFR Normal >60  Chronic Kidney Disease <60  Kidney Failure <15      CBC & Differential [[id number]]  (Abnormal) Collected: 10/11/23 0618    Specimen: Blood Updated: 10/11/23 0635    Narrative:      The following orders were created for panel order CBC & Differential.  Procedure                               Abnormality         Status                     ---------                               -----------         ------                     CBC Auto Differential[[id number]]        Abnormal            Final result                 Please view results for these tests on the individual orders.    CBC Auto Differential [[id number]]  (Abnormal) Collected: 10/11/23 0618    Specimen: Blood Updated: 10/11/23 0635     WBC 16.64 10*3/mm3      RBC 4.88 10*6/mm3      Hemoglobin 12.6 g/dL      Hematocrit 41.2 %      MCV 84.4 fL      MCH 25.8 pg      MCHC 30.6 g/dL      RDW 15.6 %      RDW-SD 47.4 fl      MPV 10.6 fL      Platelets 408 10*3/mm3      Neutrophil % 81.1 %      Lymphocyte % 12.7 %      Monocyte % 4.3 %      Eosinophil % 0.0 %      Basophil % 0.2 %      Immature Grans % 1.7 %      Neutrophils, Absolute 13.49 10*3/mm3      Lymphocytes, Absolute 2.12 10*3/mm3      Monocytes, Absolute 0.71 10*3/mm3      Eosinophils, Absolute 0.00 10*3/mm3      Basophils, Absolute 0.03 10*3/mm3      Immature Grans, Absolute 0.29 10*3/mm3      nRBC 0.0 /100 WBC     POC Glucose Once [[id number]]  (Abnormal) Collected: 10/10/23 2024    Specimen: Blood Updated: 10/10/23 2026     Glucose 306 mg/dL     POC Glucose Once [[id number]]  (Abnormal) Collected: 10/10/23 1619    Specimen: Blood Updated: 10/10/23 1625     Glucose 268 mg/dL     POC Glucose Once [[id number]]  (Abnormal) Collected: 10/10/23 1302    Specimen: Blood Updated: 10/10/23 1303     Glucose 214 mg/dL     POC Glucose Once [[id number]]  (Abnormal) Collected: 10/10/23 0957    Specimen: Blood Updated: 10/10/23 0958     Glucose 160 mg/dL     POC  Urine Pregnancy [[id number]] Collected: 10/10/23 0927    Specimen: Urine Updated: 10/10/23 0927     HCG, Urine, QL Negative     Lot Number [id number]     Internal Positive Control Positive     Internal Negative Control Negative     Expiration Date 1/25          Imaging Results (All)       Procedure Component Value Units Date/Time    FL Upper GI Water Soluble [[id number]] Collected: 10/11/23 0956     Updated: 10/11/23 1001    Narrative:      FL UPPER GI WATER SOLUBLE    Date of Exam: 10/11/2023 9:04 AM EDT    Indication: post sleeve, rule out leak.    Comparison: None available.    Technique:   radiograph of the abdomen was obtained. A single contrast radiographic exam was performed imaging through the upper gastrointestinal tract.    Fluoroscopic Time: 3 minutes and 30 seconds.    Number of Images: 9    Findings:  There was slow passage of barium from the esophagus through the operative site. No extravasation of contrast occurred. The stomach has a normal postoperative appearance, status post gastric sleeve procedure.      Impression:      Impression:    No evidence of leak.      Electronically Signed: Nicole Campbell MD    10/11/2023 9:58 AM EDT    Workstation ID: [id number]             Operative/Procedure Notes (all)        Alice Atwood MD at 10/10/23 1048  Version 1 of 1         GASTRIC SLEEVE WITH HIATAL HERNIA LAPAROSCOPIC WITH DAVINCI ROBOT  Progress Note    Edd Leung  10/10/2023    Pre-op Diagnosis:   Body mass index (BMI) of 60.0-69.9 in adult [Z68.44]  Hiatal hernia [K44.9]       Post-Op Diagnosis Codes:     * Body mass index (BMI) of 60.0-69.9 in adult [Z68.44]     * Hiatal hernia [K44.9]    Procedure/CPTr Codes:  HI LAPS GSTRC RSTRICTIV PX LONGITUDINAL GASTRECTOMY [03557]  HI ESOPHAGOGASTRODUODENOSCOPY TRANSORAL DIAGNOSTIC [35377]  HI LAPS SURG ESOPG/GSTR FUNDOPLASTY [20807]      Procedure(s):  GASTRIC SLEEVE WITH HIATAL HERNIA LAPAROSCOPIC WITH DAVINCI ROBOT, EGD, LIVER  BIOPSY              Surgeon(s):  Alice Atwood MD    Anesthesia: General with Block    Staff:   Circulator: Buddy Kowalski, RAFAL; Ismael Nair RN  Scrub Person: Lorena Romero; Ginny Marinelli  Nursing Assistant: Rylie Mcintyre CNA; Violet Spangler PCT  Assistant: Marc Trejo PA-C  Assistant: Marc Trejo PA-C      Estimated Blood Loss: minimal    Urine Voided: * No values recorded between 10/10/2023 10:17 AM and 10/10/2023 12:18 PM *    Specimens:                Specimens       ID Source Type Tests Collected By Collected At Frozen?    A Stomach Tissue TISSUE PATHOLOGY EXAM   Alice Atwood MD 10/10/23 1142 No    Description: SUB-TOTAL GASTRECTOMY    B Liver Tissue TISSUE PATHOLOGY EXAM   Alice Atwood MD 10/10/23 1148 No    Description: LIVER BIOPSY                  Drains: * No LDAs found *    Findings: Hepatomegaly.  Thick stomach.  Small hiatal hernia.        Complications: None immediate.    Assistant: Marc Trejo PA-C  was responsible for performing the following activities: Retraction, Suction, Irrigation, Suturing, Closing, Placing Dressing, and Held/Positioned Camera and their skilled assistance was necessary for the success of this case.    Alice Atwood MD     Date: 10/10/2023  Time: 12:44 EDT          Electronically signed by Alice Atwood MD at 10/10/23 1244       Alice Atwood MD at 10/10/23 1048  Version 1 of 1         OPERATIVE REPORT    DATE: 10/10/23    PATIENT: Edd Leung    PREOPERATIVE DIAGNOSIS:    1. Morbid obesity with comorbidities  2.  Hiatal hernia    POSTOPERATIVE DIAGNOSIS:    1. Morbid obesity with multiple comorbidities.  2.  Hiatal hernia  3.  Hepatomegaly with steatosis    PROCEDURES PERFORMED:  1. Robotic assisted laparoscopic sleeve gastrectomy (85% subtotal vertical gastrectomy) and type I sliding hiatal hernia repair  2.  Esophagogastroduodenoscopy  3.  Laparoscopic Gianluca-Cut liver  biopsy    SURGEON:  Alice Atwood M.D.    ASSISTANT: NICOLE John was instrumental in the success of the case and provided retraction, suction, camera holding, and skin closure.    ANESTHESIA:  General endotracheal with TAP block    ESTIMATED BLOOD LOSS:   minimal    FLUIDS:  Crystalloids.    SPECIMENS:  Subtotal gastrectomy, Gianluca-Cut liver biopsy    DRAINS:  None    COUNTS:  Correct.    COMPLICATIONS:  None.    FINDINGS: Hepatomegaly.  Thick stomach.  Small hiatal hernia.    INDICATIONS:   Edd Leung is a 38 y.o. year old female with morbid obesity and associated comorbidities who presents for elective laparoscopic sleeve gastrectomy. The patient has undergone our extensive preoperative education, teaching, and consent process. Everything is in order and they wish to proceed.         DESCRIPTION OF PROCEDURE:   The patient was brought to the operating room and placed supine upon the operating room table. SCDs were placed. The patient underwent uneventful general endotracheal anesthesia and bilateral TAP blocks per the anesthesiology staff.  She received subcutaneous Lovenox and was given Ancef. The abdomen was prepped with ChloraPrep and draped in the usual sterile fashion. An Ioban was used as well.  Timeout was performed.     A small transverse incision was made a few centimeters above and to the left of the umbilicus and the peritoneal cavity entered under direct camera visualization using an 8 mm 0ø laparoscope and an OptiView trocar.  The abdomen was then insufflated to a pressure of 16 mmHg with CO2 gas. Exploratory laparoscopy revealed no evidence of injury from the entrance technique. The gallbladder was present and enlarged without evidence of inflammation.  The liver showed uniform macrovesicular steatosis and there was impressive hepatomegaly.  Two 8 mm ports were placed to the patient's left, lateral of the  first port, and a 12 mm port was placed to the patient's right.  Due to  the size of the liver, I used a Zabrina liver retractor.  The Zabrina liver retractor was inserted through a subxiphoid incision and used to retract the left lobe of the liver.  The robot was docked, all safety checks were performed, and I moved to the robot console.     The stomach was decompressed with an 18 Thai orogastric tube, which was then positioned in the antrum. The greater curvature vessels were taken down with the vessel sealer energy device beginning at the midpoint of the stomach and continued up to the angle of His, including the short gastrics. The left kateryna was completely exposed and there was a small hiatal hernia seen here. This was photodocumented. The GE junction fat pad was elevated to make a clear landing zone for the stapler later. The greater curvature vessels were taken down with the energy device extending distally within 3 cm of the pylorus. Filmy adhesions to the stomach were taken down posteriorly.     I dissected the left kateryna with the vessel sealer, then divided the pars flaccida.  There was no replaced right hepatic vessel in the gastrohepatic ligament.  I dissected the right kateryna, then passed a penrose drain around the esophagus for retraction.  The phrenoesophageal ligament was divided.  Once the hiatus was completely dissected, I performed a posterior cruroplasty with running nonabsorbable 2-0 V-Loc suture.  The penrose was removed.     A 36 Thai bougie was inserted and positioned along the lesser curvature of the stomach.  The stomach was marked at 1 cm from the angle of His, 3 cm from the incisura, and 5 cm from the pylorus using an ink saturated Kittner.  1 mg of IV glucagon was given to relax gastric smooth muscle.  Using the bougie as a template, a vertical sleeve gastrectomy was fashioned with successive staple loads.  The stomach was thick, and I had upsize the staple load after the second and second to last firings.  The loads were fired in this order: Blue, white,  blue, white, white, blue.   The staple line was examined and was hemostatic. The gastrectomy specimen was removed through the medial 12 mm port site. The specimen was later examined, and the staples were well-formed. Palpation of the specimen revealed no masses. The staple line of the sleeve gastrectomy revealed staples that were well-formed. The upper abdomen was flooded with saline, and endoscopy was performed.     The flexible endoscope was passed transorally down to the pylorus easily. The sleeve extended to within 6 cm proximal to the pylorus and was hemostatic. The sleeve was not narrow or twisted. There was no hiatal hernia or distal esophagitis. The rest of the esophagus was normal. The scope was removed. The leak test was normal.        I rescrubbed and suctioned the irrigation fluid from the upper abdomen, making sure it was dry. The staple line on the stomach was hemostatic.  The staple line was treated with 4 ml of aerosolized Tisseel fibrin glue. The liver retractor was removed easily.     An 18 gauge Gianluca-Cut needle was inserted through the subxiphoid incision.  A core needle biopsy was taken from the left lobe of the liver.  Hemostasis was obtained immediately with cautery.  The specimen was examined and was adequate.  The medial 12 mm port was removed under direct visualization and there was no bleeding. This incision was closed with an 0-Vicryl transfascial suture using a suture passer under direct visualization in a horizontal mattress fashion. All other ports were removed and then replaced under direct visualization and all of these were hemostatic. The instruments were removed and the abdomen was desufflated. The ports were removed.  3-0 Monocryl plus was used to close skin incisions with interrupted sutures. Skin glue was placed. 10 mg of IV Barhemsys was given at the end of the case.The patient was awakened and taken to recovery in good condition, having tolerated the procedure well. All sponge,  needle, and instrument counts were correct.           Electronically signed by Alice Atwood MD at 10/10/23 1248          Physician Progress Notes (all)        Marifer Bowser, DO at 10/12/23 1037              [facility] Medicine Services  PROGRESS NOTE    Patient Name: Edd Leung  : 1985  MRN: [id number]    Date of Admission: 10/10/2023  Primary Care Physician: Erinn Renee PA    Subjective   Subjective     CC:  F/U HTN    HPI:  Patient seen and examined. Feels well. Sitting up on EOB. Working on her protein shake. Ate some jello, popsicles. BP stable.     Objective   Objective     Vital Signs:   Temp:  [97.6 øF (36.4 øC)-98.8 øF (37.1 øC)] 98.2 øF (36.8 øC)  Heart Rate:  [50-94] 53  Resp:  [16-18] 16  BP: (119-165)/(64-91) 154/88     Physical Exam:  Constitutional: No acute distress, awake, alert, pleasant   HENT: NCAT, mucous membranes moist  Respiratory: Clear to auscultation bilaterally, respiratory effort normal   Cardiovascular: SB, no murmurs, rubs, or gallops  Gastrointestinal: Positive bowel sounds, soft, nontender, nondistended  Musculoskeletal: No bilateral ankle edema  Psychiatric: Appropriate affect, cooperative  Neurologic: Oriented x 3, speech clear  Skin: No rashes, incisions c/d/i    Results Reviewed:  LAB RESULTS:      Lab 10/12/23  0847 10/11/23  0618   WBC 14.11* 16.64*   HEMOGLOBIN 11.2* 12.6   HEMATOCRIT 37.6 41.2   PLATELETS 373 408   NEUTROS ABS 9.21* 13.49*   IMMATURE GRANS (ABS) 0.09* 0.29*   LYMPHS ABS 3.94* 2.12   MONOS ABS 0.81 0.71   EOS ABS 0.02 0.00   MCV 85.3 84.4         Lab 10/12/23  0847 10/11/23  0618   SODIUM 141 142   POTASSIUM 3.8 4.1   CHLORIDE 102 102   CO2 28.0 27.0   ANION GAP 11.0 13.0   BUN 16 8   CREATININE 0.78 0.83   EGFR 101.1 93.8   GLUCOSE 158* 217*   CALCIUM 9.3 9.3         Lab 10/12/23  0847 10/11/23  0618   TOTAL PROTEIN 7.9 8.4   ALBUMIN 4.1 4.6   GLOBULIN 3.8 3.8   ALT (SGPT) 25 39*   AST  (SGOT) 14 33*   BILIRUBIN 0.4 0.4   ALK PHOS 84 95                 Lab 10/11/23  0618   IRON 53         Brief Urine Lab Results  (Last result in the past 365 days)        Color   Clarity   Blood   Leuk Est   Nitrite   Protein   CREAT   Urine HCG        10/11/23 1704 Yellow   Clear   Negative   Negative   Negative   Negative                   Microbiology Results Abnormal       None            FL Upper GI Water Soluble    Result Date: 10/11/2023  FL UPPER GI WATER SOLUBLE Date of Exam: 10/11/2023 9:04 AM EDT Indication: post sleeve, rule out leak. Comparison: None available. Technique:   radiograph of the abdomen was obtained. A single contrast radiographic exam was performed imaging through the upper gastrointestinal tract. Fluoroscopic Time: 3 minutes and 30 seconds. Number of Images: 9 Findings: There was slow passage of barium from the esophagus through the operative site. No extravasation of contrast occurred. The stomach has a normal postoperative appearance, status post gastric sleeve procedure.     Impression: Impression: No evidence of leak. Electronically Signed: Nicole Campbell MD  10/11/2023 9:58 AM EDT  Workstation ID: [id number]     Results for orders placed during the hospital encounter of 02/18/22    Adult Transthoracic Echo Complete W/ Cont if Necessary Per Protocol    Interpretation Summary  ú Left ventricular systolic function is normal. Estimated left ventricular EF = 65%.  ú Left ventricular diastolic function was normal.  ú The cardiac valves are anatomically and functionally normal.      Current medications:  Scheduled Meds:acetaminophen, 1,000 mg, Oral, Q8H   Or  acetaminophen, 1,000 mg, Oral, Q8H  enoxaparin, 40 mg, Subcutaneous, Daily  gabapentin, 100 mg, Oral, TID   Or  gabapentin, 100 mg, Oral, TID  hydroCHLOROthiazide, 25 mg, Oral, Daily  insulin detemir, 25 Units, Subcutaneous, Q12H  insulin lispro, 2-7 Units, Subcutaneous, 4x Daily AC & at Bedtime  losartan, 100 mg, Oral,  Daily  pantoprazole, 40 mg, Oral, Q AM  sodium chloride, 10 mL, Intravenous, Q12H      Continuous Infusions:sodium chloride 0.45 % with KCl 20 mEq, 100 mL/hr, Last Rate: Stopped (10/12/23 0858)      PRN Meds:.  ALPRAZolam    amisulpride (antiemetic)    dextrose    dextrose    diphenhydrAMINE    ferric gluconate    glucagon (human recombinant)    hydrALAZINE    HYDROmorphone **AND** naloxone    HYDROmorphone    metoclopramide    metoclopramide    ondansetron **FOLLOWED BY** [START ON 10/14/2023] ondansetron    oxyCODONE    phenol    prochlorperazine    prochlorperazine    promethazine    simethicone    sodium chloride    sodium chloride    Assessment & Plan   Assessment & Plan     Active Hospital Problems    Diagnosis  POA    **Body mass index (BMI) of 60.0-69.9 in adult [Z68.44]  Not Applicable    Hiatal hernia [K44.9]  Yes    Morbid obesity [E66.01]  Yes    Hypertension [I10]  Yes    Obstructive sleep apnea syndrome [G47.33]  Yes      Resolved Hospital Problems   No resolved problems to display.        Brief Hospital Course to date:  Edd Leung is a 38 y.o. female with PMH significant for chronic back pain, DM 2, HTN, GERD, ANDREWS on CPAP, who presented to [facility] for planned gastric sleeve who was found to have concern for uncontrolled hypertension.     Morbid obesity  --S/p gastric sleeve 10/10/2023  - Management per bariatric surgery     Essential hypertension  - Continue home meds, BP stable  - Previously on nifedipine but reports no use in the last several months.  -- BRIGITTE Parra     ANDREWS  - CPAP    Expected Discharge Location and Transportation: Home per primary service   Expected Discharge   Expected Discharge Date: 10/12/2023; Expected Discharge Time:      DVT prophylaxis:  Medical and mechanical DVT prophylaxis orders are present.     AM-PAC 6 Clicks Score (PT): 24 (10/12/23 0800)    CODE STATUS:   Code Status and Medical Interventions:   Ordered at: 10/10/23 1252     Level Of Support Discussed With:  "   Patient     Code Status (Patient has no pulse and is not breathing):    CPR (Attempt to Resuscitate)     Medical Interventions (Patient has pulse or is breathing):    Full       Marifer Bowser DO  10/12/23        Electronically signed by Marifer Bowser DO at 10/12/23 1040       Cesar Jacob MD at 10/11/23 1509          Cc: POD#1 RSG/HHR/liver biopsy \"sore, gas pain\"    She is sitting up in bed.  Her  is in the room.  She complains of soreness \"gas pain\" and postprandial epigastric pain.  She also has had issues with elevated blood pressure requiring a Cardene drip and seen by the hospitalist service.  She says she has ambulated and is voiding okay.  Denies pulmonary complaints, spirometer 1500 observed.  No bowel movement or flatus.  Has drank about half a protein shake.    No fever, Tmax 99 currently 98.5 pulse 65 respirations 18 blood pressure 119/64 but as high as 198/117 saturation 97%.   UO 5000 - 1000 She is in no apparent distress.  Lungs clear to auscultation.  Abdomen soft, nontender/appropriate, nondistended, bowel sounds hypoactive.  Wounds look okay    Iron normal at 53.  CMP normal except glucose of 217 ALT 39 AST 33.  White count 16.6 with 81 segs 13 lymphs 4 monocytes no bands H&H 12.6 and 41.2 with microcytic indices.  This is actually higher than her preoperative H&H of 11.6 and 39.2.  Hemoglobin A1c 7.80.  Upper GI images and report reviewed, unremarkable.    Impression: Postoperative #1 robotic sleeve gastrectomy, hiatal hernia repair, and liver biopsy.  Some gas pain and postprandial abdominal pain, suboptimal oral intake.  Hypertension requiring a Cardene drip.  Mild elevation in transaminases likely related to fatty liver and liver biopsy.  Diabetes.  Leukocytosis with slight left shift without evidence of infection.  Preadmission microcytic anemia with normal iron level.  Signs of intravascular volume depletion with elevated H&H over baseline, very high urine " output.    Plan: Continue liquids, out of bed, pulmonary toilet, VTE prophylaxis.  Check urinalysis and urine sodium.  Appreciate hospitalist assistance.  See orders        Electronically signed by Cesar Jacob MD at 10/11/23 1515          Consult Notes (all)        Jayne Quinn, BOB at 10/11/23 0057        Consult Orders    1. Inpatient Hospitalist Consult [[id number]] ordered by Amanda Harris PA at 10/10/23 1948                      [facility] Medicine Services  CONSULT NOTE      Patient Name: Edd Leung  : 1985  MRN: [id number]    Primary Care Physician: Erinn Renee PA  Provider requesting consultation: Alice Atwood MD    Subjective   Subjective     Reason for Consultation:  Hypertension     HPI:  Edd Leung is a 38 y.o. female with PMH significant for chronic back pain, DM 2, HTN, GERD, ANDREWS on CPAP, who presented to [facility] for planned gastric sleeve.  Hospital medicine asked to see in consultation secondary to hypertension.  She has required postop Cardene drip for BP control.  She is normally on losartan and HCTZ for which she reports compliance.      Review of Systems   Constitutional:  Positive for activity change and appetite change. Negative for chills, diaphoresis, fatigue, fever and unexpected weight change.   HENT: Negative.  Negative for congestion, sneezing, sore throat and trouble swallowing.    Eyes: Negative.  Negative for visual disturbance.   Respiratory: Negative.  Negative for cough, chest tightness, shortness of breath and wheezing.    Cardiovascular:  Positive for leg swelling. Negative for chest pain and palpitations.   Gastrointestinal:  Positive for abdominal pain and nausea. Negative for abdominal distention, constipation, diarrhea and vomiting.   Endocrine: Negative.  Negative for polydipsia and polyphagia.   Genitourinary: Negative.  Negative for difficulty urinating, dysuria, frequency and  urgency.   Musculoskeletal:  Positive for back pain. Negative for arthralgias, gait problem, myalgias and neck pain.   Skin: Negative.  Negative for color change, pallor, rash and wound.   Allergic/Immunologic: Negative.  Negative for immunocompromised state.   Neurological: Negative.  Negative for dizziness, tremors, seizures, syncope, facial asymmetry, speech difficulty, weakness, light-headedness, numbness and headaches.   Hematological: Negative.  Does not bruise/bleed easily.   Psychiatric/Behavioral: Negative.  Negative for confusion. The patient is not nervous/anxious.        All other systems reviewed and are negative.     Personal History     Past Medical History:   Diagnosis Date    Anemia     Anxiety and depression     Back pain     not treated with meds    Diabetes mellitus     on insulin with pregnancy 2022, last A1C 6.5    DM2 (diabetes mellitus, type 2)     bariatric intake A1C 7.7    GERD (gastroesophageal reflux disease)     Goiter     Gonorrhea     Hidradenitis suppurativa     History of abnormal cervical Pap smear     History of COVID-19      no hospital no steroids    Hydradenitis     improved iwth diet change    Hypertension     Iron deficiency anemia     MRSA infection     skin abscess ;  facial lesion    ANDREWS on CPAP     Post-op pneumonia     2021      Spinal headache     Urinary incontinence     Wears glasses     RX       Past Surgical History:   Procedure Laterality Date     SECTION  2006     SECTION N/A 2021    Procedure:  SECTION REPEAT;  Surgeon: Rita Busby MD;  Location:  [facility];  Service: Obstetrics/Gynecology;  Laterality: N/A;    DILATATION AND CURETTAGE      ENDOSCOPY      ENDOSCOPY N/A 2023    Procedure: ESOPHAGOGASTRODUODENOSCOPY WITH BIOPSY;  Surgeon: Alice Atwood MD;  Location:  [facility];  Service: General;  Laterality: N/A;       Family History:  family history  includes Breast cancer in her maternal grandmother; Diabetes in her maternal grandfather, maternal grandmother, paternal grandfather, and paternal grandmother; Hypertension in her maternal grandmother, mother, paternal grandfather, paternal grandmother, and sister; Lung cancer in her father and paternal grandmother. Otherwise pertinent FHx was reviewed and unremarkable.     Social History:  reports that she quit smoking about 4 years ago. Her smoking use included cigarettes. She has a 4.25 pack-year smoking history. She has never been exposed to tobacco smoke. She has never used smokeless tobacco. She reports current alcohol use. She reports current drug use. Drug: Marijuana.    Medications:  Semaglutide (2 MG/DOSE), apixaban, ferrous gluconate, hydroCHLOROthiazide, insulin glargine, losartan, norethindrone-ethinyl estradiol, and vitamin D    Scheduled Meds:acetaminophen, 1,000 mg, Oral, Q8H   Or  acetaminophen, 1,000 mg, Oral, Q8H  ceFAZolin, 3,000 mg, Intravenous, Q8H  cyanocobalamin, 1,000 mcg, Intramuscular, Once  enoxaparin, 40 mg, Subcutaneous, Daily  gabapentin, 100 mg, Oral, TID   Or  gabapentin, 100 mg, Oral, TID  insulin detemir, 25 Units, Subcutaneous, Q12H  insulin lispro, 2-7 Units, Subcutaneous, 4x Daily AC & at Bedtime  losartan, 100 mg, Oral, Daily  pantoprazole, 40 mg, Oral, Q AM  sodium chloride, 10 mL, Intravenous, Q12H  thiamine (B-1) 100 mg, folic acid 1 mg in sodium chloride 0.9 % 1,000 mL infusion, 200 mL/hr, Intravenous, Once      Continuous Infusions:lactated ringers, 150 mL/hr, Last Rate: 150 mL/hr (10/10/23 0366)  niCARdipine, 5-15 mg/hr, Last Rate: 7.5 mg/hr (10/11/23 0016)  sodium chloride 0.45 % with KCl 20 mEq, 100 mL/hr  sodium chloride, 150 mL/hr, Last Rate: 150 mL/hr (10/10/23 0921)      PRN Meds:.  ALPRAZolam    amisulpride (antiemetic)    dextrose    dextrose    diphenhydrAMINE    ferric gluconate    glucagon (human recombinant)    HYDROmorphone **AND** naloxone    HYDROmorphone    " metoclopramide    metoclopramide    ondansetron **FOLLOWED BY** [START ON 10/14/2023] ondansetron    oxyCODONE    phenol    prochlorperazine    prochlorperazine    promethazine    simethicone    sodium chloride    sodium chloride    Allergies   Allergen Reactions    Banana Anaphylaxis     Other reaction(s): Banana, Cucumber, Kiwis, Melon    Cucumber Extract Anaphylaxis    Kiwi Extract Anaphylaxis    Melon Anaphylaxis    Nuts Anaphylaxis    Latex Hives and Rash     \"makes my skin raw\"       Morphine Hallucinations     Dilaudid is ok, tolerates Percocet/Lortab    Duloxetine Hcl Unknown - Low Severity     Mental agitation    Naproxen Nausea Only     No problems with ibuprofen and aspirin         Objective   Objective     Vital Signs:   Temp:  [97.6 øF (36.4 øC)-99.7 øF (37.6 øC)] 99.7 øF (37.6 øC)  Heart Rate:  [54-88] 84  Resp:  [12-20] 18  BP: (150-198)/() 179/91  Flow (L/min):  [2] 2    Physical Exam  Constitutional: Awake, alert  Eyes: PERRLA, sclerae anicteric, no conjunctival injection  HENT: NCAT, mucous membranes moist  Neck: Supple, no thyromegaly, no lymphadenopathy, trachea midline  Respiratory: Clear to auscultation bilaterally, nonlabored respirations   Cardiovascular: RRR, no murmurs, rubs, or gallops, palpable pedal pulses bilaterally  Gastrointestinal: Positive bowel sounds, soft, nontender, nondistended  Musculoskeletal: No bilateral ankle edema, no clubbing or cyanosis to extremities  Psychiatric: Appropriate affect, cooperative  Neurologic: Oriented x 3, strength symmetric in all extremities, Cranial Nerves grossly intact to confrontation, speech clear  Skin: No rashes         Result Review:  I have personally reviewed the results from the time of admission and agree with these findings:  [x]  Laboratory  [x]  Radiology  []  EKG/Telemetry   []  Pathology  [x]  Old records  []  Other:  Most notable findings include:     LAB RESULTS:                              Brief Urine Lab Results  (Last " result in the past 365 days)        Color   Clarity   Blood   Leuk Est   Nitrite   Protein   CREAT   Urine HCG        10/10/23 0927               Negative             Microbiology Results (last 10 days)       Procedure Component Value - Date/Time    MRSA Screen, PCR (Inpatient) - Swab, Nares [[id number]]  (Normal) Collected: 10/03/23 0929    Lab Status: Final result Specimen: Swab from Nares Updated: 10/03/23 1117     MRSA PCR Negative    Narrative:      The negative predictive value of this diagnostic test is high and should only be used to consider de-escalating anti-MRSA therapy. A positive result may indicate colonization with MRSA and must be correlated clinically.  MRSA Negative            Peripheral Block    Result Date: 10/10/2023  Mylene Landin CRNA     10/10/2023 10:47 AM Peripheral Block Patient reassessed immediately prior to procedure Patient location during procedure: OR Reason for block: at surgeon's request and post-op pain management Performed by Anesthesiologist: Den Puentes Jr., MD CRNA/CAA: Mylene Landin CRNA Preanesthetic Checklist Completed: patient identified, IV checked, site marked, risks and benefits discussed, surgical consent, monitors and equipment checked, pre-op evaluation and timeout performed Prep: Pt Position: supine Sterile barriers:cap, gloves, mask and washed/disinfected hands Prep: ChloraPrep Patient monitoring: blood pressure monitoring, continuous pulse oximetry and EKG Procedure Sedation: yes Performed under: general Guidance:ultrasound guided Images:still images obtained, printed/placed on chart Laterality:Bilateral Block Type:TAP Injection Technique:single-shot Needle Type:short-bevel and echogenic Needle Gauge:20 G Resistance on Injection: none Medications Used: dexamethasone sodium phosphate injection - Injection  4 mg - 10/10/2023 10:26:00 AM bupivacaine PF (MARCAINE) 0.25 % injection - Injection  60 mL - 10/10/2023 10:26:00 AM Medications  "Comment:Block Injection:  LA dose divided between Right and Left block Post Assessment Injection Assessment: negative aspiration for heme, incremental injection and no paresthesia on injection Patient Tolerance:comfortable throughout block Complications:no Additional Notes Subcostal TAPs A high-frequency linear transducer, with sterile cover, was placed sub-xiphoid to identify Linea Alba, right and left Rectus Abdominus Muscles (JULIAN). The transducer was moved either right or left subcostally to identify the JULIAN and the Transverse Abdominus Muscle (CASTAÑEDA). The insertion site was prepped in sterile fashion and then localized with 2-5 ml of 1% Lidocaine. Using ultrasound-guidance, a 20-gauge B-Mota 4\" Ultraplex 360 non-stimulating echogenic needle was advanced in plane, from medial to lateral, until the tip of the needle was in the fascial plane between the JULIAN and CASTAÑEDA. 1-3ml of preservative free normal saline was used to hydro-dissect the fascial planes. After the fascial plane was verified, the local anesthetic (LA) was injected. The procedure was repeated on the opposite side for bilateral coverage. Aspiration every 5 ml to prevent intravascular injection. Injection was completed with negative aspiration of blood and negative intravascular injection. Injection pressures were normal with minimal resistance. The subcostal approach to the TAP nerve block ideally anesthetizes the intercostal nerves T6-T9.       Results for orders placed during the hospital encounter of 02/18/22    Adult Transthoracic Echo Complete W/ Cont if Necessary Per Protocol    Interpretation Summary  ú Left ventricular systolic function is normal. Estimated left ventricular EF = 65%.  ú Left ventricular diastolic function was normal.  ú The cardiac valves are anatomically and functionally normal.      Assessment & Plan   Assessment & Plan     Active Hospital Problems    Diagnosis  POA    **Body mass index (BMI) of 60.0-69.9 in adult [Z68.44]  Not " Applicable    Hiatal hernia [K44.9]  Yes    Morbid obesity [E66.01]  Yes    Hypertension [I10]  Yes    Obstructive sleep apnea syndrome [G47.33]  Yes      Resolved Hospital Problems   No resolved problems to display.     38 y.o. female with PMH significant for chronic back pain, DM 2, HTN, GERD, ANDREWS on CPAP, who presented to [facility] for planned gastric sleeve who was found to have concern for uncontrolled hypertension.    Morbid obesity  S/p gastric sleeve 10/10/2023  - Management per bariatric surgery  - Pain controlled with as needed medications    Essential hypertension  - Requiring Cardene drip postop  - Restart losartan and HCTZ in the a.m. with plan to wean drip as appropriate  - Previously on nifedipine but reports no use in the last several months.    ANDREWS  - CPAP      Thank you for allowing [facility] to provide consultative care for your patient, we will continue to follow while clinically appropriate.    BOB Thomason  10/11/23             Electronically signed by Jayne Quinn, BOB at 10/11/23 0155

## 2023-10-12 NOTE — PROGRESS NOTES
[facility]  PROGRESS NOTE    Patient Name: Edd Leung  : 1985  MRN: [id number]    Date of Admission: 10/10/2023  Primary Care Physician: Erinn Renee PA    Subjective   Subjective     CC:  F/U HTN    HPI:  Patient seen and examined. Feels well. Sitting up on EOB. Working on her protein shake. Ate some jello, popsicles. BP stable.     Objective   Objective     Vital Signs:   Temp:  [97.6 øF (36.4 øC)-98.8 øF (37.1 øC)] 98.2 øF (36.8 øC)  Heart Rate:  [50-94] 53  Resp:  [16-18] 16  BP: (119-165)/(64-91) 154/88     Physical Exam:  Constitutional: No acute distress, awake, alert, pleasant   HENT: NCAT, mucous membranes moist  Respiratory: Clear to auscultation bilaterally, respiratory effort normal   Cardiovascular: SB, no murmurs, rubs, or gallops  Gastrointestinal: Positive bowel sounds, soft, nontender, nondistended  Musculoskeletal: No bilateral ankle edema  Psychiatric: Appropriate affect, cooperative  Neurologic: Oriented x 3, speech clear  Skin: No rashes, incisions c/d/i    Results Reviewed:  LAB RESULTS:      Lab 10/12/23  0847 10/11/23  0618   WBC 14.11* 16.64*   HEMOGLOBIN 11.2* 12.6   HEMATOCRIT 37.6 41.2   PLATELETS 373 408   NEUTROS ABS 9.21* 13.49*   IMMATURE GRANS (ABS) 0.09* 0.29*   LYMPHS ABS 3.94* 2.12   MONOS ABS 0.81 0.71   EOS ABS 0.02 0.00   MCV 85.3 84.4         Lab 10/12/23  0847 10/11/23  0618   SODIUM 141 142   POTASSIUM 3.8 4.1   CHLORIDE 102 102   CO2 28.0 27.0   ANION GAP 11.0 13.0   BUN 16 8   CREATININE 0.78 0.83   EGFR 101.1 93.8   GLUCOSE 158* 217*   CALCIUM 9.3 9.3         Lab 10/12/23  0847 10/11/23  0618   TOTAL PROTEIN 7.9 8.4   ALBUMIN 4.1 4.6   GLOBULIN 3.8 3.8   ALT (SGPT) 25 39*   AST (SGOT) 14 33*   BILIRUBIN 0.4 0.4   ALK PHOS 84 95                 Lab 10/11/23  0618   IRON 53         Brief Urine Lab Results  (Last result in the past 365 days)        Color   Clarity   Blood   Leuk Est   Nitrite    Protein   CREAT   Urine HCG        10/11/23 1704 Yellow   Clear   Negative   Negative   Negative   Negative                   Microbiology Results Abnormal       None            FL Upper GI Water Soluble    Result Date: 10/11/2023  FL UPPER GI WATER SOLUBLE Date of Exam: 10/11/2023 9:04 AM EDT Indication: post sleeve, rule out leak. Comparison: None available. Technique:   radiograph of the abdomen was obtained. A single contrast radiographic exam was performed imaging through the upper gastrointestinal tract. Fluoroscopic Time: 3 minutes and 30 seconds. Number of Images: 9 Findings: There was slow passage of barium from the esophagus through the operative site. No extravasation of contrast occurred. The stomach has a normal postoperative appearance, status post gastric sleeve procedure.     Impression: Impression: No evidence of leak. Electronically Signed: Nicole Campbell MD  10/11/2023 9:58 AM EDT  Workstation ID: [id number]     Results for orders placed during the hospital encounter of 02/18/22    Adult Transthoracic Echo Complete W/ Cont if Necessary Per Protocol    Interpretation Summary  ú Left ventricular systolic function is normal. Estimated left ventricular EF = 65%.  ú Left ventricular diastolic function was normal.  ú The cardiac valves are anatomically and functionally normal.      Current medications:  Scheduled Meds:acetaminophen, 1,000 mg, Oral, Q8H   Or  acetaminophen, 1,000 mg, Oral, Q8H  enoxaparin, 40 mg, Subcutaneous, Daily  gabapentin, 100 mg, Oral, TID   Or  gabapentin, 100 mg, Oral, TID  hydroCHLOROthiazide, 25 mg, Oral, Daily  insulin detemir, 25 Units, Subcutaneous, Q12H  insulin lispro, 2-7 Units, Subcutaneous, 4x Daily AC & at Bedtime  losartan, 100 mg, Oral, Daily  pantoprazole, 40 mg, Oral, Q AM  sodium chloride, 10 mL, Intravenous, Q12H      Continuous Infusions:sodium chloride 0.45 % with KCl 20 mEq, 100 mL/hr, Last Rate: Stopped (10/12/23 0858)      PRN Meds:.  ALPRAZolam     amisulpride (antiemetic)    dextrose    dextrose    diphenhydrAMINE    ferric gluconate    glucagon (human recombinant)    hydrALAZINE    HYDROmorphone **AND** naloxone    HYDROmorphone    metoclopramide    metoclopramide    ondansetron **FOLLOWED BY** [START ON 10/14/2023] ondansetron    oxyCODONE    phenol    prochlorperazine    prochlorperazine    promethazine    simethicone    sodium chloride    sodium chloride    Assessment & Plan   Assessment & Plan     Active Hospital Problems    Diagnosis  POA    **Body mass index (BMI) of 60.0-69.9 in adult [Z68.44]  Not Applicable    Hiatal hernia [K44.9]  Yes    Morbid obesity [E66.01]  Yes    Hypertension [I10]  Yes    Obstructive sleep apnea syndrome [G47.33]  Yes      Resolved Hospital Problems   No resolved problems to display.        Brief Hospital Course to date:  Edd Leung is a 38 y.o. female with PMH significant for chronic back pain, DM 2, HTN, GERD, ANDREWS on CPAP, who presented to [facility] for planned gastric sleeve who was found to have concern for uncontrolled hypertension.     Morbid obesity  --S/p gastric sleeve 10/10/2023  - Management per bariatric surgery     Essential hypertension  - Continue home meds, BP stable  - Previously on nifedipine but reports no use in the last several months.  -- BRIGITTE Parra     ANDREWS  - CPAP    Expected Discharge Location and Transportation: Home per primary service   Expected Discharge   Expected Discharge Date: 10/12/2023; Expected Discharge Time:      DVT prophylaxis:  Medical and mechanical DVT prophylaxis orders are present.     AM-PAC 6 Clicks Score (PT): 24 (10/12/23 0800)    CODE STATUS:   Code Status and Medical Interventions:   Ordered at: 10/10/23 1252     Level Of Support Discussed With:    Patient     Code Status (Patient has no pulse and is not breathing):    CPR (Attempt to Resuscitate)     Medical Interventions (Patient has pulse or is breathing):    Full       Marifer Bowser,   10/12/23

## 2023-10-12 NOTE — PLAN OF CARE
Goal Outcome Evaluation:  Plan of Care Reviewed With: patient        Progress: improving  Outcome Evaluation: VSS on room air, CPAP while sleeping, prn meds given for c/o abd pain and gas pain. IV fluids infusing per orders. Pt appears to be sleeping at this time.

## 2023-10-12 NOTE — PROGRESS NOTES
"Cc: POD#2 RSG/HHR/liver bx  \"feel fine\"    She is sitting up in bed in the room, family in the room (? sister and brother).  She looks and feels well and wants to go home.  She is tolerating her diet without nausea or vomiting.  Drank a protein shake, had Jell-O and popsicles and is staying hydrated.  Passing a lot of flatus, no bowel movement.  Ambulating and voiding well.  No pulmonary complaints, spirometer over 2000 observed    No fever or tachycardia pulse 54 respirations 20 blood pressure 154/91 saturation 100% UO 1400 - NR.  She is no apparent distress.  Abdomen is soft and benign bowel sounds hypoactive.  Wounds look okay    CMP normal except for glucose of 158 white count is 14.1 with an unremarkable differential H&H 11.2 and 37.6.  Urinalysis unremarkable specific remedy 1.016 urine sodium 190    Impression: Postop day #2 robotic sleeve gastrectomy, hiatal hernia repair, and liver biopsy.  Doing well clinically, would like to go home and does meet discharge criteria.  She is off her Cardene drip.  Transaminases normal.  Decreasing white count, no evidence of infection.  Microcytic anemia with normal iron level.    Plan: Discharge home.  Discharge instructions discussed.  Follow-up in the office in 1 to 2 weeks and call in the meantime with any problems questions or concerns.  Reiterated avoiding ulcerogenic agents for the next 6 to 8 weeks.  She says she has her Eliquis at home.  Okay to continue HCTZ for her blood pressure since she is drinking well.  She says she has cut back to 25 units of insulin rather than 50 daily here in the hospital and says she knows how to manage her insulin at home and will work with her PCP after discharge.  Do not resume Ozempic.  Prescriptions for Roxicodone 5 mg #10, Zofran 4 mg #8 in case, omeprazole 40 mg daily #60.  See orders.   "

## 2023-10-13 ENCOUNTER — READMISSION MANAGEMENT (OUTPATIENT)
Dept: CALL CENTER | Facility: HOSPITAL | Age: 38
End: 2023-10-13
Payer: COMMERCIAL

## 2023-10-13 NOTE — PAYOR COMM NOTE
"Amanda Moore RN  Utilization Management  P:[phone]  F:[phone]    Auth#  [id number]   DC date 10/12/23  No DC summary available at this time    Yamel Leung (38 y.o. Female)       Date of Birth   1985    Social Security Number       Address   [address] [id number]    Home Phone   [phone]    MRN   [id number]       B[employer]ism   [employer]    Marital Status                               Admission Date   10/10/23    Admission Type   Elective    Admitting Provider   Alice Atwood MD    Attending Provider       Department, Room/Bed   [address], [address]/1       Discharge Date   10/12/2023    Discharge Disposition   Home or Self Care    Discharge Destination   Home                              Attending Provider: (none)   Allergies: Banana, Cucumber Extract, Kiwi Extract, Melon, Nuts, Latex, Morphine, Duloxetine Hcl, Naproxen    Isolation: None   Infection: None   Code Status: Prior    Ht: 172.7 cm (68\")   Wt: 189 kg (417 lb)    Admission Cmt: None   Principal Problem: Body mass index (BMI) of 60.0-69.9 in adult [Z68.44]                   Active Insurance as of 10/10/2023       Primary Coverage       Payor Plan Insurance Group Employer/Plan Group    [employer] [place] [employer] [place]ICAID        Payor Plan Address Payor Plan Phone Number Payor Plan Fax Number Effective Dates    [address] [address] [phone]  11/23/2021 - None Entered    [facility] [id number]         Subscriber Name Subscriber Birth Date Member ID       YAMEL LEUNG 1985 [id number]                     Emergency Contacts        (Rel.) Home Phone Work Phone Mobile Phone    RAJAN ROWELL (Relative) [phone] -- [phone]    INGE OSBORNE (Mother) [phone] -- [phone]    ANSELMO MARTÍNEZ (Spouse) -- -- [phone]                Cesar Jacob MD   Physician  Surgery     Progress Notes     Signed     Date of Service: 10/12/23 " "1607  Creation Time: 10/12/23 1607     Signed         Cc: POD#2 RSG/HHR/liver bx  \"feel fine\"     She is sitting up in bed in the room, family in the room (? sister and brother).  She looks and feels well and wants to go home.  She is tolerating her diet without nausea or vomiting.  Drank a protein shake, had Jell-O and popsicles and is staying hydrated.  Passing a lot of flatus, no bowel movement.  Ambulating and voiding well.  No pulmonary complaints, spirometer over 2000 observed     No fever or tachycardia pulse 54 respirations 20 blood pressure 154/91 saturation 100% UO 1400 - NR.  She is no apparent distress.  Abdomen is soft and benign bowel sounds hypoactive.  Wounds look okay     CMP normal except for glucose of 158 white count is 14.1 with an unremarkable differential H&H 11.2 and 37.6.  Urinalysis unremarkable specific remedy 1.016 urine sodium 190     Impression: Postop day #2 robotic sleeve gastrectomy, hiatal hernia repair, and liver biopsy.  Doing well clinically, would like to go home and does meet discharge criteria.  She is off her Cardene drip.  Transaminases normal.  Decreasing white count, no evidence of infection.  Microcytic anemia with normal iron level.     Plan: Discharge home.  Discharge instructions discussed.  Follow-up in the office in 1 to 2 weeks and call in the meantime with any problems questions or concerns.  Reiterated avoiding ulcerogenic agents for the next 6 to 8 weeks.  She says she has her Eliquis at home.  Okay to continue HCTZ for her blood pressure since she is drinking well.  She says she has cut back to 25 units of insulin rather than 50 daily here in the hospital and says she knows how to manage her insulin at home and will work with her PCP after discharge.  Do not resume Ozempic.  Prescriptions for Roxicodone 5 mg #10, Zofran 4 mg #8 in case, omeprazole 40 mg daily #60.  See orders.                    Discharge Summary    No notes of this type exist for this " encounter.

## 2023-10-13 NOTE — OUTREACH NOTE
Prep Survey      Flowsheet Row Responses   [facility] facility patient discharged from? [place]   Is LACE score < 7 ? No   Eligibility Readm Mgmt   Discharge diagnosis Body mass index (BMI) of 60.0-69.9 in adult   Does the patient have one of the following disease processes/diagnoses(primary or secondary)? General Surgery   Does the patient have Home health ordered? No   Is there a DME ordered? No   Prep survey completed? Yes            Claire RICHMOND - Registered Nurse

## 2023-10-16 NOTE — DISCHARGE SUMMARY
Discharge Summary    Patient name: Edd Leung    Medical record number: [id number]    Admission date: 10/10/2023  Discharge date:  10/12/2023    Attending physician: Alice Atwood MD    Primary care physician: Erinn Renee PA    Condition on discharge: Stable    Primary Diagnoses:  Morbid obesity with co-morbidities    Operative Procedure:  10/10/2023 robotic sleeve gastrectomy, hiatal hernia repair, and liver biopsy    Hospital Course: The patient is a very pleasant 38 y.o. female that was admitted to the hospital after elective  robotic sleeve gastrectomy, hiatal hernia repair, and liver biopsy.  On POD#1, UGI was without obstruction or leak. The patient had some nausea, but by POD#2, was ambulating well, tolerating stage 1 diet, and felt ready to be discharged.  Please see daily progress notes for full details of hospital stay.  Edd Leung was discharged home in good condition with instructions to follow up in the office in 1 week.      Discharge medications:      Discharge Medications        New Medications        Instructions Start Date   omeprazole 40 MG capsule  Commonly known as: priLOSEC   40 mg, Oral, Daily      ondansetron 4 MG tablet  Commonly known as: Zofran   4 mg, Oral, Every 4 Hours PRN      oxyCODONE 5 MG immediate release tablet  Commonly known as: Roxicodone   5 mg, Oral, Every 4 Hours PRN             Changes to Medications        Instructions Start Date   Lantus 100 UNIT/ML injection  Generic drug: insulin glargine  What changed:   how much to take  when to take this   40 Units, Subcutaneous, 2 Times Daily             Continue These Medications        Instructions Start Date   apixaban 2.5 MG tablet tablet  Commonly known as: Eliquis   2.5 mg, Oral, 2 Times Daily      ferrous gluconate 324 MG tablet  Commonly known as: FERGON   324 mg, Oral, Daily With Breakfast      hydroCHLOROthiazide 25 MG tablet  Commonly known as: HYDRODIURIL   25 mg, Oral,  Daily      losartan 100 MG tablet  Commonly known as: COZAAR   100 mg, Oral, Daily      vitamin D 1.25 MG (02137 UT) capsule capsule  Commonly known as: ERGOCALCIFEROL   50,000 Units, Oral, 2 Times Weekly, Mondays and Fridays             Stop These Medications      norethindrone-ethinyl estradiol 1-5 MG-MCG tablet  Commonly known as: FEMHRT 1/5     Ozempic (2 MG/DOSE) 8 MG/3ML solution pen-injector  Generic drug: Semaglutide (2 MG/DOSE)              Discharge instructions:  Per Bariatric manual      Follow-up appointment: Follow up with Bariatric PA in the office in 1 week.

## 2023-10-17 ENCOUNTER — READMISSION MANAGEMENT (OUTPATIENT)
Dept: CALL CENTER | Facility: HOSPITAL | Age: 38
End: 2023-10-17
Payer: COMMERCIAL

## 2023-10-17 ENCOUNTER — OFFICE VISIT (OUTPATIENT)
Dept: BARIATRICS/WEIGHT MGMT | Facility: CLINIC | Age: 38
End: 2023-10-17
Payer: COMMERCIAL

## 2023-10-17 VITALS
BODY MASS INDEX: 47.09 KG/M2 | WEIGHT: 293 LBS | DIASTOLIC BLOOD PRESSURE: 70 MMHG | TEMPERATURE: 97.8 F | HEIGHT: 66 IN | SYSTOLIC BLOOD PRESSURE: 114 MMHG | HEART RATE: 76 BPM

## 2023-10-17 DIAGNOSIS — Z98.84 S/P BARIATRIC SURGERY: Primary | ICD-10-CM

## 2023-10-17 PROCEDURE — 1160F RVW MEDS BY RX/DR IN RCRD: CPT | Performed by: PHYSICIAN ASSISTANT

## 2023-10-17 PROCEDURE — 1159F MED LIST DOCD IN RCRD: CPT | Performed by: PHYSICIAN ASSISTANT

## 2023-10-17 PROCEDURE — 99024 POSTOP FOLLOW-UP VISIT: CPT | Performed by: PHYSICIAN ASSISTANT

## 2023-10-17 PROCEDURE — 3074F SYST BP LT 130 MM HG: CPT | Performed by: PHYSICIAN ASSISTANT

## 2023-10-17 PROCEDURE — 3078F DIAST BP <80 MM HG: CPT | Performed by: PHYSICIAN ASSISTANT

## 2023-10-17 RX ORDER — URSODIOL 300 MG/1
300 CAPSULE ORAL 2 TIMES DAILY
Qty: 60 CAPSULE | Refills: 5 | Status: SHIPPED | OUTPATIENT
Start: 2023-10-17

## 2023-10-17 NOTE — LETTER
October 17, 2023     Patient: Edd Leung   YOB: 1985       To Whom It May Concern:      It is my medical opinion that Edd eLung may return to work on Tuesday Oct 24th 2023 with restrictions (no excessive activity, which includes no bending/twisting/tugging/pulling or lifting >25 lbs).  May work without restrictions starting Wedneday Nov 1st 2023.  Please call with any questions/concerns .           Sincerely,        NICOLE Lo

## 2023-10-17 NOTE — PROGRESS NOTES
"[facility]  [address]  [address]  Formerly [facility] [id number] [phone]      Patient Name:  Edd Leung  :  1985      Date of Visit: 10/17/2023      Reason for Visit:  POD #7    HPI:  Edd Leung is a 38 y.o. female s/p robotic LSG/HHR/liver bx (steatosis) 10/10/23 w/ Dr. Atwood    Discharged on POD#2.  Doing well.  No issues/concerns.  Denies dysphagia, reflux, nausea, vomiting, abdominal pain, pulmonary issues, and fevers.  Tolerating diet progression - on stage 1.  Getting 90-100g prot/day.  Voiding well - urine \"light yellow\".  Not yet taking vitamins.  On Omeprazole  and Eliquis .  Needs Actigall RX.  Holding Hormones and GLP1 .  Ambulating frequently.  Does not yet feel ready to return to work.       Presurgery weight: 417 pounds.  Today's weight is (!) 183 kg (404 lb 8 oz) pounds, today's  Body mass index is 65.29 kg/m²., and weight loss since surgery is 13 pounds.       Final Diagnosis   1.  STOMACH, SUBTOTAL GASTRECTOMY:  Benign gastric mucosa with mild chronic inactive gastritis  Negative for dysplasia or carcinoma     2.  LIVER, BIOPSY:  Minute core of benign hepatic parenchyma with mild (~5%) macrovesicular steatosis  Trichrome stain reveals no significant fibrosis  Prussian blue iron stain shows no hepatocellular iron deposition  No evidence of malignancy          Past Medical History:   Diagnosis Date    Anemia     Anxiety and depression     Back pain     not treated with meds    Diabetes mellitus 2008    on insulin with pregnancy 2022, last A1C 6.5    DM2 (diabetes mellitus, type 2)     bariatric intake A1C 7.7    GERD (gastroesophageal reflux disease)     Goiter     Gonorrhea     Hidradenitis suppurativa     History of abnormal cervical Pap smear     History of COVID-2022 no hospital no steroids    Hydradenitis     improved iwth diet change    Hypertension     Iron deficiency anemia     MRSA infection     " skin abscess ;  facial lesion    ANDREWS on CPAP     Post-op pneumonia     2021      Spinal headache     Urinary incontinence     Wears glasses     RX     Past Surgical History:   Procedure Laterality Date     SECTION  2006     SECTION N/A 2021    Procedure:  SECTION REPEAT;  Surgeon: Rita Busby MD;  Location:  [facility];  Service: Obstetrics/Gynecology;  Laterality: N/A;    DILATATION AND CURETTAGE      ENDOSCOPY      ENDOSCOPY N/A 2023    Procedure: ESOPHAGOGASTRODUODENOSCOPY WITH BIOPSY;  Surgeon: Alice Atwood MD;  Location:  [facility];  Service: General;  Laterality: N/A;    GASTRIC SLEEVE LAPAROSCOPIC N/A 10/10/2023    Procedure: GASTRIC SLEEVE WITH HIATAL HERNIA LAPAROSCOPIC WITH DAVINCI ROBOT, EGD, LIVER BIOPSY;  Surgeon: Alice Atwood MD;  Location:  [facility];  Service: Robotics - DaVinci;  Laterality: N/A;     Outpatient Medications Marked as Taking for the 10/17/23 encounter (Office Visit) with Joanne Salinas PA   Medication Sig Dispense Refill    apixaban (Eliquis) 2.5 MG tablet tablet Take 1 tablet by mouth 2 (Two) Times a Day. 42 tablet 0    hydroCHLOROthiazide (HYDRODIURIL) 25 MG tablet Take 1 tablet by mouth Daily.      insulin glargine (Lantus) 100 UNIT/ML injection Inject 40 Units under the skin into the appropriate area as directed 2 (Two) Times a Day for 90 days. (Patient taking differently: Inject 50 Units under the skin into the appropriate area as directed Daily.) 72 mL 1    losartan (COZAAR) 100 MG tablet Take 1 tablet by mouth Daily.      omeprazole (priLOSEC) 40 MG capsule Take 1 capsule by mouth Daily for 60 days. 60 capsule 0    ondansetron (Zofran) 4 MG tablet Take 1 tablet by mouth Every 4 (Four) Hours As Needed for Nausea. 8 tablet 0     Allergies   Allergen Reactions    Banana Anaphylaxis     Other reaction(s): Banana, Cucumber, Kiwis, Melon    Cucumber Extract Anaphylaxis    Kiwi  "Extract Anaphylaxis    Melon Anaphylaxis    Nuts Anaphylaxis    Latex Hives and Rash     \"makes my skin raw\"       Morphine Hallucinations     Dilaudid is ok, tolerates Percocet/Lortab    Duloxetine Hcl Unknown - Low Severity     Mental agitation    Naproxen Nausea Only     No problems with ibuprofen and aspirin         Social History     Socioeconomic History    Marital status:     Number of children: 1    Years of education: 14    Highest education level: Associate degree: occupational, technical, or vocational program   Tobacco Use    Smoking status: Former     Packs/day: 0.25     Years: 17.00     Additional pack years: 0.00     Total pack years: 4.25     Types: Cigarettes     Quit date: 2019     Years since quittin.5     Passive exposure: Never    Smokeless tobacco: Never    Tobacco comments:     wasn't a daily smoker-  depended on situation   Vaping Use    Vaping Use: Never used   Substance and Sexual Activity    Alcohol use: Yes     Comment: SOCIALLY     Drug use: Yes     Types: Marijuana     Comment: Last use 23    Sexual activity: Defer     Social History     Social History Narrative    Patient lives in [facility], she is  and a mother of 2 that works full time at [employer], [place]       /70 (BP Location: Right arm, Patient Position: Sitting)   Pulse 76   Temp 97.8 °F (36.6 °C)   Ht 167.6 cm (66\")   Wt (!) 183 kg (404 lb 8 oz)   LMP 10/02/2023 (Approximate)   BMI 65.29 kg/m²   Physical Exam  Constitutional:       Appearance: She is well-developed. She is not ill-appearing.   Eyes:      General: No scleral icterus.  Cardiovascular:      Rate and Rhythm: Normal rate.   Pulmonary:      Effort: Pulmonary effort is normal.   Abdominal:      Palpations: Abdomen is soft.      Tenderness: There is no abdominal tenderness.      Comments: incisions healing well   Musculoskeletal:         General: Normal range of motion.   Skin:     General: Skin is warm and dry.      Findings: No rash. "   Neurological:      Mental Status: She is alert.   Psychiatric:         Behavior: Behavior is cooperative.         Judgment: Judgment normal.           Assessment:   POD #7 s/p robotic LSG/HHR/liver bx (steatosis) 10/10/23 w/ Dr. Atwood           Plan:  Doing well.  Continue to advance diet per manual.  Continue protein 100g/day.  Increase exercise/activity as tolerated.  Reviewed lifting restrictions, nothing >25 lbs x 2 more weeks.  Start vitamins as discussed.  Start Actigall as prescribed.  Continue PPI.  Continue to avoid ASA/NSAIDs/tobacco x 6 weeks postop, steroids x 8 weeks postop.  Call w/ problems/concerns.    The patient was instructed to follow up in 3 weeks, sooner if needed.

## 2023-10-17 NOTE — OUTREACH NOTE
General Surgery Week 1 Survey      Flowsheet Row Responses   [facility] facility patient discharged from? [place]   Does the patient have one of the following disease processes/diagnoses(primary or secondary)? General Surgery   Week 1 attempt successful? No   Unsuccessful attempts Attempt 1            Chika Ackerman Licensed Nurse

## 2023-10-20 ENCOUNTER — OFFICE VISIT (OUTPATIENT)
Dept: INTERNAL MEDICINE | Facility: CLINIC | Age: 38
End: 2023-10-20
Payer: COMMERCIAL

## 2023-10-20 ENCOUNTER — READMISSION MANAGEMENT (OUTPATIENT)
Dept: CALL CENTER | Facility: HOSPITAL | Age: 38
End: 2023-10-20
Payer: COMMERCIAL

## 2023-10-20 VITALS
RESPIRATION RATE: 18 BRPM | WEIGHT: 293 LBS | TEMPERATURE: 97.5 F | HEART RATE: 74 BPM | SYSTOLIC BLOOD PRESSURE: 116 MMHG | OXYGEN SATURATION: 92 % | HEIGHT: 66 IN | DIASTOLIC BLOOD PRESSURE: 74 MMHG | BODY MASS INDEX: 47.09 KG/M2

## 2023-10-20 DIAGNOSIS — E66.01 OBESITY, MORBID, BMI 50 OR HIGHER: ICD-10-CM

## 2023-10-20 DIAGNOSIS — Z30.02 ENCOUNTER FOR COUNSELING AND INSTRUCTION IN NATURAL FAMILY PLANNING TO AVOID PREGNANCY: ICD-10-CM

## 2023-10-20 DIAGNOSIS — E11.65 TYPE 2 DIABETES MELLITUS WITH HYPERGLYCEMIA, WITH LONG-TERM CURRENT USE OF INSULIN: Primary | ICD-10-CM

## 2023-10-20 DIAGNOSIS — I10 ESSENTIAL HYPERTENSION: ICD-10-CM

## 2023-10-20 DIAGNOSIS — Z79.4 TYPE 2 DIABETES MELLITUS WITH HYPERGLYCEMIA, WITH LONG-TERM CURRENT USE OF INSULIN: Primary | ICD-10-CM

## 2023-10-20 LAB
ALBUMIN UR-MCNC: 2.3 MG/DL
CREAT UR-MCNC: 210.7 MG/DL
EXPIRATION DATE: ABNORMAL
HBA1C MFR BLD: 7.6 % (ref 4.5–5.7)
Lab: ABNORMAL
MICROALBUMIN/CREAT UR: 10.9 MG/G (ref 0–29)

## 2023-10-20 PROCEDURE — 82043 UR ALBUMIN QUANTITATIVE: CPT | Performed by: NURSE PRACTITIONER

## 2023-10-20 PROCEDURE — 82570 ASSAY OF URINE CREATININE: CPT | Performed by: NURSE PRACTITIONER

## 2023-10-20 RX ORDER — PROCHLORPERAZINE 25 MG/1
1 SUPPOSITORY RECTAL
Status: ON HOLD | COMMUNITY
End: 2023-10-26

## 2023-10-20 NOTE — OUTREACH NOTE
General Surgery Week 1 Survey      Flowsheet Row Responses   [facility] patient discharged from? [place]   Does the patient have one of the following disease processes/diagnoses(primary or secondary)? General Surgery   Week 1 attempt successful? No   Unsuccessful attempts Attempt 2  [Seen PCP today]            Abida CORONADO - Registered Nurse

## 2023-10-20 NOTE — PROGRESS NOTES
"Subjective   Edd Leung is a 38 y.o. female here to establish care.  Chief Complaint   Patient presents with    Establish Care    Diabetes    Hypertension     History of Present Illness     Edd Leung presents today to establish care.     The patient is currently utilizing Lantus insulin. She states prior to her surgery she was injecting fifty units daily but after her surgery, she started forty units but was unaware she had to utilize this twice daily. Her hemoglobin A1c today is 7.6% which has gone up since her last reading of 6.5%. She notes she has been unable to use her Dexcom glucose monitor, since she has been home. The patient has utilized Januvia, glipizide, metformin, Ozempic, and Humalog in the past. She states she notes metformin was \"terrible\" for her.     Her blood pressure is normal in the office today at 116/74. She is currently utilizing losartan and hydrochlorothiazide. She notes an appointment cardiologist, Dr. Lentz on 12/08/2023 but is unsure why she is following up as she was only seen for preop testing, which was normal. She obtained a cardiac stress test on 04/13/2023.    The patient reports she had a gastric sleeve completed on 10/10/2023 by Dr. Atwood. She states she has been doing well since her surgery, aside from the first two days. She reports she has only taken two of her pain medication prescribed. She denies any current pain. The patient explains her only concern is developing a hernia at her umbilical incision. She states her 1-year-old jumped on her stomach twice while she was sleeping and notes this was sore for two days but is better today. She notes her incision was warm to the touch. The patient states it is not sore today; however, when she stands and pushes the area, it is hard and becomes sore. She reports following up with her surgeon on 10/17/2023 and her next appointment is 11/14/2023. The patient adds she lost thirty pounds before her surgery and an " additional thirteen pounds at her 7-day checkup.    She currently utilizes a CPAP machine and will follow-up with her pulmonologist     The patient reports she has run out of her iron medication. She adds she is now on vitamins and is requesting a prescription. She was previously wanting to try a patch as she was unsure if she could swallow the oral medication, but now believes she may be able to. She is utilizing Vitamin D 50,000 twice a week.    She is requesting a refill on Linzess. She is also utilizing omeprazole.    The patient states she is allergic to latex and after using a nonlatex condom with STI prevention, she started to have a reaction that caused her to go to the hospital. She is looking for another contraception option that is not an IUD, as she had this in the past and does not want it again.     She is not interested in obtaining any vaccines today. She is scheduled for an eye exam on 11/07/2023.    The following portions of the patient's history were reviewed and updated as appropriate: allergies, current medications, past family history, past medical history, past social history, past surgical history and problem list.    Review of Systems   Constitutional:  Positive for appetite change (due to gastric sleeve). Negative for fatigue, fever and unexpected weight loss.   Eyes:  Negative for blurred vision, double vision and visual disturbance.   Respiratory:  Negative for cough, shortness of breath and wheezing.    Cardiovascular:  Negative for chest pain, palpitations and leg swelling.   Gastrointestinal:  Positive for abdominal pain (around incision) and GERD. Negative for constipation, diarrhea, nausea and vomiting.   Genitourinary:  Negative for difficulty urinating, frequency and urgency.   Musculoskeletal:  Negative for arthralgias and myalgias.   Skin:  Negative for color change and rash.   Neurological:  Negative for dizziness, weakness and headache.   Hematological:  Negative for  "adenopathy. Does not bruise/bleed easily.           Allergies   Allergen Reactions    Banana Anaphylaxis     Other reaction(s): Banana, Cucumber, Kiwis, Melon    Cucumber Extract Anaphylaxis    Kiwi Extract Anaphylaxis    Melon Anaphylaxis    Nuts Anaphylaxis    Latex Hives and Rash     \"makes my skin raw\"       Morphine Hallucinations     Dilaudid is ok, tolerates Percocet/Lortab    Duloxetine Hcl Unknown - Low Severity     Mental agitation    Naproxen Nausea Only     No problems with ibuprofen and aspirin       Past Medical History:   Diagnosis Date    Anemia     Anxiety and depression     Back pain     not treated with meds    Diabetes mellitus 2008    on insulin with pregnancy 2022, last A1C 6.5    DM2 (diabetes mellitus, type 2)     bariatric intake A1C 7.7    GERD (gastroesophageal reflux disease)     Goiter     Gonorrhea     Hidradenitis suppurativa     History of abnormal cervical Pap smear     History of COVID-19      no hospital no steroids    Hydradenitis     improved iwth diet change    Hypertension     Iron deficiency anemia     MRSA infection     skin abscess ;  facial lesion    ANDREWS on CPAP     Post-op pneumonia     2021      Spinal headache     Urinary incontinence     Wears glasses     RX     Past Surgical History:   Procedure Laterality Date     SECTION  2006     SECTION N/A 2021    Procedure:  SECTION REPEAT;  Surgeon: Rita Busby MD;  Location: [facility] DELIV[facility];  Service: Obstetrics/Gynecology;  Laterality: N/A;    DILATATION AND CURETTAGE      ENDOSCOPY      ENDOSCOPY N/A 2023    Procedure: ESOPHAGOGASTRODUODENOSCOPY WITH BIOPSY;  Surgeon: Alice Atwood MD;  Location: [facility] ENDOSCOPY;  Service: General;  Laterality: N/A;    GASTRIC SLEEVE LAPAROSCOPIC N/A 10/10/2023    Procedure: GASTRIC SLEEVE WITH HIATAL HERNIA LAPAROSCOPIC WITH DAVINCI ROBOT, EGD, LIVER BIOPSY;  Surgeon: Alice Atwood MD;  " Location: [facility];  Service: Robotics - DaVinci;  Laterality: N/A;     Family History   Problem Relation Age of Onset    Hypertension Mother     Lung cancer Father     Hypertension Sister     Breast cancer Maternal Grandmother     Hypertension Maternal Grandmother     Diabetes Maternal Grandmother     Diabetes Maternal Grandfather     Diabetes Paternal Grandmother     Hypertension Paternal Grandmother     Lung cancer Paternal Grandmother     Hypertension Paternal Grandfather     Diabetes Paternal Grandfather      Social History     Socioeconomic History    Marital status:     Number of children: 1    Years of education: 14    Highest education level: Associate degree: occupational, technical, or vocational program   Tobacco Use    Smoking status: Former     Packs/day: 0.25     Years: 17.00     Additional pack years: 0.00     Total pack years: 4.25     Types: Cigarettes     Quit date: 2019     Years since quittin.5     Passive exposure: Never    Smokeless tobacco: Never    Tobacco comments:     wasn't a daily smoker-  depended on situation   Vaping Use    Vaping Use: Never used   Substance and Sexual Activity    Alcohol use: Yes     Comment: SOCIALLY     Drug use: Yes     Types: Marijuana     Comment: Last use 23    Sexual activity: Defer     Immunization History   Administered Date(s) Administered    COVID-19 (PFIZER) Purple Cap Monovalent 2021, 2021    Influenza LAIV (Nasal) 10/31/2008    Pneumococcal Polysaccharide (PPSV23) 2017    Tdap 10/28/2021       Current Outpatient Medications:     Continuous Blood Gluc Sensor (Dexcom G6 Sensor), Use 1 Device Every 10 (Ten) Days., Disp: , Rfl:     apixaban (Eliquis) 2.5 MG tablet tablet, Take 1 tablet by mouth 2 (Two) Times a Day., Disp: 42 tablet, Rfl: 0    hydroCHLOROthiazide (HYDRODIURIL) 25 MG tablet, Take 1 tablet by mouth Daily., Disp: , Rfl:     Insulin Glargine (LANTUS SOLOSTAR) 100 UNIT/ML injection pen, Inject 40 Units under  "the skin into the appropriate area as directed Every Night., Disp: 15 mL, Rfl: 3    losartan (COZAAR) 100 MG tablet, Take 1 tablet by mouth Daily., Disp: , Rfl:     omeprazole (priLOSEC) 40 MG capsule, Take 1 capsule by mouth Daily for 60 days., Disp: 60 capsule, Rfl: 0    ondansetron (Zofran) 4 MG tablet, Take 1 tablet by mouth Every 4 (Four) Hours As Needed for Nausea., Disp: 8 tablet, Rfl: 0    oxyCODONE (Roxicodone) 5 MG immediate release tablet, Take 1 tablet by mouth Every 4 (Four) Hours As Needed for Moderate Pain. (Patient not taking: Reported on 10/17/2023), Disp: 10 tablet, Rfl: 0    ursodiol (ACTIGALL) 300 MG capsule, Take 1 capsule by mouth 2 (Two) Times a Day., Disp: 60 capsule, Rfl: 5    vitamin D (ERGOCALCIFEROL) 1.25 MG (00672 UT) capsule capsule, Take 1 capsule by mouth 2 (Two) Times a Week. Mondays and Fridays (Patient not taking: Reported on 10/17/2023), Disp: , Rfl:     Objective   Blood pressure 116/74, pulse 74, temperature 97.5 °F (36.4 °C), temperature source Infrared, resp. rate 18, height 167.6 cm (66\"), weight (!) 183 kg (404 lb), last menstrual period 10/02/2023, SpO2 92%, not currently breastfeeding.  Physical Exam  Constitutional:       Appearance: Normal appearance. She is well-developed. She is obese.   HENT:      Head: Normocephalic and atraumatic.   Eyes:      General: No scleral icterus.     Conjunctiva/sclera: Conjunctivae normal.   Cardiovascular:      Rate and Rhythm: Normal rate and regular rhythm.      Heart sounds: Normal heart sounds.   Pulmonary:      Effort: Pulmonary effort is normal. No respiratory distress.      Breath sounds: Normal breath sounds.   Abdominal:      General: Bowel sounds are normal.      Palpations: Abdomen is soft.      Tenderness: There is no abdominal tenderness.   Musculoskeletal:         General: Normal range of motion.      Cervical back: Normal range of motion.      Right lower leg: No edema.      Left lower leg: No edema.   Skin:     General: " Skin is warm and dry.      Comments: five lap incisions across abdomen that have skin glue intact with no signs of infection.   Neurological:      General: No focal deficit present.      Mental Status: She is alert and oriented to person, place, and time.   Psychiatric:         Attention and Perception: Attention normal.         Mood and Affect: Mood and affect normal.         Behavior: Behavior normal. Behavior is cooperative.         Thought Content: Thought content normal.         Cognition and Memory: Cognition normal.         Judgment: Judgment normal.         Assessment & Plan   Diagnoses and all orders for this visit:    1. Type 2 diabetes mellitus with hyperglycemia, with long-term current use of insulin (Primary)  -     POC Glycosylated Hemoglobin (Hb A1C)  -     Microalbumin / Creatinine Urine Ratio - Urine, Clean Catch; Future  -     Microalbumin / Creatinine Urine Ratio - Urine, Clean Catch    2. Essential hypertension    3. Obesity, morbid, BMI 50 or higher    4. Encounter for counseling and instruction in natural family planning to avoid pregnancy  -     Ambulatory Referral to Gynecology    Other orders  -     Insulin Glargine (LANTUS SOLOSTAR) 100 UNIT/ML injection pen; Inject 40 Units under the skin into the appropriate area as directed Every Night.  Dispense: 15 mL; Refill: 3      Diabetes  Uncontrolled, we will continue her Lantus 40 units daily. She will put her Dexcom on this weekend and keep a log of readings and bring them back to me for a recheck in 3 to 4 weeks.    Hypertension  Well controlled, she will continue current medications.    Contraception  She is interested in contraception that is nonhormonal. She is not interested in IUD but will consider a diaphragm. I will refer her to gynecology for this.    S/p Gastric sleeve surgery/obesity  She will continue to follow closely with bariatrics. She is requesting prescriptions for the vitamins that bariatrics once her on. I will reach out to  bariatrics and confirm what they want her on prior to any prescriptions being sent. (Message sent to Joanne Brad)         Return in about 3 weeks (around 11/10/2023) for Recheck.  Plan of care discussed with pt. They verbalized understanding and agreement.     Transcribed from ambient dictation for BOB Kurtz by Annelise Fink.  10/20/23   17:48 EDT    Patient or patient representative verbalized consent to the visit recording.  I have personally performed the services described in this document as transcribed by the above individual, and it is both accurate and complete.  BOB Kurtz  10/24/2023  13:00 EDT

## 2023-10-23 ENCOUNTER — PATIENT ROUNDING (BHMG ONLY) (OUTPATIENT)
Dept: INTERNAL MEDICINE | Facility: CLINIC | Age: 38
End: 2023-10-23
Payer: COMMERCIAL

## 2023-10-23 NOTE — PROGRESS NOTES
A [facility] message has been sent to the patient for patient rounding with [facility].

## 2023-10-24 ENCOUNTER — READMISSION MANAGEMENT (OUTPATIENT)
Dept: CALL CENTER | Facility: HOSPITAL | Age: 38
End: 2023-10-24
Payer: COMMERCIAL

## 2023-10-24 NOTE — OUTREACH NOTE
General Surgery Week 1 Survey      Flowsheet Row Responses   [facility] facility patient discharged from? [place]   Does the patient have one of the following disease processes/diagnoses(primary or secondary)? General Surgery   Week 1 attempt successful? No   Unsuccessful attempts Attempt 3            Shy PIMENTEL - Registered Nurse

## 2023-10-26 ENCOUNTER — APPOINTMENT (OUTPATIENT)
Dept: GENERAL RADIOLOGY | Facility: HOSPITAL | Age: 38
End: 2023-10-26
Payer: COMMERCIAL

## 2023-10-26 ENCOUNTER — HOSPITAL ENCOUNTER (OUTPATIENT)
Facility: HOSPITAL | Age: 38
Setting detail: OBSERVATION
Discharge: HOME | End: 2023-10-27
Attending: EMERGENCY MEDICINE | Admitting: SURGERY
Payer: COMMERCIAL

## 2023-10-26 DIAGNOSIS — R11.2 NAUSEA AND VOMITING, UNSPECIFIED VOMITING TYPE: Primary | ICD-10-CM

## 2023-10-26 DIAGNOSIS — R11.11 VOMITING WITHOUT NAUSEA, UNSPECIFIED VOMITING TYPE: ICD-10-CM

## 2023-10-26 DIAGNOSIS — E11.69 DIABETES MELLITUS TYPE 2 IN OBESE: ICD-10-CM

## 2023-10-26 DIAGNOSIS — E66.9 DIABETES MELLITUS TYPE 2 IN OBESE: ICD-10-CM

## 2023-10-26 DIAGNOSIS — Z90.3 H/O GASTRIC SLEEVE: ICD-10-CM

## 2023-10-26 DIAGNOSIS — R13.19 ESOPHAGEAL DYSPHAGIA: ICD-10-CM

## 2023-10-26 PROBLEM — R11.10 VOMITING: Status: ACTIVE | Noted: 2023-10-26

## 2023-10-26 LAB
ALBUMIN SERPL-MCNC: 4.3 G/DL (ref 3.5–5.2)
ALBUMIN/GLOB SERPL: 1.2 G/DL
ALP SERPL-CCNC: 126 U/L (ref 39–117)
ALT SERPL W P-5'-P-CCNC: <5 U/L (ref 1–33)
AMPHET+METHAMPHET UR QL: NEGATIVE
AMPHETAMINES UR QL: NEGATIVE
ANION GAP SERPL CALCULATED.3IONS-SCNC: 10 MMOL/L (ref 5–15)
AST SERPL-CCNC: 21 U/L (ref 1–32)
B-HCG UR QL: NEGATIVE
BACTERIA UR QL AUTO: ABNORMAL /HPF
BARBITURATES UR QL SCN: NEGATIVE
BASOPHILS # BLD AUTO: 0.03 10*3/MM3 (ref 0–0.2)
BASOPHILS NFR BLD AUTO: 0.4 % (ref 0–1.5)
BENZODIAZ UR QL SCN: NEGATIVE
BILIRUB SERPL-MCNC: 0.4 MG/DL (ref 0–1.2)
BILIRUB UR QL STRIP: NEGATIVE
BUN SERPL-MCNC: 6 MG/DL (ref 6–20)
BUN/CREAT SERPL: 6.7 (ref 7–25)
BUPRENORPHINE SERPL-MCNC: NEGATIVE NG/ML
CALCIUM SPEC-SCNC: 9.2 MG/DL (ref 8.6–10.5)
CANNABINOIDS SERPL QL: POSITIVE
CHLORIDE SERPL-SCNC: 103 MMOL/L (ref 98–107)
CLARITY UR: ABNORMAL
CO2 SERPL-SCNC: 29 MMOL/L (ref 22–29)
COCAINE UR QL: NEGATIVE
COLOR UR: YELLOW
CREAT SERPL-MCNC: 0.9 MG/DL (ref 0.57–1)
DEPRECATED RDW RBC AUTO: 48.4 FL (ref 37–54)
EGFRCR SERPLBLD CKD-EPI 2021: 85.1 ML/MIN/1.73
EOSINOPHIL # BLD AUTO: 0.13 10*3/MM3 (ref 0–0.4)
EOSINOPHIL NFR BLD AUTO: 1.5 % (ref 0.3–6.2)
ERYTHROCYTE [DISTWIDTH] IN BLOOD BY AUTOMATED COUNT: 15.2 % (ref 12.3–15.4)
EXPIRATION DATE: NORMAL
FENTANYL UR-MCNC: NEGATIVE NG/ML
GLOBULIN UR ELPH-MCNC: 3.6 GM/DL
GLUCOSE BLDC GLUCOMTR-MCNC: 106 MG/DL (ref 70–130)
GLUCOSE BLDC GLUCOMTR-MCNC: 117 MG/DL (ref 70–130)
GLUCOSE BLDC GLUCOMTR-MCNC: 127 MG/DL (ref 70–130)
GLUCOSE BLDC GLUCOMTR-MCNC: 133 MG/DL (ref 70–130)
GLUCOSE BLDC GLUCOMTR-MCNC: 85 MG/DL (ref 70–130)
GLUCOSE SERPL-MCNC: 142 MG/DL (ref 65–99)
GLUCOSE UR STRIP-MCNC: NEGATIVE MG/DL
HCT VFR BLD AUTO: 41.6 % (ref 34–46.6)
HGB BLD-MCNC: 12.4 G/DL (ref 12–15.9)
HGB UR QL STRIP.AUTO: NEGATIVE
HOLD SPECIMEN: NORMAL
HYALINE CASTS UR QL AUTO: ABNORMAL /LPF
IMM GRANULOCYTES # BLD AUTO: 0.03 10*3/MM3 (ref 0–0.05)
IMM GRANULOCYTES NFR BLD AUTO: 0.4 % (ref 0–0.5)
INTERNAL NEGATIVE CONTROL: NEGATIVE
INTERNAL POSITIVE CONTROL: POSITIVE
KETONES UR QL STRIP: ABNORMAL
LEUKOCYTE ESTERASE UR QL STRIP.AUTO: ABNORMAL
LIPASE SERPL-CCNC: 47 U/L (ref 13–60)
LYMPHOCYTES # BLD AUTO: 3.63 10*3/MM3 (ref 0.7–3.1)
LYMPHOCYTES NFR BLD AUTO: 42.6 % (ref 19.6–45.3)
Lab: NORMAL
MCH RBC QN AUTO: 25.8 PG (ref 26.6–33)
MCHC RBC AUTO-ENTMCNC: 29.8 G/DL (ref 31.5–35.7)
MCV RBC AUTO: 86.5 FL (ref 79–97)
METHADONE UR QL SCN: NEGATIVE
MONOCYTES # BLD AUTO: 0.55 10*3/MM3 (ref 0.1–0.9)
MONOCYTES NFR BLD AUTO: 6.5 % (ref 5–12)
NEUTROPHILS NFR BLD AUTO: 4.15 10*3/MM3 (ref 1.7–7)
NEUTROPHILS NFR BLD AUTO: 48.6 % (ref 42.7–76)
NITRITE UR QL STRIP: NEGATIVE
NRBC BLD AUTO-RTO: 0 /100 WBC (ref 0–0.2)
OPIATES UR QL: NEGATIVE
OXYCODONE UR QL SCN: NEGATIVE
PCP UR QL SCN: NEGATIVE
PH UR STRIP.AUTO: 6 [PH] (ref 5–8)
PLATELET # BLD AUTO: 417 10*3/MM3 (ref 140–450)
PMV BLD AUTO: 10.2 FL (ref 6–12)
POTASSIUM SERPL-SCNC: 3.6 MMOL/L (ref 3.5–5.2)
PROPOXYPH UR QL: NEGATIVE
PROT SERPL-MCNC: 7.9 G/DL (ref 6–8.5)
PROT UR QL STRIP: ABNORMAL
RBC # BLD AUTO: 4.81 10*6/MM3 (ref 3.77–5.28)
RBC # UR STRIP: ABNORMAL /HPF
REF LAB TEST METHOD: ABNORMAL
SODIUM SERPL-SCNC: 142 MMOL/L (ref 136–145)
SP GR UR STRIP: 1.03 (ref 1–1.03)
SQUAMOUS #/AREA URNS HPF: ABNORMAL /HPF
TRICYCLICS UR QL SCN: NEGATIVE
UROBILINOGEN UR QL STRIP: ABNORMAL
WBC # UR STRIP: ABNORMAL /HPF
WBC NRBC COR # BLD: 8.52 10*3/MM3 (ref 3.4–10.8)
WHOLE BLOOD HOLD COAG: NORMAL
WHOLE BLOOD HOLD SPECIMEN: NORMAL

## 2023-10-26 PROCEDURE — 96375 TX/PRO/DX INJ NEW DRUG ADDON: CPT

## 2023-10-26 PROCEDURE — 80053 COMPREHEN METABOLIC PANEL: CPT

## 2023-10-26 PROCEDURE — 99284 EMERGENCY DEPT VISIT MOD MDM: CPT

## 2023-10-26 PROCEDURE — 80307 DRUG TEST PRSMV CHEM ANLYZR: CPT | Performed by: EMERGENCY MEDICINE

## 2023-10-26 PROCEDURE — G0378 HOSPITAL OBSERVATION PER HR: HCPCS

## 2023-10-26 PROCEDURE — 99214 OFFICE O/P EST MOD 30 MIN: CPT | Performed by: PHYSICIAN ASSISTANT

## 2023-10-26 PROCEDURE — 25810000003 LACTATED RINGERS SOLUTION: Performed by: EMERGENCY MEDICINE

## 2023-10-26 PROCEDURE — 83690 ASSAY OF LIPASE: CPT

## 2023-10-26 PROCEDURE — 82948 REAGENT STRIP/BLOOD GLUCOSE: CPT

## 2023-10-26 PROCEDURE — 25010000002 THIAMINE PER 100 MG: Performed by: SURGERY

## 2023-10-26 PROCEDURE — 81025 URINE PREGNANCY TEST: CPT

## 2023-10-26 PROCEDURE — 25010000002 DROPERIDOL PER 5 MG: Performed by: EMERGENCY MEDICINE

## 2023-10-26 PROCEDURE — 96365 THER/PROPH/DIAG IV INF INIT: CPT

## 2023-10-26 PROCEDURE — 74240 X-RAY XM UPR GI TRC 1CNTRST: CPT

## 2023-10-26 PROCEDURE — 25010000002 ENOXAPARIN PER 10 MG: Performed by: SURGERY

## 2023-10-26 PROCEDURE — 96374 THER/PROPH/DIAG INJ IV PUSH: CPT

## 2023-10-26 PROCEDURE — 96366 THER/PROPH/DIAG IV INF ADDON: CPT

## 2023-10-26 PROCEDURE — 85025 COMPLETE CBC W/AUTO DIFF WBC: CPT

## 2023-10-26 PROCEDURE — 25810000003 SODIUM CHLORIDE 0.9 % SOLUTION 1,000 ML FLEX CONT: Performed by: SURGERY

## 2023-10-26 PROCEDURE — 81001 URINALYSIS AUTO W/SCOPE: CPT

## 2023-10-26 RX ORDER — INSULIN LISPRO 100 [IU]/ML
3-14 INJECTION, SOLUTION INTRAVENOUS; SUBCUTANEOUS
Status: DISCONTINUED | OUTPATIENT
Start: 2023-10-26 | End: 2023-10-27 | Stop reason: HOSPADM

## 2023-10-26 RX ORDER — NITROGLYCERIN 0.4 MG/1
0.4 TABLET SUBLINGUAL
Status: DISCONTINUED | OUTPATIENT
Start: 2023-10-26 | End: 2023-10-27 | Stop reason: HOSPADM

## 2023-10-26 RX ORDER — NICOTINE POLACRILEX 4 MG
15 LOZENGE BUCCAL
Status: DISCONTINUED | OUTPATIENT
Start: 2023-10-26 | End: 2023-10-27 | Stop reason: HOSPADM

## 2023-10-26 RX ORDER — LOSARTAN POTASSIUM 50 MG/1
100 TABLET ORAL DAILY
Status: DISCONTINUED | OUTPATIENT
Start: 2023-10-26 | End: 2023-10-27 | Stop reason: HOSPADM

## 2023-10-26 RX ORDER — DEXTROSE MONOHYDRATE 25 G/50ML
25 INJECTION, SOLUTION INTRAVENOUS
Status: DISCONTINUED | OUTPATIENT
Start: 2023-10-26 | End: 2023-10-27 | Stop reason: HOSPADM

## 2023-10-26 RX ORDER — SODIUM CHLORIDE 9 MG/ML
40 INJECTION, SOLUTION INTRAVENOUS AS NEEDED
Status: DISCONTINUED | OUTPATIENT
Start: 2023-10-26 | End: 2023-10-27 | Stop reason: HOSPADM

## 2023-10-26 RX ORDER — SODIUM CHLORIDE 9 MG/ML
10 INJECTION INTRAVENOUS AS NEEDED
Status: DISCONTINUED | OUTPATIENT
Start: 2023-10-26 | End: 2023-10-27 | Stop reason: HOSPADM

## 2023-10-26 RX ORDER — SODIUM CHLORIDE 0.9 % (FLUSH) 0.9 %
10 SYRINGE (ML) INJECTION AS NEEDED
Status: DISCONTINUED | OUTPATIENT
Start: 2023-10-26 | End: 2023-10-27 | Stop reason: HOSPADM

## 2023-10-26 RX ORDER — ENOXAPARIN SODIUM 100 MG/ML
60 INJECTION SUBCUTANEOUS EVERY 12 HOURS SCHEDULED
Status: DISCONTINUED | OUTPATIENT
Start: 2023-10-26 | End: 2023-10-27 | Stop reason: HOSPADM

## 2023-10-26 RX ORDER — PANTOPRAZOLE SODIUM 40 MG/10ML
40 INJECTION, POWDER, LYOPHILIZED, FOR SOLUTION INTRAVENOUS
Status: DISCONTINUED | OUTPATIENT
Start: 2023-10-26 | End: 2023-10-27

## 2023-10-26 RX ORDER — URSODIOL 300 MG/1
300 CAPSULE ORAL 2 TIMES DAILY
Status: DISCONTINUED | OUTPATIENT
Start: 2023-10-26 | End: 2023-10-27 | Stop reason: HOSPADM

## 2023-10-26 RX ORDER — IBUPROFEN 600 MG/1
1 TABLET ORAL
Status: DISCONTINUED | OUTPATIENT
Start: 2023-10-26 | End: 2023-10-27 | Stop reason: HOSPADM

## 2023-10-26 RX ORDER — DROPERIDOL 2.5 MG/ML
2.5 INJECTION, SOLUTION INTRAMUSCULAR; INTRAVENOUS ONCE
Status: COMPLETED | OUTPATIENT
Start: 2023-10-26 | End: 2023-10-26

## 2023-10-26 RX ORDER — THIAMINE HYDROCHLORIDE 100 MG/ML
100 INJECTION, SOLUTION INTRAMUSCULAR; INTRAVENOUS DAILY
Status: DISCONTINUED | OUTPATIENT
Start: 2023-10-26 | End: 2023-10-27 | Stop reason: HOSPADM

## 2023-10-26 RX ORDER — SODIUM CHLORIDE 0.9 % (FLUSH) 0.9 %
10 SYRINGE (ML) INJECTION EVERY 12 HOURS SCHEDULED
Status: DISCONTINUED | OUTPATIENT
Start: 2023-10-26 | End: 2023-10-27 | Stop reason: HOSPADM

## 2023-10-26 RX ADMIN — LOSARTAN POTASSIUM 100 MG: 50 TABLET, FILM COATED ORAL at 18:05

## 2023-10-26 RX ADMIN — THIAMINE HYDROCHLORIDE 100 MG: 100 INJECTION, SOLUTION INTRAMUSCULAR; INTRAVENOUS at 21:00

## 2023-10-26 RX ADMIN — BARIUM SULFATE 183 ML: 960 POWDER, FOR SUSPENSION ORAL at 11:55

## 2023-10-26 RX ADMIN — URSODIOL 300 MG: 300 CAPSULE ORAL at 21:00

## 2023-10-26 RX ADMIN — DROPERIDOL 2.5 MG: 2.5 INJECTION, SOLUTION INTRAMUSCULAR; INTRAVENOUS at 10:09

## 2023-10-26 RX ADMIN — FOLIC ACID: 5 INJECTION, SOLUTION INTRAMUSCULAR; INTRAVENOUS; SUBCUTANEOUS at 17:44

## 2023-10-26 RX ADMIN — SODIUM CHLORIDE, POTASSIUM CHLORIDE, SODIUM LACTATE AND CALCIUM CHLORIDE 1000 ML: 600; 310; 30; 20 INJECTION, SOLUTION INTRAVENOUS at 10:12

## 2023-10-26 RX ADMIN — Medication 10 ML: at 21:01

## 2023-10-26 RX ADMIN — PANTOPRAZOLE SODIUM 40 MG: 40 INJECTION, POWDER, LYOPHILIZED, FOR SOLUTION INTRAVENOUS at 17:44

## 2023-10-26 NOTE — ED PROVIDER NOTES
"Subjective   History of Present Illness  38 year old female presents to the Emergency department with the cc of vomiting. Patient reports she called the physician that did her gastric sleeve surgery on  and was told to go to the ED. Patient states she has not been able to keep any liquid or solids down since Tuesday around 8pm. Patient states it feels like food gets stuck when she swallows and immediately vomits. Patient reports she took 3 zofran yesterday with no relief and has not been able to take any of her medications today or yesterday. Patient states her blood sugar was 47 this morning and ate a lollipop, blood sugar was 138 on arrival. Patient also reports concerns about a knot on her lower right leg that she noticed about 1 week ago; patient states she experiences some pain with it.    History provided by:  Patient      Review of Systems    Past Medical History:   Diagnosis Date    Anemia     Anxiety and depression     Back pain     not treated with meds    Diabetes mellitus     on insulin with pregnancy 2022, last A1C 6.5    DM2 (diabetes mellitus, type 2)     bariatric intake A1C 7.7    GERD (gastroesophageal reflux disease)     Goiter     Gonorrhea     Hidradenitis suppurativa     History of abnormal cervical Pap smear     History of COVID-19      no hospital no steroids    Hydradenitis     improved iwth diet change    Hypertension     Iron deficiency anemia     MRSA infection     skin abscess ;  facial lesion    ANDREWS on CPAP     Post-op pneumonia     2021      Spinal headache     Urinary incontinence     Wears glasses     RX       Allergies   Allergen Reactions    Banana Anaphylaxis     Other reaction(s): Banana, Cucumber, Kiwis, Melon    Cucumber Extract Anaphylaxis    Kiwi Extract Anaphylaxis    Melon Anaphylaxis    Nuts Anaphylaxis    Latex Hives and Rash     \"makes my skin raw\"       Morphine Hallucinations     Dilaudid is ok, tolerates Percocet/Lortab "    Duloxetine Hcl Unknown - Low Severity     Mental agitation    Naproxen Nausea Only     No problems with ibuprofen and aspirin         Past Surgical History:   Procedure Laterality Date     SECTION  2006     SECTION N/A 2021    Procedure:  SECTION REPEAT;  Surgeon: Rita Busby MD;  Location:  [facility];  Service: Obstetrics/Gynecology;  Laterality: N/A;    DILATATION AND CURETTAGE      ENDOSCOPY      ENDOSCOPY N/A 2023    Procedure: ESOPHAGOGASTRODUODENOSCOPY WITH BIOPSY;  Surgeon: Alice Atwood MD;  Location:  [facility];  Service: General;  Laterality: N/A;    GASTRIC SLEEVE LAPAROSCOPIC N/A 10/10/2023    Procedure: GASTRIC SLEEVE WITH HIATAL HERNIA LAPAROSCOPIC WITH DAVINCI ROBOT, EGD, LIVER BIOPSY;  Surgeon: Alice Atwood MD;  Location:  [facility];  Service: Robotics - DaVinci;  Laterality: N/A;       Family History   Problem Relation Age of Onset    Hypertension Mother     Lung cancer Father     Hypertension Sister     Breast cancer Maternal Grandmother     Hypertension Maternal Grandmother     Diabetes Maternal Grandmother     Diabetes Maternal Grandfather     Diabetes Paternal Grandmother     Hypertension Paternal Grandmother     Lung cancer Paternal Grandmother     Hypertension Paternal Grandfather     Diabetes Paternal Grandfather        Social History     Socioeconomic History    Marital status:     Number of children: 1    Years of education: 14    Highest education level: Associate degree: occupational, technical, or vocational program   Tobacco Use    Smoking status: Former     Packs/day: 0.25     Years: 17.00     Additional pack years: 0.00     Total pack years: 4.25     Types: Cigarettes     Quit date: 2019     Years since quittin.5     Passive exposure: Never    Smokeless tobacco: Never    Tobacco comments:     wasn't a daily smoker-  depended on situation   Vaping Use    Vaping Use: Never used    Substance and Sexual Activity    Alcohol use: Yes     Comment: SOCIALLY     Drug use: Yes     Types: Marijuana     Comment: Last use 9/16/23    Sexual activity: Defer           Objective   Physical Exam  Vitals and nursing note reviewed.   Constitutional:       Appearance: Normal appearance.   Cardiovascular:      Rate and Rhythm: Normal rate and regular rhythm.   Pulmonary:      Effort: Pulmonary effort is normal.      Breath sounds: Normal breath sounds.   Musculoskeletal:        Legs:       Comments: Tender knot for 1 week on lateral side of lower right leg    Skin:     General: Skin is warm and dry.   Neurological:      Mental Status: She is alert.   Psychiatric:         Mood and Affect: Mood normal.         Procedures           ED Course  ED Course as of 10/26/23 1919   Thu Oct 26, 2023   1023 I reviewed the patient with the bariatric surgeon who is requesting an upper GI series with barium based contrast.  We will follow-up afterwards. [RS]   1311 FL Upper GI Single Contrast With KUB  Personally reviewed the multiple images of the upper GI series.  There is retention of contrast within the esophagus with stricture noted.  I reviewed this with the bariatric surgeon as well.  In my interpretation there is no extravasation of the contrast [RS]   1311 Case discussed with the bariatric surgeon who will admit the patient for further evaluation and management. [RS]      ED Course User Index  [RS] Harris Aguirre MD                                           Medical Decision Making  Problems Addressed:  Diabetes mellitus type 2 in obese: complicated acute illness or injury  H/O gastric sleeve: complicated acute illness or injury  Nausea and vomiting, unspecified vomiting type: complicated acute illness or injury    Amount and/or Complexity of Data Reviewed  Independent Historian: EMS  External Data Reviewed: notes.  Labs: ordered.  Radiology:  Decision-making details documented in ED  Course.    Risk  Prescription drug management.  Decision regarding hospitalization.        Final diagnoses:   Nausea and vomiting, unspecified vomiting type   H/O gastric sleeve   Diabetes mellitus type 2 in obese       ED Disposition  ED Disposition       ED Disposition   Decision to Admit    Condition   --    Comment   Level of Care: Telemetry [5]   Diagnosis: Vomiting [196651]                 No follow-up provider specified.       Medication List      No changes were made to your prescriptions during this visit.            Harris Aguirre MD  10/26/23 [id number]

## 2023-10-26 NOTE — H&P (VIEW-ONLY)
[facility] Consult    Referring Provider: No ref. provider found    PCP: Tatianna Vicente, APRN    Reason for Consultation: Esophagus dysphagia, vomiting    History of present illness:    Edd Leung is a 38 y.o. female, PMH includes T2DM, HTN, anxiety, depression, is admitted via ED yesterday for evaluation of vomiting and dysphagia.     Pt is s/p laparascopic gastric sleeve procedure 10/10/23 with Dr. Atwood. She initially did well postoperatively, but developed esophageal dysphagia / bolus sensation and emesis x 2 days following ingestion of sweet potatoes. She has been intolerant to most food and liquids since that time, with the exception of yogurt yesterday.     UGI with SBFT done earlier today, pending at time of dictation.     Patient denies associated fever, chills, abdominal pain, indigestion, nausea, diarrhea, hematemesis, hematochezia, melena, bloating, weight loss or gain, dysuria, jaundice or bruising.    Patient denies personal or FHx of PUD, H Pylori, gastritis, pancreatitis, colitis, Celiac disease, UC, Crohn's disease, IBS, colon or gastric cancers. Pt denies tobacco or NSAID use. Rare EtOH, marijuana use.     Allergies:  Banana, Cucumber extract, Kiwi extract, Melon, Nuts, Latex, Morphine, Duloxetine hcl, and Naproxen    Scheduled Meds:  enoxaparin, 60 mg, Subcutaneous, Q12H  insulin lispro, 3-14 Units, Subcutaneous, 4x Daily AC & at Bedtime  losartan, 100 mg, Oral, Daily  sodium chloride 0.9 % 1,000 mL with thiamine 100 mg, folic acid 1 mg infusion, , Intravenous, Once  sodium chloride, 10 mL, Intravenous, Q12H  ursodiol, 300 mg, Oral, BID         Infusions:       PRN Meds:    dextrose    dextrose    glucagon (human recombinant)    nitroglycerin    Sodium Chloride (PF)    sodium chloride    sodium chloride    Home Meds:  Medications Prior to Admission   Medication Sig Dispense Refill Last Dose    apixaban (Eliquis) 2.5 MG tablet tablet Take 1 tablet by mouth 2 (Two)  Times a Day. 42 tablet 0 10/25/2023    hydroCHLOROthiazide (HYDRODIURIL) 25 MG tablet Take 1 tablet by mouth Daily.   10/25/2023    Insulin Glargine (LANTUS SOLOSTAR) 100 UNIT/ML injection pen Inject 40 Units under the skin into the appropriate area as directed Every Night. 15 mL 3 10/25/2023    losartan (COZAAR) 100 MG tablet Take 1 tablet by mouth Daily.   10/25/2023    omeprazole (priLOSEC) 40 MG capsule Take 1 capsule by mouth Daily for 60 days. 60 capsule 0 10/25/2023    ondansetron (Zofran) 4 MG tablet Take 1 tablet by mouth Every 4 (Four) Hours As Needed for Nausea. 8 tablet 0 Past Week    ursodiol (ACTIGALL) 300 MG capsule Take 1 capsule by mouth 2 (Two) Times a Day. 60 capsule 5 10/25/2023    vitamin D (ERGOCALCIFEROL) 1.25 MG (38800 UT) capsule capsule Take 1 capsule by mouth 2 (Two) Times a Week. Mondays and Fridays (Patient not taking: Reported on 10/17/2023)          ROS: Review of Systems   Constitutional:  Negative for chills, diaphoresis, fatigue and unexpected weight change.   HENT:  Positive for trouble swallowing. Negative for drooling, facial swelling, mouth sores, nosebleeds, rhinorrhea, sore throat, tinnitus and voice change.    Respiratory:  Negative for cough, chest tightness and shortness of breath.    Cardiovascular:  Negative for chest pain, palpitations and leg swelling.   Gastrointestinal:  Positive for vomiting. Negative for abdominal distention, abdominal pain, blood in stool and diarrhea.   Genitourinary:  Negative for dysuria, flank pain and hematuria.   Musculoskeletal:  Negative for arthralgias, joint swelling and myalgias.   Skin:  Negative for color change, pallor and rash.   Neurological:  Negative for dizziness, tremors, syncope, weakness and light-headedness.   Psychiatric/Behavioral:  Negative for confusion and hallucinations.    All other systems reviewed and are negative.      PAST MED HX:  Past Medical History:   Diagnosis Date    Anemia     Anxiety and depression     Back  pain     not treated with meds    Diabetes mellitus 2008    on insulin with pregnancy 2022, last A1C 6.5    DM2 (diabetes mellitus, type 2)     bariatric intake A1C 7.7    GERD (gastroesophageal reflux disease)     Goiter     Gonorrhea     Hidradenitis suppurativa     History of abnormal cervical Pap smear     History of COVID-19      no hospital no steroids    Hydradenitis     improved iwth diet change    Hypertension     Iron deficiency anemia     MRSA infection     skin abscess ;  facial lesion    ANDREWS on CPAP     Post-op pneumonia     2021      Spinal headache     Urinary incontinence     Wears glasses     RX       PAST SURG HX:  Past Surgical History:   Procedure Laterality Date     SECTION  2006     SECTION N/A 2021    Procedure:  SECTION REPEAT;  Surgeon: Rita Busby MD;  Location: [facility];  Service: Obstetrics/Gynecology;  Laterality: N/A;    DILATATION AND CURETTAGE      ENDOSCOPY      ENDOSCOPY N/A 2023    Procedure: ESOPHAGOGASTRODUODENOSCOPY WITH BIOPSY;  Surgeon: Alice Atwood MD;  Location:  [facility];  Service: General;  Laterality: N/A;    GASTRIC SLEEVE LAPAROSCOPIC N/A 10/10/2023    Procedure: GASTRIC SLEEVE WITH HIATAL HERNIA LAPAROSCOPIC WITH DAVINCI ROBOT, EGD, LIVER BIOPSY;  Surgeon: Alice Atwood MD;  Location:  [facility];  Service: Robotics - DaVinci;  Laterality: N/A;       FAM HX:  Family History   Problem Relation Age of Onset    Hypertension Mother     Lung cancer Father     Hypertension Sister     Breast cancer Maternal Grandmother     Hypertension Maternal Grandmother     Diabetes Maternal Grandmother     Diabetes Maternal Grandfather     Diabetes Paternal Grandmother     Hypertension Paternal Grandmother     Lung cancer Paternal Grandmother     Hypertension Paternal Grandfather     Diabetes Paternal Grandfather        SOC HX:  Social History     Socioeconomic History     "Marital status:     Number of children: 1    Years of education: 14    Highest education level: Associate degree: occupational, technical, or vocational program   Tobacco Use    Smoking status: Former     Packs/day: 0.25     Years: 17.00     Additional pack years: 0.00     Total pack years: 4.25     Types: Cigarettes     Quit date: 2019     Years since quittin.5     Passive exposure: Never    Smokeless tobacco: Never    Tobacco comments:     wasn't a daily smoker-  depended on situation   Vaping Use    Vaping Use: Never used   Substance and Sexual Activity    Alcohol use: Yes     Comment: SOCIALLY     Drug use: Yes     Types: Marijuana     Comment: Last use 23    Sexual activity: Defer       PHYSICAL EXAM  /81 (BP Location: Left arm, Patient Position: Lying)   Pulse 60   Temp 98.7 °F (37.1 °C) (Oral)   Resp 18   Ht 175.3 cm (69\")   Wt (!) 183 kg (404 lb)   LMP 10/02/2023 (Approximate)   SpO2 98%   BMI 59.66 kg/m²   Wt Readings from Last 3 Encounters:   10/26/23 (!) 183 kg (404 lb)   10/20/23 (!) 183 kg (404 lb)   10/17/23 (!) 183 kg (404 lb 8 oz)   ,body mass index is 59.66 kg/m².  Physical Exam  Vitals and nursing note reviewed.   Constitutional:       Appearance: Normal appearance. She is normal weight. She is not ill-appearing or diaphoretic.      Comments: BMI 59.66. Pleasantly conversant.    HENT:      Head: Normocephalic and atraumatic.      Right Ear: External ear normal.      Left Ear: External ear normal.      Nose: Nose normal.      Mouth/Throat:      Mouth: Mucous membranes are moist.      Pharynx: Oropharynx is clear.      Comments: No difficulty handling secretions.   Eyes:      Conjunctiva/sclera: Conjunctivae normal.      Pupils: Pupils are equal, round, and reactive to light.   Neck:      Thyroid: No thyromegaly.   Cardiovascular:      Rate and Rhythm: Normal rate and regular rhythm.      Pulses: Normal pulses.      Heart sounds: Normal heart sounds.   Pulmonary:     " " Effort: Pulmonary effort is normal.      Breath sounds: Normal breath sounds.   Abdominal:      General: Abdomen is flat. Bowel sounds are normal. There is no distension.      Tenderness: There is no abdominal tenderness.   Musculoskeletal:         General: Normal range of motion.      Cervical back: Normal range of motion and neck supple.   Skin:     General: Skin is warm and dry.   Neurological:      General: No focal deficit present.      Mental Status: She is oriented to person, place, and time.   Psychiatric:         Mood and Affect: Mood normal.         Results Review:   I reviewed the patient's new clinical results.  I reviewed the patient's new imaging results and agree with the interpretation.  I reviewed the patient's other test results and agree with the interpretation    Lab Results   Component Value Date    WBC 8.52 10/26/2023    HGB 12.4 10/26/2023    HGB 11.2 (L) 10/12/2023    HGB 12.6 10/11/2023    HCT 41.6 10/26/2023    MCV 86.5 10/26/2023     10/26/2023       No results found for: \"INR\"    Lab Results   Component Value Date    GLUCOSE 142 (H) 10/26/2023    BUN 6 10/26/2023    CREATININE 0.90 10/26/2023    EGFRIFNONA >60 10/21/2021    EGFRIFAFRI 102 02/19/2022    BCR 6.7 (L) 10/26/2023     10/26/2023    K 3.6 10/26/2023    CO2 29.0 10/26/2023    CALCIUM 9.2 10/26/2023    PROTENTOTREF 7.3 08/01/2023    ALBUMIN 4.3 10/26/2023    ALKPHOS 126 (H) 10/26/2023    BILITOT 0.4 10/26/2023    ALT <5 10/26/2023    AST 21 10/26/2023       ASSESSMENTS/PLANS    Esophageal dysphagia  Emesis  S/p LSG 10/10/23   - continue diet as ordered, NPO at midnight   - BID PPI   - plan for EGD +/- esophageal dilation tomorrow with Dr. Vicente   - will review pending imaging when available    I discussed the patient's findings and my recommendations with patient and family. Thank you very kindly for this consultation. Will continue to follow during this hospitalization.      Mylene Quinn PA-C  10/26/23  14:55 " EDT

## 2023-10-26 NOTE — H&P
Cc:   Nausea and vomiting    HPI:   This is a 38-year-old super morbidly obese female status post robotic sleeve gastrectomy, hiatal hernia repair, and liver biopsy on 10/10/2023.  At surgery she was noted to have a normal-appearing gallbladder, hepatomegaly, thick stomach, and small hiatal hernia.  Liver biopsy was unremarkable with mild 5% macrovascular steatosis, no fibrosis.  Stomach pathology unremarkable with mild chronic inactive gastritis.  Her upper GI the following day showed slow passage of barium from the esophagus through the operative site otherwise unremarkable.  It was felt she had swelling at the GE junction and was given steroids.  She was discharged home on postop day #2 tolerating her stage I diet.  She was seen in the office on 10/17/2023 tolerating her diet.  On omeprazole and Eliquis.  She said she was doing great and then 2 days ago ate some sweet potato and since then has not been able to keep down food or water.  She is keeping down saliva and not spitting up.  She checked her blood sugar and it was below 50 she called our office and was instructed to go to the emergency room.    Denies abdominal pain.  She is being admitted for symptom control, evaluation, and intravenous fluid hydration.    PMH:  Past medical history: History of MRSA skin infection, history of insulin-dependent diabetes mellitus with pregnancy, anemia, anxiety and depression, back pain, type II insulin-dependent diabetes, goiter, hidradenitis suppurativa, hypertension, iron deficiency anemia obstructive sleep apnea on CPAP urinary incontinence,  2021 in 2006, former smoker    Medications: Eliquis, HCTZ, insulin, losartan, omeprazole, O dancer Mandeep, Actigall, vitamin D    Allergies: Banana, cucumber extract, kiwi extract, melon, nuts, latex, morphine, duloxetine, naproxen    SH: , lives in [place], previous smoking quit     PE:   She is pleasant, no apparent distress and conversant.   Temperature 99.3 pulse 50 respirations 18 blood pressure 136/51 abdomen is soft and benign.  Wounds healed    Labs:   Urinalysis cloudy pH 6.0 specific gravity 1.026 trace ketones trace protein trace leukoesterase poor sample with 21-30 epithelial cells.  Urine drug screen positive for THC.  CMP normal except for glucose of 142 alkaline phosphatase 126.  Lipase is 47.  White blood count 8.52 with a normal differential H&H 12.4 and 41.6.    Upper GI shows mild luminal narrowing at the proximal portion of the vertical sleeve around the level of the GE junction with mild delay in esophageal emptying and mild intraesophageal reflux.    Impression: Postop day #16 robotic sleeve gastrectomy and hiatal hernia repair.  2-day history of dysphagia and vomiting and inability to keep down oral intake.  Upper GI shows luminal narrowing at the GE junction with intraesophageal reflux.    Plan: Admit, intravenous fluid hydration, IV PPI, IV thiamine, n.p.o. after midnight, GI consultation for EGD with possible foreign body food removal if present and/or dilatation.  Gallbladder ultrasound.  See orders

## 2023-10-26 NOTE — CONSULTS
[facility] [place] [facility]    Referring Provider: No ref. provider found    PCP: Tatianna Vicente, APRN    Reason for Consultation: Esophagus dysphagia, vomiting    History of present illness:    Edd Leung is a 38 y.o. female, PMH includes T2DM, HTN, anxiety, depression, is admitted via ED yesterday for evaluation of vomiting and dysphagia.     Pt is s/p laparascopic gastric sleeve procedure 10/10/23 with Dr. Atwood. She initially did well postoperatively, but developed esophageal dysphagia / bolus sensation and emesis x 2 days following ingestion of sweet potatoes. She has been intolerant to most food and liquids since that time, with the exception of yogurt yesterday.     UGI with SBFT done earlier today, pending at time of dictation.     Patient denies associated fever, chills, abdominal pain, indigestion, nausea, diarrhea, hematemesis, hematochezia, melena, bloating, weight loss or gain, dysuria, jaundice or bruising.    Patient denies personal or FHx of PUD, H Pylori, gastritis, pancreatitis, colitis, Celiac disease, UC, Crohn's disease, IBS, colon or gastric cancers. Pt denies tobacco or NSAID use. Rare EtOH, marijuana use.     Allergies:  Banana, Cucumber extract, Kiwi extract, Melon, Nuts, Latex, Morphine, Duloxetine hcl, and Naproxen    Scheduled Meds:  enoxaparin, 60 mg, Subcutaneous, Q12H  insulin lispro, 3-14 Units, Subcutaneous, 4x Daily AC & at Bedtime  losartan, 100 mg, Oral, Daily  sodium chloride 0.9 % 1,000 mL with thiamine 100 mg, folic acid 1 mg infusion, , Intravenous, Once  sodium chloride, 10 mL, Intravenous, Q12H  ursodiol, 300 mg, Oral, BID         Infusions:       PRN Meds:    dextrose    dextrose    glucagon (human recombinant)    nitroglycerin    Sodium Chloride (PF)    sodium chloride    sodium chloride    Home Meds:  Medications Prior to Admission   Medication Sig Dispense Refill Last Dose    apixaban (Eliquis) 2.5 MG tablet tablet Take 1 tablet by mouth 2 (Two)  Times a Day. 42 tablet 0 10/25/2023    hydroCHLOROthiazide (HYDRODIURIL) 25 MG tablet Take 1 tablet by mouth Daily.   10/25/2023    Insulin Glargine (LANTUS SOLOSTAR) 100 UNIT/ML injection pen Inject 40 Units under the skin into the appropriate area as directed Every Night. 15 mL 3 10/25/2023    losartan (COZAAR) 100 MG tablet Take 1 tablet by mouth Daily.   10/25/2023    omeprazole (priLOSEC) 40 MG capsule Take 1 capsule by mouth Daily for 60 days. 60 capsule 0 10/25/2023    ondansetron (Zofran) 4 MG tablet Take 1 tablet by mouth Every 4 (Four) Hours As Needed for Nausea. 8 tablet 0 Past Week    ursodiol (ACTIGALL) 300 MG capsule Take 1 capsule by mouth 2 (Two) Times a Day. 60 capsule 5 10/25/2023    vitamin D (ERGOCALCIFEROL) 1.25 MG (67286 UT) capsule capsule Take 1 capsule by mouth 2 (Two) Times a Week. Mondays and Fridays (Patient not taking: Reported on 10/17/2023)          ROS: Review of Systems   Constitutional:  Negative for chills, diaphoresis, fatigue and unexpected weight change.   HENT:  Positive for trouble swallowing. Negative for drooling, facial swelling, mouth sores, nosebleeds, rhinorrhea, sore throat, tinnitus and voice change.    Respiratory:  Negative for cough, chest tightness and shortness of breath.    Cardiovascular:  Negative for chest pain, palpitations and leg swelling.   Gastrointestinal:  Positive for vomiting. Negative for abdominal distention, abdominal pain, blood in stool and diarrhea.   Genitourinary:  Negative for dysuria, flank pain and hematuria.   Musculoskeletal:  Negative for arthralgias, joint swelling and myalgias.   Skin:  Negative for color change, pallor and rash.   Neurological:  Negative for dizziness, tremors, syncope, weakness and light-headedness.   Psychiatric/Behavioral:  Negative for confusion and hallucinations.    All other systems reviewed and are negative.      PAST MED HX:  Past Medical History:   Diagnosis Date    Anemia     Anxiety and depression     Back  pain     not treated with meds    Diabetes mellitus 2008    on insulin with pregnancy 2022, last A1C 6.5    DM2 (diabetes mellitus, type 2)     bariatric intake A1C 7.7    GERD (gastroesophageal reflux disease)     Goiter     Gonorrhea     Hidradenitis suppurativa     History of abnormal cervical Pap smear     History of COVID-19      no hospital no steroids    Hydradenitis     improved iwth diet change    Hypertension     Iron deficiency anemia     MRSA infection     skin abscess ;  facial lesion    ANDREWS on CPAP     Post-op pneumonia     2021      Spinal headache     Urinary incontinence     Wears glasses     RX       PAST SURG HX:  Past Surgical History:   Procedure Laterality Date     SECTION  2006     SECTION N/A 2021    Procedure:  SECTION REPEAT;  Surgeon: Rita Busby MD;  Location: [facility] LABOR DELIVERY;  Service: Obstetrics/Gynecology;  Laterality: N/A;    DILATATION AND CURETTAGE      ENDOSCOPY      ENDOSCOPY N/A 2023    Procedure: ESOPHAGOGASTRODUODENOSCOPY WITH BIOPSY;  Surgeon: Alice Atwood MD;  Location:  [facility];  Service: General;  Laterality: N/A;    GASTRIC SLEEVE LAPAROSCOPIC N/A 10/10/2023    Procedure: GASTRIC SLEEVE WITH HIATAL HERNIA LAPAROSCOPIC WITH DAVINCI ROBOT, EGD, LIVER BIOPSY;  Surgeon: Alice Atwood MD;  Location:  [facility];  Service: Robotics - DaVinci;  Laterality: N/A;       FAM HX:  Family History   Problem Relation Age of Onset    Hypertension Mother     Lung cancer Father     Hypertension Sister     Breast cancer Maternal Grandmother     Hypertension Maternal Grandmother     Diabetes Maternal Grandmother     Diabetes Maternal Grandfather     Diabetes Paternal Grandmother     Hypertension Paternal Grandmother     Lung cancer Paternal Grandmother     Hypertension Paternal Grandfather     Diabetes Paternal Grandfather        SOC HX:  Social History     Socioeconomic History     "Marital status:     Number of children: 1    Years of education: 14    Highest education level: Associate degree: occupational, technical, or vocational program   Tobacco Use    Smoking status: Former     Packs/day: 0.25     Years: 17.00     Additional pack years: 0.00     Total pack years: 4.25     Types: Cigarettes     Quit date: 2019     Years since quittin.5     Passive exposure: Never    Smokeless tobacco: Never    Tobacco comments:     wasn't a daily smoker-  depended on situation   Vaping Use    Vaping Use: Never used   Substance and Sexual Activity    Alcohol use: Yes     Comment: SOCIALLY     Drug use: Yes     Types: Marijuana     Comment: Last use 23    Sexual activity: Defer       PHYSICAL EXAM  /81 (BP Location: Left arm, Patient Position: Lying)   Pulse 60   Temp 98.7 °F (37.1 °C) (Oral)   Resp 18   Ht 175.3 cm (69\")   Wt (!) 183 kg (404 lb)   LMP 10/02/2023 (Approximate)   SpO2 98%   BMI 59.66 kg/m²   Wt Readings from Last 3 Encounters:   10/26/23 (!) 183 kg (404 lb)   10/20/23 (!) 183 kg (404 lb)   10/17/23 (!) 183 kg (404 lb 8 oz)   ,body mass index is 59.66 kg/m².  Physical Exam  Vitals and nursing note reviewed.   Constitutional:       Appearance: Normal appearance. She is normal weight. She is not ill-appearing or diaphoretic.      Comments: BMI 59.66. Pleasantly conversant.    HENT:      Head: Normocephalic and atraumatic.      Right Ear: External ear normal.      Left Ear: External ear normal.      Nose: Nose normal.      Mouth/Throat:      Mouth: Mucous membranes are moist.      Pharynx: Oropharynx is clear.      Comments: No difficulty handling secretions.   Eyes:      Conjunctiva/sclera: Conjunctivae normal.      Pupils: Pupils are equal, round, and reactive to light.   Neck:      Thyroid: No thyromegaly.   Cardiovascular:      Rate and Rhythm: Normal rate and regular rhythm.      Pulses: Normal pulses.      Heart sounds: Normal heart sounds.   Pulmonary:     " " Effort: Pulmonary effort is normal.      Breath sounds: Normal breath sounds.   Abdominal:      General: Abdomen is flat. Bowel sounds are normal. There is no distension.      Tenderness: There is no abdominal tenderness.   Musculoskeletal:         General: Normal range of motion.      Cervical back: Normal range of motion and neck supple.   Skin:     General: Skin is warm and dry.   Neurological:      General: No focal deficit present.      Mental Status: She is oriented to person, place, and time.   Psychiatric:         Mood and Affect: Mood normal.         Results Review:   I reviewed the patient's new clinical results.  I reviewed the patient's new imaging results and agree with the interpretation.  I reviewed the patient's other test results and agree with the interpretation    Lab Results   Component Value Date    WBC 8.52 10/26/2023    HGB 12.4 10/26/2023    HGB 11.2 (L) 10/12/2023    HGB 12.6 10/11/2023    HCT 41.6 10/26/2023    MCV 86.5 10/26/2023     10/26/2023       No results found for: \"INR\"    Lab Results   Component Value Date    GLUCOSE 142 (H) 10/26/2023    BUN 6 10/26/2023    CREATININE 0.90 10/26/2023    EGFRIFNONA >60 10/21/2021    EGFRIFAFRI 102 02/19/2022    BCR 6.7 (L) 10/26/2023     10/26/2023    K 3.6 10/26/2023    CO2 29.0 10/26/2023    CALCIUM 9.2 10/26/2023    PROTENTOTREF 7.3 08/01/2023    ALBUMIN 4.3 10/26/2023    ALKPHOS 126 (H) 10/26/2023    BILITOT 0.4 10/26/2023    ALT <5 10/26/2023    AST 21 10/26/2023       ASSESSMENTS/PLANS    Esophageal dysphagia  Emesis  S/p LSG 10/10/23   - continue diet as ordered, NPO at midnight   - BID PPI   - plan for EGD +/- esophageal dilation tomorrow with Dr. Vicente   - will review pending imaging when available    I discussed the patient's findings and my recommendations with patient and family. Thank you very kindly for this consultation. Will continue to follow during this hospitalization.      Mylene Quinn PA-C  10/26/23  14:55 " EDT

## 2023-10-26 NOTE — Clinical Note
Level of Care: Telemetry [5]   Admitting Physician: CLINTON CACERES [[id number]]   Bed Request Comments: 2F

## 2023-10-27 ENCOUNTER — READMISSION MANAGEMENT (OUTPATIENT)
Dept: CALL CENTER | Facility: HOSPITAL | Age: 38
End: 2023-10-27
Payer: COMMERCIAL

## 2023-10-27 ENCOUNTER — APPOINTMENT (OUTPATIENT)
Dept: ULTRASOUND IMAGING | Facility: HOSPITAL | Age: 38
End: 2023-10-27
Payer: COMMERCIAL

## 2023-10-27 ENCOUNTER — ANESTHESIA EVENT (OUTPATIENT)
Dept: GASTROENTEROLOGY | Facility: HOSPITAL | Age: 38
End: 2023-10-27
Payer: COMMERCIAL

## 2023-10-27 ENCOUNTER — ANESTHESIA (OUTPATIENT)
Dept: GASTROENTEROLOGY | Facility: HOSPITAL | Age: 38
End: 2023-10-27
Payer: COMMERCIAL

## 2023-10-27 VITALS
BODY MASS INDEX: 43.4 KG/M2 | RESPIRATION RATE: 16 BRPM | TEMPERATURE: 98 F | SYSTOLIC BLOOD PRESSURE: 125 MMHG | WEIGHT: 293 LBS | DIASTOLIC BLOOD PRESSURE: 49 MMHG | HEART RATE: 50 BPM | HEIGHT: 69 IN | OXYGEN SATURATION: 100 %

## 2023-10-27 LAB
ALBUMIN SERPL-MCNC: 3.4 G/DL (ref 3.5–5.2)
ALBUMIN/GLOB SERPL: 1 G/DL
ALP SERPL-CCNC: 112 U/L (ref 39–117)
ALT SERPL W P-5'-P-CCNC: 16 U/L (ref 1–33)
ANION GAP SERPL CALCULATED.3IONS-SCNC: 13 MMOL/L (ref 5–15)
AST SERPL-CCNC: 18 U/L (ref 1–32)
BILIRUB SERPL-MCNC: 0.4 MG/DL (ref 0–1.2)
BUN SERPL-MCNC: 5 MG/DL (ref 6–20)
BUN/CREAT SERPL: 6.6 (ref 7–25)
CALCIUM SPEC-SCNC: 8.6 MG/DL (ref 8.6–10.5)
CHLORIDE SERPL-SCNC: 105 MMOL/L (ref 98–107)
CO2 SERPL-SCNC: 21 MMOL/L (ref 22–29)
CREAT SERPL-MCNC: 0.76 MG/DL (ref 0.57–1)
EGFRCR SERPLBLD CKD-EPI 2021: 104.3 ML/MIN/1.73
GLOBULIN UR ELPH-MCNC: 3.3 GM/DL
GLUCOSE BLDC GLUCOMTR-MCNC: 123 MG/DL (ref 70–130)
GLUCOSE BLDC GLUCOMTR-MCNC: 134 MG/DL (ref 70–130)
GLUCOSE BLDC GLUCOMTR-MCNC: 228 MG/DL (ref 70–130)
GLUCOSE SERPL-MCNC: 118 MG/DL (ref 65–99)
POTASSIUM SERPL-SCNC: 3.8 MMOL/L (ref 3.5–5.2)
PREALB SERPL-MCNC: 14.1 MG/DL (ref 20–40)
PROT SERPL-MCNC: 6.7 G/DL (ref 6–8.5)
SODIUM SERPL-SCNC: 139 MMOL/L (ref 136–145)

## 2023-10-27 PROCEDURE — 82948 REAGENT STRIP/BLOOD GLUCOSE: CPT

## 2023-10-27 PROCEDURE — 25010000002 ENOXAPARIN PER 10 MG: Performed by: SURGERY

## 2023-10-27 PROCEDURE — G0378 HOSPITAL OBSERVATION PER HR: HCPCS

## 2023-10-27 PROCEDURE — 25010000002 DEXAMETHASONE PER 1 MG

## 2023-10-27 PROCEDURE — 76705 ECHO EXAM OF ABDOMEN: CPT

## 2023-10-27 PROCEDURE — 96366 THER/PROPH/DIAG IV INF ADDON: CPT

## 2023-10-27 PROCEDURE — 25010000002 THIAMINE PER 100 MG: Performed by: INTERNAL MEDICINE

## 2023-10-27 PROCEDURE — 80053 COMPREHEN METABOLIC PANEL: CPT | Performed by: SURGERY

## 2023-10-27 PROCEDURE — 99024 POSTOP FOLLOW-UP VISIT: CPT | Performed by: SURGERY

## 2023-10-27 PROCEDURE — 25010000002 ONDANSETRON PER 1 MG

## 2023-10-27 PROCEDURE — 43249 ESOPH EGD DILATION <30 MM: CPT | Performed by: INTERNAL MEDICINE

## 2023-10-27 PROCEDURE — 84134 ASSAY OF PREALBUMIN: CPT | Performed by: SURGERY

## 2023-10-27 PROCEDURE — 25010000002 THIAMINE PER 100 MG: Performed by: SURGERY

## 2023-10-27 PROCEDURE — 25010000002 PROPOFOL 10 MG/ML EMULSION

## 2023-10-27 PROCEDURE — C1726 CATH, BAL DIL, NON-VASCULAR: HCPCS | Performed by: INTERNAL MEDICINE

## 2023-10-27 PROCEDURE — 25010000002 ENOXAPARIN PER 10 MG: Performed by: INTERNAL MEDICINE

## 2023-10-27 RX ORDER — IPRATROPIUM BROMIDE AND ALBUTEROL SULFATE 2.5; .5 MG/3ML; MG/3ML
3 SOLUTION RESPIRATORY (INHALATION) ONCE AS NEEDED
Status: DISCONTINUED | OUTPATIENT
Start: 2023-10-27 | End: 2023-10-27 | Stop reason: HOSPADM

## 2023-10-27 RX ORDER — ONDANSETRON 2 MG/ML
4 INJECTION INTRAMUSCULAR; INTRAVENOUS ONCE AS NEEDED
Status: DISCONTINUED | OUTPATIENT
Start: 2023-10-27 | End: 2023-10-27 | Stop reason: HOSPADM

## 2023-10-27 RX ORDER — LIDOCAINE HYDROCHLORIDE 10 MG/ML
INJECTION, SOLUTION EPIDURAL; INFILTRATION; INTRACAUDAL; PERINEURAL AS NEEDED
Status: DISCONTINUED | OUTPATIENT
Start: 2023-10-27 | End: 2023-10-27 | Stop reason: SURG

## 2023-10-27 RX ORDER — PANTOPRAZOLE SODIUM 40 MG/1
40 TABLET, DELAYED RELEASE ORAL
Status: DISCONTINUED | OUTPATIENT
Start: 2023-10-27 | End: 2023-10-27 | Stop reason: HOSPADM

## 2023-10-27 RX ORDER — ONDANSETRON 2 MG/ML
INJECTION INTRAMUSCULAR; INTRAVENOUS AS NEEDED
Status: DISCONTINUED | OUTPATIENT
Start: 2023-10-27 | End: 2023-10-27 | Stop reason: SURG

## 2023-10-27 RX ORDER — SODIUM CHLORIDE, SODIUM LACTATE, POTASSIUM CHLORIDE, AND CALCIUM CHLORIDE .6; .31; .03; .02 G/100ML; G/100ML; G/100ML; G/100ML
20 INJECTION, SOLUTION INTRAVENOUS CONTINUOUS
Status: DISCONTINUED | OUTPATIENT
Start: 2023-10-27 | End: 2023-10-27 | Stop reason: HOSPADM

## 2023-10-27 RX ORDER — PROPOFOL 10 MG/ML
VIAL (ML) INTRAVENOUS AS NEEDED
Status: DISCONTINUED | OUTPATIENT
Start: 2023-10-27 | End: 2023-10-27 | Stop reason: SURG

## 2023-10-27 RX ORDER — DEXAMETHASONE SODIUM PHOSPHATE 4 MG/ML
INJECTION, SOLUTION INTRA-ARTICULAR; INTRALESIONAL; INTRAMUSCULAR; INTRAVENOUS; SOFT TISSUE AS NEEDED
Status: DISCONTINUED | OUTPATIENT
Start: 2023-10-27 | End: 2023-10-27 | Stop reason: SURG

## 2023-10-27 RX ORDER — GLYCOPYRROLATE 0.2 MG/ML
INJECTION INTRAMUSCULAR; INTRAVENOUS AS NEEDED
Status: DISCONTINUED | OUTPATIENT
Start: 2023-10-27 | End: 2023-10-27 | Stop reason: SURG

## 2023-10-27 RX ADMIN — URSODIOL 300 MG: 300 CAPSULE ORAL at 10:30

## 2023-10-27 RX ADMIN — LOSARTAN POTASSIUM 100 MG: 50 TABLET, FILM COATED ORAL at 10:30

## 2023-10-27 RX ADMIN — SODIUM CHLORIDE, POTASSIUM CHLORIDE, SODIUM LACTATE AND CALCIUM CHLORIDE 400 ML: 600; 310; 30; 20 INJECTION, SOLUTION INTRAVENOUS at 09:41

## 2023-10-27 RX ADMIN — PROPOFOL 100 MG: 10 INJECTION, EMULSION INTRAVENOUS at 09:14

## 2023-10-27 RX ADMIN — LIDOCAINE HYDROCHLORIDE 100 MG: 10 INJECTION, SOLUTION EPIDURAL; INFILTRATION; INTRACAUDAL; PERINEURAL at 09:14

## 2023-10-27 RX ADMIN — ENOXAPARIN SODIUM 60 MG: 60 INJECTION SUBCUTANEOUS at 10:29

## 2023-10-27 RX ADMIN — PROPOFOL 100 MCG/KG/MIN: 10 INJECTION, EMULSION INTRAVENOUS at 09:15

## 2023-10-27 RX ADMIN — PROPOFOL 50 MG: 10 INJECTION, EMULSION INTRAVENOUS at 09:19

## 2023-10-27 RX ADMIN — SODIUM CHLORIDE, POTASSIUM CHLORIDE, SODIUM LACTATE AND CALCIUM CHLORIDE 20 ML/HR: 600; 310; 30; 20 INJECTION, SOLUTION INTRAVENOUS at 08:39

## 2023-10-27 RX ADMIN — ONDANSETRON 4 MG: 2 INJECTION INTRAMUSCULAR; INTRAVENOUS at 09:14

## 2023-10-27 RX ADMIN — PANTOPRAZOLE SODIUM 40 MG: 40 INJECTION, POWDER, LYOPHILIZED, FOR SOLUTION INTRAVENOUS at 08:37

## 2023-10-27 RX ADMIN — DEXAMETHASONE SODIUM PHOSPHATE 4 MG: 4 INJECTION, SOLUTION INTRAMUSCULAR; INTRAVENOUS at 09:14

## 2023-10-27 RX ADMIN — GLYCOPYRROLATE 0.2 MG: 0.4 INJECTION INTRAMUSCULAR; INTRAVENOUS at 09:08

## 2023-10-27 RX ADMIN — GLYCOPYRROLATE 0.2 MG: 0.4 INJECTION INTRAMUSCULAR; INTRAVENOUS at 09:14

## 2023-10-27 RX ADMIN — THIAMINE HYDROCHLORIDE 100 MG: 100 INJECTION, SOLUTION INTRAMUSCULAR; INTRAVENOUS at 10:30

## 2023-10-27 NOTE — PROGRESS NOTES
Clinical Nutrition   Nutrition Assessment  Reason for Visit: Identified at risk by screening criteria, Reduced oral intake    Patient Name: Edd Leung  YOB: 1985  MRN: [id number]  Date of Encounter: 10/27/23 10:49 EDT  Admission date: 10/26/2023    Comments:    Pt does not meet criteria for malnutrition at this time. Of note, pt wt loss appropriate given recent LSG.     Educated on alternative protein supplements available at retail stores - verbalized understanding.      Ordered Premier Protein 3x daily    Nutrition Assessment   Admission Diagnosis:  Vomiting [R11.10]    Problem List:    Vomiting      PMH:   She  has a past medical history of Anemia, Anxiety and depression, Back pain, Diabetes mellitus (), DM2 (diabetes mellitus, type 2), GERD (gastroesophageal reflux disease), Goiter, Gonorrhea, Hidradenitis suppurativa, History of abnormal cervical Pap smear, History of COVID-19, Hydradenitis, Hypertension, Iron deficiency anemia, MRSA infection (), ANDREWS on CPAP, Post-op pneumonia, Spinal headache, Urinary incontinence, and Wears glasses.    PSH:  She  has a past surgical history that includes  section (2006); Dilation and curettage of uterus;  section (N/A, 2021); Esophagogastroduodenoscopy; Esophagogastroduodenoscopy (N/A, 2023); and Gastric Sleeve (N/A, 10/10/2023).    Applicable Nutrition Concerns:   Skin:  Oral:  GI: s/p LSG (10/10)    Applicable Interval History:   10/27 - EGD with GI for GEJ dilation    Reported/Observed/Food/Nutrition Related History:     Pt just returning from endo s/p dilation. Pt endorsed tolerance of diet prior to 2 days ago. Has been drinking ONS daily - getting 90 to 100g protein each day. Does report feeling tired of Premier Protein shakes - educated on alternative protein supplements available on retail, verbalized understanding. Denies further nutrition related concerns.     Anthropometrics  "    Flowsheet Rows      Flowsheet Row First Filed Value   Admission Height 175.3 cm (69\") Documented at 10/26/2023 0901   Admission Weight 183 kg (404 lb) Documented at 10/26/2023 0901     Height: Height: 175.3 cm (69\")  Last Filed Weight: Weight: (!) 183 kg (404 lb) (10/26/23 0901)  Method: Weight Method: Stated  BMI: BMI (Calculated): 59.6  BMI classification: Obese Class III extreme obesity: > or equal to 40kg/m2  IBW:   145lbs    UBW:     Weight       Weight (kg) Weight (lbs) Weight Method Visit Report   10/5/2022 203.665 kg (H)  449 lb (H)   --    2/3/2023 203.665 kg (H)  449 lb (H)   --    3/27/2023 197.224 kg (H)  434 lb 12.8 oz (H)   --    4/13/2023 197 kg (H)  434 lb 4.9 oz (H)      4/17/2023 197 kg (H)  434 lb 4.9 oz (H)      5/24/2023 195.5 kg (H)  431 lb (H)   --    6/7/2023 195.954 kg (H)  432 lb (H)   --    8/1/2023 193.006 kg (H)  425 lb 8 oz (H)   --        --    9/14/2023 189.15 kg (H)  417 lb (H)      9/26/2023 189.15 kg (H)  417 lb (H)  Stated     10/10/2023 189.15 kg (H)  417 lb (H)      10/17/2023 183.48 kg (H)  404 lb 8 oz (H)   --    10/20/2023 183.253 kg (H)  404 lb (H)   --    10/26/2023 183.253 kg (H)  404 lb (H)  Stated       Weight change: weight loss of 13 lbs (3.1%) over the past 1 month(s)    Intentional?  Yes    Nutrition Focused Physical Exam     Date:  10/27       Pt does not meet criteria for malnutrition diagnosis, at this time.      Current Nutrition Prescription   PO: Diet: Liquid Diets; Clear Liquid; Bariatric Stage 1; Fluid Consistency: Thin (IDDSI 0)  Oral Nutrition Supplement: N/A  Intake: Insufficient data    Nutrition Diagnosis   Date:  10/27            Updated:    Problem No nutrition diagnosis at this time    Etiology    Signs/Symptoms    Status:       Goal:   General: Maintain nutrition  PO: Maintain intake  EN/PN: N/A    Nutrition Intervention      Follow treatment progress, Care plan reviewed, Encourage intake, Supplement provided, Education " provided        Monitoring/Evaluation:   Per protocol, I&O, PO intake, Pertinent labs, GI status, POC/GOC      Heather Self, MS,RD,LD  Time Spent: 25min

## 2023-10-27 NOTE — ANESTHESIA POSTPROCEDURE EVALUATION
Patient: Edd Aguayo Madhu    Procedure Summary       Date: 10/27/23 Room / Location:  [facility]    Anesthesia Start: 0905 Anesthesia Stop: 0942    Procedure: ESOPHAGOGASTRODUODENOSCOPY Diagnosis:       H/O gastric sleeve      Vomiting without nausea, unspecified vomiting type      Esophageal dysphagia      (H/O gastric sleeve [Z90.3])      (Vomiting without nausea, unspecified vomiting type [R11.11])      (Esophageal dysphagia [R13.19])    Surgeons: Rip Vicente MD Provider: Lalo Rivera MD    Anesthesia Type: general, MAC ASA Status: 3            Anesthesia Type: general, MAC    Vitals  Vitals Value Taken Time   /68 10/27/23 1005   Temp 97.4 °F (36.3 °C) 10/27/23 0938   Pulse 67 10/27/23 1007   Resp 16 10/27/23 0938   SpO2 90 % 10/27/23 1007   Vitals shown include unfiled device data.        Post Anesthesia Care and Evaluation    Patient location during evaluation: PACU  Patient participation: complete - patient participated  Level of consciousness: awake and alert  Pain management: adequate    Airway patency: patent  Anesthetic complications: No anesthetic complications  PONV Status: none  Cardiovascular status: hemodynamically stable and acceptable  Respiratory status: nonlabored ventilation, acceptable and nasal cannula  Hydration status: acceptable

## 2023-10-27 NOTE — ANESTHESIA PREPROCEDURE EVALUATION
Anesthesia Evaluation     Patient summary reviewed and Nursing notes reviewed   no history of anesthetic complications:   NPO Solid Status: > 8 hours  NPO Liquid Status: > 2 hours           Airway   Mallampati: II  TM distance: >3 FB  Neck ROM: full  No difficulty expected  Dental      Pulmonary    (+) a smoker Former,shortness of breath, sleep apnea on CPAP  (-) pneumonia (2022), asthma, recent URI, no home oxygen    ROS comment: Sats 99% RA  Cardiovascular     ECG reviewed    (+) hypertension  (-) past MI, dysrhythmias, angina, cardiac stents    ROS comment: SB TW inv ING leads   ECHO EF 60% ow normal   GXT MPS normal no evidence of ischemia.EF = 65% low risk study      Neuro/Psych  (+) headaches, psychiatric history  (-) seizures, CVA  GI/Hepatic/Renal/Endo    (+) obesity, morbid obesity, GERD, diabetes mellitus type 2 using insulin, thyroid problem     Musculoskeletal     (+) back pain  Abdominal    Substance History      OB/GYN    (-)  Pregnant, Preeclampsia and history of pregnancy induced hypertension    Comment: HCG NEG yesterday      Other        ROS/Med Hx Other: Cannot keep food down SP G sleeve 10/10/23       Phys Exam Other: McG4 G1V 10/10/23 Rezzae            Anesthesia Plan    ASA 3     general and MAC     (PFL -ETT ready no current nausea )  intravenous induction     Anesthetic plan, risks, benefits, and alternatives have been provided, discussed and informed consent has been obtained with: patient.    Plan discussed with CRNA.      CODE STATUS:    Level Of Support Discussed With: Patient  Code Status (Patient has no pulse and is not breathing): CPR (Attempt to Resuscitate)  Medical Interventions (Patient has pulse or is breathing): Full Support

## 2023-10-27 NOTE — OUTREACH NOTE
Prep Survey      Flowsheet Row Responses   [facility] patient discharged from? [place]   Is LACE score < 7 ? Yes   Eligibility [facility]   Date of Admission 10/26/23   Date of Discharge 10/27/23   Discharge Disposition Home or Self Care   Discharge diagnosis Nausea and vomiting, unspecified vomiting type   Does the patient have one of the following disease processes/diagnoses(primary or secondary)? Other   Does the patient have Home health ordered? No   Is there a DME ordered? No   Prep survey completed? Yes            JEIMY ROD - Registered Nurse

## 2023-10-27 NOTE — PROGRESS NOTES
"Cc: HD#1 \"feel better\"    She is sitting up in bed alone in the room.  She tolerated her full liquid lunch tray and would like to go home.  Preadmission symptoms resolved since EGD with dilatation of the proximal sleeve to 18 mm for approximately 30 seconds earlier today.  Denies any abdominal pain.  No fever or tachycardia.  Blood pressure as high as 186/93 currently 125/49 she is in no apparent distress abdomen is soft and benign.  Prealbumin 14.1 CMP normal except glucose 118 BUN 5 CO2 21 albumin 3.4     Impression: Nausea and vomiting postop day #17 sleeve gastrectomy resolved after dilatation of the proximal sleeve to 18 mm earlier today.    Plan: Discharge home.  She says she does not need any new prescriptions.  Continue preadmission medications and call in the meantime with any problems questions or concerns.  See orders    "

## 2023-10-27 NOTE — CASE MANAGEMENT/SOCIAL WORK
Discharge Planning Assessment  [facility]     Patient Name: Edd Leung  MRN: [id number]  Today's Date: 10/27/2023    Admit Date: 10/26/2023    Plan: Home   Discharge Needs Assessment       Row Name 10/27/23 1106       Living Environment    People in Home child(bob), dependent;spouse;parent(s)    Current Living Arrangements home    Primary Care Provided by self    Provides Primary Care For no one    Family Caregiver if Needed spouse    Quality of Family Relationships unable to assess    Able to Return to Prior Arrangements yes       Resource/Environmental Concerns    Resource/Environmental Concerns none       Transition Planning    Patient/Family Anticipates Transition to home with family    Patient/Family Anticipated Services at Transition     Transportation Anticipated family or friend will provide       Discharge Needs Assessment    Readmission Within the Last 30 Days no previous admission in last 30 days    Equipment Currently Used at Home cpap    Concerns to be Addressed discharge planning    Anticipated Changes Related to Illness none    Equipment Needed After Discharge none                   Discharge Plan       Row Name 10/27/23 1107       Plan    Plan Home    Patient/Family in Agreement with Plan yes    Plan Comments Patient lives with her  and mother in [facility].  She is independent with ADL's.  She has a CPAP at home and is not current with home health.  Her PCP is Tatianna Vicente.  She does not have an advanced directive.  Verified that she has [place].  Ms. Leung has RX coverage and has her scripts filled at [facility].  Her plan is to return home at discharge.  No needs noted at this time.  CM will continue to follow.    Final Discharge Disposition Code 01 - home or self-care                  Continued Care and Services - Admitted Since 10/26/2023    Coordination has not been started for this encounter.       Expected Discharge Date and Time       Expected Discharge  Date Expected Discharge Time    Oct 28, 2023            Demographic Summary       Row Name 10/27/23 1105       General Information    Admission Type observation    Arrived From emergency department    Referral Source admission list    Reason for Consult discharge planning    Preferred Language English                   Functional Status       Row Name 10/27/23 1105       Functional Status    Usual Activity Tolerance good    Current Activity Tolerance good       Functional Status, IADL    Medications independent    Meal Preparation independent    Housekeeping independent    Laundry independent    Shopping independent                   Psychosocial    No documentation.                  Abuse/Neglect    No documentation.                  Legal    No documentation.                  Substance Abuse    No documentation.                  Patient Forms    No documentation.                     Angeles Dumont RN

## 2023-10-30 ENCOUNTER — TRANSITIONAL CARE MANAGEMENT TELEPHONE ENCOUNTER (OUTPATIENT)
Dept: CALL CENTER | Facility: HOSPITAL | Age: 38
End: 2023-10-30
Payer: COMMERCIAL

## 2023-10-30 NOTE — OUTREACH NOTE
Call Center TCM Note      Flowsheet Row Responses   [facility] [place] patient discharged from? [place]   Does the patient have one of the following disease processes/diagnoses(primary or secondary)? Other   TCM attempt successful? Yes   Call start time 1322   Call end time 1324   Discharge diagnosis Nausea and vomiting, unspecified vomiting type   Person spoke with today (if not patient) and relationship spouse   Meds reviewed with patient/caregiver? Yes   Is the patient having any side effects they believe may be caused by any medication additions or changes? No   Is the patient taking all medications as directed (includes completed medication regime)? Yes   Comments Pt has an already scheduled appt on 11/13/23 @8am that she will use as her hospital d/c f/u appt.   Does the patient have an appointment with their PCP within 7-14 days of discharge? No   Nursing Interventions Confirmed date/time of appointment   Has home health visited the patient within 72 hours of discharge? N/A   Psychosocial issues? No   Did the patient receive a copy of their discharge instructions? --  [unsure]   What is the patient's perception of their health status since discharge? Improving   Is the patient/caregiver able to teach back the hierarchy of who to call/visit for symptoms/problems? PCP, Specialist, Home health nurse, Urgent Care, ED, 911 Yes   TCM call completed? Yes   Call end time 1324   Would this patient benefit from a Referral to Amb Social Work? No   Is the patient interested in additional calls from an ambulatory ? No            Tatianna Borden RN    10/30/2023, 13:25 EDT

## 2023-10-30 NOTE — PROGRESS NOTES
Please reschedule in the TCM timeframe as she is not in an appropriate appt slot to allow enough time for this.

## 2023-10-31 ENCOUNTER — TELEPHONE (OUTPATIENT)
Dept: INTERNAL MEDICINE | Facility: CLINIC | Age: 38
End: 2023-10-31
Payer: COMMERCIAL

## 2023-10-31 NOTE — TELEPHONE ENCOUNTER
PT CALLING BACK REGARDING APPT ON 11/13. STATES THAT SHE SPOKE WITH EDITA (NO ENCOUNTER FOUND IN CHART) THIS MORNING ABOUT RESCHEDULING HER APPT TO A LATER TIME. PT REFUSED THE SWITCH AND PREFERS TO KEEP HER ORIGINAL TIME OF 8AM. PT STATED THAT SHE NO LONGER NEEDED IT TO BE AN OFFICE F/U BC SHE WILL BE SEEING HER BARIATRIC DR THE NEXT DAY. I SWITCHED THE APPT TO AN OFFICE VISIT AND KEPT HER IN THE 8AM PER HER REQUEST.

## 2023-11-13 ENCOUNTER — OFFICE VISIT (OUTPATIENT)
Dept: INTERNAL MEDICINE | Facility: CLINIC | Age: 38
End: 2023-11-13
Payer: COMMERCIAL

## 2023-11-13 VITALS
OXYGEN SATURATION: 99 % | WEIGHT: 293 LBS | BODY MASS INDEX: 43.4 KG/M2 | DIASTOLIC BLOOD PRESSURE: 84 MMHG | SYSTOLIC BLOOD PRESSURE: 128 MMHG | HEIGHT: 69 IN | HEART RATE: 54 BPM | TEMPERATURE: 98.4 F

## 2023-11-13 DIAGNOSIS — F32.A ANXIETY AND DEPRESSION: Primary | ICD-10-CM

## 2023-11-13 DIAGNOSIS — Z79.4 TYPE 2 DIABETES MELLITUS WITH HYPERGLYCEMIA, WITH LONG-TERM CURRENT USE OF INSULIN: ICD-10-CM

## 2023-11-13 DIAGNOSIS — I10 ESSENTIAL HYPERTENSION: ICD-10-CM

## 2023-11-13 DIAGNOSIS — F41.9 ANXIETY AND DEPRESSION: Primary | ICD-10-CM

## 2023-11-13 DIAGNOSIS — E11.65 TYPE 2 DIABETES MELLITUS WITH HYPERGLYCEMIA, WITH LONG-TERM CURRENT USE OF INSULIN: ICD-10-CM

## 2023-11-13 DIAGNOSIS — E66.01 OBESITY, MORBID, BMI 50 OR HIGHER: ICD-10-CM

## 2023-11-13 RX ORDER — ESCITALOPRAM OXALATE 10 MG/1
10 TABLET ORAL EVERY MORNING
Qty: 30 TABLET | Refills: 1 | Status: SHIPPED | OUTPATIENT
Start: 2023-11-13

## 2023-11-13 RX ORDER — PROCHLORPERAZINE 25 MG/1
1 SUPPOSITORY RECTAL TAKE AS DIRECTED
Qty: 1 EACH | Refills: 3 | Status: SHIPPED | OUTPATIENT
Start: 2023-11-13

## 2023-11-13 RX ORDER — PROCHLORPERAZINE 25 MG/1
1 SUPPOSITORY RECTAL ONCE
Qty: 1 EACH | Refills: 0 | Status: SHIPPED | OUTPATIENT
Start: 2023-11-13 | End: 2023-11-13

## 2023-11-13 RX ORDER — PROCHLORPERAZINE 25 MG/1
SUPPOSITORY RECTAL
Qty: 3 EACH | Refills: 11 | Status: SHIPPED | OUTPATIENT
Start: 2023-11-13

## 2023-11-13 RX ORDER — LOSARTAN POTASSIUM 50 MG/1
50 TABLET ORAL
COMMUNITY
Start: 2023-10-30

## 2023-11-13 NOTE — PROGRESS NOTES
Subjective   Edd Leung is a 38 y.o. female.     Chief Complaint   Patient presents with   • Depression   • Hypertension   • Diabetes       PCP: Tatianna Vicente APRN    History of Present Illness     The patient states since her last visit she presented to the hospital due to her gastric sleeve being to tight and having swelling from the hernia removal. She was at the hospital for approximately 3 days. She reports for over 24 hours she was  experiencing excessive vomiting. She adds she continue to vomit daily. She has a follow up with bariatrics on 11/14/2023. The patient states she regrets the surgery and feels she did not mentally prepare herself.     She reports she had issues with anxiety and depression before she had a gastric sleeve. She notes she used to be on medication for it. She adds she has felt good the last year or so not taking the medication; however, she feels she needs some assistance with that. She states the medication she was on was Wellbutrin; however, at a certain time se felt it was not working for her anymore. She reports she cannot take Cymbalta due to feeling panic attacks. She does not like Abilify. She has been on Zoloft in the past. She does not want to restart Wellbutrin.     She inquires if she should continue  Lantus 40 units once a day. She is requesting a refill for her dexcom sensors.     She continues taking hydrochlorothiazide. She states she has been taking 100 mg of losartan and was just informed that she should be taking 50 mg.     The following portions of the patient's history were reviewed and updated as appropriate: allergies, current medications, past family history, past medical history, past social history, past surgical history and problem list.        Review of Systems   Constitutional:  Negative for fatigue, fever and unexpected weight loss.   Eyes:  Negative for blurred vision, double vision and visual disturbance.   Respiratory:  Negative for  cough, shortness of breath and wheezing.    Cardiovascular:  Negative for chest pain, palpitations and leg swelling.   Gastrointestinal:  Negative for abdominal pain, constipation, diarrhea, nausea and vomiting.   Genitourinary:  Negative for difficulty urinating, frequency and urgency.   Musculoskeletal:  Negative for arthralgias and myalgias.   Skin:  Negative for color change and rash.   Neurological:  Negative for dizziness, weakness and headache.   Hematological:  Negative for adenopathy. Does not bruise/bleed easily.   Psychiatric/Behavioral:  Positive for depressed mood and stress. Negative for self-injury, sleep disturbance and suicidal ideas. The patient is nervous/anxious.            Outpatient Medications Marked as Taking for the 11/13/23 encounter (Office Visit) with Tatianna Vicente APRN   Medication Sig Dispense Refill   • apixaban (Eliquis) 2.5 MG tablet tablet Take 1 tablet by mouth 2 (Two) Times a Day. 42 tablet 0   • Calcium Carbonate-Vit D-Min (Calcium 1200) 2472-7069 MG-UNIT chewable tablet Chew 1,200 mg Daily. 90 each 1   • Cyanocobalamin (Vitamin B-12) 1000 MCG sublingual tablet Place 1 tablet under the tongue Daily. 90 each 1   • hydroCHLOROthiazide (HYDRODIURIL) 25 MG tablet Take 1 tablet by mouth Daily.     • Insulin Glargine (LANTUS SOLOSTAR) 100 UNIT/ML injection pen Inject 40 Units under the skin into the appropriate area as directed Every Night. 15 mL 3   • Iron-Vitamin C (Vitron-C)  MG tablet Take 1 tablet by mouth Daily. 90 tablet 1   • losartan (COZAAR) 50 MG tablet 1 tablet.     • multivitamin tablet tablet Take 1 tablet by mouth Daily. 90 tablet 3   • omeprazole (priLOSEC) 40 MG capsule Take 1 capsule by mouth Daily for 60 days. 60 capsule 0   • ondansetron (Zofran) 4 MG tablet Take 1 tablet by mouth Every 4 (Four) Hours As Needed for Nausea. 8 tablet 0   • thiamine (VITAMIN B-1) 100 MG tablet Take 1 tablet by mouth Daily. 90 tablet 1   • ursodiol (ACTIGALL) 300 MG capsule  "Take 1 capsule by mouth 2 (Two) Times a Day. 60 capsule 5   • vitamin D3 125 MCG (5000 UT) capsule capsule Take 1 capsule by mouth Daily. 90 capsule 0     Allergies   Allergen Reactions   • Banana Anaphylaxis     Other reaction(s): Banana, Cucumber, Kiwis, Melon   • Cucumber Extract Anaphylaxis   • Kiwi Extract Anaphylaxis   • Melon Anaphylaxis   • Nuts Anaphylaxis   • Latex Hives and Rash     \"makes my skin raw\"      • Morphine Hallucinations     Dilaudid is ok, tolerates Percocet/Lortab   • Duloxetine Hcl Other (See Comments)     Mental agitation   • Naproxen Nausea Only     No problems with ibuprofen and aspirin             Objective   Physical Exam  Constitutional:       Appearance: Normal appearance. She is well-developed.      Comments: Morbid obesity   HENT:      Head: Normocephalic and atraumatic.   Eyes:      General: No scleral icterus.     Conjunctiva/sclera: Conjunctivae normal.   Cardiovascular:      Rate and Rhythm: Normal rate and regular rhythm.      Heart sounds: Normal heart sounds.   Pulmonary:      Effort: Pulmonary effort is normal. No respiratory distress.      Breath sounds: Normal breath sounds.   Abdominal:      General: Bowel sounds are normal.      Palpations: Abdomen is soft.      Tenderness: There is no abdominal tenderness.   Musculoskeletal:         General: Normal range of motion.      Cervical back: Normal range of motion.      Right lower leg: No edema.      Left lower leg: No edema.   Skin:     General: Skin is warm and dry.   Neurological:      General: No focal deficit present.      Mental Status: She is alert and oriented to person, place, and time.   Psychiatric:         Attention and Perception: Attention normal.         Mood and Affect: Mood and affect normal.         Behavior: Behavior normal. Behavior is cooperative.         Thought Content: Thought content normal.         Cognition and Memory: Cognition normal.         Judgment: Judgment normal.       Vitals:    11/13/23 " "0804   BP: 128/84   Pulse: 54   Temp: 98.4 °F (36.9 °C)   TempSrc: Infrared   SpO2: 99%   Weight: (!) 181 kg (398 lb)   Height: 175.3 cm (69\")     Body mass index is 58.77 kg/m².  Wt Readings from Last 3 Encounters:   11/13/23 (!) 181 kg (398 lb)   10/26/23 (!) 183 kg (404 lb)   10/20/23 (!) 183 kg (404 lb)             Assessment & Plan   Diagnoses and all orders for this visit:    1. Anxiety and depression (Primary)  -     escitalopram (Lexapro) 10 MG tablet; Take 1 tablet by mouth Every Morning.  Dispense: 30 tablet; Refill: 1    2. Type 2 diabetes mellitus with hyperglycemia, with long-term current use of insulin  -     Continuous Blood Gluc  (Dexcom G6 ) device; Use 1 Device 1 (One) Time for 1 dose.  Dispense: 1 each; Refill: 0  -     Continuous Blood Gluc Sensor (Dexcom G6 Sensor); Use Every 10 (Ten) Days.  Dispense: 3 each; Refill: 11  -     Continuous Blood Gluc Transmit (Dexcom G6 Transmitter) misc; Use 1 Device Take As Directed.  Dispense: 1 each; Refill: 3    3. Essential hypertension    4. Obesity, morbid, BMI 50 or higher      Hypertension  - Well controlled. Continue current medications. She should be on 50 mg of the losartan, not 100 mg. She will make sure that she is on this when she gets home.     Depression  - Uncontrolled. Will start Lexapro. Adverse effects and expectations discussed. She'll follow up with me in 4 to 6 weeks. She will start the Lexapro 5 mg once a day for the first 1 to 2 weeks, then increase to 10 mg daily if doing well, she can call for any questions.     Diabetes  - Glucose ranging within the normal range over Dexcom review, will continue current medications and can reevaluate in 4 to 6 weeks. May need to start decreasing insulin as she loses weight from her bariatric surgery.      Return in about 6 weeks (around 12/25/2023) for Recheck.    I discussed my findings,recommendations, and plan of care was with the patient. They verbalized understanding and " agreement.  Patient was encouraged to keep me informed of any acute changes, lack of improvement, or any new concerning symptoms.     Transcribed from ambient dictation for BOB Kurtz by Hema Cagle.  11/13/23   10:46 EST    Patient or patient representative verbalized consent to the visit recording.  I have personally performed the services described in this document as transcribed by the above individual, and it is both accurate and complete.  BOB Kurtz  11/13/2023  16:21 EST

## (undated) DEVICE — PK BARIATRIC 10

## (undated) DEVICE — LAPAROVUE VISIBILITY SYSTEM LAPAROSCOPIC SOLUTIONS: Brand: LAPAROVUE

## (undated) DEVICE — CONTN GRAD MEAS TRIANG 32OZ BLK

## (undated) DEVICE — COLUMN DRAPE

## (undated) DEVICE — Device: Brand: DEFENDO AIR/WATER/SUCTION AND BIOPSY VALVE

## (undated) DEVICE — SEALANT WND FIBRIN TISSEEL PREFIL/SYR/PRIMAFZ 10ML

## (undated) DEVICE — SUT MNCRYL 3/0 27L Y523H BX/36

## (undated) DEVICE — ADHS SKIN PREMIERPRO EXOFIN TOPICAL HI/VISC .5ML

## (undated) DEVICE — KT ORCA ORCAPOD DISP STRL

## (undated) DEVICE — GLV SURG SENSICARE PI MIC PF SZ6 LF STRL

## (undated) DEVICE — SOL IRR H2O BTL 1000ML STRL

## (undated) DEVICE — CANNULA SEAL

## (undated) DEVICE — CLTH CLENS READYCLEANSE PERI CARE PK/5

## (undated) DEVICE — COVER,TABLE,HVY DUTY,60"X90",STRL: Brand: MEDLINE

## (undated) DEVICE — SAFELINER SUCTION CANISTER 1000CC: Brand: DEROYAL

## (undated) DEVICE — THE BITE BLOCK MAXI, LATEX FREE STRAP IS USED TO PROTECT THE ENDOSCOPE INSERTION TUBE FROM BEING BITTEN BY THE PATIENT.

## (undated) DEVICE — SEALANT WND FIBRIN TISSEEL PREFIL/SYR/PRIMAFZ 4ML

## (undated) DEVICE — PK C/SECT 10

## (undated) DEVICE — FIRST STEP BEDSIDE ADD WATER KIT - RESEALABLE STAND-UP POUCH, ENDOSCOPIC CLEANING PAD - 1 POUCH: Brand: FIRST STEP BEDSIDE ADD WATER KIT - RESEALABLE STAND-UP POUCH, ENDOSCOPIC CLEANIN

## (undated) DEVICE — GLV SURG DERMASSURE GRN LF PF 7.0

## (undated) DEVICE — GLV SURG SENSICARE PI MIC PF SZ6.5 LF STRL

## (undated) DEVICE — 3M™ STERI-STRIP™ REINFORCED ADHESIVE SKIN CLOSURES, R1547, 1/2 IN X 4 IN (12 MM X 100 MM), 6 STRIPS/ENVELOPE: Brand: 3M™ STERI-STRIP™

## (undated) DEVICE — SOLIDIFIER LIQ PREMISORB 1500CC

## (undated) DEVICE — MAX-CORE® DISPOSABLE CORE BIOPSY INSTRUMENT, 18G X 25CM: Brand: MAX-CORE

## (undated) DEVICE — LUBE JELLY FOIL PACK 1.4 OZ: Brand: MEDLINE INDUSTRIES, INC.

## (undated) DEVICE — 4-PORT MANIFOLD: Brand: NEPTUNE 2

## (undated) DEVICE — GLV SURG BIOGEL LTX PF 6 1/2

## (undated) DEVICE — SOL IRR NACL 0.9PCT BT 1000ML

## (undated) DEVICE — SUT VIC 3/0 CT1 27IN DYED J338H

## (undated) DEVICE — ARM DRAPE

## (undated) DEVICE — TRY SPINE BLCK WHITACRE 25G 3X5IN

## (undated) DEVICE — SINGLE-USE BIOPSY FORCEPS: Brand: RADIAL JAW 4

## (undated) DEVICE — VESSEL SEALER EXTEND: Brand: ENDOWRIST

## (undated) DEVICE — LARGE, DISPOSABLE ALEXIS O C-SECTION PROTECTOR - RETRACTOR: Brand: ALEXIS ® O C-SECTION PROTECTOR - RETRACTOR

## (undated) DEVICE — APPL CHLORAPREP TINTED 26ML TEAL

## (undated) DEVICE — MAT PREVALON MOBL TRANSFR AIR W/PAD REPROC 39X81IN

## (undated) DEVICE — BLANKT WARM UPPR/BDY ARM/OUT 57X196CM

## (undated) DEVICE — BLADELESS OBTURATOR, LONG: Brand: WECK VISTA

## (undated) DEVICE — INTRO ACCSR BLNT TP

## (undated) DEVICE — DEV INFL CRE STERIFLATE 60CC DISP

## (undated) DEVICE — COATED VICRYL  (POLYGLACTIN 910) SUTURE, VIOLET BRAIDED, STERILE, SYNTHETIC ABSORBABLE SUTURE: Brand: COATED VICRYL

## (undated) DEVICE — SUT MNCRYL 3/0 SH 27IN UD MCP416H

## (undated) DEVICE — SPNG LAP PREWSH SFTPK 18X18IN STRL PK/5

## (undated) DEVICE — REDUCER: Brand: ENDOWRIST

## (undated) DEVICE — SUT  VICRYL 1 CT CR8 18IN DYED J753D

## (undated) DEVICE — HYBRID CO2 TUBING/CAP SET FOR OLYMPUS® SCOPES & CO2 SOURCE: Brand: ERBE

## (undated) DEVICE — DEV CONTRL TISS STRATAFIX SPIRAL PDS PLS CT1 2-0 45CM: Type: IMPLANTABLE DEVICE | Site: ABDOMEN | Status: NON-FUNCTIONAL

## (undated) DEVICE — SEAL

## (undated) DEVICE — TROCARS: Brand: KII® OPTICAL ACCESS SYSTEM

## (undated) DEVICE — TUBING, SUCTION, 1/4" X 10', STRAIGHT: Brand: MEDLINE

## (undated) DEVICE — ST TBG CONN PNEUMOCLEAR EVAC SMOKE HEAT/HUMID

## (undated) DEVICE — ESOPHAGEAL/PYLORIC/COLONIC WIREGUIDED BALLOON DILATATION CATHETER: Brand: CRE WIREGUIDED

## (undated) DEVICE — GLV SURG BIOGEL LTX PF 6

## (undated) DEVICE — SHT AIR TRANSFR COMFRT GLIDE LAT 40X80IN

## (undated) DEVICE — UNDRPD COMFRT GLD DRYPAD 36X57IN

## (undated) DEVICE — APL DUPLOSPRAYER MIS 40CM

## (undated) DEVICE — GOWN,NON-REINFORCED,SIRUS,SET IN SLV,XL: Brand: MEDLINE

## (undated) DEVICE — SYR LUERLOK 50ML

## (undated) DEVICE — STAPLER 60: Brand: SUREFORM

## (undated) DEVICE — Device

## (undated) DEVICE — ADAPT CLN LUM OLYMP AIR/H20